# Patient Record
Sex: MALE | Race: WHITE
[De-identification: names, ages, dates, MRNs, and addresses within clinical notes are randomized per-mention and may not be internally consistent; named-entity substitution may affect disease eponyms.]

---

## 2017-01-26 ENCOUNTER — PRIOR ORIGINAL RECORDS (OUTPATIENT)
Dept: OTHER | Age: 67
End: 2017-01-26

## 2017-03-06 ENCOUNTER — HOSPITAL ENCOUNTER (OUTPATIENT)
Dept: CV DIAGNOSTICS | Facility: HOSPITAL | Age: 67
Discharge: HOME | End: 2017-03-06
Attending: INTERNAL MEDICINE

## 2017-03-06 DIAGNOSIS — I71.2 THORACIC AORTIC ANEURYSM WITHOUT RUPTURE (HCC): ICD-10-CM

## 2017-03-06 DIAGNOSIS — I25.10 ATHEROSCLEROSIS OF NATIVE CORONARY ARTERY OF NATIVE HEART, ANGINA PRESENCE UNSPECIFIED: ICD-10-CM

## 2017-03-09 ENCOUNTER — PRIOR ORIGINAL RECORDS (OUTPATIENT)
Dept: OTHER | Age: 67
End: 2017-03-09

## 2017-03-10 ENCOUNTER — PRIOR ORIGINAL RECORDS (OUTPATIENT)
Dept: OTHER | Age: 67
End: 2017-03-10

## 2017-06-23 ENCOUNTER — PRIOR ORIGINAL RECORDS (OUTPATIENT)
Dept: OTHER | Age: 67
End: 2017-06-23

## 2017-09-29 ENCOUNTER — MYAURORA ACCOUNT LINK (OUTPATIENT)
Dept: OTHER | Age: 67
End: 2017-09-29

## 2017-09-29 ENCOUNTER — HOSPITAL ENCOUNTER (OUTPATIENT)
Dept: CV DIAGNOSTICS | Facility: HOSPITAL | Age: 67
Discharge: HOME | End: 2017-09-29
Attending: INTERNAL MEDICINE

## 2017-09-29 DIAGNOSIS — I71.2 THORACIC AORTIC ANEURYSM WITHOUT RUPTURE (HCC): ICD-10-CM

## 2018-01-25 ENCOUNTER — HOSPITAL ENCOUNTER (OUTPATIENT)
Dept: LAB | Facility: HOSPITAL | Age: 68
Discharge: HOME | End: 2018-01-25
Attending: INTERNAL MEDICINE
Payer: COMMERCIAL

## 2018-01-25 ENCOUNTER — PRIOR ORIGINAL RECORDS (OUTPATIENT)
Dept: OTHER | Age: 68
End: 2018-01-25

## 2018-01-25 LAB
ALBUMIN SERPL-MCNC: 3.5 G/DL (ref 3.5–4.8)
ALP LIVER SERPL-CCNC: 92 U/L (ref 45–117)
ALT SERPL-CCNC: 38 U/L (ref 17–63)
AST SERPL-CCNC: 37 U/L (ref 15–41)
BILIRUB SERPL-MCNC: 0.6 MG/DL (ref 0.1–2)
BUN BLD-MCNC: 9 MG/DL (ref 8–20)
CALCIUM BLD-MCNC: 9.2 MG/DL (ref 8.3–10.3)
CHLORIDE: 107 MMOL/L (ref 101–111)
CHOLEST SMN-MCNC: 116 MG/DL (ref ?–200)
CO2: 25 MMOL/L (ref 22–32)
CREAT BLD-MCNC: 0.8 MG/DL (ref 0.7–1.3)
GLUCOSE BLD-MCNC: 97 MG/DL (ref 70–99)
HDLC SERPL-MCNC: 59 MG/DL (ref 45–?)
HDLC SERPL: 1.97 {RATIO} (ref ?–4.97)
LDLC SERPL CALC-MCNC: 41 MG/DL (ref ?–130)
M PROTEIN MFR SERPL ELPH: 7.8 G/DL (ref 6.1–8.3)
NONHDLC SERPL-MCNC: 57 MG/DL (ref ?–130)
POTASSIUM SERPL-SCNC: 4 MMOL/L (ref 3.6–5.1)
SODIUM SERPL-SCNC: 139 MMOL/L (ref 136–144)
TRIGL SERPL-MCNC: 82 MG/DL (ref ?–150)
VLDLC SERPL CALC-MCNC: 16 MG/DL (ref 5–40)

## 2018-01-25 PROCEDURE — 36415 COLL VENOUS BLD VENIPUNCTURE: CPT | Performed by: INTERNAL MEDICINE

## 2018-01-25 PROCEDURE — 80053 COMPREHEN METABOLIC PANEL: CPT | Performed by: INTERNAL MEDICINE

## 2018-01-25 PROCEDURE — 80061 LIPID PANEL: CPT | Performed by: INTERNAL MEDICINE

## 2018-05-03 LAB
ALBUMIN: 3.5 G/DL
ALKALINE PHOSPHATATE(ALK PHOS): 92 IU/L
BILIRUBIN TOTAL: 0.6 MG/DL
BUN: 9 MG/DL
CALCIUM: 9.2 MG/DL
CHLORIDE: 107 MEQ/L
CHOLESTEROL, TOTAL: 116 MG/DL
CREATININE, SERUM: 0.8 MG/DL
GLUCOSE: 97 MG/DL
HDL CHOLESTEROL: 59 MG/DL
LDL CHOLESTEROL: 41 MG/DL
POTASSIUM, SERUM: 4 MEQ/L
PROTEIN, TOTAL: 7.8 G/DL
SGOT (AST): 37 IU/L
SGPT (ALT): 38 IU/L
SODIUM: 139 MEQ/L
TRIGLYCERIDES: 82 MG/DL

## 2018-06-22 ENCOUNTER — MYAURORA ACCOUNT LINK (OUTPATIENT)
Dept: OTHER | Age: 68
End: 2018-06-22

## 2018-06-22 ENCOUNTER — PRIOR ORIGINAL RECORDS (OUTPATIENT)
Dept: OTHER | Age: 68
End: 2018-06-22

## 2018-08-01 ENCOUNTER — PRIOR ORIGINAL RECORDS (OUTPATIENT)
Dept: OTHER | Age: 68
End: 2018-08-01

## 2018-08-21 ENCOUNTER — PRIOR ORIGINAL RECORDS (OUTPATIENT)
Dept: OTHER | Age: 68
End: 2018-08-21

## 2018-09-13 ENCOUNTER — HOSPITAL ENCOUNTER (OUTPATIENT)
Dept: CV DIAGNOSTICS | Facility: HOSPITAL | Age: 68
Discharge: HOME | End: 2018-09-13
Attending: INTERNAL MEDICINE

## 2018-09-13 ENCOUNTER — MYAURORA ACCOUNT LINK (OUTPATIENT)
Dept: OTHER | Age: 68
End: 2018-09-13

## 2018-09-13 DIAGNOSIS — R94.31 ABNORMAL EKG: ICD-10-CM

## 2018-09-13 DIAGNOSIS — I71.2 THORACIC AORTIC ANEURYSM WITHOUT RUPTURE (HCC): ICD-10-CM

## 2018-09-14 ENCOUNTER — PRIOR ORIGINAL RECORDS (OUTPATIENT)
Dept: OTHER | Age: 68
End: 2018-09-14

## 2018-10-11 ENCOUNTER — PRIOR ORIGINAL RECORDS (OUTPATIENT)
Dept: OTHER | Age: 68
End: 2018-10-11

## 2018-10-11 ENCOUNTER — HOSPITAL ENCOUNTER (OUTPATIENT)
Dept: LAB | Facility: HOSPITAL | Age: 68
Discharge: HOME | End: 2018-10-11
Attending: INTERNAL MEDICINE
Payer: COMMERCIAL

## 2018-10-11 ENCOUNTER — MYAURORA ACCOUNT LINK (OUTPATIENT)
Dept: OTHER | Age: 68
End: 2018-10-11

## 2018-10-11 PROCEDURE — 36415 COLL VENOUS BLD VENIPUNCTURE: CPT | Performed by: INTERNAL MEDICINE

## 2018-10-11 PROCEDURE — 80053 COMPREHEN METABOLIC PANEL: CPT | Performed by: INTERNAL MEDICINE

## 2018-10-11 PROCEDURE — 80061 LIPID PANEL: CPT | Performed by: INTERNAL MEDICINE

## 2018-10-12 ENCOUNTER — PRIOR ORIGINAL RECORDS (OUTPATIENT)
Dept: OTHER | Age: 68
End: 2018-10-12

## 2018-10-12 LAB
ALKALINE PHOSPHATATE(ALK PHOS): 77 IU/L
BILIRUBIN TOTAL: 0.8 MG/DL
BUN: 9 MG/DL
CALCIUM: 9.5 MG/DL
CHLORIDE: 105 MEQ/L
CHOLESTEROL, TOTAL: 111 MG/DL
CREATININE, SERUM: 0.86 MG/DL
GLUCOSE: 94 MG/DL
HDL CHOLESTEROL: 58 MG/DL
LDL CHOLESTEROL: 32 MG/DL
POTASSIUM, SERUM: 4 MEQ/L
PROTEIN, TOTAL: 7.8 G/DL
SGOT (AST): 62 IU/L
SGPT (ALT): 56 IU/L
SODIUM: 138 MEQ/L
TRIGLYCERIDES: 104 MG/DL

## 2018-11-08 ENCOUNTER — PRIOR ORIGINAL RECORDS (OUTPATIENT)
Dept: OTHER | Age: 68
End: 2018-11-08

## 2018-11-20 ENCOUNTER — PRIOR ORIGINAL RECORDS (OUTPATIENT)
Dept: OTHER | Age: 68
End: 2018-11-20

## 2018-11-30 ENCOUNTER — PRIOR ORIGINAL RECORDS (OUTPATIENT)
Dept: OTHER | Age: 68
End: 2018-11-30

## 2018-12-03 ENCOUNTER — HOSPITAL ENCOUNTER (OUTPATIENT)
Dept: CT IMAGING | Facility: HOSPITAL | Age: 68
Discharge: HOME | End: 2018-12-03
Attending: INTERNAL MEDICINE
Payer: COMMERCIAL

## 2018-12-03 DIAGNOSIS — I71.2: ICD-10-CM

## 2018-12-03 PROCEDURE — 71275 CT ANGIOGRAPHY CHEST: CPT | Performed by: INTERNAL MEDICINE

## 2018-12-03 PROCEDURE — 82565 ASSAY OF CREATININE: CPT

## 2018-12-04 ENCOUNTER — PRIOR ORIGINAL RECORDS (OUTPATIENT)
Dept: OTHER | Age: 68
End: 2018-12-04

## 2018-12-13 ENCOUNTER — PRIOR ORIGINAL RECORDS (OUTPATIENT)
Dept: OTHER | Age: 68
End: 2018-12-13

## 2019-02-07 PROCEDURE — 88305 TISSUE EXAM BY PATHOLOGIST: CPT | Performed by: FAMILY MEDICINE

## 2019-02-28 VITALS
HEART RATE: 72 BPM | BODY MASS INDEX: 37.25 KG/M2 | WEIGHT: 275 LBS | DIASTOLIC BLOOD PRESSURE: 86 MMHG | HEIGHT: 72 IN | SYSTOLIC BLOOD PRESSURE: 140 MMHG

## 2019-02-28 VITALS
HEART RATE: 80 BPM | SYSTOLIC BLOOD PRESSURE: 126 MMHG | WEIGHT: 276 LBS | BODY MASS INDEX: 38.64 KG/M2 | DIASTOLIC BLOOD PRESSURE: 72 MMHG | HEIGHT: 71 IN

## 2019-02-28 VITALS
WEIGHT: 281 LBS | SYSTOLIC BLOOD PRESSURE: 138 MMHG | DIASTOLIC BLOOD PRESSURE: 83 MMHG | HEIGHT: 71 IN | HEART RATE: 84 BPM | BODY MASS INDEX: 39.34 KG/M2

## 2019-02-28 VITALS
WEIGHT: 278 LBS | SYSTOLIC BLOOD PRESSURE: 136 MMHG | HEART RATE: 84 BPM | HEIGHT: 71 IN | DIASTOLIC BLOOD PRESSURE: 60 MMHG | BODY MASS INDEX: 38.92 KG/M2

## 2019-03-01 VITALS
BODY MASS INDEX: 37.79 KG/M2 | DIASTOLIC BLOOD PRESSURE: 70 MMHG | HEIGHT: 72 IN | HEART RATE: 80 BPM | WEIGHT: 279 LBS | SYSTOLIC BLOOD PRESSURE: 138 MMHG

## 2019-03-11 ENCOUNTER — TELEPHONE (OUTPATIENT)
Dept: CARDIOLOGY | Age: 69
End: 2019-03-11

## 2019-03-12 ENCOUNTER — TELEPHONE (OUTPATIENT)
Dept: CARDIOLOGY | Age: 69
End: 2019-03-12

## 2019-03-18 RX ORDER — OLMESARTAN MEDOXOMIL 20 MG/1
TABLET ORAL
COMMUNITY
Start: 2018-07-17 | End: 2019-04-12 | Stop reason: ALTCHOICE

## 2019-03-18 RX ORDER — LANOLIN ALCOHOL/MO/W.PET/CERES
CREAM (GRAM) TOPICAL
COMMUNITY
Start: 2016-06-23

## 2019-03-18 RX ORDER — ATORVASTATIN CALCIUM 20 MG/1
TABLET, FILM COATED ORAL
COMMUNITY
Start: 2019-02-06 | End: 2019-08-17 | Stop reason: SDUPTHER

## 2019-03-18 RX ORDER — METOPROLOL SUCCINATE 50 MG/1
TABLET, EXTENDED RELEASE ORAL
COMMUNITY
Start: 2018-11-30 | End: 2019-05-31 | Stop reason: SDUPTHER

## 2019-04-11 ENCOUNTER — OFFICE VISIT (OUTPATIENT)
Dept: CARDIOLOGY | Age: 69
End: 2019-04-11

## 2019-04-11 VITALS
HEIGHT: 71 IN | DIASTOLIC BLOOD PRESSURE: 66 MMHG | WEIGHT: 298 LBS | HEART RATE: 76 BPM | SYSTOLIC BLOOD PRESSURE: 132 MMHG | BODY MASS INDEX: 41.72 KG/M2

## 2019-04-11 DIAGNOSIS — I25.10 ASHD (ARTERIOSCLEROTIC HEART DISEASE): ICD-10-CM

## 2019-04-11 DIAGNOSIS — I10 BENIGN HYPERTENSION WITHOUT CHF: ICD-10-CM

## 2019-04-11 DIAGNOSIS — R94.31 ABNORMAL EKG: ICD-10-CM

## 2019-04-11 DIAGNOSIS — I25.10 CORONARY ARTERY CALCIFICATION SEEN ON CT SCAN: ICD-10-CM

## 2019-04-11 DIAGNOSIS — R94.39 ABNORMAL CARDIOVASCULAR STRESS TEST: ICD-10-CM

## 2019-04-11 DIAGNOSIS — G47.33 OBSTRUCTIVE SLEEP APNEA SYNDROME: ICD-10-CM

## 2019-04-11 DIAGNOSIS — I71.20 THORACIC AORTIC ANEURYSM WITHOUT RUPTURE (CMD): Primary | ICD-10-CM

## 2019-04-11 PROCEDURE — 3075F SYST BP GE 130 - 139MM HG: CPT | Performed by: INTERNAL MEDICINE

## 2019-04-11 PROCEDURE — 3078F DIAST BP <80 MM HG: CPT | Performed by: INTERNAL MEDICINE

## 2019-04-11 PROCEDURE — 99214 OFFICE O/P EST MOD 30 MIN: CPT | Performed by: INTERNAL MEDICINE

## 2019-04-11 ASSESSMENT — ENCOUNTER SYMPTOMS
WEIGHT LOSS: 0
HEMATOCHEZIA: 0
CHILLS: 0
HEMOPTYSIS: 0
COUGH: 0
SUSPICIOUS LESIONS: 0
ALLERGIC/IMMUNOLOGIC COMMENTS: NO NEW FOOD ALLERGIES
BRUISES/BLEEDS EASILY: 0
WEIGHT GAIN: 0
FEVER: 0

## 2019-04-11 ASSESSMENT — PATIENT HEALTH QUESTIONNAIRE - PHQ9
SUM OF ALL RESPONSES TO PHQ9 QUESTIONS 1 AND 2: 0
2. FEELING DOWN, DEPRESSED OR HOPELESS: NOT AT ALL
SUM OF ALL RESPONSES TO PHQ9 QUESTIONS 1 AND 2: 0
1. LITTLE INTEREST OR PLEASURE IN DOING THINGS: NOT AT ALL

## 2019-04-12 ENCOUNTER — TELEPHONE (OUTPATIENT)
Dept: CARDIOLOGY | Age: 69
End: 2019-04-12

## 2019-04-12 RX ORDER — IRBESARTAN 150 MG/1
150 TABLET ORAL DAILY
Qty: 90 TABLET | Refills: 1 | Status: SHIPPED | OUTPATIENT
Start: 2019-04-12 | End: 2019-08-16 | Stop reason: SDUPTHER

## 2019-04-12 RX ORDER — IRBESARTAN 150 MG/1
150 TABLET ORAL NIGHTLY
COMMUNITY
End: 2019-04-12 | Stop reason: SDUPTHER

## 2019-05-31 RX ORDER — METOPROLOL SUCCINATE 50 MG/1
TABLET, EXTENDED RELEASE ORAL
Qty: 180 TABLET | Refills: 1 | Status: SHIPPED | OUTPATIENT
Start: 2019-05-31 | End: 2019-11-24 | Stop reason: SDUPTHER

## 2019-08-19 RX ORDER — IRBESARTAN 150 MG/1
150 TABLET ORAL DAILY
Qty: 90 TABLET | Refills: 2 | Status: SHIPPED | OUTPATIENT
Start: 2019-08-19 | End: 2020-03-31

## 2019-08-19 RX ORDER — ATORVASTATIN CALCIUM 20 MG/1
TABLET, FILM COATED ORAL
Qty: 90 TABLET | Refills: 0 | Status: SHIPPED | OUTPATIENT
Start: 2019-08-19 | End: 2019-11-13 | Stop reason: SDUPTHER

## 2019-10-03 DIAGNOSIS — G47.33 OBSTRUCTIVE SLEEP APNEA SYNDROME: Primary | ICD-10-CM

## 2019-10-03 DIAGNOSIS — I10 BENIGN HYPERTENSION WITHOUT CHF: ICD-10-CM

## 2019-10-03 DIAGNOSIS — R94.31 ABNORMAL EKG: ICD-10-CM

## 2019-10-03 DIAGNOSIS — R94.39 ABNORMAL CARDIOVASCULAR STRESS TEST: ICD-10-CM

## 2019-10-03 DIAGNOSIS — I25.10 CORONARY ARTERY CALCIFICATION SEEN ON CT SCAN: ICD-10-CM

## 2019-10-03 DIAGNOSIS — I25.10 ASHD (ARTERIOSCLEROTIC HEART DISEASE): ICD-10-CM

## 2019-10-03 DIAGNOSIS — I71.20 THORACIC AORTIC ANEURYSM WITHOUT RUPTURE (CMD): ICD-10-CM

## 2019-10-04 RX ORDER — IRBESARTAN 150 MG/1
150 TABLET ORAL DAILY
Qty: 90 TABLET | Refills: 1 | Status: SHIPPED | OUTPATIENT
Start: 2019-10-04 | End: 2019-12-25 | Stop reason: CLARIF

## 2019-10-07 ENCOUNTER — HOSPITAL ENCOUNTER (OUTPATIENT)
Dept: CV DIAGNOSTICS | Facility: HOSPITAL | Age: 69
Discharge: HOME | End: 2019-10-07
Attending: INTERNAL MEDICINE
Payer: COMMERCIAL

## 2019-10-07 DIAGNOSIS — I10 BENIGN HYPERTENSION: ICD-10-CM

## 2019-10-07 DIAGNOSIS — I25.10 CORONARY ARTERY CALCIFICATION SEEN ON CT SCAN: ICD-10-CM

## 2019-10-07 DIAGNOSIS — R94.31 ABNORMAL EKG: ICD-10-CM

## 2019-10-07 DIAGNOSIS — G47.33 OBSTRUCTIVE SLEEP APNEA SYNDROME: ICD-10-CM

## 2019-10-07 DIAGNOSIS — R94.39 ABNORMAL CARDIOVASCULAR STRESS TEST: ICD-10-CM

## 2019-10-07 DIAGNOSIS — I25.10 ASHD (ARTERIOSCLEROTIC HEART DISEASE): ICD-10-CM

## 2019-10-07 DIAGNOSIS — I71.2 THORACIC AORTIC ANEURYSM WITHOUT RUPTURE (HCC): ICD-10-CM

## 2019-10-07 PROCEDURE — 93306 TTE W/DOPPLER COMPLETE: CPT | Performed by: INTERNAL MEDICINE

## 2019-10-08 ENCOUNTER — TELEPHONE (OUTPATIENT)
Dept: CARDIOLOGY | Age: 69
End: 2019-10-08

## 2019-10-08 DIAGNOSIS — R94.31 ABNORMAL EKG: ICD-10-CM

## 2019-10-08 DIAGNOSIS — G47.33 OBSTRUCTIVE SLEEP APNEA SYNDROME: ICD-10-CM

## 2019-10-08 DIAGNOSIS — I10 BENIGN HYPERTENSION WITHOUT CHF: ICD-10-CM

## 2019-10-08 DIAGNOSIS — I25.10 CORONARY ARTERY CALCIFICATION SEEN ON CT SCAN: ICD-10-CM

## 2019-10-08 DIAGNOSIS — I25.10 ASHD (ARTERIOSCLEROTIC HEART DISEASE): ICD-10-CM

## 2019-10-08 DIAGNOSIS — I71.20 THORACIC AORTIC ANEURYSM WITHOUT RUPTURE (CMD): ICD-10-CM

## 2019-10-08 DIAGNOSIS — R94.39 ABNORMAL CARDIOVASCULAR STRESS TEST: ICD-10-CM

## 2019-10-24 ENCOUNTER — APPOINTMENT (OUTPATIENT)
Dept: CARDIOLOGY | Age: 69
End: 2019-10-24

## 2019-11-13 ENCOUNTER — TELEPHONE (OUTPATIENT)
Dept: CARDIOLOGY | Age: 69
End: 2019-11-13

## 2019-11-13 DIAGNOSIS — E78.5 HYPERLIPIDEMIA, UNSPECIFIED HYPERLIPIDEMIA TYPE: Primary | ICD-10-CM

## 2019-11-13 RX ORDER — ATORVASTATIN CALCIUM 20 MG/1
TABLET, FILM COATED ORAL
Qty: 30 TABLET | Refills: 1 | Status: SHIPPED | OUTPATIENT
Start: 2019-11-13 | End: 2020-01-08 | Stop reason: SDUPTHER

## 2019-11-26 ENCOUNTER — TELEPHONE (OUTPATIENT)
Dept: CARDIOLOGY | Age: 69
End: 2019-11-26

## 2019-11-26 RX ORDER — METOPROLOL SUCCINATE 50 MG/1
TABLET, EXTENDED RELEASE ORAL
Qty: 180 TABLET | Refills: 1 | Status: SHIPPED | OUTPATIENT
Start: 2019-11-26 | End: 2020-05-19

## 2019-12-26 ENCOUNTER — HOSPITAL ENCOUNTER (OUTPATIENT)
Dept: LAB | Facility: HOSPITAL | Age: 69
Discharge: HOME | End: 2019-12-26
Attending: INTERNAL MEDICINE
Payer: COMMERCIAL

## 2019-12-26 DIAGNOSIS — M10.9 GOUT ATTACK: ICD-10-CM

## 2019-12-26 LAB
ALBUMIN SERPL-MCNC: 3.7 G/DL (ref 3.4–5)
ALBUMIN/GLOB SERPL: 0.9 {RATIO} (ref 1–2)
ALP LIVER SERPL-CCNC: 101 U/L (ref 45–117)
ALT SERPL-CCNC: 60 U/L (ref 16–61)
ANION GAP SERPL CALC-SCNC: 4 MMOL/L (ref 0–18)
AST SERPL-CCNC: 61 U/L (ref 15–37)
BILIRUB SERPL-MCNC: 0.6 MG/DL (ref 0.1–2)
BUN BLD-MCNC: 14 MG/DL (ref 7–18)
BUN/CREAT SERPL: 15.9 (ref 10–20)
CALCIUM BLD-MCNC: 8.7 MG/DL (ref 8.5–10.1)
CHLORIDE SERPL-SCNC: 111 MMOL/L (ref 98–112)
CHOLEST SERPL-MCNC: 136 MG/DL
CHOLEST SMN-MCNC: 136 MG/DL (ref ?–200)
CHOLEST/HDLC SERPL: NORMAL {RATIO}
CO2 SERPL-SCNC: 27 MMOL/L (ref 21–32)
CREAT BLD-MCNC: 0.88 MG/DL (ref 0.7–1.3)
GLOBULIN PLAS-MCNC: 4.2 G/DL (ref 2.8–4.4)
GLUCOSE BLD-MCNC: 108 MG/DL (ref 70–99)
HDLC SERPL-MCNC: 65 MG/DL
HDLC SERPL-MCNC: 65 MG/DL (ref 40–59)
LDLC SERPL CALC-MCNC: 50 MG/DL
LDLC SERPL CALC-MCNC: 50 MG/DL (ref ?–100)
LENGTH OF FAST TIME PATIENT: NORMAL H
M PROTEIN MFR SERPL ELPH: 7.9 G/DL (ref 6.4–8.2)
NONHDLC SERPL-MCNC: 71 MG/DL
NONHDLC SERPL-MCNC: 71 MG/DL (ref ?–130)
OSMOLALITY SERPL CALC.SUM OF ELEC: 295 MOSM/KG (ref 275–295)
PATIENT FASTING Y/N/NP: YES
PATIENT FASTING Y/N/NP: YES
POTASSIUM SERPL-SCNC: 4.4 MMOL/L (ref 3.5–5.1)
SODIUM SERPL-SCNC: 142 MMOL/L (ref 136–145)
TRIGL SERPL-MCNC: 106 MG/DL
TRIGL SERPL-MCNC: 106 MG/DL (ref 30–149)
VLDLC SERPL CALC-MCNC: 21 MG/DL (ref 0–30)
VLDLC SERPL CALC-MCNC: NORMAL MG/DL

## 2019-12-26 PROCEDURE — 80061 LIPID PANEL: CPT | Performed by: INTERNAL MEDICINE

## 2019-12-26 PROCEDURE — 80053 COMPREHEN METABOLIC PANEL: CPT

## 2019-12-26 PROCEDURE — 36415 COLL VENOUS BLD VENIPUNCTURE: CPT | Performed by: INTERNAL MEDICINE

## 2019-12-26 SDOH — HEALTH STABILITY: MENTAL HEALTH: HOW OFTEN DO YOU HAVE A DRINK CONTAINING ALCOHOL?: NEVER

## 2019-12-27 ENCOUNTER — OFFICE VISIT (OUTPATIENT)
Dept: CARDIOLOGY | Age: 69
End: 2019-12-27

## 2019-12-27 VITALS
HEIGHT: 71 IN | HEART RATE: 76 BPM | BODY MASS INDEX: 42 KG/M2 | WEIGHT: 300 LBS | DIASTOLIC BLOOD PRESSURE: 80 MMHG | SYSTOLIC BLOOD PRESSURE: 142 MMHG

## 2019-12-27 DIAGNOSIS — I71.20 THORACIC AORTIC ANEURYSM WITHOUT RUPTURE (CMD): ICD-10-CM

## 2019-12-27 DIAGNOSIS — I10 BENIGN HYPERTENSION WITHOUT CHF: ICD-10-CM

## 2019-12-27 DIAGNOSIS — I25.10 ASHD (ARTERIOSCLEROTIC HEART DISEASE): Primary | ICD-10-CM

## 2019-12-27 DIAGNOSIS — E78.00 PURE HYPERCHOLESTEROLEMIA: ICD-10-CM

## 2019-12-27 PROCEDURE — 3077F SYST BP >= 140 MM HG: CPT | Performed by: NURSE PRACTITIONER

## 2019-12-27 PROCEDURE — 99204 OFFICE O/P NEW MOD 45 MIN: CPT | Performed by: NURSE PRACTITIONER

## 2019-12-27 PROCEDURE — 3079F DIAST BP 80-89 MM HG: CPT | Performed by: NURSE PRACTITIONER

## 2019-12-27 ASSESSMENT — ENCOUNTER SYMPTOMS
FEVER: 0
HEMOPTYSIS: 0
CHILLS: 0
SUSPICIOUS LESIONS: 0
COUGH: 0
BRUISES/BLEEDS EASILY: 0
ALLERGIC/IMMUNOLOGIC COMMENTS: NO NEW FOOD ALLERGIES
WEIGHT GAIN: 0
HEMATOCHEZIA: 0
WEIGHT LOSS: 0

## 2020-01-06 ENCOUNTER — TELEPHONE (OUTPATIENT)
Dept: CARDIOLOGY | Age: 70
End: 2020-01-06

## 2020-01-08 RX ORDER — ATORVASTATIN CALCIUM 20 MG/1
TABLET, FILM COATED ORAL
Qty: 90 TABLET | Refills: 3 | Status: SHIPPED | OUTPATIENT
Start: 2020-01-08 | End: 2021-01-12 | Stop reason: SDUPTHER

## 2020-01-15 ENCOUNTER — CLINICAL ABSTRACT (OUTPATIENT)
Dept: CARDIOLOGY | Age: 70
End: 2020-01-15

## 2020-01-15 DIAGNOSIS — E78.5 HYPERLIPIDEMIA, UNSPECIFIED HYPERLIPIDEMIA TYPE: ICD-10-CM

## 2020-02-18 PROCEDURE — 88305 TISSUE EXAM BY PATHOLOGIST: CPT | Performed by: INTERNAL MEDICINE

## 2020-03-31 RX ORDER — IRBESARTAN 150 MG/1
150 TABLET ORAL DAILY
Qty: 90 TABLET | Refills: 3 | Status: SHIPPED | OUTPATIENT
Start: 2020-03-31

## 2020-04-29 ENCOUNTER — TELEPHONE (OUTPATIENT)
Dept: CARDIOLOGY | Age: 70
End: 2020-04-29

## 2020-05-19 RX ORDER — METOPROLOL SUCCINATE 50 MG/1
TABLET, EXTENDED RELEASE ORAL
Qty: 180 TABLET | Refills: 1 | Status: SHIPPED | OUTPATIENT
Start: 2020-05-19 | End: 2020-11-20

## 2020-06-23 ENCOUNTER — APPOINTMENT (OUTPATIENT)
Dept: CARDIOLOGY | Age: 70
End: 2020-06-23

## 2020-06-30 ENCOUNTER — OFFICE VISIT (OUTPATIENT)
Dept: CARDIOLOGY | Age: 70
End: 2020-06-30

## 2020-06-30 DIAGNOSIS — I47.10 SVT (SUPRAVENTRICULAR TACHYCARDIA): Primary | ICD-10-CM

## 2020-06-30 PROCEDURE — 99213 OFFICE O/P EST LOW 20 MIN: CPT | Performed by: INTERNAL MEDICINE

## 2020-11-20 RX ORDER — METOPROLOL SUCCINATE 50 MG/1
TABLET, EXTENDED RELEASE ORAL
Qty: 180 TABLET | Refills: 1 | Status: SHIPPED | OUTPATIENT
Start: 2020-11-20 | End: 2021-05-14

## 2020-12-02 ENCOUNTER — TELEPHONE (OUTPATIENT)
Dept: CARDIOLOGY | Age: 70
End: 2020-12-02

## 2020-12-02 DIAGNOSIS — R94.39 ABNORMAL CARDIOVASCULAR STRESS TEST: ICD-10-CM

## 2020-12-02 DIAGNOSIS — I25.10 ASHD (ARTERIOSCLEROTIC HEART DISEASE): ICD-10-CM

## 2020-12-02 DIAGNOSIS — I71.20 THORACIC AORTIC ANEURYSM WITHOUT RUPTURE (CMD): ICD-10-CM

## 2020-12-02 DIAGNOSIS — I25.10 CORONARY ARTERY CALCIFICATION SEEN ON CT SCAN: Primary | ICD-10-CM

## 2020-12-02 DIAGNOSIS — R94.31 ABNORMAL EKG: ICD-10-CM

## 2020-12-10 ENCOUNTER — HOSPITAL ENCOUNTER (OUTPATIENT)
Dept: CV DIAGNOSTICS | Facility: HOSPITAL | Age: 70
Discharge: HOME | End: 2020-12-10
Attending: INTERNAL MEDICINE
Payer: COMMERCIAL

## 2020-12-10 DIAGNOSIS — I25.10 ASHD (ARTERIOSCLEROTIC HEART DISEASE): ICD-10-CM

## 2020-12-10 DIAGNOSIS — I71.2 THORACIC AORTIC ANEURYSM WITHOUT RUPTURE (HCC): ICD-10-CM

## 2020-12-10 DIAGNOSIS — I25.10 CORONARY ARTERY CALCIFICATION SEEN ON CT SCAN: ICD-10-CM

## 2020-12-10 DIAGNOSIS — R94.31 ABNORMAL EKG: ICD-10-CM

## 2020-12-10 DIAGNOSIS — I10 BENIGN HYPERTENSION: ICD-10-CM

## 2020-12-10 PROCEDURE — 93306 TTE W/DOPPLER COMPLETE: CPT | Performed by: INTERNAL MEDICINE

## 2020-12-16 ENCOUNTER — OFFICE VISIT (OUTPATIENT)
Dept: CARDIOLOGY | Age: 70
End: 2020-12-16

## 2020-12-16 VITALS
HEART RATE: 93 BPM | SYSTOLIC BLOOD PRESSURE: 160 MMHG | BODY MASS INDEX: 41.31 KG/M2 | WEIGHT: 292 LBS | DIASTOLIC BLOOD PRESSURE: 70 MMHG

## 2020-12-16 DIAGNOSIS — G47.33 OBSTRUCTIVE SLEEP APNEA SYNDROME: ICD-10-CM

## 2020-12-16 DIAGNOSIS — R94.39 ABNORMAL CARDIOVASCULAR STRESS TEST: ICD-10-CM

## 2020-12-16 DIAGNOSIS — E78.00 PURE HYPERCHOLESTEROLEMIA: ICD-10-CM

## 2020-12-16 DIAGNOSIS — I25.10 ASHD (ARTERIOSCLEROTIC HEART DISEASE): ICD-10-CM

## 2020-12-16 DIAGNOSIS — I71.20 THORACIC AORTIC ANEURYSM WITHOUT RUPTURE (CMD): ICD-10-CM

## 2020-12-16 DIAGNOSIS — I25.10 CORONARY ARTERY CALCIFICATION SEEN ON CT SCAN: ICD-10-CM

## 2020-12-16 DIAGNOSIS — I10 BENIGN HYPERTENSION WITHOUT CHF: Primary | ICD-10-CM

## 2020-12-16 PROCEDURE — 3077F SYST BP >= 140 MM HG: CPT | Performed by: INTERNAL MEDICINE

## 2020-12-16 PROCEDURE — 99214 OFFICE O/P EST MOD 30 MIN: CPT | Performed by: INTERNAL MEDICINE

## 2020-12-16 PROCEDURE — 3078F DIAST BP <80 MM HG: CPT | Performed by: INTERNAL MEDICINE

## 2020-12-16 RX ORDER — HYDROCHLOROTHIAZIDE 25 MG/1
25 TABLET ORAL DAILY
Qty: 90 TABLET | Refills: 3 | Status: SHIPPED | OUTPATIENT
Start: 2020-12-16

## 2020-12-16 SDOH — HEALTH STABILITY: PHYSICAL HEALTH: ON AVERAGE, HOW MANY DAYS PER WEEK DO YOU ENGAGE IN MODERATE TO STRENUOUS EXERCISE (LIKE A BRISK WALK)?: 0 DAYS

## 2020-12-16 ASSESSMENT — ENCOUNTER SYMPTOMS
HEMOPTYSIS: 0
COUGH: 0
SUSPICIOUS LESIONS: 0
HEMATOCHEZIA: 0
CHILLS: 0
WEIGHT GAIN: 0
ALLERGIC/IMMUNOLOGIC COMMENTS: NO NEW FOOD ALLERGIES
FEVER: 0
WEIGHT LOSS: 0
BRUISES/BLEEDS EASILY: 0

## 2020-12-16 ASSESSMENT — PATIENT HEALTH QUESTIONNAIRE - PHQ9
SUM OF ALL RESPONSES TO PHQ9 QUESTIONS 1 AND 2: 0
1. LITTLE INTEREST OR PLEASURE IN DOING THINGS: NOT AT ALL
CLINICAL INTERPRETATION OF PHQ9 SCORE: NO FURTHER SCREENING NEEDED
SUM OF ALL RESPONSES TO PHQ9 QUESTIONS 1 AND 2: 0
CLINICAL INTERPRETATION OF PHQ2 SCORE: NO FURTHER SCREENING NEEDED
2. FEELING DOWN, DEPRESSED OR HOPELESS: NOT AT ALL

## 2020-12-17 ENCOUNTER — TELEPHONE (OUTPATIENT)
Dept: CARDIOLOGY | Age: 70
End: 2020-12-17

## 2021-01-12 ENCOUNTER — TELEPHONE (OUTPATIENT)
Dept: CARDIOLOGY | Age: 71
End: 2021-01-12

## 2021-01-12 RX ORDER — ATORVASTATIN CALCIUM 20 MG/1
20 TABLET, FILM COATED ORAL AT BEDTIME
Qty: 30 TABLET | Refills: 0 | Status: SHIPPED | OUTPATIENT
Start: 2021-01-12 | End: 2021-02-12 | Stop reason: SDUPTHER

## 2021-01-25 ENCOUNTER — HOSPITAL ENCOUNTER (OUTPATIENT)
Dept: CV DIAGNOSTICS | Facility: HOSPITAL | Age: 71
Discharge: HOME | End: 2021-01-25
Attending: INTERNAL MEDICINE
Payer: COMMERCIAL

## 2021-01-25 DIAGNOSIS — E78.00 PURE HYPERCHOLESTEROLEMIA: ICD-10-CM

## 2021-01-25 DIAGNOSIS — I10 BENIGN HYPERTENSION WITHOUT CHF: ICD-10-CM

## 2021-01-25 DIAGNOSIS — I25.10 CORONARY ARTERY CALCIFICATION SEEN ON CT SCAN: ICD-10-CM

## 2021-01-25 DIAGNOSIS — I25.10 ASHD (ARTERIOSCLEROTIC HEART DISEASE): ICD-10-CM

## 2021-01-25 DIAGNOSIS — I71.2 THORACIC AORTIC ANEURYSM WITHOUT RUPTURE (HCC): ICD-10-CM

## 2021-01-25 PROCEDURE — 78452 HT MUSCLE IMAGE SPECT MULT: CPT | Performed by: INTERNAL MEDICINE

## 2021-01-25 PROCEDURE — 93018 CV STRESS TEST I&R ONLY: CPT | Performed by: INTERNAL MEDICINE

## 2021-01-25 PROCEDURE — 93017 CV STRESS TEST TRACING ONLY: CPT | Performed by: INTERNAL MEDICINE

## 2021-01-27 ENCOUNTER — TELEPHONE (OUTPATIENT)
Dept: CARDIOLOGY | Age: 71
End: 2021-01-27

## 2021-02-12 RX ORDER — ATORVASTATIN CALCIUM 20 MG/1
20 TABLET, FILM COATED ORAL AT BEDTIME
Qty: 30 TABLET | Refills: 0 | Status: SHIPPED | OUTPATIENT
Start: 2021-02-12 | End: 2021-03-12 | Stop reason: SDUPTHER

## 2021-03-12 ENCOUNTER — TELEPHONE (OUTPATIENT)
Dept: CARDIOLOGY | Age: 71
End: 2021-03-12

## 2021-03-12 DIAGNOSIS — E78.00 PURE HYPERCHOLESTEROLEMIA: Primary | ICD-10-CM

## 2021-03-12 RX ORDER — ATORVASTATIN CALCIUM 20 MG/1
20 TABLET, FILM COATED ORAL AT BEDTIME
Qty: 90 TABLET | Refills: 0 | Status: SHIPPED | OUTPATIENT
Start: 2021-03-12 | End: 2021-06-10 | Stop reason: SDUPTHER

## 2021-04-05 ENCOUNTER — HOSPITAL ENCOUNTER (OUTPATIENT)
Dept: LAB | Facility: HOSPITAL | Age: 71
Discharge: HOME | End: 2021-04-05
Attending: INTERNAL MEDICINE
Payer: COMMERCIAL

## 2021-04-05 PROCEDURE — 80061 LIPID PANEL: CPT | Performed by: INTERNAL MEDICINE

## 2021-04-05 PROCEDURE — 80053 COMPREHEN METABOLIC PANEL: CPT | Performed by: INTERNAL MEDICINE

## 2021-04-05 PROCEDURE — 36415 COLL VENOUS BLD VENIPUNCTURE: CPT | Performed by: INTERNAL MEDICINE

## 2021-04-11 DIAGNOSIS — Z23 NEED FOR VACCINATION: ICD-10-CM

## 2021-05-14 RX ORDER — METOPROLOL SUCCINATE 50 MG/1
TABLET, EXTENDED RELEASE ORAL
Qty: 180 TABLET | Refills: 1 | OUTPATIENT
Start: 2021-05-14

## 2021-05-14 RX ORDER — METOPROLOL SUCCINATE 50 MG/1
TABLET, EXTENDED RELEASE ORAL
Qty: 180 TABLET | Refills: 1 | Status: SHIPPED | OUTPATIENT
Start: 2021-05-14

## 2021-05-17 PROBLEM — E11.9 CONTROLLED TYPE 2 DIABETES MELLITUS WITHOUT COMPLICATION, WITH LONG-TERM CURRENT USE OF INSULIN (HCC): Status: ACTIVE | Noted: 2021-05-17

## 2021-05-17 PROBLEM — I27.20 PULMONARY HYPERTENSION (HCC): Status: ACTIVE | Noted: 2021-05-17

## 2021-05-17 PROBLEM — E66.01 OBESITY, MORBID (HCC): Status: ACTIVE | Noted: 2021-05-17

## 2021-05-17 PROBLEM — I77.810 DILATED AORTIC ROOT (HCC): Status: ACTIVE | Noted: 2021-05-17

## 2021-05-17 PROBLEM — I25.10 CORONARY ARTERY DISEASE INVOLVING NATIVE CORONARY ARTERY OF NATIVE HEART WITHOUT ANGINA PECTORIS: Status: ACTIVE | Noted: 2021-05-17

## 2021-05-17 PROBLEM — Z79.4 CONTROLLED TYPE 2 DIABETES MELLITUS WITHOUT COMPLICATION, WITH LONG-TERM CURRENT USE OF INSULIN (HCC): Status: ACTIVE | Noted: 2021-05-17

## 2021-05-17 PROBLEM — I77.810 DILATED AORTIC ROOT: Status: ACTIVE | Noted: 2021-05-17

## 2021-06-10 ENCOUNTER — TELEPHONE (OUTPATIENT)
Dept: CARDIOLOGY | Age: 71
End: 2021-06-10

## 2021-06-10 RX ORDER — ATORVASTATIN CALCIUM 20 MG/1
20 TABLET, FILM COATED ORAL AT BEDTIME
Qty: 90 TABLET | Refills: 3 | Status: SHIPPED | OUTPATIENT
Start: 2021-06-10

## 2021-07-01 PROBLEM — I10 ESSENTIAL HYPERTENSION: Status: ACTIVE | Noted: 2021-07-01

## 2021-11-17 ENCOUNTER — APPOINTMENT (OUTPATIENT)
Dept: CARDIOLOGY | Age: 71
End: 2021-11-17

## 2021-12-17 ENCOUNTER — ORDER TRANSCRIPTION (OUTPATIENT)
Dept: ADMINISTRATIVE | Facility: HOSPITAL | Age: 71
End: 2021-12-17

## 2021-12-17 DIAGNOSIS — I25.10 ASHD (ARTERIOSCLEROTIC HEART DISEASE): ICD-10-CM

## 2021-12-17 DIAGNOSIS — I25.10 CORONARY ARTERY CALCIFICATION SEEN ON CT SCAN: ICD-10-CM

## 2021-12-17 DIAGNOSIS — I71.2 ANEURYSM, THORACIC AORTIC (HCC): Primary | ICD-10-CM

## 2022-01-11 NOTE — IMAGING NOTE
Left message with call back number regarding  instructions  in preparation for gated study procedure:  Check-in in the [address] [place] side out-patient registration at [facility] 61 caffeine, decaf drink  and chocolate 12 hours prior.   Spoke to [employer] from [place] [phone]

## 2022-01-13 ENCOUNTER — HOSPITAL ENCOUNTER (OUTPATIENT)
Dept: CT IMAGING | Facility: HOSPITAL | Age: 72
Discharge: HOME | End: 2022-01-13
Attending: INTERNAL MEDICINE
Payer: COMMERCIAL

## 2022-01-13 ENCOUNTER — LAB ENCOUNTER (OUTPATIENT)
Dept: LAB | Facility: HOSPITAL | Age: 72
End: 2022-01-13
Attending: INTERNAL MEDICINE
Payer: COMMERCIAL

## 2022-01-13 VITALS — DIASTOLIC BLOOD PRESSURE: 57 MMHG | HEART RATE: 73 BPM | SYSTOLIC BLOOD PRESSURE: 116 MMHG

## 2022-01-13 DIAGNOSIS — I25.10 ASHD (ARTERIOSCLEROTIC HEART DISEASE): ICD-10-CM

## 2022-01-13 DIAGNOSIS — I25.10 CORONARY ARTERY CALCIFICATION SEEN ON CT SCAN: ICD-10-CM

## 2022-01-13 DIAGNOSIS — I71.2 ANEURYSM, THORACIC AORTIC (HCC): ICD-10-CM

## 2022-01-13 DIAGNOSIS — E78.00 PURE HYPERCHOLESTEROLEMIA: ICD-10-CM

## 2022-01-13 DIAGNOSIS — I10 ESSENTIAL HYPERTENSION, MALIGNANT: Primary | ICD-10-CM

## 2022-01-13 LAB — CREAT BLD-MCNC: 1.1 MG/DL

## 2022-01-13 PROCEDURE — 82565 ASSAY OF CREATININE: CPT

## 2022-01-13 PROCEDURE — 71275 CT ANGIOGRAPHY CHEST: CPT | Performed by: INTERNAL MEDICINE

## 2022-01-13 RX ORDER — METOPROLOL TARTRATE 50 MG/1
TABLET, FILM COATED ORAL
Status: DISPENSED
Start: 2022-01-13 | End: 2022-01-13

## 2022-01-13 NOTE — IMAGING NOTE
Pt scheduled for CT gated thoracic  Denies any allergies to contrast.  Scanned in CT room # 4  HR 73 during scan at 0846  0.9 NS 68 ml given  IV contrast 80 ml given  Tolerated exam well. Instructed to drink water.

## 2022-07-20 ENCOUNTER — APPOINTMENT (OUTPATIENT)
Dept: GENERAL RADIOLOGY | Age: 72
End: 2022-07-20
Attending: STUDENT IN AN ORGANIZED HEALTH CARE EDUCATION/TRAINING PROGRAM
Payer: COMMERCIAL

## 2022-07-20 ENCOUNTER — HOSPITAL ENCOUNTER (OUTPATIENT)
Age: 72
Discharge: HOME | End: 2022-07-20
Attending: STUDENT IN AN ORGANIZED HEALTH CARE EDUCATION/TRAINING PROGRAM
Payer: COMMERCIAL

## 2022-07-20 VITALS
HEART RATE: 97 BPM | DIASTOLIC BLOOD PRESSURE: 58 MMHG | BODY MASS INDEX: 45.1 KG/M2 | RESPIRATION RATE: 20 BRPM | TEMPERATURE: 98 F | HEIGHT: 70 IN | SYSTOLIC BLOOD PRESSURE: 107 MMHG | OXYGEN SATURATION: 93 % | WEIGHT: 315 LBS

## 2022-07-20 DIAGNOSIS — J01.90 ACUTE SINUSITIS, RECURRENCE NOT SPECIFIED, UNSPECIFIED LOCATION: Primary | ICD-10-CM

## 2022-07-20 LAB — SARS-COV-2 RNA RESP QL NAA+PROBE: NOT DETECTED

## 2022-07-20 PROCEDURE — 99204 OFFICE O/P NEW MOD 45 MIN: CPT

## 2022-07-20 PROCEDURE — 71046 X-RAY EXAM CHEST 2 VIEWS: CPT | Performed by: STUDENT IN AN ORGANIZED HEALTH CARE EDUCATION/TRAINING PROGRAM

## 2022-07-20 PROCEDURE — 99214 OFFICE O/P EST MOD 30 MIN: CPT

## 2022-07-20 RX ORDER — AMOXICILLIN AND CLAVULANATE POTASSIUM 875; 125 MG/1; MG/1
1 TABLET, FILM COATED ORAL 2 TIMES DAILY
Qty: 14 TABLET | Refills: 0 | Status: SHIPPED | OUTPATIENT
Start: 2022-07-20 | End: 2022-07-27

## 2022-07-20 RX ORDER — FLUTICASONE PROPIONATE 50 MCG
2 SPRAY, SUSPENSION (ML) NASAL DAILY
Qty: 16 G | Refills: 0 | Status: SHIPPED | OUTPATIENT
Start: 2022-07-20 | End: 2022-08-19

## 2023-02-14 ENCOUNTER — HOSPITAL ENCOUNTER (OUTPATIENT)
Dept: LAB | Facility: HOSPITAL | Age: 73
Discharge: HOME | End: 2023-02-14
Attending: INTERNAL MEDICINE
Payer: MEDICARE

## 2023-02-14 LAB
ALBUMIN SERPL-MCNC: 3.4 G/DL (ref 3.4–5)
ALBUMIN/GLOB SERPL: 0.8 {RATIO} (ref 1–2)
ALP LIVER SERPL-CCNC: 97 U/L
ALT SERPL-CCNC: 27 U/L
ANION GAP SERPL CALC-SCNC: 5 MMOL/L (ref 0–18)
AST SERPL-CCNC: 38 U/L (ref 15–37)
BILIRUB SERPL-MCNC: 0.9 MG/DL (ref 0.1–2)
BUN BLD-MCNC: 17 MG/DL (ref 7–18)
CALCIUM BLD-MCNC: 9.4 MG/DL (ref 8.5–10.1)
CHLORIDE SERPL-SCNC: 107 MMOL/L (ref 98–112)
CHOLEST SERPL-MCNC: 117 MG/DL (ref ?–200)
CO2 SERPL-SCNC: 28 MMOL/L (ref 21–32)
CREAT BLD-MCNC: 0.92 MG/DL
FASTING PATIENT LIPID ANSWER: YES
FASTING STATUS PATIENT QL REPORTED: YES
GFR SERPLBLD BASED ON 1.73 SQ M-ARVRAT: 89 ML/MIN/1.73M2 (ref 60–?)
GLOBULIN PLAS-MCNC: 4.2 G/DL (ref 2.8–4.4)
GLUCOSE BLD-MCNC: 116 MG/DL (ref 70–99)
HDLC SERPL-MCNC: 75 MG/DL (ref 40–59)
LDLC SERPL CALC-MCNC: 27 MG/DL (ref ?–100)
NONHDLC SERPL-MCNC: 42 MG/DL (ref ?–130)
OSMOLALITY SERPL CALC.SUM OF ELEC: 293 MOSM/KG (ref 275–295)
POTASSIUM SERPL-SCNC: 4.3 MMOL/L (ref 3.5–5.1)
PROT SERPL-MCNC: 7.6 G/DL (ref 6.4–8.2)
SODIUM SERPL-SCNC: 140 MMOL/L (ref 136–145)
TRIGL SERPL-MCNC: 76 MG/DL (ref 30–149)
VLDLC SERPL CALC-MCNC: 10 MG/DL (ref 0–30)

## 2023-02-14 PROCEDURE — 80053 COMPREHEN METABOLIC PANEL: CPT | Performed by: INTERNAL MEDICINE

## 2023-02-14 PROCEDURE — 80061 LIPID PANEL: CPT | Performed by: INTERNAL MEDICINE

## 2023-02-14 PROCEDURE — 36415 COLL VENOUS BLD VENIPUNCTURE: CPT | Performed by: INTERNAL MEDICINE

## 2023-03-14 ENCOUNTER — HOSPITAL ENCOUNTER (OUTPATIENT)
Dept: LAB | Facility: HOSPITAL | Age: 73
Discharge: HOME | End: 2023-03-14
Attending: INTERNAL MEDICINE
Payer: MEDICARE

## 2023-03-14 LAB
ANION GAP SERPL CALC-SCNC: 6 MMOL/L (ref 0–18)
BUN BLD-MCNC: 43 MG/DL (ref 7–18)
CALCIUM BLD-MCNC: 9.6 MG/DL (ref 8.5–10.1)
CHLORIDE SERPL-SCNC: 107 MMOL/L (ref 98–112)
CO2 SERPL-SCNC: 27 MMOL/L (ref 21–32)
CREAT BLD-MCNC: 1.37 MG/DL
GFR SERPLBLD BASED ON 1.73 SQ M-ARVRAT: 55 ML/MIN/1.73M2 (ref 60–?)
GLUCOSE BLD-MCNC: 177 MG/DL (ref 70–99)
OSMOLALITY SERPL CALC.SUM OF ELEC: 305 MOSM/KG (ref 275–295)
POTASSIUM SERPL-SCNC: 4.8 MMOL/L (ref 3.5–5.1)
SODIUM SERPL-SCNC: 140 MMOL/L (ref 136–145)

## 2023-03-14 PROCEDURE — 36415 COLL VENOUS BLD VENIPUNCTURE: CPT | Performed by: INTERNAL MEDICINE

## 2023-03-14 PROCEDURE — 80048 BASIC METABOLIC PNL TOTAL CA: CPT | Performed by: INTERNAL MEDICINE

## 2023-06-28 ENCOUNTER — HOSPITAL ENCOUNTER (OUTPATIENT)
Dept: LAB | Facility: HOSPITAL | Age: 73
End: 2023-06-28
Attending: INTERNAL MEDICINE | Admitting: INTERNAL MEDICINE
Payer: MEDICARE

## 2023-06-28 LAB
ANION GAP SERPL CALC-SCNC: 6 MMOL/L (ref 0–18)
BUN BLD-MCNC: 27 MG/DL (ref 7–18)
CALCIUM BLD-MCNC: 10.1 MG/DL (ref 8.5–10.1)
CHLORIDE SERPL-SCNC: 109 MMOL/L (ref 98–112)
CO2 SERPL-SCNC: 24 MMOL/L (ref 21–32)
CREAT BLD-MCNC: 1.21 MG/DL
FASTING STATUS PATIENT QL REPORTED: YES
GFR SERPLBLD BASED ON 1.73 SQ M-ARVRAT: 64 ML/MIN/1.73M2 (ref 60–?)
GLUCOSE BLD-MCNC: 161 MG/DL (ref 70–99)
NT-PROBNP SERPL-MCNC: 104 PG/ML (ref ?–125)
OSMOLALITY SERPL CALC.SUM OF ELEC: 297 MOSM/KG (ref 275–295)
POTASSIUM SERPL-SCNC: 4.4 MMOL/L (ref 3.5–5.1)
SODIUM SERPL-SCNC: 139 MMOL/L (ref 136–145)
TSI SER-ACNC: 1.11 MIU/ML (ref 0.36–3.74)

## 2023-06-28 PROCEDURE — 80048 BASIC METABOLIC PNL TOTAL CA: CPT | Performed by: INTERNAL MEDICINE

## 2023-06-28 PROCEDURE — 36415 COLL VENOUS BLD VENIPUNCTURE: CPT | Performed by: INTERNAL MEDICINE

## 2023-06-28 PROCEDURE — 83880 ASSAY OF NATRIURETIC PEPTIDE: CPT | Performed by: INTERNAL MEDICINE

## 2023-06-28 PROCEDURE — 84443 ASSAY THYROID STIM HORMONE: CPT | Performed by: INTERNAL MEDICINE

## 2023-07-06 RX ORDER — FUROSEMIDE 40 MG/1
40 TABLET ORAL 2 TIMES DAILY
COMMUNITY

## 2023-07-06 RX ORDER — POTASSIUM CHLORIDE 1500 MG/1
40 TABLET, EXTENDED RELEASE ORAL DAILY
COMMUNITY

## 2023-07-06 RX ORDER — ALLOPURINOL 300 MG/1
300 TABLET ORAL DAILY
COMMUNITY

## 2023-07-06 RX ORDER — IRBESARTAN 150 MG/1
150 TABLET ORAL DAILY
COMMUNITY

## 2023-07-06 RX ORDER — ORAL SEMAGLUTIDE 3 MG/1
TABLET ORAL DAILY
COMMUNITY

## 2023-07-17 NOTE — OPERATIVE REPORT
PATIENT NAME: Jhon Amezquita  MRN: [id number]  DATE OF OPERATION:  7/18/23  REFERRING PHYSICIAN: Dr. Gabe Figueroa  Medications:  MAC  Date of last COLONOSCOPY:  2020  PREOPERATIVE DIAGNOSIS                personal history of colon polyps (2015 and 2020)  POSTOPERATIVE DIAGNOSIS:   Normal colon exam  PROCEDURE PERFORMED:               Colonoscopy   Endoscopist:                                             Annabelle Johnson MD     PROCEDURE AND FINDINGS: The patient was placed into the left lateral decubitus after informed consent was obtained. All questions were answered. An updated history and physical were performed and an ASA score of 3 was assigned. Informed consent was obtained prior to the procedure, with review of possible complications including bleeding, infection, and missed polyps and or cancer. MAC sedation was administered. A digital rectal exam was performed and was normal.  The video adult colonoscope was passed from anus to cecum. The ileocecal valve, appendiceal orfice, hepatic and splenic flexures were all well visualized. The bowel preparation was rated on the Aronchick bowel prep scale as 2. (1 - excellent > 95% mucosa seen; 2 - good - clear liquid up to 25% of the mucosa, 90% mucosa seen;  3 - fair - semisolid stool not suctioned, but 90% of the mucosa seen; 4 - poor - semisolid stool not suctioned, but < 90% mucosa seen; 5 - inadequate - repeat prep needed) . Findings included no polyps or diverticulosis. On retroflexion of the scope in the anorectum, normal internal hemorrhoids were noted. The scope withdrawal from cecum to anus was 14 minutes. RECOMMENDATIONS         1. The patient will be provided with a written summary of today's endoscopic findings. 2. Repeat colonoscopy in 5 years.     Annabelle Johnson MD, Gastroenterologist  Cc: Dr. Gabe Figueroa

## 2023-07-18 ENCOUNTER — ANESTHESIA EVENT (OUTPATIENT)
Dept: ENDOSCOPY | Facility: HOSPITAL | Age: 73
End: 2023-07-18
Payer: MEDICARE

## 2023-07-18 ENCOUNTER — HOSPITAL ENCOUNTER (OUTPATIENT)
Facility: HOSPITAL | Age: 73
Setting detail: HOSPITAL OUTPATIENT SURGERY
Discharge: HOME | End: 2023-07-18
Attending: INTERNAL MEDICINE | Admitting: INTERNAL MEDICINE
Payer: MEDICARE

## 2023-07-18 ENCOUNTER — ANESTHESIA (OUTPATIENT)
Dept: ENDOSCOPY | Facility: HOSPITAL | Age: 73
End: 2023-07-18
Payer: MEDICARE

## 2023-07-18 VITALS
SYSTOLIC BLOOD PRESSURE: 97 MMHG | TEMPERATURE: 98 F | HEIGHT: 70 IN | WEIGHT: 300 LBS | RESPIRATION RATE: 18 BRPM | HEART RATE: 75 BPM | OXYGEN SATURATION: 91 % | BODY MASS INDEX: 42.95 KG/M2 | DIASTOLIC BLOOD PRESSURE: 48 MMHG

## 2023-07-18 LAB — GLUCOSE BLD-MCNC: 140 MG/DL (ref 70–99)

## 2023-07-18 PROCEDURE — 0DJD8ZZ INSPECTION OF LOWER INTESTINAL TRACT, VIA NATURAL OR ARTIFICIAL OPENING ENDOSCOPIC: ICD-10-PCS | Performed by: INTERNAL MEDICINE

## 2023-07-18 PROCEDURE — 82962 GLUCOSE BLOOD TEST: CPT

## 2023-07-18 RX ORDER — LABETALOL HYDROCHLORIDE 5 MG/ML
5 INJECTION, SOLUTION INTRAVENOUS EVERY 5 MIN PRN
Status: DISCONTINUED | OUTPATIENT
Start: 2023-07-18 | End: 2023-07-18

## 2023-07-18 RX ORDER — NICOTINE POLACRILEX 4 MG
30 LOZENGE BUCCAL
Status: DISCONTINUED | OUTPATIENT
Start: 2023-07-18 | End: 2023-07-18

## 2023-07-18 RX ORDER — ONDANSETRON 2 MG/ML
4 INJECTION INTRAMUSCULAR; INTRAVENOUS AS NEEDED
Status: DISCONTINUED | OUTPATIENT
Start: 2023-07-18 | End: 2023-07-18

## 2023-07-18 RX ORDER — NALOXONE HYDROCHLORIDE 0.4 MG/ML
80 INJECTION, SOLUTION INTRAMUSCULAR; INTRAVENOUS; SUBCUTANEOUS AS NEEDED
Status: DISCONTINUED | OUTPATIENT
Start: 2023-07-18 | End: 2023-07-18

## 2023-07-18 RX ORDER — METOCLOPRAMIDE HYDROCHLORIDE 5 MG/ML
10 INJECTION INTRAMUSCULAR; INTRAVENOUS AS NEEDED
Status: DISCONTINUED | OUTPATIENT
Start: 2023-07-18 | End: 2023-07-18

## 2023-07-18 RX ORDER — LIDOCAINE HYDROCHLORIDE 10 MG/ML
INJECTION, SOLUTION EPIDURAL; INFILTRATION; INTRACAUDAL; PERINEURAL AS NEEDED
Status: DISCONTINUED | OUTPATIENT
Start: 2023-07-18 | End: 2023-07-18 | Stop reason: SURG

## 2023-07-18 RX ORDER — SODIUM CHLORIDE, SODIUM LACTATE, POTASSIUM CHLORIDE, CALCIUM CHLORIDE 600; 310; 30; 20 MG/100ML; MG/100ML; MG/100ML; MG/100ML
INJECTION, SOLUTION INTRAVENOUS CONTINUOUS
Status: DISCONTINUED | OUTPATIENT
Start: 2023-07-18 | End: 2023-07-18

## 2023-07-18 RX ORDER — NICOTINE POLACRILEX 4 MG
15 LOZENGE BUCCAL
Status: DISCONTINUED | OUTPATIENT
Start: 2023-07-18 | End: 2023-07-18

## 2023-07-18 RX ORDER — PHENYLEPHRINE HCL 10 MG/ML
VIAL (ML) INJECTION AS NEEDED
Status: DISCONTINUED | OUTPATIENT
Start: 2023-07-18 | End: 2023-07-18 | Stop reason: SURG

## 2023-07-18 RX ORDER — DEXTROSE MONOHYDRATE 25 G/50ML
50 INJECTION, SOLUTION INTRAVENOUS
Status: DISCONTINUED | OUTPATIENT
Start: 2023-07-18 | End: 2023-07-18

## 2023-07-18 RX ADMIN — SODIUM CHLORIDE, SODIUM LACTATE, POTASSIUM CHLORIDE, CALCIUM CHLORIDE: 600; 310; 30; 20 INJECTION, SOLUTION INTRAVENOUS at 09:34:00

## 2023-07-18 RX ADMIN — PHENYLEPHRINE HCL 200 MCG: 10 MG/ML VIAL (ML) INJECTION at 09:58:00

## 2023-07-18 RX ADMIN — PHENYLEPHRINE HCL 200 MCG: 10 MG/ML VIAL (ML) INJECTION at 10:02:00

## 2023-07-18 RX ADMIN — PHENYLEPHRINE HCL 100 MCG: 10 MG/ML VIAL (ML) INJECTION at 09:48:00

## 2023-07-18 RX ADMIN — LIDOCAINE HYDROCHLORIDE 50 MG: 10 INJECTION, SOLUTION EPIDURAL; INFILTRATION; INTRACAUDAL; PERINEURAL at 09:42:00

## 2023-07-18 RX ADMIN — PHENYLEPHRINE HCL 200 MCG: 10 MG/ML VIAL (ML) INJECTION at 09:52:00

## 2023-07-18 NOTE — BRIEF OP NOTE
Pre-Operative Diagnosis: FAMILY HISTORY OF MALIGNANT NEOPLASM OF GASTROINTESTINAL TRACT; HISTORY OF COLON POLYPS     Post-Operative Diagnosis: normal colonoscopy      Procedure Performed:   COLONOSCOPY    Surgeon(s) and Role:     * Alverto Cary MD - Primary    Assistant(s):        Surgical Findings: see above     Specimen: none     Estimated Blood Loss: No data recorded    Dictation Number:  none    Jared Narayan MD  7/18/2023  10:14 AM

## 2023-07-18 NOTE — ANESTHESIA POSTPROCEDURE EVALUATION
[facility]    Vijay Rojas Patient Status:  Hospital Outpatient Surgery   Age/Gender 72year old male MRN [id number]   Location [address] [facility] 28 Attending Rick Paredes MD   Hosp Day # 0 PCP Halie Hernandez MD       Anesthesia Post-op Note    COLONOSCOPY    Procedure Summary       Date: 07/18/23 Room / Location: [address] [place] [address] ENDOSCOPY 03 / 1404 [facility] ENDOSCOPY    Anesthesia Start: 0933 Anesthesia Stop: 4424    Procedure: COLONOSCOPY Diagnosis: (normal colonoscopy)    Surgeons: Rick Paredes MD Anesthesiologist: Debbie Hardy MD    Anesthesia Type: MAC ASA Status: 3            Anesthesia Type: MAC    Vitals Value Taken Time   /58 07/18/23 1012   Temp  07/18/23 1016   Pulse 78 07/18/23 1016   Resp 18 07/18/23 1016   SpO2 91 % 07/18/23 1016   Vitals shown include unvalidated device data. Patient Location: Endoscopy    Anesthesia Type: MAC    Airway Patency: patent    Postop Pain Control: adequate    Mental Status: preanesthetic baseline    Nausea/Vomiting: none    Cardiopulmonary/Hydration status: stable euvolemic    Complications: no apparent anesthesia related complications    Postop vital signs: stable    Dental Exam: Unchanged from Preop    Patient to be discharged home when criteria met.

## 2023-07-18 NOTE — DISCHARGE INSTRUCTIONS
ENDOSCOPY DISCHARGE INSTRUCTIONS    Procedure Performed:   Colonoscopy  Endoscopist: No name on file  FINDINGS:   Normal colon    MEDICATIONS:  You may resume all other medications today    DIET:  General    BIOPSIES:  No biopsies were taken      ADDITIONAL RECOMMENDATIONS:  Repeat the colonoscopy in 5 years. Activity for remainder of today:    REST TODAY  DO NOT drive or operate heavy machinery  DO NOT drink any alcoholic beverages  DO NOT sign any legal documents or make any important decisions    After your procedure(s): It is not unusual to feel bloated or gassy . Passing gas and belching is encouraged. Lying on your left side with your knees flexed may relieve the discomfort. A hot pack to the abdomen may also help. After your gastroscopy (upper endoscopy): You may experience a slight sore throat which will subside. Throat lozenges or salt water gargle can be used.     FOLLOW-UP:  Contact the office at [phone] for follow-up appointment if needed or if you develop any of the following:    Severe abdominal pain/discomfort       Excessive bleeding or black tarry stool  Difficulty breathing or swallowing        Persistent nausea,vomiting, or a fever above 100 degrees or chills

## 2023-08-01 ENCOUNTER — HOSPITAL ENCOUNTER (OUTPATIENT)
Dept: LAB | Facility: HOSPITAL | Age: 73
Discharge: HOME | End: 2023-08-01
Attending: INTERNAL MEDICINE
Payer: MEDICARE

## 2023-08-01 LAB
ANION GAP SERPL CALC-SCNC: 7 MMOL/L (ref 0–18)
BUN BLD-MCNC: 43 MG/DL (ref 7–18)
CALCIUM BLD-MCNC: 9.8 MG/DL (ref 8.5–10.1)
CHLORIDE SERPL-SCNC: 103 MMOL/L (ref 98–112)
CO2 SERPL-SCNC: 26 MMOL/L (ref 21–32)
CREAT BLD-MCNC: 1.54 MG/DL
EGFRCR SERPLBLD CKD-EPI 2021: 48 ML/MIN/1.73M2 (ref 60–?)
FASTING STATUS PATIENT QL REPORTED: NO
GLUCOSE BLD-MCNC: 129 MG/DL (ref 70–99)
OSMOLALITY SERPL CALC.SUM OF ELEC: 295 MOSM/KG (ref 275–295)
POTASSIUM SERPL-SCNC: 4.6 MMOL/L (ref 3.5–5.1)
SODIUM SERPL-SCNC: 136 MMOL/L (ref 136–145)

## 2023-08-01 PROCEDURE — 36415 COLL VENOUS BLD VENIPUNCTURE: CPT | Performed by: INTERNAL MEDICINE

## 2023-08-01 PROCEDURE — 80048 BASIC METABOLIC PNL TOTAL CA: CPT | Performed by: INTERNAL MEDICINE

## 2023-09-20 ENCOUNTER — HOSPITAL ENCOUNTER (OUTPATIENT)
Dept: LAB | Facility: HOSPITAL | Age: 73
Discharge: HOME | End: 2023-09-20
Attending: INTERNAL MEDICINE
Payer: MEDICARE

## 2023-09-20 LAB
ANION GAP SERPL CALC-SCNC: 7 MMOL/L (ref 0–18)
BUN BLD-MCNC: 43 MG/DL (ref 7–18)
CALCIUM BLD-MCNC: 10.3 MG/DL (ref 8.5–10.1)
CHLORIDE SERPL-SCNC: 103 MMOL/L (ref 98–112)
CHOLEST SERPL-MCNC: 127 MG/DL (ref ?–200)
CO2 SERPL-SCNC: 26 MMOL/L (ref 21–32)
CREAT BLD-MCNC: 1.72 MG/DL
EGFRCR SERPLBLD CKD-EPI 2021: 42 ML/MIN/1.73M2 (ref 60–?)
FASTING PATIENT LIPID ANSWER: YES
FASTING STATUS PATIENT QL REPORTED: YES
GLUCOSE BLD-MCNC: 156 MG/DL (ref 70–99)
HDLC SERPL-MCNC: 84 MG/DL (ref 40–59)
LDLC SERPL CALC-MCNC: 26 MG/DL (ref ?–100)
NONHDLC SERPL-MCNC: 43 MG/DL (ref ?–130)
OSMOLALITY SERPL CALC.SUM OF ELEC: 296 MOSM/KG (ref 275–295)
POTASSIUM SERPL-SCNC: 4.7 MMOL/L (ref 3.5–5.1)
SODIUM SERPL-SCNC: 136 MMOL/L (ref 136–145)
TRIGL SERPL-MCNC: 91 MG/DL (ref 30–149)
VLDLC SERPL CALC-MCNC: 12 MG/DL (ref 0–30)

## 2023-09-20 PROCEDURE — 80061 LIPID PANEL: CPT | Performed by: INTERNAL MEDICINE

## 2023-09-20 PROCEDURE — 36415 COLL VENOUS BLD VENIPUNCTURE: CPT | Performed by: INTERNAL MEDICINE

## 2023-09-20 PROCEDURE — 80048 BASIC METABOLIC PNL TOTAL CA: CPT | Performed by: INTERNAL MEDICINE

## 2023-10-17 ENCOUNTER — HOSPITAL ENCOUNTER (OUTPATIENT)
Dept: LAB | Facility: HOSPITAL | Age: 73
Discharge: HOME | End: 2023-10-17
Attending: INTERNAL MEDICINE
Payer: MEDICARE

## 2023-10-17 LAB
ANION GAP SERPL CALC-SCNC: 5 MMOL/L (ref 0–18)
BUN BLD-MCNC: 35 MG/DL (ref 7–18)
CALCIUM BLD-MCNC: 10 MG/DL (ref 8.5–10.1)
CHLORIDE SERPL-SCNC: 105 MMOL/L (ref 98–112)
CO2 SERPL-SCNC: 25 MMOL/L (ref 21–32)
CREAT BLD-MCNC: 1.74 MG/DL
EGFRCR SERPLBLD CKD-EPI 2021: 41 ML/MIN/1.73M2 (ref 60–?)
FASTING STATUS PATIENT QL REPORTED: YES
GLUCOSE BLD-MCNC: 146 MG/DL (ref 70–99)
OSMOLALITY SERPL CALC.SUM OF ELEC: 291 MOSM/KG (ref 275–295)
POTASSIUM SERPL-SCNC: 4.4 MMOL/L (ref 3.5–5.1)
SODIUM SERPL-SCNC: 135 MMOL/L (ref 136–145)

## 2023-10-17 PROCEDURE — 80048 BASIC METABOLIC PNL TOTAL CA: CPT | Performed by: INTERNAL MEDICINE

## 2023-10-17 PROCEDURE — 36415 COLL VENOUS BLD VENIPUNCTURE: CPT | Performed by: INTERNAL MEDICINE

## 2023-11-01 ENCOUNTER — LAB ENCOUNTER (OUTPATIENT)
Dept: LAB | Age: 73
End: 2023-11-01
Attending: INTERNAL MEDICINE
Payer: MEDICARE

## 2023-11-01 ENCOUNTER — HOSPITAL ENCOUNTER (OUTPATIENT)
Dept: GENERAL RADIOLOGY | Age: 73
Discharge: HOME | End: 2023-11-01
Attending: INTERNAL MEDICINE
Payer: MEDICARE

## 2023-11-01 ENCOUNTER — EKG ENCOUNTER (OUTPATIENT)
Dept: LAB | Age: 73
End: 2023-11-01
Attending: INTERNAL MEDICINE
Payer: MEDICARE

## 2023-11-01 DIAGNOSIS — I50.31 ACUTE DIASTOLIC HEART FAILURE (HCC): ICD-10-CM

## 2023-11-01 DIAGNOSIS — Z01.818 PREOP TESTING: ICD-10-CM

## 2023-11-01 DIAGNOSIS — R94.39 ABNORMAL STRESS TEST: Primary | ICD-10-CM

## 2023-11-01 DIAGNOSIS — R06.09 DOE (DYSPNEA ON EXERTION): ICD-10-CM

## 2023-11-01 DIAGNOSIS — R06.09 DOE (DYSPNEA ON EXERTION): Primary | ICD-10-CM

## 2023-11-01 DIAGNOSIS — I10 HYPERTENSION: ICD-10-CM

## 2023-11-01 DIAGNOSIS — I10 BENIGN HYPERTENSION: ICD-10-CM

## 2023-11-01 DIAGNOSIS — I10 HTN (HYPERTENSION): ICD-10-CM

## 2023-11-01 LAB
ATRIAL RATE: 80 BPM
BASOPHILS # BLD AUTO: 0.07 X10(3) UL (ref 0–0.2)
BASOPHILS NFR BLD AUTO: 0.9 %
EOSINOPHIL # BLD AUTO: 0.26 X10(3) UL (ref 0–0.7)
EOSINOPHIL NFR BLD AUTO: 3.3 %
ERYTHROCYTE [DISTWIDTH] IN BLOOD BY AUTOMATED COUNT: 15.4 %
HCT VFR BLD AUTO: 38.2 %
HGB BLD-MCNC: 12.4 G/DL
IMM GRANULOCYTES # BLD AUTO: 0.04 X10(3) UL (ref 0–1)
IMM GRANULOCYTES NFR BLD: 0.5 %
LYMPHOCYTES # BLD AUTO: 1.29 X10(3) UL (ref 1–4)
LYMPHOCYTES NFR BLD AUTO: 16.5 %
MCH RBC QN AUTO: 35.3 PG (ref 26–34)
MCHC RBC AUTO-ENTMCNC: 32.5 G/DL (ref 31–37)
MCV RBC AUTO: 108.8 FL
MONOCYTES # BLD AUTO: 0.84 X10(3) UL (ref 0.1–1)
MONOCYTES NFR BLD AUTO: 10.7 %
NEUTROPHILS # BLD AUTO: 5.33 X10 (3) UL (ref 1.5–7.7)
NEUTROPHILS # BLD AUTO: 5.33 X10(3) UL (ref 1.5–7.7)
NEUTROPHILS NFR BLD AUTO: 68.1 %
P-R INTERVAL: 208 MS
PLATELET # BLD AUTO: 164 10(3)UL (ref 150–450)
Q-T INTERVAL: 412 MS
QRS DURATION: 140 MS
QTC CALCULATION (BEZET): 475 MS
R AXIS: 41 DEGREES
RBC # BLD AUTO: 3.51 X10(6)UL
T AXIS: -17 DEGREES
VENTRICULAR RATE: 80 BPM
WBC # BLD AUTO: 7.8 X10(3) UL (ref 4–11)

## 2023-11-01 PROCEDURE — 36415 COLL VENOUS BLD VENIPUNCTURE: CPT

## 2023-11-01 PROCEDURE — 93010 ELECTROCARDIOGRAM REPORT: CPT | Performed by: INTERNAL MEDICINE

## 2023-11-01 PROCEDURE — 71046 X-RAY EXAM CHEST 2 VIEWS: CPT | Performed by: INTERNAL MEDICINE

## 2023-11-01 PROCEDURE — 93005 ELECTROCARDIOGRAM TRACING: CPT

## 2023-11-01 PROCEDURE — 85025 COMPLETE CBC W/AUTO DIFF WBC: CPT

## 2023-11-06 RX ORDER — ORAL SEMAGLUTIDE 7 MG/1
1 TABLET ORAL DAILY
COMMUNITY

## 2023-11-06 RX ORDER — TORSEMIDE 10 MG/1
10 TABLET ORAL 2 TIMES DAILY
Status: ON HOLD | COMMUNITY
End: 2023-11-08

## 2023-11-06 NOTE — PAT NURSING NOTE
Per PAT encounter/Astereshart message sent to pt:     PreOp Instructions     You are scheduled for: a Cardiac Procedure     Date of Procedure: 11/08/23 Wednesday     Diet Instructions: Do not eat or drink anything after midnight     Medications: Take Aspirin 81 mg x 4 tablets the day of your procedure;Medications you are allowed to take can be taken with a sip of water the morning of your procedure     Medications to Stop: Hold herbal supplements and vitamins     Other Medications: Do NOT take your Torsemide and Potassium doses on Wednesday 11/8 morning of the procedure. Diabetic Instructions: Do not take morning dose of your diabetic medications (Jardiance, Rybelsus)     Skin Prep: Shower with antibacterial soap using a clean washcloth, prior to procedure     Arrival Time: You will receive a call the afternoon before your procedure after 3 pm on what time you should arrive the day of your procedure    Driving After Procedure: If sedation is given, you WILL NOT be able to drive home. You will need a responsible adult  to drive you home. ;Cannot take uber or cab unless approved by physician     Discharge Teaching: Most people can resume normal activities in 2-3 days; Your nurse will give you specific instructions before discharge; Any questions, please call the physician's office      parking is available starting at 6 am or park in the [facility] at [address] [place] [address] in at the [place] reception desk. Our  will be there to check you in for your procedure. Please bring your insurance cards and ID with you. Please DO NOT respond to this message, the inbasket is not monitored for messages. For any questions, please call the physician's office.

## 2023-11-08 ENCOUNTER — HOSPITAL ENCOUNTER (OUTPATIENT)
Dept: INTERVENTIONAL RADIOLOGY/VASCULAR | Facility: HOSPITAL | Age: 73
Discharge: HOME | End: 2023-11-08
Attending: INTERNAL MEDICINE | Admitting: INTERNAL MEDICINE
Payer: MEDICARE

## 2023-11-08 VITALS
OXYGEN SATURATION: 96 % | HEART RATE: 73 BPM | BODY MASS INDEX: 43.67 KG/M2 | HEIGHT: 70 IN | TEMPERATURE: 97 F | DIASTOLIC BLOOD PRESSURE: 67 MMHG | WEIGHT: 305 LBS | RESPIRATION RATE: 24 BRPM | SYSTOLIC BLOOD PRESSURE: 135 MMHG

## 2023-11-08 DIAGNOSIS — I10 HTN (HYPERTENSION): ICD-10-CM

## 2023-11-08 DIAGNOSIS — I50.31 ACUTE DIASTOLIC HEART FAILURE (HCC): ICD-10-CM

## 2023-11-08 DIAGNOSIS — E66.01 OBESITY, MORBID (HCC): Primary | ICD-10-CM

## 2023-11-08 DIAGNOSIS — R06.09 DOE (DYSPNEA ON EXERTION): ICD-10-CM

## 2023-11-08 LAB
GLUCOSE BLD-MCNC: 124 MG/DL (ref 70–99)
ISTAT ACTIVATED CLOTTING TIME: 185 SECONDS (ref 74–137)
ISTAT ACTIVATED CLOTTING TIME: 227 SECONDS (ref 74–137)

## 2023-11-08 PROCEDURE — 4A023N8 MEASUREMENT OF CARDIAC SAMPLING AND PRESSURE, BILATERAL, PERCUTANEOUS APPROACH: ICD-10-PCS | Performed by: INTERNAL MEDICINE

## 2023-11-08 PROCEDURE — 99153 MOD SED SAME PHYS/QHP EA: CPT | Performed by: INTERNAL MEDICINE

## 2023-11-08 PROCEDURE — 93460 R&L HRT ART/VENTRICLE ANGIO: CPT | Performed by: INTERNAL MEDICINE

## 2023-11-08 PROCEDURE — 85347 COAGULATION TIME ACTIVATED: CPT

## 2023-11-08 PROCEDURE — B211YZZ FLUOROSCOPY OF MULTIPLE CORONARY ARTERIES USING OTHER CONTRAST: ICD-10-PCS | Performed by: INTERNAL MEDICINE

## 2023-11-08 PROCEDURE — 82962 GLUCOSE BLOOD TEST: CPT

## 2023-11-08 PROCEDURE — 99152 MOD SED SAME PHYS/QHP 5/>YRS: CPT | Performed by: INTERNAL MEDICINE

## 2023-11-08 RX ORDER — HEPARIN SODIUM 5000 [USP'U]/ML
INJECTION, SOLUTION INTRAVENOUS; SUBCUTANEOUS
Status: COMPLETED
Start: 2023-11-08 | End: 2023-11-08

## 2023-11-08 RX ORDER — TORSEMIDE 10 MG/1
10 TABLET ORAL DAILY
Qty: 30 TABLET | Refills: 0 | Status: SHIPPED | OUTPATIENT
Start: 2023-11-08

## 2023-11-08 RX ORDER — TORSEMIDE 10 MG/1
10 TABLET ORAL DAILY
Qty: 30 TABLET | Refills: 0 | Status: SHIPPED | OUTPATIENT
Start: 2023-11-08 | End: 2023-11-08

## 2023-11-08 RX ORDER — MIDAZOLAM HYDROCHLORIDE 1 MG/ML
INJECTION INTRAMUSCULAR; INTRAVENOUS
Status: COMPLETED
Start: 2023-11-08 | End: 2023-11-08

## 2023-11-08 RX ORDER — NITROGLYCERIN 20 MG/100ML
INJECTION INTRAVENOUS
Status: COMPLETED
Start: 2023-11-08 | End: 2023-11-08

## 2023-11-08 RX ORDER — VERAPAMIL HYDROCHLORIDE 2.5 MG/ML
INJECTION, SOLUTION INTRAVENOUS
Status: COMPLETED
Start: 2023-11-08 | End: 2023-11-08

## 2023-11-08 RX ORDER — LIDOCAINE HYDROCHLORIDE 10 MG/ML
INJECTION, SOLUTION EPIDURAL; INFILTRATION; INTRACAUDAL; PERINEURAL
Status: COMPLETED
Start: 2023-11-08 | End: 2023-11-08

## 2023-11-08 RX ORDER — POTASSIUM CHLORIDE 1500 MG/1
20 TABLET, EXTENDED RELEASE ORAL DAILY
Qty: 30 TABLET | Refills: 0 | Status: SHIPPED | OUTPATIENT
Start: 2023-11-08

## 2023-11-08 RX ORDER — IODIXANOL 320 MG/ML
100 INJECTION, SOLUTION INTRAVASCULAR
Status: COMPLETED | OUTPATIENT
Start: 2023-11-08 | End: 2023-11-08

## 2023-11-08 RX ORDER — SODIUM CHLORIDE 9 MG/ML
INJECTION, SOLUTION INTRAVENOUS
Status: COMPLETED | OUTPATIENT
Start: 2023-11-08 | End: 2023-11-08

## 2023-11-08 RX ADMIN — SODIUM CHLORIDE: 9 INJECTION, SOLUTION INTRAVENOUS at 13:28:00

## 2023-11-08 RX ADMIN — IODIXANOL 130 ML: 320 INJECTION, SOLUTION INTRAVASCULAR at 16:36:00

## 2023-11-08 NOTE — H&P
[facility]   History and Physical    Luigi Pang Patient Status:  Outpatient    6/15/1950 MRN [id number]   Location [address] [facility] Attending Lexie Rodrigez MD   Hosp Day # 0 PCP Sondra Ratliff MD         Please see scanned recent H&P from [facility] office 2023. The patient is referred from Dr. Vesna Winters. History reviewed and up to date. No changes to H&P. The patient is scheduled for right and left heart catheterization to assess coronary status and also hemodynamics of the heart with dyspnea on exertion. Patient has obesity hypertension hypercholesterolemia calcium score of 904 and pulm hypertension with history of smoking as well as obstructive sleep apnea. Patient has chronic kidney disease       Associated Documents  Scan on 10/24/2023 3:46 PM by Cece Mao RN       Patient was consented. The risks and benefits of the procedure were explained to the patient. 1-2% risk of coronary angiography, possible angioplasty, include, but are not limited to stroke, death, heart attack, coronary dissection requiring emergent CABG, hematoma, bleeding, allergic reaction to medications, vascular damage requiring surgical repair, kidney failure requiring dialysis and others. Patient wishes to proceed. Case was discussed with the patient and the family member and the nursing staff    Savanna Laws M.D., F.A.C.C.   Interventional Cardiology  Advanced Heart Failure  [address] [place] [address]    2023

## 2023-11-09 NOTE — PROCEDURES
[facility]    PATIENT'S NAME: Terry Hines TIARRA   ATTENDING PHYSICIAN: Astrid Corona MD   OPERATING PHYSICIAN: Fallon Desir M.D. PATIENT ACCOUNT#:   [de-identified]    LOCATION:  [address]  MEDICAL RECORD #:   [id number]       YOB: 1950  ADMISSION DATE:       11/08/2023      OPERATION DATE:  11/08/2023    CARDIAC PROCEDURE TRANSCRIPTION      CARDIAC CATHETERIZATION    PREOPERATIVE DIAGNOSIS:    1. Dyspnea on exertion. 2.   Bilateral leg swelling. 3.   Abnormal ultrafast CT scan with high coronary calcium score of 904.  4.   Morbid obesity with BMI of 44.  5.   Obstructive sleep apnea, untreated. 6.   Systemic hypertension. 7.   Diabetes mellitus type 2.  8.   Hypercholesterolemia. 9.   Leg venous insufficiency, bilateral.  10.   Chronic kidney disease with recent increase in creatinine of up to 1.7, on diuretic. POSTOPERATIVE DIAGNOSIS:    1. Dyspnea on exertion. 2.   Bilateral leg swelling. 3.   Abnormal ultrafast CT scan with high coronary calcium score of 904.  4.   Morbid obesity with BMI of 44.  5.   Obstructive sleep apnea, untreated. 6.   Systemic hypertension. 7.   Diabetes mellitus type 2.  8.   Hypercholesterolemia. 9.   Leg venous insufficiency, bilateral.  10.   Chronic kidney disease with recent increase in creatinine of up to 1.7, on diuretic. PROCEDURE PERFORMED:    1. Selective coronary angiography through the right radial artery approach. 2.   Right heart catheterization through the right internal jugular vein approach. SEDATION:  We performed moderate conscious sedation with IV Versed and IV fentanyl. The time of first sedation dose was 3:46 p.m. and procedure ended at 4:51 p.m. Blood pressure, pulse ox, and heart rate were monitored all the time by the nurse and myself, and IV line was checked by the nurse and myself.     PROCEDURE DESCRIPTION:  After written informed consent was obtained from the patient, he was brought to cardiac catheterization laboratory. He was prepped and draped in the usual sterile fashion. Then, 1% lidocaine was used to infiltrate his right side of the neck and micropuncture kit was used to cannulate the right jugular vein. Micropuncture sheath was removed, and a 7-Trinidadian introducer sheath was inserted over the J-wire using modified Seldinger technique. Right heart catheterization was performed using [place]-Arcelia catheter through the right internal jugular vein. We measured right heart pressures and calculated cardiac output using a Nicki equation on room air. We also obtained saturation from pulmonary artery and right atrium and aorta to screen for any intracardiac shunt on room air. There was no need for any vasodilator challenge. Then, our attention was turned to coronary angiography. Coronary angiography was performed through the right radial approach. For this reason, we anesthetized the area of right radial artery at the wrist level. Micropuncture needle was used to obtain access to the right radial artery with no difficulties. A 6-Trinidadian Navneet Gio was advanced to the right radial artery over the wire using modified Seldinger technique. Then, we performed selective coronary angiography through the right radial approach using 6-Trinidadian FR4 diagnostic catheter for right coronary arterial system and 6-Trinidadian FL3.5 catheter for left coronary arterial system. We performed selective coronary angiography over J guidewire Cook 0.035 x 260 cm. The patient with spasming artery despite sedation, and we did not perform left ventriculogram.  Patient had echo Doppler and nuclear stress test with normal LVEF. The hemostasis of the right radial artery was completed with 27 cm vascular wrist band with no difficulties. There was no bleeding or hematoma on the right side of the neck or in the area of the right radial approach.     ACT was 226 second, and right internal jugular vein sheath was secured to the skin to be pulled out later in holding area when ACT is less than 180 seconds. SELECTIVE CORONARY ANGIOGRAPHY FINDINGS:  Left main coronary artery had no disease angiographically. The left main was very short vessel. Mildly calcified LAD artery, had no disease angiographically, but there was a bridging mid LAD segment with kinking of the vessel, and patient was also hypovolemic by right heart catheterization. Major diagonal branches with no disease angiographically. There was FRIEDA 3 in LAD artery. The left circumflex was a dominant system with no disease angiographically. Major high OM branch had no disease angiographically. Left posterolateral branch from dominant circumflex had no disease angiographically. Right coronary artery was nondominant, but good medium-sized vessel with no disease angiographically. 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 RIGHT HEART CATHETERIZATION HEMODYNAMICS:  Right atrial pressure 3 mmHg. RV pressure 29/0/5 mmHg. Pulmonary artery pressure 30/8/19 mmHg. Pulmonary capillary wedge pressure is 7 mmHg. Transpulmonary gradient 12 mmHg. Pulmonary vascular resistance 1.5 Wood units with systemic vascular resistance of 415. Cardiac output 8.1 L/minute with cardiac index of 3.2 L/minute/sq m. Saturations 93% in aorta, 66.3% in pulmonary artery, and 66.4% in right atrium on room air with hemoglobin sampling of 10.6. There was no pressure gradient across the aortic valve and LVEDP was 12 mmHg. IMPRESSION:    1. Normal coronary arteries angiographically. 2.   Dominant circumflex artery. 3.   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   4.   Right heart catheterization was surprisingly unremarkable with normal pulmonary pressures, but in view of mild hypovolemia and if volume status is corrected, he may have at most mild pulmonary hypertension. 5.   Normal transpulmonary gradient, at the upper limit of normal.  6.   Pulmonary vascular resistance at the upper limit of normal.  7.   Low normal biventricular filling pressures. 8.   No intracardiac shunt by quick saturation run on room air. 9.   Low SVR with hypovolemia. 10.   Right ventricle pressure waveform was consistent with sleep apnea. DISCUSSION AND PLAN:    1. Medical management. 2.   I believe the patient's symptoms and clinical picture is related to morbid obesity, which includes leg swelling, venous insufficiency with obstructive sleep apnea. 3.   The patient is hypovolemic with mild leg edema which is most likely related to bilateral venous insufficiency rather than any heart failure by itself. There was normal systolic function and only slow relaxation by Doppler study, which can be also normal variant for his age. 4.   I believe the kidney function got worse because he was treated with a higher dose of torsemide for venous insufficiency induced leg edema and dyspnea on exertion caused by morbid obesity. There was no fluid overload and actually, he is hypovolemic for this reason, I would recommend to lower torsemide dose at least to 10 mg p.o. daily and also lower the potassium supplement and it may be re-evaluated in the clinic by Dr. Kylie Cleaning later. 5.   The patient needs to lose weight and treat sleep apnea. 6.   He may benefit from semaglutide injections and referral to weight loss clinic. 7.    The patient will follow with referring, Dr. Kylie Cleaning. 8.   Repeat BMP after lowering the torsemide dose and KCl dose. I think he has artificially elevated creatinine from overdosing of torsemide, and his picture is related to morbid obesity and venous insufficiency rather than any heart failure.   9.   All was discussed with the patient and his wife in detail.      Dictated By Aysha Cao M.D.  d: 11/08/2023 17:27:46  t: 11/08/2023 20:49:43  Job [id number]  JESSICA/    cc: MD Erin Merchant M.D.

## 2023-11-09 NOTE — PROGRESS NOTES
Pt received s/p [facility] with Dr. Leobardo Litten. Right radial arterial access site closed with vasc band with air in cuff. Site CDI, no bleeding or hematoma present. Right jugular venous sheath in place. Sheath removed after ACT drawn. Manual pressure held until hemostasis achieved. Site CDI. Dr. Leobardo Litten at bedside and spoke with pt and pt's family. Vasc band removed per protocol, site remains unchanged. Recovery complete. Discharge instructions given to pt and pt's wife. IV discontinued, pt taken down to [facility] via wheelchair for discharge. Pt's wife driving pt home.

## 2023-11-17 ENCOUNTER — HOSPITAL ENCOUNTER (OUTPATIENT)
Dept: LAB | Facility: HOSPITAL | Age: 73
Discharge: HOME | End: 2023-11-17
Attending: INTERNAL MEDICINE
Payer: MEDICARE

## 2023-11-17 DIAGNOSIS — I10 HTN (HYPERTENSION): ICD-10-CM

## 2023-11-17 DIAGNOSIS — E66.01 OBESITY, MORBID (HCC): ICD-10-CM

## 2023-11-17 LAB
ANION GAP SERPL CALC-SCNC: 5 MMOL/L (ref 0–18)
BUN BLD-MCNC: 21 MG/DL (ref 9–23)
CALCIUM BLD-MCNC: 9.9 MG/DL (ref 8.5–10.1)
CHLORIDE SERPL-SCNC: 108 MMOL/L (ref 98–112)
CO2 SERPL-SCNC: 26 MMOL/L (ref 21–32)
CREAT BLD-MCNC: 1.22 MG/DL
EGFRCR SERPLBLD CKD-EPI 2021: 63 ML/MIN/1.73M2 (ref 60–?)
FASTING STATUS PATIENT QL REPORTED: NO
GLUCOSE BLD-MCNC: 156 MG/DL (ref 70–99)
OSMOLALITY SERPL CALC.SUM OF ELEC: 294 MOSM/KG (ref 275–295)
POTASSIUM SERPL-SCNC: 4.2 MMOL/L (ref 3.5–5.1)
SODIUM SERPL-SCNC: 139 MMOL/L (ref 136–145)

## 2023-11-17 PROCEDURE — 36415 COLL VENOUS BLD VENIPUNCTURE: CPT | Performed by: INTERNAL MEDICINE

## 2023-11-17 PROCEDURE — 80048 BASIC METABOLIC PNL TOTAL CA: CPT | Performed by: INTERNAL MEDICINE

## 2025-01-06 ENCOUNTER — APPOINTMENT (OUTPATIENT)
Dept: ULTRASOUND IMAGING | Facility: HOSPITAL | Age: 75
End: 2025-01-06
Attending: EMERGENCY MEDICINE
Payer: MEDICARE

## 2025-01-06 ENCOUNTER — HOSPITAL ENCOUNTER (INPATIENT)
Facility: HOSPITAL | Age: 75
LOS: 11 days | Discharge: NURSING, REHAB OR LONG-TERM CARE | End: 2025-01-17
Attending: EMERGENCY MEDICINE | Admitting: INTERNAL MEDICINE
Payer: MEDICARE

## 2025-01-06 ENCOUNTER — APPOINTMENT (OUTPATIENT)
Dept: CT IMAGING | Facility: HOSPITAL | Age: 75
End: 2025-01-06
Attending: INTERNAL MEDICINE
Payer: MEDICARE

## 2025-01-06 DIAGNOSIS — N17.9 ACUTE RENAL FAILURE, UNSPECIFIED ACUTE RENAL FAILURE TYPE (HCC): Primary | ICD-10-CM

## 2025-01-06 DIAGNOSIS — D64.9 ANEMIA, UNSPECIFIED TYPE: ICD-10-CM

## 2025-01-06 LAB
ALBUMIN SERPL-MCNC: 3.5 G/DL (ref 3.2–4.8)
ALBUMIN/GLOB SERPL: 1.3 {RATIO} (ref 1–2)
ALP LIVER SERPL-CCNC: 93 U/L
ALT SERPL-CCNC: 12 U/L
ANION GAP SERPL CALC-SCNC: 12 MMOL/L (ref 0–18)
ANTIBODY SCREEN: NEGATIVE
AST SERPL-CCNC: 17 U/L (ref ?–34)
BASOPHILS # BLD AUTO: 0.03 X10(3) UL (ref 0–0.2)
BASOPHILS NFR BLD AUTO: 0.3 %
BILIRUB SERPL-MCNC: 0.7 MG/DL (ref 0.2–1.1)
BUN BLD-MCNC: 84 MG/DL (ref 9–23)
CALCIUM BLD-MCNC: 9.2 MG/DL (ref 8.7–10.4)
CHLORIDE SERPL-SCNC: 103 MMOL/L (ref 98–112)
CO2 SERPL-SCNC: 20 MMOL/L (ref 21–32)
CREAT BLD-MCNC: 5.2 MG/DL
EGFRCR SERPLBLD CKD-EPI 2021: 11 ML/MIN/1.73M2 (ref 60–?)
EOSINOPHIL # BLD AUTO: 0.13 X10(3) UL (ref 0–0.7)
EOSINOPHIL NFR BLD AUTO: 1.5 %
ERYTHROCYTE [DISTWIDTH] IN BLOOD BY AUTOMATED COUNT: 14.6 %
GLOBULIN PLAS-MCNC: 2.6 G/DL (ref 2–3.5)
GLUCOSE BLD-MCNC: 137 MG/DL (ref 70–99)
HCT VFR BLD AUTO: 35.1 %
HGB BLD-MCNC: 11.8 G/DL
IMM GRANULOCYTES # BLD AUTO: 0.03 X10(3) UL (ref 0–1)
IMM GRANULOCYTES NFR BLD: 0.3 %
LIPASE SERPL-CCNC: 96 U/L (ref 12–53)
LYMPHOCYTES # BLD AUTO: 0.64 X10(3) UL (ref 1–4)
LYMPHOCYTES NFR BLD AUTO: 7.3 %
MCH RBC QN AUTO: 37.2 PG (ref 26–34)
MCHC RBC AUTO-ENTMCNC: 33.6 G/DL (ref 31–37)
MCV RBC AUTO: 110.7 FL
MONOCYTES # BLD AUTO: 0.77 X10(3) UL (ref 0.1–1)
MONOCYTES NFR BLD AUTO: 8.8 %
NEUTROPHILS # BLD AUTO: 7.19 X10 (3) UL (ref 1.5–7.7)
NEUTROPHILS # BLD AUTO: 7.19 X10(3) UL (ref 1.5–7.7)
NEUTROPHILS NFR BLD AUTO: 81.8 %
OSMOLALITY SERPL CALC.SUM OF ELEC: 308 MOSM/KG (ref 275–295)
OSMOLALITY SERPL: 317 MOSM/KG (ref 280–300)
PLATELET # BLD AUTO: 107 10(3)UL (ref 150–450)
POTASSIUM SERPL-SCNC: 4.5 MMOL/L (ref 3.5–5.1)
PROT SERPL-MCNC: 6.1 G/DL (ref 5.7–8.2)
RBC # BLD AUTO: 3.17 X10(6)UL
RH BLOOD TYPE: POSITIVE
RH BLOOD TYPE: POSITIVE
SODIUM SERPL-SCNC: 135 MMOL/L (ref 136–145)
WBC # BLD AUTO: 8.8 X10(3) UL (ref 4–11)

## 2025-01-06 PROCEDURE — 76770 US EXAM ABDO BACK WALL COMP: CPT | Performed by: EMERGENCY MEDICINE

## 2025-01-06 RX ORDER — TAMSULOSIN HYDROCHLORIDE 0.4 MG/1
0.4 CAPSULE ORAL NIGHTLY
Status: DISCONTINUED | OUTPATIENT
Start: 2025-01-06 | End: 2025-01-11 | Stop reason: ALTCHOICE

## 2025-01-06 RX ORDER — DOXEPIN HYDROCHLORIDE 50 MG/1
1 CAPSULE ORAL DAILY
Status: DISCONTINUED | OUTPATIENT
Start: 2025-01-07 | End: 2025-01-17

## 2025-01-06 RX ORDER — ATORVASTATIN CALCIUM 20 MG/1
20 TABLET, FILM COATED ORAL NIGHTLY
Status: DISCONTINUED | OUTPATIENT
Start: 2025-01-06 | End: 2025-01-17

## 2025-01-06 RX ORDER — ONDANSETRON 2 MG/ML
4 INJECTION INTRAMUSCULAR; INTRAVENOUS EVERY 6 HOURS PRN
Status: DISCONTINUED | OUTPATIENT
Start: 2025-01-06 | End: 2025-01-17

## 2025-01-06 RX ORDER — TORSEMIDE 10 MG/1
10 TABLET ORAL
COMMUNITY
End: 2025-01-17

## 2025-01-06 RX ORDER — TORSEMIDE 10 MG/1
TABLET ORAL SEE ADMIN INSTRUCTIONS
COMMUNITY
End: 2025-01-17

## 2025-01-06 RX ORDER — SPIRONOLACTONE 25 MG/1
12.5 TABLET ORAL DAILY
COMMUNITY
Start: 2024-12-23 | End: 2025-01-17

## 2025-01-06 RX ORDER — FOLIC ACID 1 MG/1
1 TABLET ORAL DAILY
COMMUNITY

## 2025-01-06 RX ORDER — DILTIAZEM HYDROCHLORIDE 120 MG/1
120 CAPSULE, EXTENDED RELEASE ORAL DAILY
COMMUNITY
Start: 2024-12-17 | End: 2025-01-17

## 2025-01-06 RX ORDER — ACETAMINOPHEN 325 MG/1
650 TABLET ORAL EVERY 6 HOURS PRN
Status: DISCONTINUED | OUTPATIENT
Start: 2025-01-06 | End: 2025-01-17

## 2025-01-06 RX ORDER — SODIUM CHLORIDE 9 MG/ML
125 INJECTION, SOLUTION INTRAVENOUS CONTINUOUS
Status: DISCONTINUED | OUTPATIENT
Start: 2025-01-06 | End: 2025-01-07

## 2025-01-06 RX ORDER — ONDANSETRON 2 MG/ML
4 INJECTION INTRAMUSCULAR; INTRAVENOUS ONCE
Status: DISCONTINUED | OUTPATIENT
Start: 2025-01-06 | End: 2025-01-06

## 2025-01-06 RX ORDER — SODIUM CHLORIDE 9 MG/ML
INJECTION, SOLUTION INTRAVENOUS CONTINUOUS
Status: DISCONTINUED | OUTPATIENT
Start: 2025-01-06 | End: 2025-01-06

## 2025-01-06 RX ORDER — METOPROLOL SUCCINATE 50 MG/1
50 TABLET, EXTENDED RELEASE ORAL 2 TIMES DAILY
COMMUNITY
End: 2025-01-17

## 2025-01-06 RX ORDER — MELATONIN
2000 DAILY
Status: DISCONTINUED | OUTPATIENT
Start: 2025-01-06 | End: 2025-01-17

## 2025-01-06 RX ORDER — MELATONIN
100 DAILY
Status: DISCONTINUED | OUTPATIENT
Start: 2025-01-07 | End: 2025-01-17

## 2025-01-06 RX ORDER — SODIUM CHLORIDE 9 MG/ML
INJECTION, SOLUTION INTRAVENOUS CONTINUOUS
Status: ACTIVE | OUTPATIENT
Start: 2025-01-06 | End: 2025-01-06

## 2025-01-06 NOTE — ED QUICK NOTES
Orders for admission, patient is aware of plan and ready to go upstairs. Any questions, please call ED RN George at extension [phone].     Patient Covid vaccination status: Fully vaccinated     COVID Test Ordered in ED: None    COVID Suspicion at Admission: N/A    Running Infusions:  NS    Mental Status/LOC at time of transport: 4/4    Other pertinent information: wound on coccyx   CIWA score: N/A   NIH score:  N/A

## 2025-01-06 NOTE — ED PROVIDER NOTES
Patient Seen in: [facility] Emergency Department      History     Chief Complaint   Patient presents with    Bleeding     Stated Complaint: Bleeding ulcers    Subjective:   HPI      Patient is a 74-year-old male who was recently started on Eliquis for atrial fibrillation presenting bleeding from a sacral ulcer.  He states he has had some diarrhea for the last couple days and just has not been feeling well.  Today while he was cleaning himself up after a bowel movement suddenly started \"gushing\" blood, he believes he rubbed the scab off of a chronic sacral ulcer he has.    Objective:     Past Medical History:    Back problem    CAD (coronary artery disease)    Calculus of kidney    Diabetes (HCC)    Diabetes mellitus type 2 in obese    Dilated aortic root (HCC)    GOUT    Gout    High blood pressure    High cholesterol    KIDNEY STONE    Morbid obesity (HCC)    Neuropathy    OTHER DISEASES    SVT Chato    PONV (postoperative nausea and vomiting)    Pulmonary hypertension (HCC)    Thoracic aortic aneurysm (HCC)              Past Surgical History:   Procedure Laterality Date    Appendectomy      Back surgery      discectomy/cauda equina   Nicole    Colonoscopy      [place]ps[place]  [place]    Colonoscopy      Colonoscopy      Colonoscopy N/A 10/28/2015    Procedure: COLONOSCOPY;  Surgeon: Reed Garcia;  Location:  ENDOSCOPY    Colonoscopy N/A 2023    Procedure: COLONOSCOPY;  Surgeon: Evgeny Horner MD;  Location:  ENDOSCOPY    Each add tooth extraction      Lithotripsy      Other surgical history      achilles rupture                Social History     Socioeconomic History    Marital status:    Tobacco Use    Smoking status: Former     Current packs/day: 0.00     Average packs/day: 1 pack/day for 20.0 years (20.0 ttl pk-yrs)     Types: Cigarettes     Start date: 1998     Quit date: 2018     Years since quittin.3    Smokeless tobacco: Never    Tobacco comments:      cigarillos/small cigars 3-5 daily   Vaping Use    Vaping status: Never Used   Substance and Sexual Activity    Alcohol use: Not Currently     Alcohol/week: 3.0 - 4.0 standard drinks of alcohol     Types: 3 - 4 Standard drinks or equivalent per week    Drug use: No     Social Drivers of Health     Physical Activity: Unknown (12/16/2020)    Received from [facility], [facility]    Exercise Vital Sign     Days of Exercise per Week: 0 days                  Physical Exam     ED Triage Vitals   BP 01/06/25 1139 (!) 78/40   Pulse 01/06/25 1139 50   Resp 01/06/25 1139 16   Temp 01/06/25 1207 97 °F (36.1 °C)   Temp src 01/06/25 1207 Temporal   SpO2 01/06/25 1139 97 %   O2 Device 01/06/25 1249 None (Room air)       Current Vitals:   Vital Signs  BP: 114/78  Pulse: 52  Resp: 18  Temp: 97 °F (36.1 °C)  Temp src: Temporal    Oxygen Therapy  SpO2: 95 %  O2 Device: None (Room air)        Physical Exam  Vitals and nursing note reviewed.   Constitutional:       Appearance: He is well-developed.   HENT:      Head: Normocephalic and atraumatic.   Eyes:      Conjunctiva/sclera: Conjunctivae normal.      Pupils: Pupils are equal, round, and reactive to light.   Cardiovascular:      Rate and Rhythm: Bradycardia present. Rhythm irregular.      Heart sounds: Normal heart sounds.   Pulmonary:      Effort: Pulmonary effort is normal.      Breath sounds: Normal breath sounds.   Abdominal:      General: Bowel sounds are normal.      Palpations: Abdomen is soft.   Musculoskeletal:         General: Normal range of motion.   Skin:     General: Skin is warm and dry.      Comments: There is some oozing blood from a chronic appearing wound directly between the buttock cleft.  No brisk bleeding.   Neurological:      Mental Status: He is alert and oriented to person, place, and time.             ED Course     Labs Reviewed   COMP METABOLIC PANEL (14) - Abnormal; Notable for the following components:       Result Value    Glucose  137 (*)     Sodium 135 (*)     CO2 20.0 (*)     BUN 84 (*)     Creatinine 5.20 (*)     Calculated Osmolality 308 (*)     eGFR-Cr 11 (*)     All other components within normal limits   CBC WITH DIFFERENTIAL WITH PLATELET - Abnormal; Notable for the following components:    RBC 3.17 (*)     HGB 11.8 (*)     HCT 35.1 (*)     .0 (*)     .7 (*)     MCH 37.2 (*)     Lymphocyte Absolute 0.64 (*)     All other components within normal limits   LIPASE - Abnormal; Notable for the following components:    Lipase 96 (*)     All other components within normal limits   URINALYSIS WITH CULTURE REFLEX   OSMOLALITY, SERUM   OSMOLALITY, URINE   CHLORIDE, URINE, RANDOM   CREATININE, URINE, RANDOM   POTASSIUM, URINE, RANDOM   SODIUM, URINE, RANDOM   TYPE AND SCREEN    Narrative:     The following orders were created for panel order Type and screen.  Procedure                               Abnormality         Status                     ---------                               -----------         ------                     ABORH (Blood Type)[[id number]]                               Final result               Antibody Screen[[id number]]                                  Final result                 Please view results for these tests on the individual orders.   ABORH (BLOOD TYPE)   ANTIBODY SCREEN   ABORH CONFIRMATION   RAINBOW DRAW BLUE     EKG    Rate, intervals and axes as noted on EKG Report.  Rate: 51  Rhythm: Atrial Fibrillation  Reading: Atrial fibrillation with slow ventricular response.  T inversions precordial leads.  No ST elevations or depressions.  No old immediately available for comparison.                       MDM      74-year-old male presenting with bleeding from chronic sacral ulcer.  There is venous oozing on arrival although paramedics reported seem to be more blood at the scene.  4 x 4's placed as a pressure dressing and will reevaluated.  Does not appear to be anything to suture.      Update at 1:10 PM.   Labs demonstrate mild anemia of 11.8.  Wound disease examined today continues to be a little bit of oozing.  Topical silver nitrate chemical cautery was done and was repacked with 4 x 4's.  Minimal oozing at that point.  Hemoglobin will need to be followed in the hospital.  He will need to be admitted as he has EMILIO with a creatinine of 5.2.  Last creatinine from July 2024 was normal at 0.9.  No hyperkalemia no acidosis.  Etiology is unclear although his wife at bedside does report his oral intake, weeks of solids has been poor for a while.  May all be prerenal.  Urine electrolytes, osmole's and renal ultrasound ordered.  Discussed case with hospitalist, nephrology and cardiology.      A total of 35 minutes of critical care time (exclusive of billable procedures) was administered to manage the patient's unstable vital signs due to his presenting hypotension, acute renal failure.  This involved direct patient intervention, complex decision making, and/or extensive discussions with the patient, family, and clinical staff.      Past Medical History-hypertension, diabetes, high cholesterol    Differential diagnosis before testing included anemia, dehydration, electrolyte abnormality, renal failure    Co-morbidities that add to the complexity of management include: new medications per cardiology    Testing ordered during this visit included labs      External chart review showed reviewed outpatient clinic notes    History obtained by an independent source included from family at bedside    Discussion of management with hospitalist, nephrology, cardiology            Disposition:    Admission  I have discussed with the patient the results of test, differential diagnosis, and treatment plan. They expressed clear understanding of these instructions and agrees to the plan provided.         Admission disposition: 1/6/2025  1:12 PM           Medical Decision Making      Disposition and Plan     Clinical Impression:  1. Acute  renal failure, unspecified acute renal failure type (HCC)    2. Anemia, unspecified type         Disposition:  Admit  1/6/2025  1:12 pm    Follow-up:  No follow-up provider specified.        Medications Prescribed:  Current Discharge Medication List              Supplementary Documentation:         Hospital Problems       Present on Admission  Date Reviewed: 7/20/2022            ICD-10-CM Noted POA    * (Principal) Acute renal failure, unspecified acute renal failure type (HCC) N17.9 1/6/2025 Unknown

## 2025-01-06 NOTE — CONSULTS
Cardiology Consultation    Gerardo Torrez Patient Status:  Emergency    6/15/1950 MRN [id number]   Location [facility] EMERGENCY DEPARTMENT Attending Willian Lamas MD   Hosp Day # 0 PCP Sid Gordon MD     Reason for Consultation:  acute bleed on eliquis, ARF      History of Present Illness:  Gerardo Torrez is a a(n) 74 year old male. Nice gillian, here with his wife.  Follows with Dr. Tapia and more recently Dr. Valles from CHF standpoint.  He has chronic afib, DM, and lymphedema.  RHC showing normal coronary arteries and normal filling pressures in .  He reports he just started eliquis last week, but a note from 24 reports he was already on it.  He is extremely limited by his morbid obesity, essentially wheelchair bound.  He has a sacral decubitus ulcer.  He is winded with any activity.  He was scheduled to be cardioverted later this month.  Paramedics called today as he was bleeding profusely from his decubitus ulcer.  In the ER, it was oozing and had slowed down, treated with silver nitrate cautery.  His creatine was 5.0, baseline normal.  Hb 11.8, 12.2023.    History:  Past Medical History:    Back problem    CAD (coronary artery disease)    Calculus of kidney    Diabetes (HCC)    Diabetes mellitus type 2 in obese    Dilated aortic root (HCC)    GOUT    Gout    High blood pressure    High cholesterol    KIDNEY STONE    Morbid obesity (HCC)    Neuropathy    OTHER DISEASES    SVT Chato    PONV (postoperative nausea and vomiting)    Pulmonary hypertension (HCC)    Thoracic aortic aneurysm (HCC)     Past Surgical History:   Procedure Laterality Date    Appendectomy      Back surgery      discectomy/cauda equina   Nicole    Colonoscopy      [place]ps[place]  [place]    Colonoscopy  2009    Colonoscopy      Colonoscopy N/A 10/28/2015    Procedure: COLONOSCOPY;  Surgeon: Reed Garcia;  Location:  ENDOSCOPY    Colonoscopy N/A 2023    Procedure: COLONOSCOPY;  Surgeon: Evgeny Horner  MD;  Location:  ENDOSCOPY    Each add tooth extraction      Lithotripsy      Other surgical history      achilles rupture     Family History   Problem Relation Age of Onset    Cancer Father         colon cancer         Allergies:  Allergies[1]    Medications:      Continuous Infusions:      Social History:   reports that he quit smoking about 6 years ago. His smoking use included cigarettes. He started smoking about 26 years ago. He has a 20 pack-year smoking history. He has never used smokeless tobacco. He reports that he does not currently use alcohol after a past usage of about 3.0 - 4.0 standard drinks of alcohol per week. He reports that he does not use drugs.    Review of Systems:  All systems were reviewed and are negative except as described above in HPI.    Physical Exam:      Temp:  [97 °F (36.1 °C)] 97 °F (36.1 °C)  Pulse:  [50-54] 52  Resp:  [16-18] 18  BP: ()/(40-78) 114/78  SpO2:  [95 %-97 %] 95 %    Last 3 Weights   01/06/25 1144 (!) 328 lb (148.8 kg)   11/06/23 1318 (!) 305 lb (138.3 kg)   07/18/23 0900 300 lb (136.1 kg)   07/06/23 1227 300 lb (136.1 kg)           General: No apparent distress  HEENT: No focal deficits.  Neck: supple. JVP normal  Cardiac: Irregular rhythm, S1, S2 normal,   No rub or gallop.  No murmur.  Lungs: CTA  Abdomen: Soft, non-tender.   Extremities: lymphedema with diffuse erythema.  Neurologic: no focal deficits  Skin: Warm and dry.          Telemetry: fib    Laboratories and Data:  Diagnostics:    EKG, 1/6/2025:  afib, RBBB    CXR, 1/6/2025:  none    Labs:   HEM:  Recent Labs   Lab 01/06/25  1145   WBC 8.8   HGB 11.8*   .0*       Chem:  Recent Labs   Lab 01/06/25  1145   *   K 4.5      CO2 20.0*   BUN 84*   CREATSERUM 5.20*   CA 9.2   *       Recent Labs   Lab 01/06/25  1145   ALT 12   AST 17   ALB 3.5       No results for input(s): \"PTP\", \"INR\" in the last 168 hours.    No results for input(s): \"TROP\", \"CK\" in the last 168  hours.      Impression:   Acute blood loss anemia - hemorrhage from sacral decub, on eliquis.  Chronic afib, on eliquis and metoprolol.  Historically preserved EF.  Morbid obesity with very limited mobility and sacral decubitus ulcer.  Clean cath with normal PA pressures Nov 2023.  Type 2 DM  Lymphedema   ARF - hopefully volume down  Asc Ao aneurysm, 4.2 cm root, 4.1 asc Ao.    Plan:  Hold eliquis and f/u CBC.  Await nephrology.  Ok to give volume.  Continue metoprolol.    Sebastien Cuellar MD  1/6/2025  2:04 PM  C5         [1]   Allergies  Allergen Reactions    Metformin DIARRHEA

## 2025-01-06 NOTE — CONSULTS
[facility]   part of [facility]    Report of Consultation    Gerardo Torrez Patient Status:  Inpatient    6/15/1950 MRN [id number]   Location [facility] 3NE-A Attending Peter Flores, DO   Hosp Day # 0 PCP Sid Gordon MD     Date of consult: 2025    REASON FOR CONSULT:     EMILIO    HISTORY OF PRESENT ILLNESS:     Gerardo Torrez is a a(n) 74 year old male w chronic Afib, DM, lymphedema, morbid obesity essentially wheelchair bound w sacral decubitus ulcer, asc ao aneurysm who called 911 today as was bleeding profusely from decubitus ulcer.     +decreased po intake for weeks  +diarrhea for a few days  +vomiting this am   Did take ibuprofen once recently   Drinks 3-4 etoh (vodka/gin/etc) drinks per day. Does not drink much water   Home meds include = torsemide, jardiance, irbesartan, spironolactone    BPs low 78/40 on admit. Sp 1L IVF  BUN 84, Cr 5.2mg/dl    Cr 0.9 on 2024  Baseline Cr appears 0.9-1.14mg/dL mostly since     REVIEW OF SYSTEMS:     Please see HPI for pertinent positives. 10 point review of systems otherwise reviewed and negative.     HISTORY:     Past Medical History:    Back problem    CAD (coronary artery disease)    Calculus of kidney    Diabetes (HCC)    Diabetes mellitus type 2 in obese    Dilated aortic root (HCC)    GOUT    Gout    High blood pressure    High cholesterol    KIDNEY STONE    Morbid obesity (HCC)    Neuropathy    OTHER DISEASES    SVT Chato    PONV (postoperative nausea and vomiting)    Pulmonary hypertension (HCC)    Thoracic aortic aneurysm (HCC)     Past Surgical History:   Procedure Laterality Date    Appendectomy      Back surgery      discectomy/cauda equina   Nicole    Colonoscopy      polyps/[facility]  [place]    Colonoscopy  2009    Colonoscopy      Colonoscopy N/A 10/28/2015    Procedure: COLONOSCOPY;  Surgeon: Reed Garcia;  Location:  ENDOSCOPY    Colonoscopy N/A 2023    Procedure: COLONOSCOPY;  Surgeon: Evgeny Horner MD;   Location:  ENDOSCOPY    Each add tooth extraction      Lithotripsy      Other surgical history      achilles rupture     Family History   Problem Relation Age of Onset    Cancer Father         colon cancer      reports that he quit smoking about 6 years ago. His smoking use included cigarettes. He started smoking about 26 years ago. He has a 20 pack-year smoking history. He has never used smokeless tobacco. He reports that he does not currently use alcohol after a past usage of about 3.0 - 4.0 standard drinks of alcohol per week. He reports that he does not use drugs.    ALLERGIES:     Allergies[1]    MEDICATIONS:       Current Facility-Administered Medications:     sodium chloride 0.9% infusion, , Intravenous, Continuous    sodium chloride 0.9% infusion, 125 mL/hr, Intravenous, Continuous    atorvastatin (Lipitor) tab 20 mg, 20 mg, Oral, Nightly    B-12 TABS 2,000 mcg, 2,000 mcg, Oral, Daily    tamsulosin (Flomax) cap 0.4 mg, 0.4 mg, Oral, Nightly    acetaminophen (Tylenol) tab 650 mg, 650 mg, Oral, Q6H PRN    ondansetron (Zofran) 4 MG/2ML injection 4 mg, 4 mg, Intravenous, Q6H PRN  No current outpatient medications on file.         PHYSICAL EXAM:     Vital Signs: BP 95/43   Pulse 50   Temp 97 °F (36.1 °C) (Temporal)   Resp 18   Wt (!) 328 lb (148.8 kg)   SpO2 94%   BMI 47.06 kg/m²   Temp (24hrs), Av °F (36.1 °C), Min:97 °F (36.1 °C), Max:97 °F (36.1 °C)     No intake or output data in the 24 hours ending 25 1540  Wt Readings from Last 3 Encounters:   25 (!) 328 lb (148.8 kg)   23 (!) 305 lb (138.3 kg)   23 300 lb (136.1 kg)       General: NAD  HEENT: NCAT, DMM, EOMI  Neck: Supple   Cardiac: Regular rate and rhythm   Lungs: CTAB   Abdomen: Soft, non-tender   : No CVA tenderness  Extremities: + leg edema, erythema  Neurologic/Psych: mentating well, no asterixis  Skin: LE erythema    LABORATORY DATA:       Lab Results   Component Value Date     (H) 2025    BUN 84 (H)  01/06/2025    BUNCREA 19.0 07/01/2021    CREATSERUM 5.20 (H) 01/06/2025    ANIONGAP 12 01/06/2025    GFR 94 01/25/2018    GFRNAA 68 01/13/2022    GFRAA 79 01/13/2022    CA 9.2 01/06/2025    OSMOCALC 308 (H) 01/06/2025    ALKPHO 93 01/06/2025    AST 17 01/06/2025    ALT 12 01/06/2025    BILT 0.7 01/06/2025    TP 6.1 01/06/2025    ALB 3.5 01/06/2025    GLOBULIN 2.6 01/06/2025    AGRATIO 1.7 06/16/2014     (L) 01/06/2025    K 4.5 01/06/2025     01/06/2025    CO2 20.0 (L) 01/06/2025     Lab Results   Component Value Date    WBC 8.8 01/06/2025    RBC 3.17 (L) 01/06/2025    HGB 11.8 (L) 01/06/2025    HCT 35.1 (L) 01/06/2025    .0 (L) 01/06/2025    MPV 10.1 (H) 07/16/2011    .7 (H) 01/06/2025    MCH 37.2 (H) 01/06/2025    MCHC 33.6 01/06/2025    RDW 14.6 01/06/2025    NEPRELIM 7.19 01/06/2025    NEPERCENT 81.8 01/06/2025    LYPERCENT 7.3 01/06/2025    MOPERCENT 8.8 01/06/2025    EOPERCENT 1.5 01/06/2025    BAPERCENT 0.3 01/06/2025    NE 7.19 01/06/2025    LYMABS 0.64 (L) 01/06/2025    MOABSO 0.77 01/06/2025    EOABSO 0.13 01/06/2025    BAABSO 0.03 01/06/2025     No results found for: \"MALBP\", \"CREUR\", \"CREAURINE\", \"MIALBURINE\", \"MCRRATIOUR\", \"MALBCRECALC\", \"MICROALBUMIN\", \"CREAUR\", \"MALBCREACALC\"  Lab Results   Component Value Date    SPECGRAVITY 1.020 06/12/2013    NITRITE Negative 06/12/2013         IMAGING:     All imaging studies personally reviewed.    US KIDNEY/BLADDER (CPT=76770)    Result Date: 1/6/2025  CONCLUSION:  Normal appearance of the kidneys.   LOCATION:  [id number]     Dictated by (CST): Aftab Georges MD on 1/06/2025 at 1:26 PM     Finalized by (CST): Aftab Georges MD on 1/06/2025 at 1:29 PM          ASSESSMENT/PLAN:   Gerardo Torrez is a a(n) 74 year old male w chronic Afib, DM, lymphedema, morbid obesity essentially wheelchair bound w sacral decubitus ulcer, asc ao aneurysm who called 911 today as was bleeding profusely from decubitus ulcer. Nephrology consulted for EMILIO    EMILIO  --  likely prerenal due decreased intravascular volume + poor perfusion w hypotension   -- BUN 84, Cr 5.2mg/dL on admit. Baseline Cr appears 0.9-1.14mg/dL mostly since 2022  -- renal US w no hydro.   -- check UA urine lytes   -- hold torsemide, jardiance, irbesartan, spironolactone  -- stop etoh, monitor per primary team   -- sp 1L bolus, continue IVFs  -- avoid NSAIDs, IV contrast, other nephrotoxins. Renally dose meds  -- no acute need for dialysis, but need to monitor closely. Pt agreeable to dialysis if acute need arises.     Hypotension   -- improved w IVFs    Anemia /acute blood loss anemia  -- transfusion prn. Check iron stores     Bradycardia  -- defer to cards, likely needs to hold BB    D/w RN, Dr Flores, Dr Lamas, and pt wife    Thank you for allowing me to participate in the care of your patient. Please do not hesitate to contact me with concerns or questions.    Deysi Coyle MD  [facility]    1/6/2025  3:40 PM         [1]   Allergies  Allergen Reactions    Metformin DIARRHEA

## 2025-01-06 NOTE — HISTORICAL OFFICE NOTE
Facility Logo [facility]  [address], 4th [place] [address]  [phone]      Gerardo Torrez  Progress Note  Demographics:  Name: Gerardo Torrez YOB: 1950  Age: 74, Male Medical Record No: [id number]  Visited Date/Time: 12/23/2024 03:30 PM    Chief Complaints  F/U per Dr. Tapia  History of Present Illness  74-year-old gentleman with a past medical history of HFpEF, A-fib on anticoagulation, morbid obesity, type 2 diabetes, chronic lymphedema presumed, chronic dyspnea on exertion and type 2 diabetes with CKD who presents today for evaluation and management of HFpEF and chronic OLSON.  Patient states he has been winded and fatigued for years.  He states he has had lower extremity swelling for years as well.  He has been evaluated extensively by his primary cardiologist.  He underwent a right and left heart cath last year that showed normal filling pressures and was decreased on his diuretics because it was presumed that his leg swelling was related to heart failure.  He states he is here now.  Has been attempted on GLP-1 P weight loss however has not helped his symptoms.  He is fairly deconditioned and pretty much in a wheelchair or sitting most of the day.  He is very morbidly obese.  He states he has been taking his medications and following up as needed 2.  His chronic venous skin changes with lower extremity swelling.  He underwent extensive workup and on his last office visit was found to be in A-fib and was put on diltiazem as well as Eliquis and was planned for cardioversion   Within a month.  He denies active chest pain or palpitations but states previously when he had A-fib when his rates were higher he felt symptoms.  He states he is feels chronically winded even going from the living room to the kitchen.  Currently has NYHA Class 3 symptoms.  Cardiac risk factors Diabetes, Hypertension, Obesity, Physical inactivity and Former smoker  Past Medical  History  1.Edema, unspecified  2.DM (diabetes mellitus) Type 2 with CKD stage 2  3.S/P cardiac cath  4.Preop testing  5.OLSON (dyspnea on exertion)  6.Acute diastolic (congestive) heart failure  7.Hypertension (HTN), primary  8.Pure hypercholesterolemia  9.Obstructive sleep apnea - HEIDI  10.Coronary artery calcification seen on CT scan  11.Aneurysm, thoracic aortic  12.Benign hypertension without CHF  13.Obstructive sleep apnea syndrome  14.ASHD (arteriosclerotic heart disease)  15.Abnormal EKG  16.Abnormal cardiovascular stress test  Family History  1. Father - Family history of heart disease - FHx  2. Mother - Hypertension (HTN), primary  3. Brother - Family history of heart disease - FHx  Social History  Smoking status Former smoker  Tobacco usage - No (Non-smoker for personal reasons (finding))  Alcohol usage - Yes (\"daily\" )  Review of systems  Constitutional No history of Fevers and Chills  Cardiovascular OLSON and Edema  No history of Chest pain, Palpitations, Syncope, PND, Orthopnea and Claudication  Physical Examination  Vitals Right Arm Sitting  / 60 mmHg, Pulse rate 67 bpm, Height in 5' 10\", BMI: 47.1, Weight in 328 lbs (or) 148.78 kgs and BSA : 2.79 cc/m²  General Appearance No Acute Distress  Cardiovascular   EKG/Other abnormalities  General: no acute distress, appears stated age. morbidly obese.   HEENT: EOMI, PERRLA, normal oral dentition   Neck - large neck habitus, difficult to appreciate JVD   Cardiac - S1 and S2 irregular, RRR, no rubs or gallops noted.   Respiratory - normal breathing (non labored), Clear to auscultation bilaterally, no wheezing or rhonchi / rales noted.   Abdomen - soft, non tender to palpation, bowel sounds present.   Extremities- chronic venous skin changes present, 2+ edema  Neuro - alert and oriented x4, follows commands, no focal deficits  Allergies  1.metformin \"Ingredient (IN)\" [RxNorm:6809](Reaction:Diarrhea , Severity:Unknown)  Medications (Info obtained by:  Verbal)  1.aspirin 81 MG tablet, 1 TABLET DAILY.  2.ATORVASTATIN 20MG TABLETS, TAKE 1 TABLET BY MOUTH AT BEDTIME  3.dilTIAZem  mg capsule,24 hr,extended release, Take 1 capsule orally once a day.  4.Eliquis 5 mg tablet, Take 1 tablet orally 2 times a day.  5.IRBESARTAN 150MG TABLETS, TAKE 1 TABLET BY MOUTH EVERY DAY  6.JARDIANCE 10MG TABLETS, TAKE 1 TABLET BY MOUTH EVERY DAY  7.metoprolol succinate ER 50 mg tablet,extended release 24 hr, Take 1 tablet orally 2 times a day.  8.Rybelsus 7 mg tablet, Take 1 tablet orally once a day.  9.tamsulosin 0.4 mg capsule, Take 1 capsule orally once a day.  10.TORSEMIDE 10MG TABLETS, TAKE 10 MG DAILY THREE DAYS A WEEK AND TWICE DAILY FOR THE OTHER FOUR DAYS  11.Vitamin b12 1000 mcg , 2 tablets daily  12.Vitamin D2 1,250 mcg (50,000 unit) capsule, Take 1 capsule orally once a day.  Impression  1.Atrial fibrillation, new onset  2.Edema, unspecified  3.DM (diabetes mellitus) Type 2 with CKD stage 2  4.Bilateral leg cramps  5.OLSON (dyspnea on exertion)  6.Acute diastolic (congestive) heart failure  7.Hypertension (HTN), primary  8.Pure hypercholesterolemia  Assessment & Plan  Chronic HFpEF  0-numerous risk factors, I am not convinced that his symptoms are all related to heart failure.  However discussed physiology and pathophysiology as well as management of HFpEF.  -Patient is already on ARB, SGLT2, will add MRA 12.5 daily, repeat blood work in 5 days to monitor for hyperkalemia.  -Discussed that his symptoms are probably combination multifactorial from other causes.    Chronic dyspnea on exertion  -Multifactorial from morbid obesity, deconditioning, chronic lymphedema, minor role in HFpEF.  -Discussed importance of weight loss, lymphedema evaluation, consider weight loss clinic and GLP-1 P agonist to help with weight loss, PT/OT as well as long-term management of other chronic comorbid conditions    A-fib  -A-fib is not helping this patient's symptoms with loss of atrial kick,  patient already on rate control and DOAC and plan for cardioversion which is agreeable.  -Maintained sinus rhythm however has numerous risk factors and may not be able to.  Will defer to primary cardiologist.    Morbid obesity  -BMI greater than 40, encouraged weight loss with diet and exercise as well as medical therapy    Type 2 diabetes mellitus  -Medical management as per PCP, discussed importance of changing may be from Rybelsus to Ozempic to tamp with weight loss    Chronic lower extremity swelling  Suspect likely lymphedema and venous insufficiency, refer to lymphedema clinic, and will also review findings with vein specialist Dr. Ferrell to see if there is any intervention from a vein specialist standpoint that can be present to help-    Chronic osteoarthritis  -Physical therapy  Medications Ordered  1.spironolactone 25 mg tablet, Take one-half tablet orally once a day.  Labs and Diagnostics ordered  1.CMP (Schedule next available)  2.NT - ProBNP (Schedule next available)  Future appointments  1.Referral Visit - Sid Gordon ([email or web address]) : (Today)  2.Follow up visit - Gaston Valles MD (6 Months)  Miscellaneous  1.Weight monitoring (regime/therapy)  Nurses documentation  Upcoming surgeries: None  Assistive devices: None   Refills: Lipitor, Irbesartan, and Jardiance   EKG: No   (MS, CMA)   Patient instructions  Plan:  - Begin Spironolactone 12.5mg daily   - Update bloodwork (CMP/pBNP) 5 days after starting new medication  - Referral to [facility]. Call @ [phone] to schedule a visit, next available.   - Follow-up with Dr. Valles in 6 months   Diagnostics Details  Holter Monitoring 12/17/2024  1.This is an excellent quality study.    2.Predominant rhythm is atrial fibrillation.    3.The minimum heart rate recorded was 35 beats / minute (12/18/2024). The maximum heart rate is 105 beats / minute (12/18/2024). The mean heart rate is 65 beats / minute.    4.No evidence of AV block  is noted.    5.Rare premature atrial contractions noted.    6.No evidence of supraventricular tachycardia is noted.    7.Rare premature ventricular contractions noted.    8.No pauses were noted.    9.*The predominant rhythm was Atrial Fibrillation/Flutter. *The Maximum Heart Rate recorded was 105 bpm, 12/18 15:10:14, the Minimum Heart Rate recorded was 35 bpm, 12/18 15:57:19, and the Average Heart Rate was 65 bpm. *There were 569 VE beats with a burden of 2 %. *The study included an Atrial Fibrillation/Flutter [place] of >99 % with <1 % in RVR and 24 % in SVR. The longest episode was 6h 22m 48.2s, 12/18 10:06:57, and the Fastest episode was 105 bpm, 12/18 10:06:57. *There were 0 Patient Triggers.    Lower Extremity Venous Ultrasound 09/20/2023  1.The study quality is below average. Left leg limited exam as patient was unable to complete exam due to pain with postitioning.    2.Exam performed with the patient in reverse Trendelenburg and sitting positions.    3.Normal compressibility, augmentation, and phasic flow in the Right lower extremity venous system. Negative study for acute deep venous thrombosis of the Right leg. Left leg deep venous system not examined.    4.DEEP REFLUX: Right CFV 1.2 sec, popliteal 1.1 sec.    5.Right GSV reflux at SFJ only.    6.Right no reflux SSV    7.Left GSV no reflux in areas examined proximal calf-distal calf.    8.Left no reflux SSV    9.Left mid calf  to a GSV branch with reflux noted, see report.    Trans Thoracic Echocardiogram 02/28/2023  1.The study quality is good.    2.The left ventricle chamber size is upper normal. Global left ventricular systolic function is normal. The left ventricular ejection fraction is 50-55%. The left ventricle diastolic function is impaired (Grade I) with normal left atrial pressure. Noted left ventricular hypertrophy. Concentric left ventricular hypertrophy is present. It is mild. No regional wall motin abnormalities were identified.  Technically difficult study due to patients body habitus.    3.Aortic root diameter is 4.2 cms. Ascending aorta diameter is 4.1 cms. Dilatation of the aortic root and ascending aorta is noted.    4.Mild aortic regurgitation.    5.The right ventricle is normal in size. Right ventricular systolic function is normal.    6.The estimated pulmonary artery systolic pressure is 49 mmHg assuming a right atrial pressure of 15 mmHg. Evidence of pulmonary hypertension is noted.    Nuclear PET 02/27/2023  1.Stress EKG is normal.    1.The study quality is good.    2.This is a normal perfusion study, no perfusion defects noted. There is no evidence of ischemia.    3.This scan is suggestive of low risk for future cardiovascular events.    4.The left ventricular cavity is noted to be normal on the stress studies. The stress left ventricular ejection fraction was calculated to be 60% and left ventricular global function is normal. The rest left ventricular cavity is noted to be normal. The rest left ventricular ejection fraction was calculated to be 68% and rest left ventricular global function is normal.    CPOE Orders carried out by: Lia Valles MD  Care Providers: Lia Valles MD  Electronically Authenticated by  Gaston Valles MD  12/23/2024 04:14:55 PM  Disclaimer: Components of this note were documented using voice recognition system and are subject to errors not corrected at proofreading. Contact the author of this note for any clarifications.

## 2025-01-06 NOTE — PLAN OF CARE
NURSING ADMISSION NOTE      Patient admitted via Cart  Oriented to room.  Safety precautions initiated.  Bed in low position.  Call light in reach.    Assumed care around 1520.  A&O 4.  Oxygen- 2-3 L via NC.  Fluids- 0.9 at 125 mL/hr started.  Cardiac diet.  Tele- A fib.  No VTE.  PT/OT to see.  MD paged regarding redness and potential fungal infection to groin. See managed orders for more details.  Urine and stool samples to be collected and sent to the lab. Enteric/contact PLUS isolation maintained while awaiting results.  Wound care to see.  CT/abd/pelvis ordered per pt report to MD regarding distention.   Specialty bed ordered.    Pt and wife at bedside updated on the plan of care. All needs met at this time.     1827: Pt made NPO for CT.    Problem: CARDIOVASCULAR - ADULT  Goal: Maintains optimal cardiac output and hemodynamic stability  Description: INTERVENTIONS:  - Monitor vital signs, rhythm, and trends  - Monitor for bleeding, hypotension and signs of decreased cardiac output  - Evaluate effectiveness of vasoactive medications to optimize hemodynamic stability  - Monitor arterial and/or venous puncture sites for bleeding and/or hematoma  - Assess quality of pulses, skin color and temperature  - Assess for signs of decreased coronary artery perfusion - ex. Angina  - Evaluate fluid balance, assess for edema, trend weights  Outcome: Progressing  Goal: Absence of cardiac arrhythmias or at baseline  Description: INTERVENTIONS:  - Continuous cardiac monitoring, monitor vital signs, obtain 12 lead EKG if indicated  - Evaluate effectiveness of antiarrhythmic and heart rate control medications as ordered  - Initiate emergency measures for life threatening arrhythmias  - Monitor electrolytes and administer replacement therapy as ordered  Outcome: Progressing     Problem: RESPIRATORY - ADULT  Goal: Achieves optimal ventilation and oxygenation  Description: INTERVENTIONS:  - Assess for changes in respiratory  status  - Assess for changes in mentation and behavior  - Position to facilitate oxygenation and minimize respiratory effort  - Oxygen supplementation based on oxygen saturation or ABGs  - Provide Smoking Cessation handout, if applicable  - Encourage broncho-pulmonary hygiene including cough, deep breathe, Incentive Spirometry  - Assess the need for suctioning and perform as needed  - Assess and instruct to report SOB or any respiratory difficulty  - Respiratory Therapy support as indicated  - Manage/alleviate anxiety  - Monitor for signs/symptoms of CO2 retention  Outcome: Progressing     Problem: GASTROINTESTINAL - ADULT  Goal: Maintains or returns to baseline bowel function  Description: INTERVENTIONS:  - Assess bowel function  - Maintain adequate hydration with IV or PO as ordered and tolerated  - Evaluate effectiveness of GI medications  - Encourage mobilization and activity  - Obtain nutritional consult as needed  - Establish a toileting routine/schedule  - Consider collaborating with pharmacy to review patient's medication profile  Outcome: Progressing     Problem: SKIN/TISSUE INTEGRITY - ADULT  Goal: Incision(s), wounds(s) or drain site(s) healing without S/S of infection  Description: INTERVENTIONS:  - Assess and document risk factors for pressure ulcer development  - Assess and document skin integrity  - Assess and document dressing/incision, wound bed, drain sites and surrounding tissue  - Implement wound care per orders  - Initiate isolation precautions as appropriate  - Initiate Pressure Ulcer prevention bundle as indicated  Outcome: Progressing     Problem: HEMATOLOGIC - ADULT  Goal: Maintains hematologic stability  Description: INTERVENTIONS  - Assess for signs and symptoms of bleeding or hemorrhage  - Monitor labs and vital signs for trends  - Administer supportive blood products/factors, fluids and medications as ordered and appropriate  - Administer supportive blood products/factors as ordered and  appropriate  Outcome: Progressing  Goal: Free from bleeding injury  Description: (Example usage: patient with low platelets)  INTERVENTIONS:  - Avoid intramuscular injections, enemas and rectal medication administration  - Ensure safe mobilization of patient  - Hold pressure on venipuncture sites to achieve adequate hemostasis  - Assess for signs and symptoms of internal bleeding  - Monitor lab trends  - Patient is to report abnormal signs of bleeding to staff  - Avoid use of toothpicks and dental floss  - Use electric shaver for shaving  - Use soft bristle tooth brush  - Limit straining and forceful nose blowing  Outcome: Progressing

## 2025-01-06 NOTE — H&P
[facility] History and Physical      Chief Complaint   Patient presents with    Bleeding        PCP: Sid Gordon MD      History of Present Illness: Patient is a 74 year old male with PMH sig for a-fib on eliquis, HTN, HLD, DM II, gout, pulm HTN, chronic sacral decub, and morbid obesity who presented to the ED for evaluation of bleeding from his sacral wound.  He was started on eliquis last week by cardiology, his spouse states she noted \"gushing blood\" after cleaning a pressure sore on his sacral area.  The patient reports diarrhea for the last couple of days, has felt more fatigued.   His oral intake has been poor and he drinks several drinks of EtOH daily.  He also reports that his abd has become more distended but he did have a loose BM this AM.  Pt is WC bound with as chronic sacral wound, uses a walker occasionally, lives with his spouse.  No F/C, N/V.    In the ED, labs notable for EMILIO with BUN/Cr 84/5.20.  Hgb 11.8.  Pltc 107.  US kidneys neg for hydronephrosis.  1 L NS given.      On my evaluation, pt c/o feeling more abd distention.      Past Medical History:    Back problem    CAD (coronary artery disease)    Calculus of kidney    Diabetes (HCC)    Diabetes mellitus type 2 in obese    Dilated aortic root (HCC)    GOUT    Gout    High blood pressure    High cholesterol    KIDNEY STONE    Morbid obesity (HCC)    Neuropathy    OTHER DISEASES    SVT [place]    PONV (postoperative nausea and vomiting)    Pulmonary hypertension (HCC)    Thoracic aortic aneurysm (HCC)      Past Surgical History:   Procedure Laterality Date    Appendectomy      Back surgery      discectomy/cauda equina   [place]    Colonoscopy  2003    polyps/[facility]  [place]    Colonoscopy  2009    Colonoscopy      Colonoscopy N/A 10/28/2015    Procedure: COLONOSCOPY;  Surgeon: Reed Garcia;  Location:  ENDOSCOPY    Colonoscopy N/A 7/18/2023    Procedure: COLONOSCOPY;  Surgeon: Evgeny Horner MD;  Location:   ENDOSCOPY    Each add tooth extraction      Lithotripsy      Other surgical history      achilles rupture        ALL:  Allergies[1]     Prior to Admission Medications   Medication Sig    Potassium Chloride ER 20 MEQ Oral Tab CR Take 20 mEq by mouth daily.    torsemide 10 MG Oral Tab Take 1 tablet (10 mg total) by mouth daily.    Semaglutide (RYBELSUS) 7 MG Oral Tab Take 1 tablet by mouth daily.    Ergocalciferol (VITAMIN D2 OR) Take 5,000 Units by mouth daily.    Empagliflozin (JARDIANCE OR) Take by mouth daily.    allopurinol 300 MG Oral Tab Take 1 tablet (300 mg total) by mouth daily.    Irbesartan 150 MG Oral Tab Take 1 tablet (150 mg total) by mouth daily.    tamsulosin (FLOMAX) cap TAKE 1 CAPSULE(0.4 MG) BY MOUTH DAILY    Cyanocobalamin (B-12) 2000 MCG Oral Tab Take 1 tablet (2,000 mcg total) by mouth daily.    atorvastatin (LIPITOR) 20 MG Oral Tab Take 1 tablet (20 mg total) by mouth daily. Per Dr. Reis/Foster    metoprolol (LOPRESSOR) 50 MG Oral Tab Take 1 tablet (50 mg total) by mouth 2 (two) times daily. Per Dr. Reis/Foster    ASPIRIN 81 MG OR TABS Take by mouth daily.       Social History     Tobacco Use    Smoking status: Former     Current packs/day: 0.00     Average packs/day: 1 pack/day for 20.0 years (20.0 ttl pk-yrs)     Types: Cigarettes     Start date: 1998     Quit date: 2018     Years since quittin.3    Smokeless tobacco: Never    Tobacco comments:     cigarillos/small cigars 3-5 daily   Substance Use Topics    Alcohol use: Not Currently     Alcohol/week: 3.0 - 4.0 standard drinks of alcohol     Types: 3 - 4 Standard drinks or equivalent per week        Fam Hx  Family History   Problem Relation Age of Onset    Cancer Father         colon cancer       Review of Systems  Comprehensive ROS reviewed and negative except for what is stated in HPI.      OBJECTIVE:  BP (!) 82/42   Pulse 54   Temp 97 °F (36.1 °C) (Temporal)   Resp 18   Wt (!) 328 lb (148.8 kg)   SpO2 95%   BMI  47.06 kg/m²   Gen: No acute distress, alert and oriented x3, no focal neurologic deficits. Chronically ill appearing male.   HEENT:  EOMI, PERRLA, OP clear, MMM  Pulm: Lungs clear bilaterally, normal respiratory effort  CV: Heart with regular rate and rhythm, no murmur.  Normal PMI.    Abd:  Mod to severe abd distention, hypoactive bowel sounds, no TTP. Morbid obesity.    MSK: +lymphedema, +chronic venous stasis dermatitis.   Skin: +sacral decub  Neuro:  Grossly intact, no focal deficits      Data Review:    LABS:   Lab Results   Component Value Date    WBC 8.8 01/06/2025    HGB 11.8 01/06/2025    HCT 35.1 01/06/2025    .0 01/06/2025    CREATSERUM 5.20 01/06/2025    BUN 84 01/06/2025     01/06/2025    K 4.5 01/06/2025     01/06/2025    CO2 20.0 01/06/2025     01/06/2025    CA 9.2 01/06/2025    ALB 3.5 01/06/2025    ALKPHO 93 01/06/2025    BILT 0.7 01/06/2025    TP 6.1 01/06/2025    AST 17 01/06/2025    ALT 12 01/06/2025    LIP 96 01/06/2025     Radiology:     US kidney/bladder:  Normal appearing kidneys, no hydronephrosis.      Assessment/Plan:     74 yr old male with PMH sig for a-fib on eliquis, HTN, HLD, DM II, gout, pulm HTN, chronic sacral decub, and morbid obesity who presented to the ED for evaluation of bleeding from his sacral wound.      # Acute renal failure   - suspect prerenal in the setting of GI losses and ARB use  - cont IVFs per renal recs  - hold ARB  - hold diuretics   - renal US neg for hydro  - monitor renal function and UO, avoid nephrotoxins   - renal c/s appreciated     # Bleeding sacral decub   # Acute blood loss anemia  - hold eliquis   - monitor CBC  - wound care c/s     # Abdominal distention  - pt with more abd distention than usual  - consider ileus, SBO, ascites, etc  - check CT a/p with po contrast only     # Chronic a-fib  - rate controlled  - hold metoprolol given bradycardia    - hold eliquis for now given bleeding from sacral decub     # Essential HTN  -  low on admit  - hold ARB, metoprolol, and diuretics     # HLD  - cont statin    # Pulm HTN  - monitor volume status on IVFs  - hold diuretics     # Thrombocytopenia   - suspect due to EtOH use and possible liver disease   - monitor     # EtOH abuse   - monitor for withdrawal   - MVI, thiamine, folate   - pt counseled on EtOH cessation     # Morbid obesity  - Recommend follow up with PCP to discuss diet and lifestyle modifications to encourage weight loss.    DVT prophy - SCD  Dispo: inpt care.  POC d/w pt who agrees.     Outpatient records or previous hospital records reviewed.   [facility] hospitalist to continue to follow patient while in house  A total of 76 minutes taken with patient and coordinating care.  Greater than 50% face to face encounter.      Advanced Care Planning  While discussing goals of care with pt, Gerardo voluntarily participated in an advanced care planning discussion.  Additionally, his spouse Grace participated in the conversation.  The following was discussed: POA and code status.  Grace states that she does not have his healthcare POA but is interested in obtaining it. He confirms FULL CODE status.   17 minutes were spent discussing advanced care planning.  This time was exclusive of the documented time for this visit.     Peter Flores DO  [facility] Hospitalist                [1]   Allergies  Allergen Reactions    Metformin DIARRHEA

## 2025-01-06 NOTE — ED INITIAL ASSESSMENT (HPI)
PT reports \"gushing blood\" after wife was cleaning a pressure sore on sacral area.  PT just started thinners last week.

## 2025-01-07 ENCOUNTER — APPOINTMENT (OUTPATIENT)
Dept: GENERAL RADIOLOGY | Facility: HOSPITAL | Age: 75
End: 2025-01-07
Attending: INTERNAL MEDICINE
Payer: MEDICARE

## 2025-01-07 ENCOUNTER — APPOINTMENT (OUTPATIENT)
Dept: CT IMAGING | Facility: HOSPITAL | Age: 75
End: 2025-01-07
Attending: INTERNAL MEDICINE
Payer: MEDICARE

## 2025-01-07 ENCOUNTER — APPOINTMENT (OUTPATIENT)
Dept: CV DIAGNOSTICS | Facility: HOSPITAL | Age: 75
End: 2025-01-07
Attending: NURSE PRACTITIONER
Payer: MEDICARE

## 2025-01-07 LAB
ALBUMIN SERPL-MCNC: 3.2 G/DL (ref 3.2–4.8)
ALBUMIN SERPL-MCNC: 3.3 G/DL (ref 3.2–4.8)
ALBUMIN SERPL-MCNC: 3.3 G/DL (ref 3.2–4.8)
ALBUMIN SERPL-MCNC: 3.4 G/DL (ref 3.2–4.8)
ALBUMIN/GLOB SERPL: 1.3 {RATIO} (ref 1–2)
ALBUMIN/GLOB SERPL: 1.3 {RATIO} (ref 1–2)
ALP LIVER SERPL-CCNC: 89 U/L
ALP LIVER SERPL-CCNC: 94 U/L
ALT SERPL-CCNC: 12 U/L
ALT SERPL-CCNC: 13 U/L
ANION GAP SERPL CALC-SCNC: 10 MMOL/L (ref 0–18)
ANION GAP SERPL CALC-SCNC: 12 MMOL/L (ref 0–18)
ANION GAP SERPL CALC-SCNC: 12 MMOL/L (ref 0–18)
ANION GAP SERPL CALC-SCNC: 14 MMOL/L (ref 0–18)
APTT PPP: 34.9 SECONDS (ref 23–36)
AST SERPL-CCNC: 19 U/L (ref ?–34)
AST SERPL-CCNC: 20 U/L (ref ?–34)
BASE EXCESS BLDA CALC-SCNC: -14.8 MMOL/L (ref ?–2)
BASOPHILS # BLD AUTO: 0.03 X10(3) UL (ref 0–0.2)
BASOPHILS NFR BLD AUTO: 0.3 %
BILIRUB SERPL-MCNC: 0.6 MG/DL (ref 0.2–1.1)
BILIRUB SERPL-MCNC: 0.8 MG/DL (ref 0.2–1.1)
BILIRUB UR QL STRIP.AUTO: NEGATIVE
BODY TEMPERATURE: 98.6 F
BUN BLD-MCNC: 75 MG/DL (ref 9–23)
BUN BLD-MCNC: 82 MG/DL (ref 9–23)
BUN BLD-MCNC: 90 MG/DL (ref 9–23)
BUN BLD-MCNC: 90 MG/DL (ref 9–23)
C DIFF GDH + TOXINS A+B STL QL IA.RAPID: NOT DETECTED
C DIFF TOX B STL QL: POSITIVE
CA-I BLD-SCNC: 0.81 MMOL/L (ref 0.95–1.32)
CA-I BLD-SCNC: 1.16 MMOL/L (ref 0.95–1.32)
CALCIUM BLD-MCNC: 8.5 MG/DL (ref 8.7–10.4)
CALCIUM BLD-MCNC: 8.5 MG/DL (ref 8.7–10.4)
CALCIUM BLD-MCNC: 8.8 MG/DL (ref 8.7–10.4)
CALCIUM BLD-MCNC: 9.1 MG/DL (ref 8.7–10.4)
CHLORIDE SERPL-SCNC: 102 MMOL/L (ref 98–112)
CHLORIDE SERPL-SCNC: 104 MMOL/L (ref 98–112)
CHLORIDE UR-SCNC: 51 MMOL/L
CLARITY UR REFRACT.AUTO: CLEAR
CO2 SERPL-SCNC: 18 MMOL/L (ref 21–32)
CO2 SERPL-SCNC: 18 MMOL/L (ref 21–32)
CO2 SERPL-SCNC: 19 MMOL/L (ref 21–32)
CO2 SERPL-SCNC: 22 MMOL/L (ref 21–32)
COHGB MFR BLD: 1.1 % SAT (ref 0–3)
CREAT BLD-MCNC: 2.99 MG/DL
CREAT BLD-MCNC: 3.64 MG/DL
CREAT BLD-MCNC: 4.37 MG/DL
CREAT BLD-MCNC: 4.37 MG/DL
CREAT UR-SCNC: 60.4 MG/DL
CREAT UR-SCNC: 60.4 MG/DL
EGFRCR SERPLBLD CKD-EPI 2021: 14 ML/MIN/1.73M2 (ref 60–?)
EGFRCR SERPLBLD CKD-EPI 2021: 14 ML/MIN/1.73M2 (ref 60–?)
EGFRCR SERPLBLD CKD-EPI 2021: 17 ML/MIN/1.73M2 (ref 60–?)
EGFRCR SERPLBLD CKD-EPI 2021: 21 ML/MIN/1.73M2 (ref 60–?)
EOSINOPHIL # BLD AUTO: 0.18 X10(3) UL (ref 0–0.7)
EOSINOPHIL NFR BLD AUTO: 1.9 %
ERYTHROCYTE [DISTWIDTH] IN BLOOD BY AUTOMATED COUNT: 14.5 %
ERYTHROCYTE [DISTWIDTH] IN BLOOD BY AUTOMATED COUNT: 14.6 %
FIO2: 80 %
FOLATE SERPL-MCNC: 3.6 NG/ML (ref 5.4–?)
GLOBULIN PLAS-MCNC: 2.5 G/DL (ref 2–3.5)
GLOBULIN PLAS-MCNC: 2.7 G/DL (ref 2–3.5)
GLUCOSE BLD-MCNC: 128 MG/DL (ref 70–99)
GLUCOSE BLD-MCNC: 129 MG/DL (ref 70–99)
GLUCOSE BLD-MCNC: 148 MG/DL (ref 70–99)
GLUCOSE BLD-MCNC: 148 MG/DL (ref 70–99)
GLUCOSE BLD-MCNC: 174 MG/DL (ref 70–99)
GLUCOSE BLD-MCNC: 177 MG/DL (ref 70–99)
GLUCOSE UR STRIP.AUTO-MCNC: 300 MG/DL
HCO3 BLDA-SCNC: 13.4 MEQ/L (ref 21–27)
HCT VFR BLD AUTO: 32.6 %
HCT VFR BLD AUTO: 35 %
HGB BLD-MCNC: 10.9 G/DL
HGB BLD-MCNC: 11.7 G/DL
HGB BLD-MCNC: 8.3 G/DL
HYALINE CASTS #/AREA URNS AUTO: PRESENT /LPF
IMM GRANULOCYTES # BLD AUTO: 0.08 X10(3) UL (ref 0–1)
IMM GRANULOCYTES NFR BLD: 0.8 %
INR BLD: 1.57 (ref 0.8–1.2)
KETONES UR STRIP.AUTO-MCNC: 10 MG/DL
LACTATE BLD-SCNC: 0.5 MMOL/L (ref 0.5–2)
LACTATE SERPL-SCNC: 1 MMOL/L (ref 0.5–2)
LACTATE SERPL-SCNC: 1.4 MMOL/L (ref 0.5–2)
LEUKOCYTE ESTERASE UR QL STRIP.AUTO: NEGATIVE
LYMPHOCYTES # BLD AUTO: 0.57 X10(3) UL (ref 1–4)
LYMPHOCYTES NFR BLD AUTO: 5.9 %
MAGNESIUM SERPL-MCNC: 2.3 MG/DL (ref 1.6–2.6)
MAGNESIUM SERPL-MCNC: 2.3 MG/DL (ref 1.6–2.6)
MCH RBC QN AUTO: 37.3 PG (ref 26–34)
MCH RBC QN AUTO: 37.6 PG (ref 26–34)
MCHC RBC AUTO-ENTMCNC: 33.4 G/DL (ref 31–37)
MCHC RBC AUTO-ENTMCNC: 33.4 G/DL (ref 31–37)
MCV RBC AUTO: 111.5 FL
MCV RBC AUTO: 112.4 FL
METHGB MFR BLD: 0.8 % SAT (ref 0.4–1.5)
MICROALBUMIN UR-MCNC: 4.4 MG/DL
MICROALBUMIN/CREAT 24H UR-RTO: 72.8 UG/MG (ref ?–30)
MONOCYTES # BLD AUTO: 0.76 X10(3) UL (ref 0.1–1)
MONOCYTES NFR BLD AUTO: 7.9 %
MRSA DNA SPEC QL NAA+PROBE: NEGATIVE
NEUTROPHILS # BLD AUTO: 8.02 X10 (3) UL (ref 1.5–7.7)
NEUTROPHILS # BLD AUTO: 8.02 X10(3) UL (ref 1.5–7.7)
NEUTROPHILS NFR BLD AUTO: 83.2 %
NITRITE UR QL STRIP.AUTO: NEGATIVE
OSMOLALITY SERPL CALC.SUM OF ELEC: 306 MOSM/KG (ref 275–295)
OSMOLALITY SERPL CALC.SUM OF ELEC: 308 MOSM/KG (ref 275–295)
OSMOLALITY SERPL CALC.SUM OF ELEC: 308 MOSM/KG (ref 275–295)
OSMOLALITY SERPL CALC.SUM OF ELEC: 309 MOSM/KG (ref 275–295)
OSMOLALITY UR: 408 MOSM/KG (ref 300–1300)
OXYHGB MFR BLDA: 97.4 % (ref 92–100)
PCO2 BLDA: 23 MM HG (ref 35–45)
PEEP: 5 CM H2O
PH BLDA: 7.27 [PH] (ref 7.35–7.45)
PH UR STRIP.AUTO: 5.5 [PH] (ref 5–8)
PHOSPHATE SERPL-MCNC: 4.3 MG/DL (ref 2.4–5.1)
PHOSPHATE SERPL-MCNC: 5.4 MG/DL (ref 2.4–5.1)
PHOSPHATE SERPL-MCNC: 6.9 MG/DL (ref 2.4–5.1)
PLATELET # BLD AUTO: 117 10(3)UL (ref 150–450)
PLATELET # BLD AUTO: 134 10(3)UL (ref 150–450)
PO2 BLDA: 108 MM HG (ref 80–100)
POTASSIUM BLD-SCNC: 4.3 MMOL/L (ref 3.6–5.1)
POTASSIUM SERPL-SCNC: 3.8 MMOL/L (ref 3.5–5.1)
POTASSIUM SERPL-SCNC: 4 MMOL/L (ref 3.5–5.1)
POTASSIUM SERPL-SCNC: 4 MMOL/L (ref 3.5–5.1)
POTASSIUM SERPL-SCNC: 4.2 MMOL/L (ref 3.5–5.1)
POTASSIUM UR-SCNC: 13.1 MMOL/L
PROT SERPL-MCNC: 5.8 G/DL (ref 5.7–8.2)
PROT SERPL-MCNC: 6.1 G/DL (ref 5.7–8.2)
PROT UR STRIP.AUTO-MCNC: 20 MG/DL
PROT UR-MCNC: 44.6 MG/DL (ref ?–14)
PROT/CREAT UR-RTO: 0.74
PROTHROMBIN TIME: 18.9 SECONDS (ref 11.6–14.8)
RBC # BLD AUTO: 2.9 X10(6)UL
RBC # BLD AUTO: 3.14 X10(6)UL
SODIUM BLD-SCNC: 136 MMOL/L (ref 135–145)
SODIUM SERPL-SCNC: 134 MMOL/L (ref 136–145)
SODIUM SERPL-SCNC: 134 MMOL/L (ref 136–145)
SODIUM SERPL-SCNC: 135 MMOL/L (ref 136–145)
SODIUM SERPL-SCNC: 136 MMOL/L (ref 136–145)
SODIUM SERPL-SCNC: 64 MMOL/L
SP GR UR STRIP.AUTO: 1.01 (ref 1–1.03)
TIDAL VOLUME: 500 ML
TRIGL SERPL-MCNC: 90 MG/DL (ref 30–149)
UROBILINOGEN UR STRIP.AUTO-MCNC: NORMAL MG/DL
UUN UR-MCNC: 567 MG/DL
VENT RATE: 22 /MIN
WBC # BLD AUTO: 13.6 X10(3) UL (ref 4–11)
WBC # BLD AUTO: 9.6 X10(3) UL (ref 4–11)

## 2025-01-07 PROCEDURE — 71045 X-RAY EXAM CHEST 1 VIEW: CPT | Performed by: INTERNAL MEDICINE

## 2025-01-07 PROCEDURE — 93306 TTE W/DOPPLER COMPLETE: CPT | Performed by: NURSE PRACTITIONER

## 2025-01-07 PROCEDURE — B548ZZA ULTRASONOGRAPHY OF SUPERIOR VENA CAVA, GUIDANCE: ICD-10-PCS | Performed by: INTERNAL MEDICINE

## 2025-01-07 PROCEDURE — 74018 RADEX ABDOMEN 1 VIEW: CPT | Performed by: INTERNAL MEDICINE

## 2025-01-07 PROCEDURE — 3E033XZ INTRODUCTION OF VASOPRESSOR INTO PERIPHERAL VEIN, PERCUTANEOUS APPROACH: ICD-10-PCS | Performed by: INTERNAL MEDICINE

## 2025-01-07 PROCEDURE — 02HV33Z INSERTION OF INFUSION DEVICE INTO SUPERIOR VENA CAVA, PERCUTANEOUS APPROACH: ICD-10-PCS | Performed by: INTERNAL MEDICINE

## 2025-01-07 PROCEDURE — 5A12012 PERFORMANCE OF CARDIAC OUTPUT, SINGLE, MANUAL: ICD-10-PCS | Performed by: INTERNAL MEDICINE

## 2025-01-07 PROCEDURE — 5A1955Z RESPIRATORY VENTILATION, GREATER THAN 96 CONSECUTIVE HOURS: ICD-10-PCS | Performed by: INTERNAL MEDICINE

## 2025-01-07 PROCEDURE — 74176 CT ABD & PELVIS W/O CONTRAST: CPT | Performed by: INTERNAL MEDICINE

## 2025-01-07 PROCEDURE — 0BH17EZ INSERTION OF ENDOTRACHEAL AIRWAY INTO TRACHEA, VIA NATURAL OR ARTIFICIAL OPENING: ICD-10-PCS | Performed by: INTERNAL MEDICINE

## 2025-01-07 RX ORDER — ACETAMINOPHEN 650 MG/1
650 SUPPOSITORY RECTAL EVERY 4 HOURS PRN
Status: DISCONTINUED | OUTPATIENT
Start: 2025-01-07 | End: 2025-01-13 | Stop reason: ALTCHOICE

## 2025-01-07 RX ORDER — MINERAL OIL AND PETROLATUM 150; 830 MG/G; MG/G
1 OINTMENT OPHTHALMIC NIGHTLY
Status: DISCONTINUED | OUTPATIENT
Start: 2025-01-07 | End: 2025-01-11

## 2025-01-07 RX ORDER — CHLORHEXIDINE GLUCONATE ORAL RINSE 1.2 MG/ML
15 SOLUTION DENTAL
Status: DISCONTINUED | OUTPATIENT
Start: 2025-01-07 | End: 2025-01-11

## 2025-01-07 RX ORDER — VANCOMYCIN HYDROCHLORIDE 50 MG/ML
125 KIT ORAL 4 TIMES DAILY
Status: DISCONTINUED | OUTPATIENT
Start: 2025-01-07 | End: 2025-01-07

## 2025-01-07 RX ORDER — LIDOCAINE HYDROCHLORIDE 10 MG/ML
1 INJECTION, SOLUTION INFILTRATION; PERINEURAL ONCE
Status: COMPLETED | OUTPATIENT
Start: 2025-01-07 | End: 2025-01-07

## 2025-01-07 RX ORDER — BISACODYL 10 MG
10 SUPPOSITORY, RECTAL RECTAL
Status: DISCONTINUED | OUTPATIENT
Start: 2025-01-07 | End: 2025-01-17

## 2025-01-07 RX ORDER — FAMOTIDINE 10 MG/ML
20 INJECTION, SOLUTION INTRAVENOUS EVERY MORNING
Status: DISCONTINUED | OUTPATIENT
Start: 2025-01-07 | End: 2025-01-12 | Stop reason: ALTCHOICE

## 2025-01-07 RX ORDER — PHENYLEPHRINE HCL IN 0.9% NACL 50MG/250ML
PLASTIC BAG, INJECTION (ML) INTRAVENOUS CONTINUOUS
Status: DISCONTINUED | OUTPATIENT
Start: 2025-01-07 | End: 2025-01-10

## 2025-01-07 RX ORDER — SENNOSIDES 8.8 MG/5ML
10 LIQUID ORAL 2 TIMES DAILY
Status: DISCONTINUED | OUTPATIENT
Start: 2025-01-07 | End: 2025-01-07

## 2025-01-07 RX ORDER — ACETAMINOPHEN 160 MG/5ML
650 SOLUTION ORAL EVERY 4 HOURS PRN
Status: DISCONTINUED | OUTPATIENT
Start: 2025-01-07 | End: 2025-01-13 | Stop reason: ALTCHOICE

## 2025-01-07 RX ORDER — ACETAMINOPHEN 10 MG/ML
1000 INJECTION, SOLUTION INTRAVENOUS EVERY 6 HOURS PRN
Status: DISCONTINUED | OUTPATIENT
Start: 2025-01-07 | End: 2025-01-13 | Stop reason: ALTCHOICE

## 2025-01-07 RX ORDER — VANCOMYCIN HYDROCHLORIDE 125 MG/1
125 CAPSULE ORAL 4 TIMES DAILY
Status: DISCONTINUED | OUTPATIENT
Start: 2025-01-07 | End: 2025-01-07

## 2025-01-07 RX ORDER — LIDOCAINE HYDROCHLORIDE 10 MG/ML
INJECTION, SOLUTION EPIDURAL; INFILTRATION; INTRACAUDAL; PERINEURAL
Status: COMPLETED
Start: 2025-01-07 | End: 2025-01-07

## 2025-01-07 RX ORDER — DOPAMINE HYDROCHLORIDE 320 MG/100ML
INJECTION, SOLUTION INTRAVENOUS CONTINUOUS
Status: DISCONTINUED | OUTPATIENT
Start: 2025-01-07 | End: 2025-01-08

## 2025-01-07 RX ORDER — HYDROCODONE BITARTRATE AND ACETAMINOPHEN 5; 325 MG/1; MG/1
1 TABLET ORAL ONCE
Status: COMPLETED | OUTPATIENT
Start: 2025-01-07 | End: 2025-01-07

## 2025-01-07 RX ORDER — BISACODYL 10 MG
10 SUPPOSITORY, RECTAL RECTAL
Status: DISCONTINUED | OUTPATIENT
Start: 2025-01-07 | End: 2025-01-07

## 2025-01-07 RX ORDER — SODIUM BICARBONATE 75 MG/ML
INJECTION, SOLUTION INTRAVENOUS
Status: CANCELLED | OUTPATIENT
Start: 2025-01-07

## 2025-01-07 RX ORDER — FAMOTIDINE 10 MG/ML
10 INJECTION, SOLUTION INTRAVENOUS EVERY MORNING
Status: DISCONTINUED | OUTPATIENT
Start: 2025-01-07 | End: 2025-01-07

## 2025-01-07 RX ORDER — POLYETHYLENE GLYCOL 3350 17 G/17G
17 POWDER, FOR SOLUTION ORAL DAILY PRN
Status: DISCONTINUED | OUTPATIENT
Start: 2025-01-07 | End: 2025-01-17

## 2025-01-07 RX ORDER — MIDAZOLAM HYDROCHLORIDE 1 MG/ML
2 INJECTION INTRAMUSCULAR; INTRAVENOUS EVERY 5 MIN PRN
Status: DISCONTINUED | OUTPATIENT
Start: 2025-01-07 | End: 2025-01-11

## 2025-01-07 RX ORDER — SENNOSIDES 8.8 MG/5ML
10 LIQUID ORAL 2 TIMES DAILY
Status: DISCONTINUED | OUTPATIENT
Start: 2025-01-07 | End: 2025-01-13 | Stop reason: ALTCHOICE

## 2025-01-07 RX ORDER — VANCOMYCIN 2 GRAM/500 ML IN 0.9 % SODIUM CHLORIDE INTRAVENOUS
20 ONCE
Status: COMPLETED | OUTPATIENT
Start: 2025-01-07 | End: 2025-01-07

## 2025-01-07 RX ORDER — CALCIUM GLUCONATE 20 MG/ML
2 INJECTION, SOLUTION INTRAVENOUS ONCE
Status: COMPLETED | OUTPATIENT
Start: 2025-01-07 | End: 2025-01-07

## 2025-01-07 NOTE — PROGRESS NOTES
[facility]   part of [facility] Hospitalist Progress Note     Gerardo Torrez Patient Status:  Inpatient    6/15/1950 MRN [id number]   Location [facility] 4SW-A Attending Peter Flores, DO   Hosp Day # 1 PCP Sid Gordon MD     Follow Up:  The primary encounter diagnosis was Acute renal failure, unspecified acute renal failure type (HCC). A diagnosis of Anemia, unspecified type was also pertinent to this visit.    Subjective:     Patient seen and examined.  RRT called for cardiac arrest this AM, pt given epi x 1, had ROSC, transferred to ICU.  Now intubated on pressors.  Responds to commands.      Objective:    Review of Systems:   10 point ROS completed and was negative, except for pertinent positive and negatives stated in subjective.    Vital signs:  Temp:  [97 °F (36.1 °C)-98.2 °F (36.8 °C)] 97 °F (36.1 °C)  Pulse:  [42-80] 63  Resp:  [9-24] 22  BP: ()/(32-94) 128/56  SpO2:  [92 %-100 %] 95 %  AO: ()/(20-63) 139/53  FiO2 (%):  [80 %-100 %] 80 %    Physical Exam:    Gen: Intubated, responds to commands.  Chronically ill appearing male.   HEENT:  EOMI, PERRLA, OP clear, MMM.  OGT in place.  Pulm:  Course to auscultation on vent.  CV: Heart with regular rate and rhythm, no murmur.  Normal PMI.    Abd:  Mod to severe abd distention, hypoactive bowel sounds, no TTP. Morbid obesity.    MSK: +lymphedema, +chronic venous stasis dermatitis.   Skin: +sacral decub  Neuro:  Grossly intact, no focal deficits  : Luis cath in place    Diagnostic Data:    Labs:  Recent Labs   Lab 25  1145 25  0643 25  1200   WBC 8.8 9.6 13.6*   HGB 11.8* 10.9* 11.7*   .7* 112.4* 111.5*   .0* 117.0* 134.0*   INR  --  1.57*  --        Recent Labs   Lab 25  1145 25  0643 25  1200   * 148*  148* 174*   BUN 84* 90*  90* 82*   CREATSERUM 5.20* 4.37*  4.37* 3.64*   CA 9.2 8.5*  8.5* 8.8   ALB 3.5 3.3  3.3 3.4   * 134*  134*  135*   K 4.5 4.0  4.0 4.2    104  104 102   CO2 20.0* 18.0*  18.0* 19.0*   ALKPHO 93 89 94   AST 17 19 20   ALT 12 12 13   BILT 0.7 0.6 0.8   TP 6.1 5.8 6.1       Estimated Creatinine Clearance: 18.9 mL/min (A) (based on SCr of 3.64 mg/dL (H)).    Recent Labs   Lab 01/07/25  0643   PTP 18.9*   INR 1.57*            COVID-19 Lab Results    COVID-19  Lab Results   Component Value Date    COVID19 Not Detected 07/20/2022    COVID19 DETECTED (A) 01/26/2021       Pro-Calcitonin  No results for input(s): \"PCT\" in the last 168 hours.    Cardiac  No results for input(s): \"TROP\", \"PBNP\" in the last 168 hours.    Creatinine Kinase  No results for input(s): \"CK\" in the last 168 hours.    Inflammatory Markers  No results for input(s): \"CRP\", \"KENNEDY\", \"LDH\", \"DDIMER\" in the last 168 hours.    Imaging: Imaging data reviewed in Epic.    Medications:    senna  10 mL Per NG Tube BID    docusate  100 mg Per NG Tube BID    chlorhexidine gluconate  15 mL Mouth/Throat BID@0800,2000    mineral oil-white petrolatum  1 Application Both Eyes Nightly    piperacillin-tazobactam  4.5 g Intravenous Q12H    famotidine  20 mg Intravenous QAM    Vancomycin IV  1 each Intravenous See Admin Instructions (RX holding)    atorvastatin  20 mg Oral Nightly    cyanocobalamin  2,000 mcg Oral Daily    tamsulosin  0.4 mg Oral Nightly    multivitamin  1 tablet Oral Daily    thiamine  100 mg Oral Daily    miconazole   Topical BID       Assessment & Plan:      74 yr old male with PMH sig for a-fib on eliquis, HTN, HLD, DM II, gout, pulm HTN, chronic sacral decub, and morbid obesity who presented to the ED for evaluation of bleeding from his sacral wound.       # Cardiac arrest   - concern for primary intra-abd event   - ROSC obtained s/p epi x 1  - maintain normothermia     # Acute respiratory failure  - intubated in setting of cardiac arrest   - cont full vent support   - wean FiO2 as able  - weaning trials per critical care    # Shock  - suspect  distributive/hypovolemic  - cont vasopressors to maintain SBP > 90  - cont empiric zosyn   - f/u cultures     # Abdominal distention   - CT a/p neg for SBO or perf  - OG placed to LIS  - cont empiric abx  - gen surg c/s     # Acute renal failure   - suspect prerenal in the setting of GI losses and ARB use  - cont IVFs per renal recs  - hold ARB  - hold diuretics   - renal US neg for hydro  - monitor renal function and UO, avoid nephrotoxins   - renal c/s appreciated      # Bleeding sacral decub   # Acute blood loss anemia  - hold eliquis   - monitor CBC  - wound care c/s      # Chronic a-fib  - rate controlled  - ECHO pending   - hold metoprolol given bradycardia    - hold eliquis for now given bleeding from sacral decub      # Essential HTN  - low on admit  - hold ARB, metoprolol, and diuretics      # HLD  - cont statin     # Pulm HTN  - monitor volume status on IVFs  - hold diuretics      # Thrombocytopenia   - suspect due to EtOH use and possible liver disease   - monitor      # EtOH abuse   - monitor for withdrawal   - MVI, thiamine, folate   - pt counseled on EtOH cessation      # Morbid obesity  - Recommend follow up with PCP to discuss diet and lifestyle modifications to encourage weight loss.    Plan of care: inpt care in the ICU.      Plan of care discussed with bedside RN.    Peter Flores DO    Supplementary Documentation:   DVT Mechanical Prophylaxis:   SCDs,    DVT Pharmacologic Prophylaxis   Medication   None                Code Status: Full Code  Luis: Luis catheter in place  Luis Duration (in days): 1  Central line: central venous catheter in place  ETHAN:

## 2025-01-07 NOTE — BRIEF PROCEDURE NOTE
[facility]    Gerardo Torrez Patient Status:  Inpatient    6/15/1950 MRN [id number]   Location [facility] 4SW-A Attending Peter Flores, DO   Hosp Day # 1 PCP Sid Gordon MD       Pulmonary Procedure Note      Procedure        : right internal jugular triple lumen catheter with ultrasound guidance  Indication          : shock, vasopressors  Consent           : emergent  Timeout           : performed at bedside  (s)     :  ABUNDIO Carmona  Complications : none  EBL                 : 3mL  Meds:                : 3mL 1% lidocaine      Consent unable to be obtained due to emergent nature of procedure.    Timeout performed at bedside. Patient prepped and draped in sterile fashion, and pre-medicated as above. The RIJ was visualized by ultrasound, and distinguished from the adjacent artery as being easily compressible. The RIJ was seen to be entered by the 18g introducer needle under direct ultrasound guidance with one skin break(s). Venous location was verified by return of dark, non-pulsatile blood return. A 7fr triple lumen catheter was advanced by modified seldinger technique. All ports flush and draw easily.    No obvious complications noted.  Chest x-ray is pending.    MARYCARMEN Carmona

## 2025-01-07 NOTE — PROGRESS NOTES
Gerardo Torrez Patient Status:  Inpatient    6/15/1950 MRN [id number]   [facility] 4SW-A Attending Peter Flores, DO   Hosp Day # 1 PCP Sid Gordon MD     Critical Care Progress Note          Subjective:  Patient with continued mechanical ventilation and vasopressor requirements.      Objective:    Medications:   senna  10 mL Per NG Tube BID    docusate  100 mg Per NG Tube BID    chlorhexidine gluconate  15 mL Mouth/Throat BID@0800,2000    mineral oil-white petrolatum  1 Application Both Eyes Nightly    piperacillin-tazobactam  4.5 g Intravenous Q12H    famotidine  20 mg Intravenous QAM    calcium gluconate  2 g Intravenous Once    Vancomycin IV  1 each Intravenous See Admin Instructions (RX holding)    atorvastatin  20 mg Oral Nightly    cyanocobalamin  2,000 mcg Oral Daily    tamsulosin  0.4 mg Oral Nightly    multivitamin  1 tablet Oral Daily    thiamine  100 mg Oral Daily    miconazole   Topical BID       Vent Mode: VC+  FiO2 (%):  [80 %-100 %] 80 %  S RR:  [22] 22  S VT:  [50 mL-500 mL] 50 mL  PEEP/CPAP (cm H2O):  [5 cm H20] 5 cm H20  MAP (cm H2O):  [13-15] 15    Intake/Output Summary (Last 24 hours) at 2025 1245  Last data filed at 2025 1047  Gross per 24 hour   Intake 647.5 ml   Output 500 ml   Net 147.5 ml       BP (!) 115/94   Pulse (!) 43   Temp 97.8 °F (36.6 °C) (Temporal)   Resp 20   Ht 177.8 cm (5' 10\")   Wt (!) 340 lb 6.2 oz (154.4 kg)   SpO2 99%   BMI 48.84 kg/m²   Physical Exam:    General Appearance: Intubated and sedated, appears stated age  Neck: No JVD, neck supple, no adenopathy, trachea midline, no carotid bruits  Lungs: Course to auscultation bilaterally, respirations unlabored  Heart: Regular rate and rhythm, S1 and S2 normal, no murmur, rub or gallop  Abdomen: distended, firm, bowel sounds active all four quadrants, no masses, no organomegaly  Extremities: Extremities normal, atraumatic, no cyanosis or edema,capillary refill <3 sec.    Pulses: 2+ and  symmetric all extremities  Skin: Skin color, texture, turgor normal for ethnicity, no rashes or lesions, warm and dry  Neurologic: sedated, moves all extremities to stimuli    Recent Labs   Lab 01/07/25  0643 01/07/25  1200   RBC 2.90* 3.14*   HGB 10.9* 11.7*   HCT 32.6* 35.0*   .4* 111.5*   MCH 37.6* 37.3*   MCHC 33.4 33.4   RDW 14.5 14.6   NEPRELIM 8.02*  --    WBC 9.6 13.6*   .0* 134.0*     Recent Labs   Lab 01/06/25  1145 01/07/25  0643 01/07/25  1200   * 148*  148* 174*   BUN 84* 90*  90* 82*   CREATSERUM 5.20* 4.37*  4.37* 3.64*   CA 9.2 8.5*  8.5* 8.8   ALB 3.5 3.3  3.3 3.4   * 134*  134* 135*   K 4.5 4.0  4.0 4.2    104  104 102   CO2 20.0* 18.0*  18.0* 19.0*   ALKPHO 93 89 94   AST 17 19 20   ALT 12 12 13   BILT 0.7 0.6 0.8   TP 6.1 5.8 6.1     Recent Labs   Lab 01/07/25  1124   ABGPHT 7.27*   HVLFBU0T 23*   UZCOB1Q 108*   ABGHCO3 13.4*   ABGBE -14.8*   TEMP 98.6   CATHI Not Applicable   SITE Arterial Line   DEV    THGB 8.3*     No results for input(s): \"BNP\" in the last 168 hours.  No results for input(s): \"TROP\", \"CK\" in the last 168 hours.          Assessment/Plan:  Cardiac arrest: on 1/8 am, concern for primary intra-abdominal event   -ROSC obtained after epi x 1  -maintain normothermia   Acute respiratory failure:  -intubated in setting of cardiac arrest  -continue full vent support  -wean FiO2 as able  -SBT daily when more stable  Shock:  -IVF  -vasopressors to maintain SBP >90  -Continue Zosyn (1/8-)  Abdominal distention:   -OG placed to LIS  -stat CT a/p reviewed  -General surgery consulted  -on empiric Abx as above  EMILIO:  -IVF with caution  -renal US negative for hydro  -nephrology following   -avoid nephrotoxins  A.Fib  -eliquis on hold due to bleeding from sacral wound   -hold home medications as patient with bradycardia  -Calcium given for possible BB toxicity  Thrombocytopenia:  -suspect due to EtOH abuse  -monitor   DM:  -insulin per  IM  HTN  -holding antihypertensives in setting of shock  EtOH abuse  -MVI/thiamine/folate  -monitor for withdrawal  FEN:  -NPO  Proph:  -SCD  Dispo:  -Full code  -ICU for risk of deterioration      Plan of care discussed with intensivist on-call, Dr. Alberts.      Yoanan Hampton, [facility]  Critical Care NP  Phone [phone]

## 2025-01-07 NOTE — PHYSICAL THERAPY NOTE
Physical therapy consult requested.  Pt is a 75 y/o M admitted with acute renal failure.  Overnight pt with cardiac arrest. Pt currently intubated and on pressors, not medically stable to participate in therapy at this time.  Will hold therapy at this time and initiate service as clinically appropriate.

## 2025-01-07 NOTE — PROGRESS NOTES
01/07/25 1644   Settings   FiO2 (%) 80 %   Resp Rate (Set) 22   Vt (Set, mL) 500 mL   Waveform Decelerating ramp   PEEP/CPAP (cm H2O) 5 cm H20     Patient resting comfortably on current settings, will continue to monitor and wean as necessary.

## 2025-01-07 NOTE — PLAN OF CARE
Responded to code blue on tele floor, ROSC obtained after 1 round of epi, pt intubated for airway protection at bedside and transported to ICU.  Pt sedated with propofol.  Pt hypotensive with BP ~60/40, initiated levo and vaso gtts.  Arterial line placed per critical care APN.  Pt moving all extremities, nodding appropriately to questions.  OG tube placed and set to suction, brownish red bile noted ~250cc.  Pt's abdomen remains incredibly distended, active bowel sounds noted.  Multiple family member at bedside and updated on pt condition, questions answered, verbalized understanding.

## 2025-01-07 NOTE — PROGRESS NOTES
Pharmacy Dosing Service  Initial Pharmacokinetic Consult for Vancomycin Dosing          Gerardo Torrez is a 74 year old male who is being treated for sepsis.  The initial treatment and monitoring approach will be non-AUC strategy.      Pharmacy has been asked to dose vancomycin by [facility] [place].      Other current anti-infective medication(s):    Zosyn (1/7 -  PO vanc (1/7-      Est CrCl: ~20 mL/min    Renal dosing considerations:  ARF - baseline SCr 0.9-1.1 -- suspect may worsen, holding of on HD at this time    Pharmacokinetic target: Trough/random 10-15 mg/L        A/P:  1.  Ordered a loading dose of vancomycin 2000mg IVPB (~20mg/kg, AdjBW) x 1 dose.    2.  Plan for vancomycin random level to be obtained w/ am labs tomorrow.      3.  Will monitor SCr daily while on vancomycin to assess renal function.  Will continue to follow renal plan for HD needs.    Pharmacy will continue to follow him and adjust dosing as appropriate.        Reji Chinchilla, PharmD, BCPS  1/7/2025  12:45 PM  [facility]: [phone]

## 2025-01-07 NOTE — PLAN OF CARE
Assumed care of pt at 1930  A&Ox4, 2L NC, afib w/ PVCs on tele  Reports pain, prn medications given per MAR  Reports dyspnea at rest and exertion  Denies n/v  Cardiac diet, NPO for CT of abd/pelvis  Max assist, PT/OT to see  Wound care to see  Specialty bed ordered and planned delivery for 1/7  Isolation precautions, stool sample results pending  Urine sample pending  Safety precautions in place  Patient updated on plan of care  All needs met at this time    2306 - Pt back to floor from CT. Was not tolerating CT, declined to complete testing. Pt stated he is not able to breathe lying flat, CT attempted to prop him up. Pt did not tolerate adjustments. MD notified.       0111 - MD notified of BP 88/35, IV Bolus ordered and administered per MAR    0443 - MD notified of increasing concerns: Decreasing Bps, increasing c/o SOB, and increasing abdomen distention    0445 - Notified Charge RN of concerns    0530 - Xray at bedside for KUB ordered per MD.  Pt continues to c/o SOB    0535 - Pt unresponsive and purple in the face.Loss of pulse. Code blue called, compressions started. ROSC. Pt transferred to ICU. Refer to code note for further details.     Problem: CARDIOVASCULAR - ADULT  Goal: Maintains optimal cardiac output and hemodynamic stability  Description: INTERVENTIONS:  - Monitor vital signs, rhythm, and trends  - Monitor for bleeding, hypotension and signs of decreased cardiac output  - Evaluate effectiveness of vasoactive medications to optimize hemodynamic stability  - Monitor arterial and/or venous puncture sites for bleeding and/or hematoma  - Assess quality of pulses, skin color and temperature  - Assess for signs of decreased coronary artery perfusion - ex. Angina  - Evaluate fluid balance, assess for edema, trend weights  Outcome: Progressing  Goal: Absence of cardiac arrhythmias or at baseline  Description: INTERVENTIONS:  - Continuous cardiac monitoring, monitor vital signs, obtain 12 lead EKG if  indicated  - Evaluate effectiveness of antiarrhythmic and heart rate control medications as ordered  - Initiate emergency measures for life threatening arrhythmias  - Monitor electrolytes and administer replacement therapy as ordered  Outcome: Progressing     Problem: RESPIRATORY - ADULT  Goal: Achieves optimal ventilation and oxygenation  Description: INTERVENTIONS:  - Assess for changes in respiratory status  - Assess for changes in mentation and behavior  - Position to facilitate oxygenation and minimize respiratory effort  - Oxygen supplementation based on oxygen saturation or ABGs  - Provide Smoking Cessation handout, if applicable  - Encourage broncho-pulmonary hygiene including cough, deep breathe, Incentive Spirometry  - Assess the need for suctioning and perform as needed  - Assess and instruct to report SOB or any respiratory difficulty  - Respiratory Therapy support as indicated  - Manage/alleviate anxiety  - Monitor for signs/symptoms of CO2 retention  Outcome: Progressing     Problem: GASTROINTESTINAL - ADULT  Goal: Maintains or returns to baseline bowel function  Description: INTERVENTIONS:  - Assess bowel function  - Maintain adequate hydration with IV or PO as ordered and tolerated  - Evaluate effectiveness of GI medications  - Encourage mobilization and activity  - Obtain nutritional consult as needed  - Establish a toileting routine/schedule  - Consider collaborating with pharmacy to review patient's medication profile  Outcome: Progressing     Problem: SKIN/TISSUE INTEGRITY - ADULT  Goal: Incision(s), wounds(s) or drain site(s) healing without S/S of infection  Description: INTERVENTIONS:  - Assess and document risk factors for pressure ulcer development  - Assess and document skin integrity  - Assess and document dressing/incision, wound bed, drain sites and surrounding tissue  - Implement wound care per orders  - Initiate isolation precautions as appropriate  - Initiate Pressure Ulcer prevention  bundle as indicated  Outcome: Progressing     Problem: HEMATOLOGIC - ADULT  Goal: Maintains hematologic stability  Description: INTERVENTIONS  - Assess for signs and symptoms of bleeding or hemorrhage  - Monitor labs and vital signs for trends  - Administer supportive blood products/factors, fluids and medications as ordered and appropriate  - Administer supportive blood products/factors as ordered and appropriate  Outcome: Progressing  Goal: Free from bleeding injury  Description: (Example usage: patient with low platelets)  INTERVENTIONS:  - Avoid intramuscular injections, enemas and rectal medication administration  - Ensure safe mobilization of patient  - Hold pressure on venipuncture sites to achieve adequate hemostasis  - Assess for signs and symptoms of internal bleeding  - Monitor lab trends  - Patient is to report abnormal signs of bleeding to staff  - Avoid use of toothpicks and dental floss  - Use electric shaver for shaving  - Use soft bristle tooth brush  - Limit straining and forceful nose blowing  Outcome: Progressing

## 2025-01-07 NOTE — PROGRESS NOTES
Cardiology Progress Note        Gerardo Torrez Patient Status:  Inpatient    6/15/1950 MRN [id number]   [facility] 4SW-A Attending Peter Flores, DO   Hosp Day # 1 PCP Sid Gordon MD     Subjective:  Events noted.  Daughter at the bedside.  He arrested early this morning during a KUB.  CPR and epi given with pulse restored.  Intubated, on pressors at present, abdomen quite distended.      Medications:   atropine        senna  10 mL Per NG Tube BID    docusate  100 mg Per NG Tube BID    chlorhexidine gluconate  15 mL Mouth/Throat BID@0800,2000    mineral oil-white petrolatum  1 Application Both Eyes Nightly    sodium bicarbonate        piperacillin-tazobactam  4.5 g Intravenous Q12H    vancomycin  20 mg/kg (Adjusted) Intravenous Once    famotidine  20 mg Intravenous QAM    vancomycin  125 mg Per NG Tube QID    atorvastatin  20 mg Oral Nightly    cyanocobalamin  2,000 mcg Oral Daily    tamsulosin  0.4 mg Oral Nightly    multivitamin  1 tablet Oral Daily    thiamine  100 mg Oral Daily    miconazole   Topical BID       Continuous Infusions:   fentanyl 25 mcg/hr (25 0847)    vasopressin (Vasostrict) 20 Units in sodium chloride 0.9% 100 mL infusion for septic shock 0.03 Units/min (25 0834)    phenylephrine      propofol 40 mcg/kg/min (25)    sodium bicarbonate in D5W infusion      norepinephrine           Allergies:  Allergies[1]      Intake/Output:    Intake/Output Summary (Last 24 hours) at 2025 0913  Last data filed at 2025 1801  Gross per 24 hour   Intake 240 ml   Output 50 ml   Net 190 ml           Last 3 Weights   25 1602 (!) 340 lb 6.2 oz (154.4 kg)   25 1144 (!) 328 lb (148.8 kg)   23 1318 (!) 305 lb (138.3 kg)   23 0900 300 lb (136.1 kg)   23 1227 300 lb (136.1 kg)            Physical Exam:    Temp:  [97 °F (36.1 °C)-98.2 °F (36.8 °C)] 97.8 °F (36.6 °C)  Pulse:  [42-72] 62  Resp:  [9-24] 18  BP: ()/(32-78) 112/49  SpO2:   [90 %-100 %] 97 %  AO: ()/(20-53) 123/53  FiO2 (%):  [80 %-100 %] 80 %    General: No apparent distress.  intubated  HEENT: No focal deficits.  Neck: supple. JVP normal  Cardiac: Regular rhythm, S1, S2 normal,  rub or gallop.  No murmur.  Lungs: CTA  Abdomen: distended  Extremities: chronic edema  Neurologic: no focal deficits  Skin: Warm and dry.     Telemetry: fib    EKG:      Echo:      Cardiac Cath:      Labs:  HEM:  Recent Labs   Lab 01/06/25  1145 01/07/25  0643   WBC 8.8 9.6   HGB 11.8* 10.9*   .0* 117.0*       Chem:  Recent Labs   Lab 01/06/25  1145 01/07/25  0643   * 134*  134*   K 4.5 4.0  4.0    104  104   CO2 20.0* 18.0*  18.0*   BUN 84* 90*  90*   CREATSERUM 5.20* 4.37*  4.37*   CA 9.2 8.5*  8.5*   MG  --  2.3   PHOS  --  6.9*   * 148*  148*       Recent Labs   Lab 01/06/25  1145 01/07/25  0643   ALT 12 12   AST 17 19   ALB 3.5 3.3  3.3       No results for input(s): \"TROP\", \"CK\" in the last 168 hours.    Recent Labs   Lab 01/07/25  0643   PTP 18.9*   INR 1.57*                 Impression:  Cardiac arrest early am on 1/7/25.  Epi and CPR.  Remains hypotensive, on pressors.  Intraabdominal source suspected.  Chronic afib, on eliquis and metoprolol.  Historically preserved EF.  Morbid obesity with very limited mobility and sacral decubitus ulcer.  Clean cath with normal PA pressures Nov 2023.  Type 2 DM  Lymphedema   ARF - sl improvement in creatinine with volume.  Acute blood loss anemia - hemorrhage from sacral decub, on eliquis.  ETOH abuse          Plan:  Await CT abdomen.  Echo with doppler.  Continue pressors as indicated.  Holding a/c in this setting.        Sebastien Cuellar MD  1/7/2025  9:13 AM  Cr care 30 min         [1]   Allergies  Allergen Reactions    Metformin DIARRHEA

## 2025-01-07 NOTE — SPIRITUAL CARE NOTE
Spiritual Care Visit Note    Patient Name: Gerardo Torrez Date of Spiritual Care Visit: 25   : 6/15/1950 Primary Dx: Acute renal failure, unspecified acute renal failure type (HCC)       Referred By:      Spiritual Care Taxonomy:    Intended Effects: Build relationship of care and support    Methods: Offer support;Offer spiritual/Yarsanism support;Collaborate with care team member    Interventions: Active listening;Ask guided questions;Assist someone with Advance Directives    Visit Type/Summary:     - PoA: New PoAH Created: Visited patient in response to PoA for Healthcare consult/request. Created a new PoAH for Healthcare document that, per the patient, accurately reflects their wishes. Gave the patient the original PoAH document along with copies. Confirmed that the PoAH primary agent name and contact information have been entered into the Epic, Advance Directives section. A copy of the new PoAH document has been placed on the patient's paper chart. Offered spiritual/emotional support. Patient declined.  remains available for follow up.    Spiritual Care support can be requested via an Epic consult. For urgent/immediate needs, please contact the On Call  at: [facility]: ext [phone]    Pr. Rox Mansfield Mdiv.

## 2025-01-07 NOTE — PROGRESS NOTES
[facility]   part of [facility]    Nephrology Progress Note    Gerardo Torrez Attending:  Peter Flores DO     Cc: EMILIO    SUBJECTIVE     Sp cardiac arrest early this morning. Sp CPR and epi w pulse restored  Intubated, transferred to icu, and started on pressors and abx (zosyn and vanc)  Notes abdominal distention. Getting CT and echo w doppler     PHYSICAL EXAM   Vital signs: BP (!) 115/94   Pulse (!) 43   Temp 97.8 °F (36.6 °C) (Temporal)   Resp 20   Ht 5' 10\" (1.778 m)   Wt (!) 340 lb 6.2 oz (154.4 kg)   SpO2 99%   BMI 48.84 kg/m²   Temp (24hrs), Av.7 °F (36.5 °C), Min:97 °F (36.1 °C), Max:98.2 °F (36.8 °C)       Intake/Output Summary (Last 24 hours) at 2025 1144  Last data filed at 2025 1047  Gross per 24 hour   Intake 647.5 ml   Output 500 ml   Net 147.5 ml     Wt Readings from Last 3 Encounters:   25 (!) 340 lb 6.2 oz (154.4 kg)   23 (!) 305 lb (138.3 kg)   23 300 lb (136.1 kg)     General: intubated on MV  HEENT: NCAT, DMM  Neck: Supple   Cardiac: Regular rate and rhythm   Lungs: dimininished  Abdomen: +distended   Extremities: + edema  Neurologic: No asterxis  Skin: LE erythema     MEDS     Current Facility-Administered Medications   Medication Dose Route Frequency    acetaminophen (Tylenol) 160 MG/5ML oral liquid 650 mg  650 mg Per NG Tube Q4H PRN    Or    acetaminophen (Tylenol) rectal suppository 650 mg  650 mg Rectal Q4H PRN    Or    acetaminophen (Ofirmev) 10 mg/mL infusion premix 1,000 mg  1,000 mg Intravenous Q6H PRN    fentaNYL (Sublimaze) 50 mcg/mL injection 50 mcg  50 mcg Intravenous Q30 Min PRN    fentaNYL (Sublimaze) 50 mcg BOLUS FROM BAG infusion  50 mcg Intravenous Once PRN    And    fentaNYL in sodium chloride 0.9% (Sublimaze) 1000 mcg/100mL infusion premix   mcg/hr Intravenous Continuous PRN    fentaNYL (Sublimaze) 25 mcg BOLUS FROM BAG infusion  25 mcg Intravenous Q30 Min PRN    senna (Senokot) 8.8 MG/5ML oral syrup 17.6 mg  10 mL Per  NG Tube BID    docusate (Colace) 50 MG/5ML oral solution 100 mg  100 mg Per NG Tube BID    polyethylene glycol (PEG 3350) (Miralax) 17 g oral packet 17 g  17 g Per NG Tube Daily PRN    bisacodyl (Dulcolax) 10 MG rectal suppository 10 mg  10 mg Rectal Daily PRN    chlorhexidine gluconate (Peridex) 0.12 % oral solution 15 mL  15 mL Mouth/Throat BID@0800,2000    mineral oil-white petrolatum (Artificial Tears) 83-15 % ophthalmic ointment 1 Application  1 Application Both Eyes Nightly    vasopressin (Vasostrict) 20 Units in sodium chloride 0.9% 100 mL infusion for septic shock  0.01-0.03 Units/min Intravenous Continuous    phenylephrine in sodium chloride 0.9% (Francesco-Synephrine) 50 mg/250mL infusion premix   mcg/min Intravenous Continuous    sodium bicarbonate 8.4 % injection        midazolam (Versed) 2 MG/2ML injection 2 mg  2 mg Intravenous Q5 Min PRN    propofol (Diprivan) 10 mg/mL infusion premix  5-50 mcg/kg/min (Dosing Weight) Intravenous Continuous    piperacillin-tazobactam (Zosyn) 4.5 g in dextrose 5% 100 mL IVPB-ADDV  4.5 g Intravenous Q12H    sodium bicarbonate 150 mEq in dextrose 5% 1,000 mL infusion  150 mEq Intravenous Continuous    famotidine (Pepcid) 20 mg/2mL injection 20 mg  20 mg Intravenous QAM    norepinephrine (Levophed) 32 mg in dextrose 5% 250 mL infusion  0.5-30 mcg/min Intravenous Continuous    vancomycin (Firvanq) 50 mg/mL oral solution 125 mg  125 mg Per NG Tube QID    calcium gluconate 2g in 100mL iso-NaCl IVPB premix  2 g Intravenous Once    atorvastatin (Lipitor) tab 20 mg  20 mg Oral Nightly    cyanocobalamin (Vitamin B12) tab 2,000 mcg  2,000 mcg Oral Daily    tamsulosin (Flomax) cap 0.4 mg  0.4 mg Oral Nightly    acetaminophen (Tylenol) tab 650 mg  650 mg Oral Q6H PRN    ondansetron (Zofran) 4 MG/2ML injection 4 mg  4 mg Intravenous Q6H PRN    multivitamin (Tab-A-Kieran/Beta Carotene) tab 1 tablet  1 tablet Oral Daily    thiamine (Vitamin B1) tab 100 mg  100 mg Oral Daily    miconazole  2 % powder   Topical BID       LABS     Lab Results   Component Value Date    WBC 9.6 01/07/2025    HGB 10.9 01/07/2025    HCT 32.6 01/07/2025    .0 01/07/2025    CREATSERUM 4.37 01/07/2025    CREATSERUM 4.37 01/07/2025    BUN 90 01/07/2025    BUN 90 01/07/2025     01/07/2025     01/07/2025    K 4.0 01/07/2025    K 4.0 01/07/2025     01/07/2025     01/07/2025    CO2 18.0 01/07/2025    CO2 18.0 01/07/2025     01/07/2025     01/07/2025    CA 8.5 01/07/2025    CA 8.5 01/07/2025    ALB 3.3 01/07/2025    ALB 3.3 01/07/2025    ALKPHO 89 01/07/2025    BILT 0.6 01/07/2025    TP 5.8 01/07/2025    AST 19 01/07/2025    ALT 12 01/07/2025    PTT 34.9 01/07/2025    INR 1.57 01/07/2025    PTP 18.9 01/07/2025    LIP 96 01/06/2025    MG 2.3 01/07/2025    PHOS 6.9 01/07/2025       IMAGING   All imaging studies personally reviewed.    CT ABDOMEN+PELVIS(CPT=74176)   Final Result   PROCEDURE:  CT ABDOMEN+PELVIS (CPT=74176)       COMPARISON:  [facility], CT ABD(C/S)/PELVIS(C) (SET), 3/14/2006, 7:09 PM.       INDICATIONS:  abdominal distention       TECHNIQUE:  Unenhanced multislice CT scanning was performed from the dome    of the diaphragm to the pubic symphysis.  Dose reduction techniques were    used. Dose information is transmitted to the ACR (American College of    Radiology) NRDR (National Radiology    Data Registry) which includes the Dose Index Registry.       PATIENT STATED HISTORY: (As transcribed by Technologist)  Patient is here    for evaluation of abdominal distention.             FINDINGS:     LIVER:  No enlargement, atrophy, abnormal density, or significant focal    lesion.     BILIARY:  There is gallbladder wall thickening and mild stranding around    the gallbladder.  There are no calcified stones detected.   PANCREAS:  No lesion, fluid collection, ductal dilatation, or atrophy.     SPLEEN:  No enlargement or focal lesion.     KIDNEYS:  Kidneys are unremarkable.   ADRENALS:   No mass or enlargement.     AORTA/VASCULAR:    Aorta is diffusely atherosclerotic but not aneurysmal.   RETROPERITONEUM:  There is an enlarged portal caval space lymph node    series 3, image 47 which measures 3.6 x 1.7 cm.    BOWEL/MESENTERY:  There is a nasogastric tube with tip in gastric body.     There is enlarged lymph node noted in region of gastrohepatic ligament    series 3, image 44 which measures 2.4 x 2 cm.    ABDOMINAL WALL:  Diffuse body wall edema is noted.   URINARY BLADDER:  No visible focal wall thickening, lesion, or calculus.     PELVIC NODES:  No adenopathy.     PELVIC ORGANS:  No visible mass.  Pelvic organs appropriate for patient    age.     BONES:  There is diffuse osteopenia noted.  There is diffuse degenerative    disc disease in the spine.   LUNG BASES:  There are moderate bilateral pleural effusions and there is    dependent atelectasis in the lower lobes.   OTHER:  Negative.                           =====   CONCLUSION:     1. There is diffuse body wall edema.  There are moderate bilateral pleural    effusions and there is dependent atelectasis likely related to diffuse    edematous state.   2. Gallbladder wall thickening and mild stranding around gallbladder could    also be related to diffusely edematous state although correlation with any    clinical evidence of cholecystitis is necessary.   3. There is retroperitoneal and gastrohepatic ligament lymphadenopathy    which is of uncertain significance.   4. Nasogastric tube tip is in region of gastric body.             LOCATION:  [place]           Dictated by (CST): Ahmet Griffith MD on 1/07/2025 at 10:16 AM        Finalized by (CST): Ahmet Griffith MD on 1/07/2025 at 10:22 AM         XR CHEST AP PORTABLE  (CPT=71045)   Final Result   PROCEDURE:  XR CHEST AP PORTABLE  (CPT=71045)       TECHNIQUE:  AP chest radiograph was obtained.       COMPARISON:  [facility] , XR, XR CHEST AP PORTABLE  (CPT=71045), 1/07/2025,    6:37 AM.       INDICATIONS:   Verify correct tube placement       PATIENT STATED HISTORY: (As transcribed by Technologist)  Patient offered    no additional history at this time.                                         =====   CONCLUSION:         Stable cardiac and mediastinal contours.  Findings of congestive heart    failure with moderate pulmonary edema.  A small left pleural effusion is    suspected, similar to prior.  No pneumothorax.       Endotracheal tube tip terminates approximately 5 cm from the gianna.     Enteric catheter extends into the upper abdomen beyond the field of view.     Interval placement of right IJ central venous catheter terminating in the    mid/lower SVC.           LOCATION:  [id number]                       Dictated by (CST): Yudy Colon MD on 1/07/2025 at 8:22 AM        Finalized by (CST): Yudy Colon MD on 1/07/2025 at 8:23 AM         XR CHEST AP PORTABLE  (CPT=71045)   Final Result   PROCEDURE:  XR CHEST AP PORTABLE  (CPT=71045)       TECHNIQUE:  AP chest radiograph was obtained.       COMPARISON:  [address] [facility], XR, XR CHEST PA + LAT CHEST (CPT=71046),    11/01/2023, 12:38 PM.       INDICATIONS:  s/p intubation       PATIENT STATED HISTORY: (As transcribed by Technologist)  Patient offered    no additional history at this time.             FINDINGS:                           =====   CONCLUSION:  Stable cardiac and mediastinal contours.  Findings of    pulmonary venous hypertension with mild/moderate pulmonary edema.  A    small/mild left pleural effusion is suspected.  No appreciable    pneumothorax.       Endotracheal tube tip terminates approximately 4 cm from the gianna.     Enteric catheter extends into the upper abdomen beyond the field of view.           LOCATION:  [id number]                       Dictated by (CST): Yudy Colon MD on 1/07/2025 at 6:50 AM        Finalized by (CST): Yudy Colon MD on 1/07/2025 at 6:51 AM         XR ABDOMEN (1 VIEW) (CPT=74018)   Final Result   PROCEDURE:  XR ABDOMEN (1 VIEW)  (CPT=74018)       INDICATIONS:  abdomen distended       COMPARISON:  None.       TECHNIQUE:  Supine AP view was obtained.       PATIENT STATED HISTORY: (As transcribed by Technologist)  Patient offered    no additional history at this time.                                   =====   CONCLUSION:         Under penetration related to body habitus degrades assessment of the bowel    gas pattern.  Within this limitation there appears to be moderate gaseous    distention of the stomach and small bowel with relative paucity of colonic    gas.  Findings may reflect    ileus or bowel obstruction.           LOCATION:  [id number]           Dictated by (CST): Yudy Colon MD on 1/07/2025 at 6:48 AM        Finalized by (CST): Yudy Colon MD on 1/07/2025 at 6:49 AM         US KIDNEY/BLADDER (CPT=76770)   Final Result   PROCEDURE:  US KIDNEY/BLADDER (CPT=76770)       COMPARISON:  None.       INDICATIONS:  Bleeding ulcers       TECHNIQUE:  Transabdominal gray scale ultrasound imaging of the bilateral    kidneys and bladder was performed.  Routine technique was utilized.         PATIENT STATED HISTORY: (As transcribed by Technologist)              FINDINGS:        RIGHT KIDNEY   MEASUREMENTS:  13.5 x 7.3 x 8.4 cm   ECHOGENICITY:  Normal.   HYDRONEPHROSIS:  None.   CYSTS/STONES/MASSES:  None.       LEFT KIDNEY    MEASUREMENTS:  12.7 x 7.6 x 7.9 cm   ECHOGENICITY:  Normal.   HYDRONEPHROSIS:  None.   CYSTS/STONES/MASSES:  None.       BLADDER:  Empty, not evaluated    OTHER:  Negative.                         =====   CONCLUSION:  Normal appearance of the kidneys.           LOCATION:  [id number]                   Dictated by (CST): Aftab Georges MD on 1/06/2025 at 1:26 PM        Finalized by (CST): Aftab Georges MD on 1/06/2025 at 1:29 PM               ASSESSMENT & PLAN    74 year old male w chronic Afib, DM, lymphedema, morbid obesity essentially wheelchair bound w sacral decubitus ulcer, asc ao aneurysm who called 911 today as was bleeding  profusely from decubitus ulcer. Nephrology consulted for EMILIO     EIMLIO  -- likely prerenal due decreased intravascular volume + poor perfusion w hypotension   -- BUN 84, Cr 5.2mg/dL on admit. Baseline Cr appears 0.9-1.14mg/dL mostly since 2022  -- renal US w no hydro.   -- check UA urine lytes   -- hold torsemide, jardiance, irbesartan, spironolactone  -- stop etoh, monitor per primary team   -- sp bolus, continue IVFs, now on bicarb. Increase rate slightly   Cr actually improved on morning labs after IVFs yesterday but may worsen again in next 24-48hrs due to arrest and shock   Will need to be mindful of diffuse body wall edema. Also moderate bl pleural effusions  -- suarez placed, strict UOP  -- ECHO pending   -- avoid NSAIDs, IV contrast, other nephrotoxins. Renally dose meds  -- no acute need for dialysis, but need to monitor closely. Pt previously agreeable to dialysis if acute need arises.      Acidosis  -- lactic wnl. Changed IVF to bicarb.     Hyperphosphatemia  -- binders if feeds starting    Hypocalcemia  -- repleting. Recheck iCa and trend     Anemia /acute blood loss anemia  -- transfusion prn. Check iron stores     Cardiac arrest  -- per cards    Shock   -- on pressors. Echo pending.     Abd distention   -- CT pending     Acute hypoxic resp failure   -- intubated on MV. Per ICU pulm     Critical care time > 35min    D/w RN and pt daughter   D/w Dr Flores    Thank you for allowing me to participate in the care of this patient. Please do not hesitate to call with questions or concerns.       Deysi Coyle MD  [facility] Nephrology

## 2025-01-07 NOTE — SPIRITUAL CARE NOTE
Spiritual Care Visit Note    Patient Name: Gerardo Torrez Date of Spiritual Care Visit: 25   : 6/15/1950 Primary Dx: Acute renal failure, unspecified acute renal failure type (HCC)       Referred By:      Spiritual Care Taxonomy:    Intended Effects: Demonstrate caring and concern    Methods: Offer support;Collaborate with care team member;Offer emotional support    Interventions: Acknowledge current situation;Active listening;Ask guided questions;Silent prayer    Visit Type/Summary:     - Spiritual Care: Consulted with RN prior to visit.  responded to [employer].  followed patient as he was transported to ICU after Code.  escorted wife and daughter from [facility] to ICU.  connected wife Grace with patient daughter.  offered beverages and educated family on waiting area for other family members who will arrive.  remains available as needed for follow up.    Spiritual Care support can be requested via an Epic consult. For urgent/immediate needs, please contact the On Call  at: [facility]: ext [phone]    Pr. Rox Mansfield Mdiv.

## 2025-01-07 NOTE — WOUND PROGRESS NOTE
[facility]  Inpatient Wound Care Contact Note    Gerardo Torrez Patient Status:  Inpatient    6/15/1950 MRN [id number]   Location [facility] 4SW-A Attending Peter Flores, DO   Hosp Day # 1 PCP Sid Gordon MD     Attempted to see patient for follow up wound care session.  Patient's condition is unstable per RN, causing difficulty with turning and repositioning at this time.    We will continue to follow this patient while in-house and assist with wound care discharge planning.    Please call me at [phone]  if you have any questions about this consultation and plan of care.      Thank you,    Val England RN BSN [facility]  [address] [address]

## 2025-01-07 NOTE — CONSULTS
[facility]  Report of Consultation    Gerardo Torrez Patient Status:  Inpatient    6/15/1950 MRN [id number]   Location [facility] 4SW-A Attending Peter Flores, DO   Hosp Day # 1 PCP Sid Gordon MD     25    Reason for Consultation:    Surgical Consultation     CC:   Chief Complaint   Patient presents with    Bleeding        History of Present Illness:    Gerardo Torrez is a a(n) 74 year old male. Patient was admitted yesterday with complains of bleeding from a sacral ulcer.   Patient noted increased abdominal distension, had cardiac arrest during KUB  Patient intubated and sedated on pressors in the ICU, he was noted to have elevated abdominal pressures, general surgery consulted  CT scan was obtained diffuse body wall edema, gb wall thickening- likely 2/2 volume overload, no obstruction, no free air noted.   OG output charted 450cc     Past Medical History:    Past Medical History:    Back problem    CAD (coronary artery disease)    Calculus of kidney    Diabetes (HCC)    Diabetes mellitus type 2 in obese    Dilated aortic root (HCC)    GOUT    Gout    High blood pressure    High cholesterol    KIDNEY STONE    Morbid obesity (HCC)    Neuropathy    OTHER DISEASES    SVT Chato    PONV (postoperative nausea and vomiting)    Pulmonary hypertension (HCC)    Thoracic aortic aneurysm (HCC)       Past Surgical History:    Past Surgical History:   Procedure Laterality Date    Appendectomy      Back surgery      discectomy/cauda equina   [place]    Colonoscopy      polyps/[facility]  [place]    Colonoscopy      Colonoscopy      Colonoscopy N/A 10/28/2015    Procedure: COLONOSCOPY;  Surgeon: Reed Gracia;  Location:  ENDOSCOPY    Colonoscopy N/A 2023    Procedure: COLONOSCOPY;  Surgeon: Evgeny Horner MD;  Location:  ENDOSCOPY    Each add tooth extraction      Lithotripsy      Other surgical history      achilles rupture       Family History:    Family History   Problem Relation Age of  Onset    Cancer Father         colon cancer       Social History:     reports that he quit smoking about 6 years ago. His smoking use included cigarettes. He started smoking about 26 years ago. He has a 20 pack-year smoking history. He has never used smokeless tobacco. He reports that he does not currently use alcohol after a past usage of about 3.0 - 4.0 standard drinks of alcohol per week. He reports that he does not use drugs.    Allergies:    Allergies[1]    Current Medications:      Current Facility-Administered Medications:     acetaminophen (Tylenol) 160 MG/5ML oral liquid 650 mg, 650 mg, Per NG Tube, Q4H PRN **OR** acetaminophen (Tylenol) rectal suppository 650 mg, 650 mg, Rectal, Q4H PRN **OR** acetaminophen (Ofirmev) 10 mg/mL infusion premix 1,000 mg, 1,000 mg, Intravenous, Q6H PRN    fentaNYL (Sublimaze) 50 mcg/mL injection 50 mcg, 50 mcg, Intravenous, Q30 Min PRN    fentaNYL (Sublimaze) 50 mcg BOLUS FROM BAG infusion, 50 mcg, Intravenous, Once PRN **AND** fentaNYL in sodium chloride 0.9% (Sublimaze) 1000 mcg/100mL infusion premix,  mcg/hr, Intravenous, Continuous PRN    fentaNYL (Sublimaze) 25 mcg BOLUS FROM BAG infusion, 25 mcg, Intravenous, Q30 Min PRN    senna (Senokot) 8.8 MG/5ML oral syrup 17.6 mg, 10 mL, Per NG Tube, BID    docusate (Colace) 50 MG/5ML oral solution 100 mg, 100 mg, Per NG Tube, BID    polyethylene glycol (PEG 3350) (Miralax) 17 g oral packet 17 g, 17 g, Per NG Tube, Daily PRN    bisacodyl (Dulcolax) 10 MG rectal suppository 10 mg, 10 mg, Rectal, Daily PRN    chlorhexidine gluconate (Peridex) 0.12 % oral solution 15 mL, 15 mL, Mouth/Throat, BID@0800,2000    mineral oil-white petrolatum (Artificial Tears) 83-15 % ophthalmic ointment 1 Application, 1 Application, Both Eyes, Nightly    vasopressin (Vasostrict) 20 Units in sodium chloride 0.9% 100 mL infusion for septic shock, 0.01-0.03 Units/min, Intravenous, Continuous    phenylephrine in sodium chloride 0.9% (Francesco-Synephrine) 50  mg/250mL infusion premix,  mcg/min, Intravenous, Continuous    midazolam (Versed) 2 MG/2ML injection 2 mg, 2 mg, Intravenous, Q5 Min PRN    propofol (Diprivan) 10 mg/mL infusion premix, 5-50 mcg/kg/min (Dosing Weight), Intravenous, Continuous    piperacillin-tazobactam (Zosyn) 4.5 g in dextrose 5% 100 mL IVPB-ADDV, 4.5 g, Intravenous, Q12H    famotidine (Pepcid) 20 mg/2mL injection 20 mg, 20 mg, Intravenous, QAM    norepinephrine (Levophed) 32 mg in dextrose 5% 250 mL infusion, 0.5-30 mcg/min, Intravenous, Continuous    sodium bicarbonate 150 mEq in dextrose 5% 1,000 mL infusion, 150 mEq, Intravenous, Continuous    Vancomycin: PHARMACY DOSING, 1 each, Intravenous, See Admin Instructions (RX holding)    DOPamine in dextrose 5% (Intropin) 800 mg/250mL infusion premix, 2-20 mcg/kg/min (Dosing Weight), Intravenous, Continuous    atorvastatin (Lipitor) tab 20 mg, 20 mg, Oral, Nightly    cyanocobalamin (Vitamin B12) tab 2,000 mcg, 2,000 mcg, Oral, Daily    tamsulosin (Flomax) cap 0.4 mg, 0.4 mg, Oral, Nightly    acetaminophen (Tylenol) tab 650 mg, 650 mg, Oral, Q6H PRN    ondansetron (Zofran) 4 MG/2ML injection 4 mg, 4 mg, Intravenous, Q6H PRN    multivitamin (Tab-A-Kieran/Beta Carotene) tab 1 tablet, 1 tablet, Oral, Daily    thiamine (Vitamin B1) tab 100 mg, 100 mg, Oral, Daily    miconazole 2 % powder, , Topical, BID    Home Medications:    Medications Ordered Prior to Encounter[2]    Review of Systems:    10 point review performed pertinent positives and negatives per history of present illness.    Physical Exam:    Blood pressure 108/47, pulse 68, temperature 97.4 °F (36.3 °C), temperature source Temporal, resp. rate 22, height 5' 10\" (1.778 m), weight (!) 340 lb 6.2 oz (154.4 kg), SpO2 95%.    GENERAL: intubated and sedated    SKIN: anicteric    RESPIRATORY: non labored breathing    CARDIOVASCULAR: RRR    ABDOMEN: soft, distended, reducible umbilical hernia    LYMPHATIC: no lymphadenopathy    EXTREMITIES: no  cyanosis, clubbing     .    Laboratory Data:  Recent Labs   Lab 01/06/25  1145 01/07/25  0643 01/07/25  1200   WBC 8.8 9.6 13.6*   HGB 11.8* 10.9* 11.7*   .7* 112.4* 111.5*   .0* 117.0* 134.0*   INR  --  1.57*  --        Recent Labs   Lab 01/06/25  1145 01/07/25  0643 01/07/25  1200   * 134*  134* 135*   K 4.5 4.0  4.0 4.2    104  104 102   CO2 20.0* 18.0*  18.0* 19.0*   BUN 84* 90*  90* 82*   CREATSERUM 5.20* 4.37*  4.37* 3.64*   * 148*  148* 174*   CA 9.2 8.5*  8.5* 8.8   MG  --  2.3 2.3   PHOS  --  6.9* 5.4*       Recent Labs   Lab 01/06/25  1145 01/07/25  0643 01/07/25  1200   ALT 12 12 13   AST 17 19 20   ALB 3.5 3.3  3.3 3.4       No results for input(s): \"TROP\" in the last 168 hours.          Radiology:    CT ABDOMEN+PELVIS(CPT=74176)    Result Date: 1/7/2025  PROCEDURE:  CT ABDOMEN+PELVIS (CPT=74176)  COMPARISON:  [facility] , CT, CT ABD(C/S)/PELVIS(C) (SET), 3/14/2006, 7:09 PM.  INDICATIONS:  abdominal distention  TECHNIQUE:  Unenhanced multislice CT scanning was performed from the dome of the diaphragm to the pubic symphysis.  Dose reduction techniques were used. Dose information is transmitted to the ACR (American College of Radiology) NRDR (National Radiology Data Registry) which includes the Dose Index Registry.  PATIENT STATED HISTORY: (As transcribed by Technologist)  Patient is here for evaluation of abdominal distention.    FINDINGS:  LIVER:  No enlargement, atrophy, abnormal density, or significant focal lesion.  BILIARY:  There is gallbladder wall thickening and mild stranding around the gallbladder.  There are no calcified stones detected. PANCREAS:  No lesion, fluid collection, ductal dilatation, or atrophy.  SPLEEN:  No enlargement or focal lesion.  KIDNEYS:  Kidneys are unremarkable. ADRENALS:  No mass or enlargement.  AORTA/VASCULAR:    Aorta is diffusely atherosclerotic but not aneurysmal. RETROPERITONEUM:  There is an enlarged portal caval space lymph  node series 3, image 47 which measures 3.6 x 1.7 cm. BOWEL/MESENTERY:  There is a nasogastric tube with tip in gastric body.  There is enlarged lymph node noted in region of gastrohepatic ligament series 3, image 44 which measures 2.4 x 2 cm. ABDOMINAL WALL:  Diffuse body wall edema is noted. URINARY BLADDER:  No visible focal wall thickening, lesion, or calculus.  PELVIC NODES:  No adenopathy.  PELVIC ORGANS:  No visible mass.  Pelvic organs appropriate for patient age.  BONES:  There is diffuse osteopenia noted.  There is diffuse degenerative disc disease in the spine. LUNG BASES:  There are moderate bilateral pleural effusions and there is dependent atelectasis in the lower lobes. OTHER:  Negative.             CONCLUSION:  1. There is diffuse body wall edema.  There are moderate bilateral pleural effusions and there is dependent atelectasis likely related to diffuse edematous state. 2. Gallbladder wall thickening and mild stranding around gallbladder could also be related to diffusely edematous state although correlation with any clinical evidence of cholecystitis is necessary. 3. There is retroperitoneal and gastrohepatic ligament lymphadenopathy which is of uncertain significance. 4. Nasogastric tube tip is in region of gastric body.    LOCATION:  [facility]   Dictated by (CST): Ahmet Griffith MD on 1/07/2025 at 10:16 AM     Finalized by (CST): Ahmet Griffith MD on 1/07/2025 at 10:22 AM       XR CHEST AP PORTABLE  (CPT=71045)    Result Date: 1/7/2025  PROCEDURE:  XR CHEST AP PORTABLE  (CPT=71045)  TECHNIQUE:  AP chest radiograph was obtained.  COMPARISON:  [facility] , XR, XR CHEST AP PORTABLE  (CPT=71045), 1/07/2025, 6:37 AM.  INDICATIONS:  Verify correct tube placement  PATIENT STATED HISTORY: (As transcribed by Technologist)  Patient offered no additional history at this time.                  CONCLUSION:   Stable cardiac and mediastinal contours.  Findings of congestive heart failure with moderate pulmonary edema.  A  small left pleural effusion is suspected, similar to prior.  No pneumothorax.  Endotracheal tube tip terminates approximately 5 cm from the gianna.  Enteric catheter extends into the upper abdomen beyond the field of view.  Interval placement of right IJ central venous catheter terminating in the mid/lower SVC.   LOCATION:  [id number]      Dictated by (CST): Yudy Colon MD on 1/07/2025 at 8:22 AM     Finalized by (CST): Yudy Colon MD on 1/07/2025 at 8:23 AM       XR CHEST AP PORTABLE  (CPT=71045)    Result Date: 1/7/2025  PROCEDURE:  XR CHEST AP PORTABLE  (CPT=71045)  TECHNIQUE:  AP chest radiograph was obtained.  COMPARISON:  [facility], XR, XR CHEST PA + LAT CHEST (CPT=71046), 11/01/2023, 12:38 PM.  INDICATIONS:  s/p intubation  PATIENT STATED HISTORY: (As transcribed by Technologist)  Patient offered no additional history at this time.    FINDINGS:             CONCLUSION:  Stable cardiac and mediastinal contours.  Findings of pulmonary venous hypertension with mild/moderate pulmonary edema.  A small/mild left pleural effusion is suspected.  No appreciable pneumothorax.  Endotracheal tube tip terminates approximately 4 cm from the gianna.  Enteric catheter extends into the upper abdomen beyond the field of view.   LOCATION:  [id number]      Dictated by (CST): Yudy Colon MD on 1/07/2025 at 6:50 AM     Finalized by (CST): Yudy Colon MD on 1/07/2025 at 6:51 AM       XR ABDOMEN (1 VIEW) (CPT=74018)    Result Date: 1/7/2025  PROCEDURE:  XR ABDOMEN (1 VIEW) (CPT=74018)  INDICATIONS:  abdomen distended  COMPARISON:  None.  TECHNIQUE:  Supine AP view was obtained.  PATIENT STATED HISTORY: (As transcribed by Technologist)  Patient offered no additional history at this time.               CONCLUSION:   Under penetration related to body habitus degrades assessment of the bowel gas pattern.  Within this limitation there appears to be moderate gaseous distention of the stomach and small bowel with relative paucity of colonic  gas.  Findings may reflect ileus or bowel obstruction.   LOCATION:  [id number]   Dictated by (CST): Yudy Colon MD on 1/07/2025 at 6:48 AM     Finalized by (CST): Yudy Colon MD on 1/07/2025 at 6:49 AM       US KIDNEY/BLADDER (CPT=76770)    Result Date: 1/6/2025  PROCEDURE:  US KIDNEY/BLADDER (CPT=76770)  COMPARISON:  None.  INDICATIONS:  Bleeding ulcers  TECHNIQUE:  Transabdominal gray scale ultrasound imaging of the bilateral kidneys and bladder was performed.  Routine technique was utilized.   PATIENT STATED HISTORY: (As transcribed by Technologist)     FINDINGS:   RIGHT KIDNEY MEASUREMENTS:  13.5 x 7.3 x 8.4 cm ECHOGENICITY:  Normal. HYDRONEPHROSIS:  None. CYSTS/STONES/MASSES:  None.  LEFT KIDNEY MEASUREMENTS:  12.7 x 7.6 x 7.9 cm ECHOGENICITY:  Normal. HYDRONEPHROSIS:  None. CYSTS/STONES/MASSES:  None.  BLADDER:  Empty, not evaluated OTHER:  Negative.            CONCLUSION:  Normal appearance of the kidneys.   LOCATION:  [id number]     Dictated by (CST): Aftab Georges MD on 1/06/2025 at 1:26 PM     Finalized by (CST): Aftab Georges MD on 1/06/2025 at 1:29 PM          Problem List:    Patient Active Problem List   Diagnosis    Dyslipidemia    Gout    IGT (impaired glucose tolerance)    Aneurysm, thoracic aortic (HCC)    Controlled type 2 diabetes mellitus without complication, with long-term current use of insulin (HCC)    Obesity, morbid (HCC)    Coronary artery disease involving native coronary artery of native heart without angina pectoris    Dilated aortic root (HCC)    Pulmonary hypertension (HCC)    Essential hypertension    Acute renal failure, unspecified acute renal failure type (HCC)    Anemia, unspecified type       Impression:    75 y/o with abdominal distension  CT scan as noted above  -cardiac arrest today while KUB  -intubated/sedated  -anasarca   Lactic acid 1.0  Obese, soft, distended reducible umbilical hernia    Plan:    NPO  OG to LIS  IV fluids  No plans for surgical management  Serial  abdominal exams  Continue critical care management   Patient seen and examined with [facility] Leigh  [facility]  General Surgery  1/7/2025    Addendum  Agree as above  Discussed about the findings with patient's wife  At this time there is significant anasarca along with abdominal distension but no clear evidence of abdominal compartment syndrome.  There is umbilical hernia but no evidence of incarceration.  His lactic acid level remains normal.  No acute surgical issues to intervene at this time.  Will follow along. Voiced understanding         [1]   Allergies  Allergen Reactions    Metformin DIARRHEA   [2]   No current facility-administered medications on file prior to encounter.     Current Outpatient Medications on File Prior to Encounter   Medication Sig Dispense Refill    Cholecalciferol (D3 OR) Take 1 tablet by mouth Noon.      apixaban (ELIQUIS) 5 MG Oral Tab Take 1 tablet (5 mg total) by mouth 2 (two) times daily.      dilTIAZem HCl ER Beads 120 MG Oral Capsule SR 24 Hr Take 1 capsule (120 mg total) by mouth daily.      spironolactone 25 MG Oral Tab Take 0.5 tablets (12.5 mg total) by mouth daily.      metoprolol succinate ER (TOPROL XL) 50 MG Oral Tablet 24 Hr Take 1 tablet (50 mg total) by mouth 2 (two) times daily.      torsemide 10 MG Oral Tab Take 1 tablet (10 mg total) by mouth 3 (three) times a week.      torsemide 10 MG Oral Tab See Admin Instructions. Take 1 tablet (10 MG total) by mouth twice daily four days a week.      folic acid 1 MG Oral Tab Take 1 tablet (1 mg total) by mouth daily.      Semaglutide (RYBELSUS) 7 MG Oral Tab Take 1 tablet by mouth daily.      empagliflozin 10 MG Oral Tab Take 1 tablet (10 mg total) by mouth daily.      allopurinol 300 MG Oral Tab Take 1 tablet (300 mg total) by mouth daily.      Irbesartan 150 MG Oral Tab Take 1 tablet (150 mg total) by mouth daily.      tamsulosin (FLOMAX) cap TAKE 1 CAPSULE(0.4 MG) BY MOUTH DAILY (Patient taking  differently: Take 1 capsule (0.4 mg total) by mouth daily.) 90 capsule 1    Cyanocobalamin (B-12) 1000 MCG Oral Tab Take 2,000 mcg by mouth Noon.      atorvastatin (LIPITOR) 20 MG Oral Tab Take 1 tablet (20 mg total) by mouth daily.      aspirin 81 MG Oral Tab EC Take 1 tablet (81 mg total) by mouth at bedtime.

## 2025-01-07 NOTE — OCCUPATIONAL THERAPY NOTE
OT order received, chart reviewed. Patient admitted 1/6 with renal failure and bleeding from sacral ulcer. Patient sustained a cardiac arrest with emergent intubation early this A.M, thus will hold initiation of therapy until patient is more medically stable.

## 2025-01-07 NOTE — H&P
[facility]    Gerardo Torrez Patient Status:  Inpatient    6/15/1950 MRN [id number]   Location [facility] 4SW-A Attending Peter Flores, DO   Hosp Day # 1 PCP Sid Gordon MD     Date of Admission: 2025  Admission Diagnosis: Acute renal failure, unspecified acute renal failure type (HCC) [N17.9]  Anemia, unspecified type [D64.9]     History of Present Illness: 73 yo male with history of HTN, HLD, DM, CAD, A.Fib on eliquis, pulm HTN, EtOH abuse who presented on  with bleeding from sacral wound as well as diarrhea and abdominal distention.  On admission labs were notable for EMILIO with creatinine of 5.2.  Pt was given 1L NS.  CT a/p was ordered although pt could not tolerate laying flat to tolerate the study.  This morning pt was layed flat for a KUB when he lost a pulse and became unconscious.   Code blue was called and CPR was initiated.  ROSC was obtained after 1 round of epi.  Pt was intubated and brought to the ICU for further care..       Past Medical History:    Back problem    CAD (coronary artery disease)    Calculus of kidney    Diabetes (HCC)    Diabetes mellitus type 2 in obese    Dilated aortic root (HCC)    GOUT    Gout    High blood pressure    High cholesterol    KIDNEY STONE    Morbid obesity (HCC)    Neuropathy    OTHER DISEASES    SVT Chato    PONV (postoperative nausea and vomiting)    Pulmonary hypertension (HCC)    Thoracic aortic aneurysm (HCC)      Past Surgical History:   Procedure Laterality Date    Appendectomy      Back surgery      discectomy/cauda equina   [place]ole    Colonoscopy      polyps/[facility]  [place]    Colonoscopy      Colonoscopy      Colonoscopy N/A 10/28/2015    Procedure: COLONOSCOPY;  Surgeon: Reed Garcia;  Location:  ENDOSCOPY    Colonoscopy N/A 2023    Procedure: COLONOSCOPY;  Surgeon: Evgeny Horner MD;  Location:  ENDOSCOPY    Each add tooth extraction      Lithotripsy      Other surgical history      achilles rupture          Allergies[1]     Social History:   reports that he quit smoking about 6 years ago. His smoking use included cigarettes. He started smoking about 26 years ago. He has a 20 pack-year smoking history. He has never used smokeless tobacco. He reports that he does not currently use alcohol after a past usage of about 3.0 - 4.0 standard drinks of alcohol per week. He reports that he does not use drugs.     Family History:  Family History   Problem Relation Age of Onset    Cancer Father         colon cancer         Home Medications:  Medications Taking[2]     Current Medications:    Current Facility-Administered Medications:     norepinephrine (Levophed) 4 mg/250mL infusion premix, , ,     propofol (Diprivan) 10 MG/ML injection, , ,     acetaminophen (Tylenol) 160 MG/5ML oral liquid 650 mg, 650 mg, Per NG Tube, Q4H PRN **OR** acetaminophen (Tylenol) rectal suppository 650 mg, 650 mg, Rectal, Q4H PRN **OR** acetaminophen (Ofirmev) 10 mg/mL infusion premix 1,000 mg, 1,000 mg, Intravenous, Q6H PRN    fentaNYL (Sublimaze) 50 mcg/mL injection 50 mcg, 50 mcg, Intravenous, Q30 Min PRN    fentaNYL (Sublimaze) 50 mcg BOLUS FROM BAG infusion, 50 mcg, Intravenous, Once PRN **AND** fentaNYL in sodium chloride 0.9% (Sublimaze) 1000 mcg/100mL infusion premix,  mcg/hr, Intravenous, Continuous PRN    fentaNYL (Sublimaze) 25 mcg BOLUS FROM BAG infusion, 25 mcg, Intravenous, Q30 Min PRN    senna (Senokot) 8.8 MG/5ML oral syrup 17.6 mg, 10 mL, Per NG Tube, BID    docusate (Colace) 50 MG/5ML oral solution 100 mg, 100 mg, Per NG Tube, BID    polyethylene glycol (PEG 3350) (Miralax) 17 g oral packet 17 g, 17 g, Per NG Tube, Daily PRN    bisacodyl (Dulcolax) 10 MG rectal suppository 10 mg, 10 mg, Rectal, Daily PRN    chlorhexidine gluconate (Peridex) 0.12 % oral solution 15 mL, 15 mL, Mouth/Throat, BID@0800,2000    mineral oil-white petrolatum (Artificial Tears) 83-15 % ophthalmic ointment 1 Application, 1 Application, Both Eyes,  Nightly    famotidine (Pepcid) 20 mg/2mL injection 10 mg, 10 mg, Intravenous, QAM    lidocaine (Xylocaine) 1 % injection, 1 mL, Intradermal, Once    vasopressin (Vasostrict) 20 Units in sodium chloride 0.9% 100 mL infusion for septic shock, 0.01-0.03 Units/min, Intravenous, Continuous    norepinephrine (Levophed) 4 mg/250mL infusion premix, 0.5-30 mcg/min, Intravenous, Continuous    sodium chloride 0.9% infusion, 125 mL/hr, Intravenous, Continuous    atorvastatin (Lipitor) tab 20 mg, 20 mg, Oral, Nightly    cyanocobalamin (Vitamin B12) tab 2,000 mcg, 2,000 mcg, Oral, Daily    tamsulosin (Flomax) cap 0.4 mg, 0.4 mg, Oral, Nightly    acetaminophen (Tylenol) tab 650 mg, 650 mg, Oral, Q6H PRN    ondansetron (Zofran) 4 MG/2ML injection 4 mg, 4 mg, Intravenous, Q6H PRN    multivitamin (Tab-A-Kieran/Beta Carotene) tab 1 tablet, 1 tablet, Oral, Daily    thiamine (Vitamin B1) tab 100 mg, 100 mg, Oral, Daily    miconazole 2 % powder, , Topical, BID     REVIEW OF SYSTEMS:   Unable to obtain - pt intubated     OBJECTIVE:  BP 96/61 (BP Location: Right arm)   Pulse 57   Temp 98.2 °F (36.8 °C) (Oral)   Resp 17   Ht 70\"   Wt (!) 340 lb 6.2 oz (154.4 kg)   SpO2 92%   BMI 48.84 kg/m²      Ventilator Settings:          Wt Readings from Last 3 Encounters:   01/06/25 (!) 340 lb 6.2 oz (154.4 kg)   11/06/23 (!) 305 lb (138.3 kg)   07/18/23 300 lb (136.1 kg)        I/O last 3 completed shifts:  In: 240 [P.O.:240]  Out: 50 [Urine:50]  No intake/output data recorded.      Physical Exam:                          General: intubated                          HEENT: ETT in place                           Lungs: Clear to auscultation bilaterally, no wheezes or crackles                           Chest wall: No tenderness or deformity.                          Heart: Regular rate and rhythm, normal S1S2                          Abdomen: soft, non-tender, non-distended, positive BS.                          Extremity: No clubbing or cyanosis.  no edema                          Skin: No rashes or lesions.       Lab Results   Component Value Date    WBC 8.8 01/06/2025    RBC 3.17 01/06/2025    HGB 11.8 01/06/2025    HCT 35.1 01/06/2025    .7 01/06/2025    MCH 37.2 01/06/2025    MCHC 33.6 01/06/2025    RDW 14.6 01/06/2025    .0 01/06/2025     Lab Results   Component Value Date     01/06/2025    K 4.5 01/06/2025     01/06/2025    CO2 20.0 01/06/2025    BUN 84 01/06/2025    CREATSERUM 5.20 01/06/2025     01/06/2025    CA 9.2 01/06/2025    ALKPHO 93 01/06/2025    ALT 12 01/06/2025    AST 17 01/06/2025    BILT 0.7 01/06/2025    ALB 3.5 01/06/2025    TP 6.1 01/06/2025     No results found for: \"PT\", \"INR\"       Imaging: I have independently visualized all relevant chest imaging in PACS.  I agree with the radiology interpretation except where noted.       ASSESSMENT/PLAN:  Cardiac arrest: on 1/8 am, concern for primary intra-abdominal event   -ROSC obtained after epi x 1  -maintain normothermia   Acute respiratory failure:  -intubated in setting of cardiac arrest  -continue full vent support  -check ABG  Shock:  -IVF  -vasopressors to maintain SBP >90  -start empiric zosyn given concern for intra-abdominal process  Abdominal distention:   -OG placed to LIS  -stat CT a/p now   -on empiric Abx as above  EMILIO:  -IVF  -renal US negative for hydro  -nephrology following   A.Fib  -eliquis on hold due to bleeding from sacral wound   Thrombocytopenia:  -suspect due to EtOH abuse  -monitor   DM:  -insulin per IM  HTN  -holding antihypertensives in setting of shock  EtOH abuse  -MVI/thiamine/folate  -monitor for withdrawal  FEN:  -NPO  Proph:  -SCD  Dispo:  -ICU  -discussed with wife, updates provided     Critical Care Time greater than: 60 min    Neda Dbobs MD  1/7/2025  6:13 AM        [1]   Allergies  Allergen Reactions    Metformin DIARRHEA   [2]   Outpatient Medications Marked as Taking for the 1/6/25 encounter (Hospital  Encounter)   Medication Sig Dispense Refill    Cholecalciferol (D3 OR) Take 1 tablet by mouth Noon.      apixaban (ELIQUIS) 5 MG Oral Tab Take 1 tablet (5 mg total) by mouth 2 (two) times daily.      dilTIAZem HCl ER Beads 120 MG Oral Capsule SR 24 Hr Take 1 capsule (120 mg total) by mouth daily.      spironolactone 25 MG Oral Tab Take 0.5 tablets (12.5 mg total) by mouth daily.      metoprolol succinate ER (TOPROL XL) 50 MG Oral Tablet 24 Hr Take 1 tablet (50 mg total) by mouth 2 (two) times daily.      torsemide 10 MG Oral Tab Take 1 tablet (10 mg total) by mouth 3 (three) times a week.      torsemide 10 MG Oral Tab See Admin Instructions. Take 1 tablet (10 MG total) by mouth twice daily four days a week.      folic acid 1 MG Oral Tab Take 1 tablet (1 mg total) by mouth daily.      Semaglutide (RYBELSUS) 7 MG Oral Tab Take 1 tablet by mouth daily.      empagliflozin 10 MG Oral Tab Take 1 tablet (10 mg total) by mouth daily.      allopurinol 300 MG Oral Tab Take 1 tablet (300 mg total) by mouth daily.      Irbesartan 150 MG Oral Tab Take 1 tablet (150 mg total) by mouth daily.      tamsulosin (FLOMAX) cap TAKE 1 CAPSULE(0.4 MG) BY MOUTH DAILY (Patient taking differently: Take 1 capsule (0.4 mg total) by mouth daily.) 90 capsule 1    Cyanocobalamin (B-12) 1000 MCG Oral Tab Take 2,000 mcg by mouth Noon.      atorvastatin (LIPITOR) 20 MG Oral Tab Take 1 tablet (20 mg total) by mouth daily.      aspirin 81 MG Oral Tab EC Take 1 tablet (81 mg total) by mouth at bedtime.

## 2025-01-07 NOTE — PROCEDURES
Intubation (bedside)     Date/Time: 1/8/24 @ 0545     Performed by: Neda Dobbs MD  Authorized by: Neda Dobbs MD  Consent: The procedure was performed in an emergent situation.   Patient identity confirmed: verbally with RN  Time out: Immediately prior to procedure a \"time out\" was called to verify the correct patient, procedure, equipment, support staff  Indications: respiratory distress, respiratory failure, airway protection and hypoxemia  Patient was preoxygenated on 100% noninvasive positive pressure ventilation  Intubation method: video-assisted  Patient status: awake  Sedatives: none  Paralytic: none  Laryngoscope size: Glidescope Mac 4  Tube size: 8.0 mm  Tube type: cuffed  Number of attempts: 1  Cords visualized: yes  Post-procedure assessment: chest rise and ETCO2 monitor  Breath sounds: equal  Cuff inflated: yes  ETT to lip: 25 cm  Tube secured with: ETT odom and adhesive tape  Patient tolerance: patient tolerated the procedure well with no immediate complications    Vera ELIZABETH

## 2025-01-08 LAB
ALBUMIN SERPL-MCNC: 3.1 G/DL (ref 3.2–4.8)
ALBUMIN SERPL-MCNC: 3.1 G/DL (ref 3.2–4.8)
ALBUMIN/GLOB SERPL: 1.2 {RATIO} (ref 1–2)
ALP LIVER SERPL-CCNC: 81 U/L
ALT SERPL-CCNC: 9 U/L
ANION GAP SERPL CALC-SCNC: 7 MMOL/L (ref 0–18)
ANION GAP SERPL CALC-SCNC: 7 MMOL/L (ref 0–18)
AST SERPL-CCNC: 13 U/L (ref ?–34)
BASE EXCESS BLDA CALC-SCNC: -1.3 MMOL/L (ref ?–2)
BASE EXCESS BLDA CALC-SCNC: 1.5 MMOL/L (ref ?–2)
BASE EXCESS BLDA CALC-SCNC: 2.3 MMOL/L (ref ?–2)
BASOPHILS # BLD AUTO: 0.02 X10(3) UL (ref 0–0.2)
BASOPHILS NFR BLD AUTO: 0.2 %
BILIRUB SERPL-MCNC: 0.7 MG/DL (ref 0.2–1.1)
BODY TEMPERATURE: 98.6 F
BUN BLD-MCNC: 66 MG/DL (ref 9–23)
BUN BLD-MCNC: 66 MG/DL (ref 9–23)
CA-I BLD-SCNC: 1.25 MMOL/L (ref 0.95–1.32)
CA-I BLD-SCNC: 1.26 MMOL/L (ref 0.95–1.32)
CA-I BLD-SCNC: 1.28 MMOL/L (ref 0.95–1.32)
CALCIUM BLD-MCNC: 9 MG/DL (ref 8.7–10.4)
CALCIUM BLD-MCNC: 9 MG/DL (ref 8.7–10.4)
CHLORIDE SERPL-SCNC: 106 MMOL/L (ref 98–112)
CHLORIDE SERPL-SCNC: 106 MMOL/L (ref 98–112)
CO2 SERPL-SCNC: 25 MMOL/L (ref 21–32)
CO2 SERPL-SCNC: 25 MMOL/L (ref 21–32)
COHGB MFR BLD: 1.4 % SAT (ref 0–3)
COHGB MFR BLD: 1.4 % SAT (ref 0–3)
COHGB MFR BLD: 1.5 % SAT (ref 0–3)
CREAT BLD-MCNC: 2.38 MG/DL
CREAT BLD-MCNC: 2.38 MG/DL
EGFRCR SERPLBLD CKD-EPI 2021: 28 ML/MIN/1.73M2 (ref 60–?)
EGFRCR SERPLBLD CKD-EPI 2021: 28 ML/MIN/1.73M2 (ref 60–?)
EOSINOPHIL # BLD AUTO: 0.12 X10(3) UL (ref 0–0.7)
EOSINOPHIL NFR BLD AUTO: 1.4 %
ERYTHROCYTE [DISTWIDTH] IN BLOOD BY AUTOMATED COUNT: 14.4 %
FIO2: 50 %
GLOBULIN PLAS-MCNC: 2.5 G/DL (ref 2–3.5)
GLUCOSE BLD-MCNC: 122 MG/DL (ref 70–99)
GLUCOSE BLD-MCNC: 127 MG/DL (ref 70–99)
GLUCOSE BLD-MCNC: 129 MG/DL (ref 70–99)
GLUCOSE BLD-MCNC: 140 MG/DL (ref 70–99)
GLUCOSE BLD-MCNC: 140 MG/DL (ref 70–99)
GLUCOSE BLD-MCNC: 155 MG/DL (ref 70–99)
GLUCOSE BLD-MCNC: 158 MG/DL (ref 70–99)
HCO3 BLDA-SCNC: 23.9 MEQ/L (ref 21–27)
HCO3 BLDA-SCNC: 26.1 MEQ/L (ref 21–27)
HCO3 BLDA-SCNC: 26.7 MEQ/L (ref 21–27)
HCT VFR BLD AUTO: 32 %
HGB BLD-MCNC: 10.9 G/DL
HGB BLD-MCNC: 11 G/DL
HGB BLD-MCNC: 11.4 G/DL
HGB BLD-MCNC: 11.4 G/DL
IMM GRANULOCYTES # BLD AUTO: 0.02 X10(3) UL (ref 0–1)
IMM GRANULOCYTES NFR BLD: 0.2 %
LACTATE BLD-SCNC: 0.8 MMOL/L (ref 0.5–2)
LACTATE BLD-SCNC: 0.8 MMOL/L (ref 0.5–2)
LACTATE BLD-SCNC: 1 MMOL/L (ref 0.5–2)
LYMPHOCYTES # BLD AUTO: 0.47 X10(3) UL (ref 1–4)
LYMPHOCYTES NFR BLD AUTO: 5.6 %
MAGNESIUM SERPL-MCNC: 2.1 MG/DL (ref 1.6–2.6)
MCH RBC QN AUTO: 36.3 PG (ref 26–34)
MCHC RBC AUTO-ENTMCNC: 34.1 G/DL (ref 31–37)
MCV RBC AUTO: 106.7 FL
METHGB MFR BLD: 0.5 % SAT (ref 0.4–1.5)
METHGB MFR BLD: 0.8 % SAT (ref 0.4–1.5)
METHGB MFR BLD: 1.2 % SAT (ref 0.4–1.5)
MONOCYTES # BLD AUTO: 0.83 X10(3) UL (ref 0.1–1)
MONOCYTES NFR BLD AUTO: 9.9 %
NEUTROPHILS # BLD AUTO: 6.91 X10 (3) UL (ref 1.5–7.7)
NEUTROPHILS # BLD AUTO: 6.91 X10(3) UL (ref 1.5–7.7)
NEUTROPHILS NFR BLD AUTO: 82.7 %
OSMOLALITY SERPL CALC.SUM OF ELEC: 307 MOSM/KG (ref 275–295)
OSMOLALITY SERPL CALC.SUM OF ELEC: 307 MOSM/KG (ref 275–295)
OXYHGB MFR BLDA: 94.1 % (ref 92–100)
OXYHGB MFR BLDA: 96.4 % (ref 92–100)
OXYHGB MFR BLDA: 97 % (ref 92–100)
P/F RATIO: 146 MMHG
P/F RATIO: 176 MMHG
PCO2 BLDA: 38 MM HG (ref 35–45)
PCO2 BLDA: 39 MM HG (ref 35–45)
PCO2 BLDA: 40 MM HG (ref 35–45)
PEEP: 5 CM H2O
PEEP: 8 CM H2O
PEEP: 8 CM H2O
PH BLDA: 7.38 [PH] (ref 7.35–7.45)
PH BLDA: 7.43 [PH] (ref 7.35–7.45)
PH BLDA: 7.45 [PH] (ref 7.35–7.45)
PHOSPHATE SERPL-MCNC: 3.3 MG/DL (ref 2.4–5.1)
PLATELET # BLD AUTO: 106 10(3)UL (ref 150–450)
PO2 BLDA: 118 MM HG (ref 80–100)
PO2 BLDA: 73 MM HG (ref 80–100)
PO2 BLDA: 88 MM HG (ref 80–100)
POTASSIUM BLD-SCNC: 3.6 MMOL/L (ref 3.6–5.1)
POTASSIUM BLD-SCNC: 3.7 MMOL/L (ref 3.6–5.1)
POTASSIUM BLD-SCNC: 3.8 MMOL/L (ref 3.6–5.1)
POTASSIUM SERPL-SCNC: 3.6 MMOL/L (ref 3.5–5.1)
POTASSIUM SERPL-SCNC: 3.6 MMOL/L (ref 3.5–5.1)
PROT SERPL-MCNC: 5.6 G/DL (ref 5.7–8.2)
Q-T INTERVAL: 524 MS
QRS DURATION: 162 MS
QTC CALCULATION (BEZET): 482 MS
R AXIS: 36 DEGREES
RBC # BLD AUTO: 3 X10(6)UL
SODIUM BLD-SCNC: 136 MMOL/L (ref 135–145)
SODIUM BLD-SCNC: 136 MMOL/L (ref 135–145)
SODIUM BLD-SCNC: 137 MMOL/L (ref 135–145)
SODIUM SERPL-SCNC: 138 MMOL/L (ref 136–145)
SODIUM SERPL-SCNC: 138 MMOL/L (ref 136–145)
T AXIS: 15 DEGREES
TIDAL VOLUME: 500 ML
VANCOMYCIN SERPL-MCNC: 11 UG/ML (ref ?–40)
VENT RATE: 16 /MIN
VENTRICULAR RATE: 51 BPM
WBC # BLD AUTO: 8.4 X10(3) UL (ref 4–11)

## 2025-01-08 RX ORDER — SODIUM BICARBONATE 325 MG/1
325 TABLET ORAL AS NEEDED
Status: DISCONTINUED | OUTPATIENT
Start: 2025-01-08 | End: 2025-01-13 | Stop reason: ALTCHOICE

## 2025-01-08 RX ORDER — VANCOMYCIN 2 GRAM/500 ML IN 0.9 % SODIUM CHLORIDE INTRAVENOUS
20 ONCE
Status: DISCONTINUED | OUTPATIENT
Start: 2025-01-08 | End: 2025-01-08 | Stop reason: DRUGHIGH

## 2025-01-08 RX ORDER — HEPARIN SODIUM 5000 [USP'U]/ML
5000 INJECTION, SOLUTION INTRAVENOUS; SUBCUTANEOUS EVERY 8 HOURS SCHEDULED
Status: DISCONTINUED | OUTPATIENT
Start: 2025-01-08 | End: 2025-01-09

## 2025-01-08 NOTE — OCCUPATIONAL THERAPY NOTE
OT following to attempt initial evaluation. Patient remains intubated and sedated. Breathing trial was stopped today due to increased agitation per chart review. Will continue to follow and re-attempt as medically and clinically appropriate.

## 2025-01-08 NOTE — PROGRESS NOTES
Cardiology Progress Note        Gerardo Torrez Patient Status:  Inpatient    6/15/1950 MRN [id number]   [facility] 4SW-A Attending Peter Flores, DO   Hosp Day # 2 PCP Sid Gordon MD     Subjective:  Remains intubated, FIO2 50%.  Low dose dopamine and vaso.  Slower HR's , now improved.  Good urine output.      Medications:   vancomycin  20 mg/kg (Adjusted) Intravenous Once    senna  10 mL Per NG Tube BID    docusate  100 mg Per NG Tube BID    chlorhexidine gluconate  15 mL Mouth/Throat BID@0800,2000    mineral oil-white petrolatum  1 Application Both Eyes Nightly    piperacillin-tazobactam  4.5 g Intravenous Q12H    famotidine  20 mg Intravenous QAM    Vancomycin IV  1 each Intravenous See Admin Instructions (RX holding)    atorvastatin  20 mg Oral Nightly    cyanocobalamin  2,000 mcg Oral Daily    tamsulosin  0.4 mg Oral Nightly    multivitamin  1 tablet Oral Daily    thiamine  100 mg Oral Daily    miconazole   Topical BID       Continuous Infusions:   fentanyl 50 mcg/hr (25 0451)    vasopressin (Vasostrict) 20 Units in sodium chloride 0.9% 100 mL infusion for septic shock 0.03 Units/min (25 0622)    phenylephrine      propofol 45 mcg/kg/min (25 0536)    norepinephrine Stopped (25 1850)    sodium bicarbonate in D5W infusion 150 mEq (25 2146)    DOPamine in dextrose 5% 2.5 mcg/kg/min (25 1351)         Allergies:  Allergies[1]      Intake/Output:    Intake/Output Summary (Last 24 hours) at 2025 0718  Last data filed at 2025 0635  Gross per 24 hour   Intake 4484.4 ml   Output 4065 ml   Net 419.4 ml           Last 3 Weights   25 1602 (!) 340 lb 6.2 oz (154.4 kg)   25 1144 (!) 328 lb (148.8 kg)   23 1318 (!) 305 lb (138.3 kg)   23 0900 300 lb (136.1 kg)   23 1227 300 lb (136.1 kg)            Physical Exam:    Temp:  [97 °F (36.1 °C)-98.9 °F (37.2 °C)] 98.9 °F (37.2 °C)  Pulse:  [42-96] 79  Resp:  [15-30] 16  BP:  (108-128)/(47-94) 127/57  SpO2:  [90 %-100 %] 92 %  AO: ()/(37-71) 152/57  FiO2 (%):  [50 %-80 %] 50 %    General: No apparent distress.  intubated  HEENT: No focal deficits.  Neck: supple. JVP normal  Cardiac: Regular rhythm, S1, S2 normal,  rub or gallop.  No murmur.  Lungs: CTA  Abdomen: distended  Extremities: chronic edema  Neurologic: no focal deficits  Skin: Warm and dry.     Telemetry: fib    EKG:      Echo:      Cardiac Cath:      Labs:  HEM:  Recent Labs   Lab 01/06/25 1145 01/07/25 0643 01/07/25 1200 01/08/25  0332   WBC 8.8 9.6 13.6* 8.4   HGB 11.8* 10.9* 11.7* 10.9*   .0* 117.0* 134.0* 106.0*       Chem:  Recent Labs   Lab 01/06/25 1145 01/07/25 0643 01/07/25 1200 01/07/25 1831 01/08/25  0332   * 134*  134* 135* 136 138  138   K 4.5 4.0  4.0 4.2 3.8 3.6  3.6    104  104 102 104 106  106   CO2 20.0* 18.0*  18.0* 19.0* 22.0 25.0  25.0   BUN 84* 90*  90* 82* 75* 66*  66*   CREATSERUM 5.20* 4.37*  4.37* 3.64* 2.99* 2.38*  2.38*   CA 9.2 8.5*  8.5* 8.8 9.1 9.0  9.0   MG  --  2.3 2.3  --  2.1   PHOS  --  6.9* 5.4* 4.3 3.3   * 148*  148* 174* 129* 140*  140*       Recent Labs   Lab 01/06/25 1145 01/07/25 0643 01/07/25 1200 01/07/25 1831 01/08/25  0332   ALT 12 12 13  --  9*   AST 17 19 20  --  13   ALB 3.5 3.3  3.3 3.4 3.2 3.1*  3.1*       No results for input(s): \"TROP\", \"CK\" in the last 168 hours.    Recent Labs   Lab 01/07/25  0643   PTP 18.9*   INR 1.57*                 Impression:  Cardiac arrest early am on 1/7/25, responded to Epi and CPR.  Remains hypotensive, on low dose pressors.  Intraabdominal source suspected, though Ct abdomen unrevealing.  Chronic afib, on eliquis and metoprolol.  Preserved EF, last echo 1/7/25.  Morbid obesity with very limited mobility and sacral decubitus ulcer.  Clean cath with normal PA pressures Nov 2023.  Type 2 DM  Lymphedema   ARF -improving greatly with volume.  Acute blood loss anemia - hemorrhage from  sacral decub, on eliquis.  Hb stable.  ETOH abuse            Plan:  Nephrology managing volume.  Continue pressors as indicated.  Holding a/c in this setting.        Sebastien Cuellar MD    Cr care 30 min         [1]   Allergies  Allergen Reactions    Metformin DIARRHEA

## 2025-01-08 NOTE — PLAN OF CARE
Assumed care after RN shift report.   Pt opening eyes to verbal stimuli. Not following commands. Moves all extremities to pain. Soft wrist restraints for pt safey. Propofol gtt for sedation goals. Fentanyl gtt for pain control.  ETT intact. Large amounts of oral secretions. Tan inline secretions. Repeat ABG/vent settings changed.   Vaso/dopamine gtt for BP goal. Afib, rate controlled. EKG completed. Afebrile. Frequent ectopy.   OG to LIS. Distended abdomen. Hyperactive bowel sounds. No BM. Bariatric bed ordered.

## 2025-01-08 NOTE — PROGRESS NOTES
Gerardo Torrez Patient Status:  Inpatient    6/15/1950 MRN [id number]   [facility] 4SW-A Attending Peter Flores, DO   Hosp Day # 2 PCP Sid Gordon MD     Critical Care Progress Note          Subjective:  No acute events overnight.  Patient with SBT this am, stopped 2/2 agitation.  Good urine output.    Objective:    Medications:   piperacillin-tazobactam  4.5 g Intravenous Q8H BING    senna  10 mL Per NG Tube BID    docusate  100 mg Per NG Tube BID    chlorhexidine gluconate  15 mL Mouth/Throat BID@0800,2000    mineral oil-white petrolatum  1 Application Both Eyes Nightly    famotidine  20 mg Intravenous QAM    Vancomycin IV  1 each Intravenous See Admin Instructions (RX holding)    atorvastatin  20 mg Oral Nightly    cyanocobalamin  2,000 mcg Oral Daily    tamsulosin  0.4 mg Oral Nightly    multivitamin  1 tablet Oral Daily    thiamine  100 mg Oral Daily    miconazole   Topical BID       Vent Mode: VC+  FiO2 (%):  [50 %-80 %] 50 %  S RR:  [16-22] 16  S VT:  [500 mL] 500 mL  PEEP/CPAP (cm H2O):  [5 cm H20-8 cm H20] 8 cm H20  MAP (cm H2O):  [12-15] 13    Intake/Output Summary (Last 24 hours) at 2025 0840  Last data filed at 2025 0635  Gross per 24 hour   Intake 4484.4 ml   Output 4065 ml   Net 419.4 ml       /63   Pulse 87   Temp 99.3 °F (37.4 °C) (Temporal)   Resp 19   Ht 177.8 cm (5' 10\")   Wt (!) 340 lb 6.2 oz (154.4 kg)   SpO2 93%   BMI 48.84 kg/m²   Physical Exam:    General Appearance: Intubated and sedated, appears stated age  Neck: No JVD, neck supple, no adenopathy, trachea midline, no carotid bruits  Lungs: Course to auscultation bilaterally, respirations unlabored  Heart: Regular rate and rhythm, S1 and S2 normal, no murmur, rub or gallop  Abdomen: distended, firm, bowel sounds active all four quadrants, no masses, no organomegaly  Extremities: Extremities normal, atraumatic, no cyanosis or edema,capillary refill <3 sec.    Pulses: 2+ and symmetric all  extremities  Skin: Skin color, texture, turgor normal for ethnicity, no rashes or lesions, warm and dry  Neurologic: sedated, moves all extremities to stimuli    Recent Labs   Lab 01/08/25  0332   RBC 3.00*   HGB 10.9*   HCT 32.0*   .7*   MCH 36.3*   MCHC 34.1   RDW 14.4   NEPRELIM 6.91   WBC 8.4   .0*     Recent Labs   Lab 01/07/25  0643 01/07/25  1200 01/07/25  1831 01/08/25  0332   *  148* 174* 129* 140*  140*   BUN 90*  90* 82* 75* 66*  66*   CREATSERUM 4.37*  4.37* 3.64* 2.99* 2.38*  2.38*   CA 8.5*  8.5* 8.8 9.1 9.0  9.0   ALB 3.3  3.3 3.4 3.2 3.1*  3.1*   *  134* 135* 136 138  138   K 4.0  4.0 4.2 3.8 3.6  3.6     104 102 104 106  106   CO2 18.0*  18.0* 19.0* 22.0 25.0  25.0   ALKPHO 89 94  --  81   AST 19 20  --  13   ALT 12 13  --  9*   BILT 0.6 0.8  --  0.7   TP 5.8 6.1  --  5.6*     Recent Labs   Lab 01/07/25  2358   ABGPHT 7.38   FSUNYO1N 40   DQRLC9R 73*   ABGHCO3 23.9   ABGBE -1.3   TEMP 98.6   CATHI Not Applicable   SITE Arterial Line   DEV  adult   THGB 11.4*     No results for input(s): \"BNP\" in the last 168 hours.  No results for input(s): \"TROP\", \"CK\" in the last 168 hours.          Assessment/Plan:  Cardiac arrest: on 1/7 am, concern for primary intra-abdominal event   -ROSC obtained after epi x 1  -maintain normothermia   Acute respiratory failure:  -intubated in setting of cardiac arrest  -continue full vent support  -wean FiO2 as able  -SBT daily  Shock:  -IVF  -vasopressors to maintain SBP >90, continues on levophed  -Continue Zosyn (1/8-)  Abdominal distention: improving   -OG clamped.  -stat CT a/p reviewed  -General surgery consulted  -on empiric Abx as above  EMILIO:  -renal US negative for hydro  -nephrology following   -avoid nephrotoxins  A.Fib  -eliquis on hold due to bleeding from sacral wound   -hold home medications as patient with bradycardia  -Calcium given for possible BB toxicity  Thrombocytopenia:  -suspect due to EtOH  abuse  -monitor   DM:  -insulin per IM  HTN  -holding antihypertensives in setting of shock  EtOH abuse  -MVI/thiamine/folate  -monitor for withdrawal  FEN:  -NPO  -Start tube feeding  -Replete electrolytes as needed  Proph:  -SCD  -Heparin SQ  Dispo:  -Full code  -ICU for risk of deterioration      Plan of care discussed with intensivist on-call, Dr. Alberts.      Yoanna Hampton [facility]  Critical Care NP  Phone [phone]

## 2025-01-08 NOTE — RESPIRATORY THERAPY NOTE
Current Mechanical Ventilator Settings:  - Mode: Acvc+  - Rate: 16  - Tidal Volume(mL):500  - FiO2: 50%  - PEEP +8  - inspiratory time 0.9    Breath Sounds: dim    ETT Size: 8.0    Measured Parameters:  Peak Inspiratory Pressure(cmH2O): 27  Plateu Pressure(cmH2O) : 17  Tidal Volume(mL): 501  Rate:17    Shift Events noted: set RR decreased to 16, peep  increased to +8. Pt's tolerating current vent settings. Routine care given

## 2025-01-08 NOTE — PROGRESS NOTES
Care assumed at change of shift. Patient intubated, sedated, and restrainted. On propofol to maintain RASS 0 to -1, fentanyl gtt started for pain control. Patient on levo and vaso to maintain SBP >/= 90, weaned as tolerated. Dopamine added for bradycardia, when up titrated patient with runs of VT-dopamine weaned. Luis placed, IAP monitored, 26-28 mmHg, surgery consulted-no surgical intervention at this time. Family updated on plan of care. See MAR and flowsheets for additional documentation details.

## 2025-01-08 NOTE — PROGRESS NOTES
[facility] Pharmacy Dosing Service      Follow Up Pharmacokinetic Consult for Vancomycin Dosing     Gerardo Torrez is a 74 year old male who is receiving vancomycin therapy for sepsis. Patient is on day 2 of vancomycin and is currently receiving  per levels. The current treatment and monitoring approach is non-AUC strategy.        Weight and Temperature:    Wt Readings from Last 1 Encounters:   25 (!) 154.4 kg (340 lb 6.2 oz)         Temp Readings from Last 1 Encounters:   25 98.9 °F (37.2 °C) (Temporal)      Labs:   Recent Labs   Lab 25  1200 25  1831 25  0332   CREATSERUM 3.64* 2.99* 2.38*  2.38*      Estimated Creatinine Clearance: 29 mL/min (A) (based on SCr of 2.38 mg/dL (H)).     Recent Labs   Lab 25  0643 25  1200 25  0332   WBC 9.6 13.6* 8.4        Vancomycin Levels:  Lab Results   Component Value Date/Time    VANCR 11.0 2025 03:32 AM       Corresponding 24 h-AUC: N/A     The Pharmacokinetic Target is:    Trough/random 10-15 mg/L    Renal Dosing Considerations:    EMILIO/ARF     Assessment/Plan:   Maintenance Regimen:  Continue to dose per levels. Next random post 2nd dose    Monitorin) Plan for vancomycin random level to be obtained in approximately 48 hours    2) Pharmacy will order SCr as clinically indicated to assess renal function.    3) Pharmacy will monitor for toxicity and efficacy, adjust vancomycin dose and/or frequency, and order vancomycin levels as appropriate per the Pharmacy and Therapeutics Committee approved protocol until discontinuation of the medication.       We appreciate the opportunity to assist in the care of this patient.     Kosta Snow, [facility]  2025  7:13 AM  [facility]  Pharmacy Extension: [phone]

## 2025-01-08 NOTE — PROGRESS NOTES
01/08/25 1626   Vent Information   $ RT Standby Charge (per 15 min) 1   Vent Initiation Date 01/07/25   Ventilation Day(s) 2   Interface Invasive   Vent Type PB   Vent plugged into main power? Yes   Vent -3   Vent Mode VC+   Settings   FiO2 (%) 50 %   Resp Rate (Set) 16   Vt (Set, mL) 500 mL   Waveform Decelerating ramp   PEEP/CPAP (cm H2O) (S)  5 cm H20   Insp Time (sec) 0.9 sec   Insp Rise Time (%) 50 %   Trigger Sensitivity Flow (L/min) 3 L/min   Humidification Heater   H2O Bag Level 1/2 Full   Heater Temperature 98.4 °F (36.9 °C)   Readings   Total RR 16   Minute Ventilation (L/min) 8.2 L/min   Inspiratory Tidal Volume 500 mL   Expiratory Tidal Volume 514 mL   PIP Observed (cm H2O) 18 cm H2O   MAP (cm H2O) 8.7   I/E Ratio 1:3.2   Plateau Pressure (cm H2O) 13 cm H2O   Static Compliance (L/cm H2O) 70   Alarms   High RR 40   Insp Pressure High (cm H2O) 40 cm H2O   Insp Pressure Low (cm H2O) 2 cm H2O   MV High (L/min) 20 L/min   MV Low (L/min) 4 L/min   Apnea Interval (sec) 20 seconds   Apnea Rate 16   Apnea Volume (mL) 500 mL   ETT   Placement Date/Time: 01/07/25 0605   Airway Size: 8 mm  Cuffed: Cuffed  Insertion attempts: 1  Technique: Video laryngoscopy  Placement Verification: Capnometry;Colorimetric EtCO2 device  Placed By: ICU physician   Secured at (cm) 25 cm   Cuff Pressure (cm H2O) 30 cm H2O   Suctioned? N   Measured From Teeth   Secured Location Right   Secured by Commercial tube odom   Site Condition Dry   Req'd equipment at bedside Bag mask     Patient doing well on a decreased peep of +5. Will continue to monitor

## 2025-01-08 NOTE — DIETARY NOTE
[facility]   part of [facility]  NUTRITION ASSESSMENT    Unable to diagnose malnutrition criteria at this time.    NUTRITION INTERVENTION:    Meal and Snacks - ADAT once extubated.  Enteral Nutrition - Via OGT, recommend initiating Vital AF 1.2 at 20 ml/hr and advancing 20 ml/hr q4hrs to GOAL: Vital AF at 80 ml/hr x 22hrs + 1 pkt Prosource TF Free BID.   This will provide 2292 kcal, 162 grams protein, 1426 ml total free water, and 100% of RDI's.   Recommend 150 ml water flush q 4 hours, TF+FWF provides 2326 ml total fluids.   Hold TF 1 hour before/after administering flomax.  Coordination of Nutrition Care - Recommend SLP consult prior to diet advancement.    PATIENT STATUS: 74 year old male admitted on 1/6 presents with bleeding from sacral wound. Per H&P, pt was having diarrhea and increased fatigue as well as poor oral intake for days PTA. Pt with abdominal distention so KUB attempted 1/7 but code blue called after lying flat; was intubated and transferred to ICU. Pt screened d/t consult for tube feeds. Pt is currently intubated, sedation on hold for SBT, requiring pressors, NPO with OGT to LIS. Will provide TF recs above.     PMH: CAD, Diabetes mellitus type 2, Gout, High blood pressure, High cholesterol, Pulmonary hypertension, Thoracic aortic aneurysm    ANTHROPOMETRICS:  Ht: 177.8 cm (5' 10\")  Wt: (!) 154.4 kg (340 lb 6.2 oz).   BMI: Body mass index is 48.84 kg/m².  IBW: 75.5 kg    WEIGHT HISTORY:   Patient Weight(s) for the past 336 hrs:   Weight   01/06/25 1602 (!) 154.4 kg (340 lb 6.2 oz)   01/06/25 1144 (!) 148.8 kg (328 lb)       Wt Readings from Last 10 Encounters:   01/06/25 (!) 154.4 kg (340 lb 6.2 oz)   11/06/23 (!) 138.3 kg (305 lb)   07/18/23 136.1 kg (300 lb)   07/20/22 (!) 145.2 kg (320 lb)   03/14/22 136.1 kg (300 lb)   11/29/21 (!) 140.6 kg (310 lb)   11/16/21 136.1 kg (300 lb)   11/15/21 136.1 kg (300 lb)   09/14/21 135.2 kg (298 lb)   08/09/21 (!) 137 kg (302 lb)         NUTRITION:  Diet:       Procedures    NPO      Food Allergies: No  Cultural/Ethnic/Confucianist Preferences Addressed: Yes    Percent Meals Eaten (last 3 days)       Date/Time Percent Meals Eaten (%)    01/06/25 1526 0 %    01/06/25 1801 40 %            GI SYSTEM REVIEW:  distention ; last BM 1/7  Skin/Wounds: sacral wound    NUTRITION RELATED PHYSICAL FINDINGS:     1. Body Fat/Muscle Mass: obese     2. Fluid Accumulation: ascites, lower extremity edema 4+, foot edema 3+, and perineal +2    NUTRITION PRESCRIPTION:  148.8 kg Actual Body Weight / 75.5 kg IBW  Calories: 2263 calories/day ([facility]; MV:9 L/min, Temp:37.4 C)  Protein: 151-189 grams protein/day (2-2.5 gm/kg IBW)  Fluid: ~1 ml/kcal or per MD discretion    NUTRITION DIAGNOSIS/PROBLEM:  Inadequate oral intake related to respiratory process or complication of therapy which results in mechanical ventilation as evidenced by need for NPO status    MONITOR AND EVALUATE/NUTRITION GOALS:  Weight stable within 1 to 2 lbs during admission - New  Start alternative nutrition in 24-48 hrs if diet is not able to advance- New      MEDICATIONS:  Vit B12 2000 mcg, colace, pepcid, multivitamin, abx, senna, flomax, thiamine 100 mg  Gtt: levophed at 2 mcg/min, Na bicarb at 84 ml/hr, vasopressin at 0.03 units/min    LABS:  Reviewed     Pt is at High nutrition risk    Amy Hurtado RD, LDN, CNSC  Clinical Dietitian  Spectra: [phone]

## 2025-01-08 NOTE — PROGRESS NOTES
[facility]   part of [facility] Hospitalist Progress Note     Gerardo Torrez Patient Status:  Inpatient    6/15/1950 MRN [id number]   Location [facility] 4SW-A Attending Peter Flores, DO   Hosp Day # 2 PCP Sid Gordon MD     Follow Up:  The primary encounter diagnosis was Acute renal failure, unspecified acute renal failure type (HCC). A diagnosis of Anemia, unspecified type was also pertinent to this visit.    Subjective:     Patient seen and examined.  SBT this AM stopped due to agitation.  Remains intubated and sedated.  Family at bedside.      Objective:    Review of Systems:   10 point ROS completed and was negative, except for pertinent positive and negatives stated in subjective.    Vital signs:  Temp:  [97.6 °F (36.4 °C)-99.3 °F (37.4 °C)] 98.7 °F (37.1 °C)  Pulse:  [] 73  Resp:  [13-30] 16  BP: ()/(44-69) 101/53  SpO2:  [90 %-99 %] 96 %  AO: ()/(37-71) 103/46  FiO2 (%):  [50 %-80 %] 50 %    Physical Exam:    Gen: Intubated, sedated.  Chronically ill appearing male.   HEENT:  EOMI, PERRLA, OP clear, MMM.  OGT in place.  Pulm:  Course to auscultation on vent.  CV: Heart with regular rate and rhythm, no murmur.  Normal PMI.    Abd:  Mod to severe abd distention, hypoactive bowel sounds, no TTP. Morbid obesity.    MSK: +lymphedema, +chronic venous stasis dermatitis.   Skin: +sacral decub  Neuro:  Grossly intact, no focal deficits  : Luis cath in place    Diagnostic Data:    Labs:  Recent Labs   Lab 25  1145 25  0643 25  1200 25  0332   WBC 8.8 9.6 13.6* 8.4   HGB 11.8* 10.9* 11.7* 10.9*   .7* 112.4* 111.5* 106.7*   .0* 117.0* 134.0* 106.0*   INR  --  1.57*  --   --        Recent Labs   Lab 25  0643 25  1200 25  1831 25  0332   *  148* 174* 129* 140*  140*   BUN 90*  90* 82* 75* 66*  66*   CREATSERUM 4.37*  4.37* 3.64* 2.99* 2.38*  2.38*   CA 8.5*  8.5* 8.8 9.1 9.0  9.0    ALB 3.3  3.3 3.4 3.2 3.1*  3.1*   *  134* 135* 136 138  138   K 4.0  4.0 4.2 3.8 3.6  3.6     104 102 104 106  106   CO2 18.0*  18.0* 19.0* 22.0 25.0  25.0   ALKPHO 89 94  --  81   AST 19 20  --  13   ALT 12 13  --  9*   BILT 0.6 0.8  --  0.7   TP 5.8 6.1  --  5.6*       Estimated Creatinine Clearance: 29 mL/min (A) (based on SCr of 2.38 mg/dL (H)).    Recent Labs   Lab 01/07/25  0643   PTP 18.9*   INR 1.57*            COVID-19 Lab Results    COVID-19  Lab Results   Component Value Date    COVID19 Not Detected 07/20/2022    COVID19 DETECTED (A) 01/26/2021       Pro-Calcitonin  No results for input(s): \"PCT\" in the last 168 hours.    Cardiac  No results for input(s): \"TROP\", \"PBNP\" in the last 168 hours.    Creatinine Kinase  No results for input(s): \"CK\" in the last 168 hours.    Inflammatory Markers  No results for input(s): \"CRP\", \"KENNEDY\", \"LDH\", \"DDIMER\" in the last 168 hours.    Imaging: Imaging data reviewed in Epic.    Medications:    piperacillin-tazobactam  4.5 g Intravenous Q8H [facility]    heparin  5,000 Units Subcutaneous Q8H [facility]    senna  10 mL Per NG Tube BID    docusate  100 mg Per NG Tube BID    chlorhexidine gluconate  15 mL Mouth/Throat BID@0800,2000    mineral oil-white petrolatum  1 Application Both Eyes Nightly    famotidine  20 mg Intravenous QAM    atorvastatin  20 mg Oral Nightly    cyanocobalamin  2,000 mcg Oral Daily    tamsulosin  0.4 mg Oral Nightly    multivitamin  1 tablet Oral Daily    thiamine  100 mg Oral Daily    miconazole   Topical BID       Assessment & Plan:      74 yr old male with PMH sig for a-fib on eliquis, HTN, HLD, DM II, gout, pulm HTN, chronic sacral decub, and morbid obesity who presented to the ED for evaluation of bleeding from his sacral wound.       # Cardiac arrest   - concern for primary intra-abd event   - ROSC obtained s/p epi x 1  - maintain normothermia     # Acute respiratory failure  - intubated in setting of cardiac arrest   - cont full vent  support   - wean FiO2 as able  - weaning trials per critical care    # Shock  - suspect distributive/hypovolemic  - IVFs  - cont vasopressors to maintain SBP > 90  - cont empiric zosyn (1/8 - )  - f/u cultures     # Abdominal distention   - CT a/p neg for SBO or perf  - OG placed to LIS  - cont empiric abx  - gen surg c/s appreciated     # Acute renal failure   - suspect prerenal in the setting of GI losses and ARB use  - cont IVFs per renal recs  - hold ARB  - hold diuretics   - renal US neg for hydro  - monitor renal function and UO, avoid nephrotoxins   - renal c/s appreciated      # Bleeding sacral decub   # Acute blood loss anemia  - hold eliquis   - monitor CBC  - wound care c/s      # Chronic a-fib  - rate controlled  - ECHO with intact LVEF  - hold metoprolol given bradycardia    - hold eliquis for now given bleeding from sacral decub      # Essential HTN  - low on admit  - hold ARB, metoprolol, and diuretics      # HLD  - cont statin     # Pulm HTN  - monitor volume status on IVFs  - hold diuretics      # Thrombocytopenia   - suspect due to EtOH use and possible liver disease   - monitor      # EtOH abuse   - monitor for withdrawal   - MVI, thiamine, folate   - pt counseled on EtOH cessation      # Morbid obesity  - Recommend follow up with PCP to discuss diet and lifestyle modifications to encourage weight loss.    Plan of care: inpt care in the ICU.      Plan of care discussed with pt's spouse and daughter at bedside who agrees.     Peter Flores, DO    Supplementary Documentation:   DVT Mechanical Prophylaxis:   SCDs,    DVT Pharmacologic Prophylaxis   Medication    heparin (Porcine) 5000 UNIT/ML injection 5,000 Units                Code Status: Full Code  Luis: Luis catheter in place  Luis Duration (in days): 1  Central line: central venous catheter in place  ETHAN:

## 2025-01-08 NOTE — PAYOR COMM NOTE
--------------  ADMISSION REVIEW     Payor: [employer]  Subscriber #:  [id number]  Authorization Number: [id number]    Admit date: 1/6/25  Admit time:  2:58 PM       ED Provider Notes        History   HPI  Patient is a 74-year-old male who was recently started on Eliquis for atrial fibrillation presenting bleeding from a sacral ulcer.  He states he has had some diarrhea for the last couple days and just has not been feeling well.  Today while he was cleaning himself up after a bowel movement suddenly started \"gushing\" blood, he believes he rubbed the scab off of a chronic sacral ulcer he has.  ED Triage Vitals   BP 01/06/25 1139 (!) 78/40   Pulse 01/06/25 1139 50   Resp 01/06/25 1139 16   Temp 01/06/25 1207 97 °F (36.1 °C)   Temp src 01/06/25 1207 Temporal   SpO2 01/06/25 1139 97 %   O2 Device 01/06/25 1249 None (Room air)     Oxygen Therapy  SpO2: 95 %  O2 Device: None (Room air)     HENT:      Head: Normocephalic and atraumatic.   Eyes:      Conjunctiva/sclera: Conjunctivae normal.      Pupils: Pupils are equal, round, and reactive to light.   Cardiovascular:      Rate and Rhythm: Bradycardia present. Rhythm irregular.      Heart sounds: Normal heart sounds.   Pulmonary:      Effort: Pulmonary effort is normal.      Breath sounds: Normal breath sounds.   Abdominal:      General: Bowel sounds are normal.      Palpations: Abdomen is soft.   Musculoskeletal:         General: Normal range of motion.   Skin:     General: Skin is warm and dry.      Comments: There is some oozing blood from a chronic appearing wound directly between the buttock cleft.  No brisk bleeding.   Neurological:      Mental Status: He is alert and oriented to person, place, and time.     Labs Reviewed   COMP METABOLIC PANEL (14) - Abnormal; Notable for the following components:       Result Value    Glucose 137 (*)     Sodium 135 (*)     CO2 20.0 (*)     BUN 84 (*)     Creatinine 5.20 (*)     Calculated Osmolality 308 (*)      eGFR-Cr 11 (*)     All other components within normal limits   CBC WITH DIFFERENTIAL WITH PLATELET - Abnormal; Notable for the following components:    RBC 3.17 (*)     HGB 11.8 (*)     HCT 35.1 (*)     .0 (*)     .7 (*)     MCH 37.2 (*)     Lymphocyte Absolute 0.64 (*)     All other components within normal limits   LIPASE - Abnormal; Notable for the following components:    Lipase 96 (*)    EKG    Rate, intervals and axes as noted on EKG Report.  Rate: 51  Rhythm: Atrial Fibrillation  Reading: Atrial fibrillation with slow ventricular response.  T inversions precordial leads.  No ST elevations or depressions.  No old immediately available for comparison.  Admission disposition: 1/6/2025  1:12 PM    Disposition and Plan     Clinical Impression:  1. Acute renal failure, unspecified acute renal failure type (HCC)    2. Anemia, unspecified type      Disposition:  Admit  1/6/2025  1:12 pm         [facility] Hospitalist History and Physical       History of Present Illness: Patient is a 74 year old male with PMH sig for a-fib on eliquis, HTN, HLD, DM II, gout, pulm HTN, chronic sacral decub, and morbid obesity who presented to the ED for evaluation of bleeding from his sacral wound.  He was started on eliquis last week by cardiology, his spouse states she noted \"gushing blood\" after cleaning a pressure sore on his sacral area.  The patient reports diarrhea for the last couple of days, has felt more fatigued.   His oral intake has been poor and he drinks several drinks of EtOH daily.  He also reports that his abd has become more distended but he did have a loose BM this AM.  Pt is WC bound with as chronic sacral wound, uses a walker occasionally, lives with his spouse.  No F/C, N/V.     In the ED, labs notable for EMILIO with BUN/Cr 84/5.20.  Hgb 11.8.  Pltc 107.  US kidneys neg for hydronephrosis.  1 L NS given.      On my evaluation, pt c/o feeling more abd distention.       BP (!) 82/42   Pulse  54   Temp 97 °F (36.1 °C) (Temporal)   Resp 18   Wt (!) 328 lb (148.8 kg)   SpO2 95%   BMI 47.06 kg/m²   Gen: alert and oriented x3, no focal neurologic deficits. Chronically ill appearing male.   HEENT:  EOMI, PERRLA, OP clear, MMM  Pulm: Lungs clear bilaterally, normal respiratory effort  CV: Heart with regular rate and rhythm, no murmur.  Normal PMI.    Abd:  Mod to severe abd distention, hypoactive bowel sounds, no TTP. Morbid obesity.    MSK: +lymphedema, +chronic venous stasis dermatitis.   Skin: +sacral decub  Neuro:  Grossly intact, no focal deficits     Lab Results   Component Value Date     WBC 8.8 01/06/2025     HGB 11.8 01/06/2025     HCT 35.1 01/06/2025     .0 01/06/2025     CREATSERUM 5.20 01/06/2025     BUN 84 01/06/2025      01/06/2025     K 4.5 01/06/2025      01/06/2025     CO2 20.0 01/06/2025      01/06/2025     CA 9.2 01/06/2025     ALB 3.5 01/06/2025     ALKPHO 93 01/06/2025     BILT 0.7 01/06/2025     TP 6.1 01/06/2025     AST 17 01/06/2025     ALT 12 01/06/2025     LIP 96 01/06/2025      Assessment/Plan:      74 yr old male with PMH sig for a-fib on eliquis, HTN, HLD, DM II, gout, pulm HTN, chronic sacral decub, and morbid obesity who presented to the ED for evaluation of bleeding from his sacral wound.       # Acute renal failure   - suspect prerenal in the setting of GI losses and ARB use  - cont IVFs per renal recs  - hold ARB  - hold diuretics   - renal US neg for hydro  - monitor renal function and UO, avoid nephrotoxins   - renal c/s appreciated      # Bleeding sacral decub   # Acute blood loss anemia  - hold eliquis   - monitor CBC  - wound care c/s      # Abdominal distention  - pt with more abd distention than usual  - consider ileus, SBO, ascites, etc  - check CT a/p with po contrast only      # Chronic a-fib  - rate controlled  - hold metoprolol given bradycardia    - hold eliquis for now given bleeding from sacral decub      # Essential HTN  - low on  admit  - hold ARB, metoprolol, and diuretics      # HLD  - cont statin     # Pulm HTN  - monitor volume status on IVFs  - hold diuretics      # Thrombocytopenia   - suspect due to EtOH use and possible liver disease   - monitor      # EtOH abuse   - monitor for withdrawal   - MVI, thiamine, folate   - pt counseled on EtOH cessation      # Morbid obesity  - Recommend follow up with PCP to discuss diet and lifestyle modifications to encourage weight loss.     DVT prophy - SCD            PULMONOLOGY  History of Present Illness: 73 yo male with history of HTN, HLD, DM, CAD, A.Fib on eliquis, pulm HTN, EtOH abuse who presented on 1/7 with bleeding from sacral wound as well as diarrhea and abdominal distention.  On admission labs were notable for EMILIO with creatinine of 5.2.  Pt was given 1L NS.  CT a/p was ordered although pt could not tolerate laying flat to tolerate the study.  This morning pt was layed flat for a KUB when he lost a pulse and became unconscious.   Code blue was called and CPR was initiated.  ROSC was obtained after 1 round of epi.  Pt was intubated and brought to the ICU for further care.     ASSESSMENT/PLAN:  Cardiac arrest: concern for primary intra-abdominal event   -ROSC obtained after epi x 1  -maintain normothermia   Acute respiratory failure:  -intubated in setting of cardiac arrest  -continue full vent support  -check ABG  Shock:  -IVF  -vasopressors to maintain SBP >90  -start empiric zosyn given concern for intra-abdominal process  Abdominal distention:   -OG placed to LIS  -stat CT a/p now   -on empiric Abx as above  EMILIO:  -IVF  -renal US negative for hydro  -nephrology following   A.Fib  -eliquis on hold due to bleeding from sacral wound   Thrombocytopenia:  -suspect due to EtOH abuse  -monitor   DM:  -insulin per IM  HTN  -holding antihypertensives in setting of shock  EtOH abuse  -MVI/thiamine/folate  -monitor for withdrawal  FEN:  -NPO  Proph:  -SCD  Dispo:  -ICU      1/7/25      Subjective:  Patient with continued mechanical ventilation and vasopressor requirements.        Medications:   senna  10 mL Per NG Tube BID    docusate  100 mg Per NG Tube BID    chlorhexidine gluconate  15 mL Mouth/Throat BID@0800,2000    mineral oil-white petrolatum  1 Application Both Eyes Nightly    piperacillin-tazobactam  4.5 g Intravenous Q12H    famotidine  20 mg Intravenous QAM    calcium gluconate  2 g Intravenous Once    Vancomycin IV  1 each Intravenous See Admin Instructions (RX holding)    atorvastatin  20 mg Oral Nightly    cyanocobalamin  2,000 mcg Oral Daily    tamsulosin  0.4 mg Oral Nightly    multivitamin  1 tablet Oral Daily    thiamine  100 mg Oral Daily    miconazole   Topical BID         Vent Mode: VC+  FiO2 (%):  [80 %-100 %] 80 %  S RR:  [22] 22  S VT:  [50 mL-500 mL] 50 mL  PEEP/CPAP (cm H2O):  [5 cm H20] 5 cm H20  MAP (cm H2O):  [13-15] 15     BP (!) 115/94   Pulse (!) 43   Temp 97.8 °F (36.6 °C) (Temporal)   Resp 20   Ht 177.8 cm (5' 10\")   Wt (!) 340 lb 6.2 oz (154.4 kg)   SpO2 99%   BMI 48.84 kg/m²   Physical Exam:   General Appearance: Intubated and sedated, appears stated age  Neck: No JVD, neck supple, no adenopathy, trachea midline, no carotid bruits  Lungs: Course to auscultation bilaterally, respirations unlabored  Heart: Regular rate and rhythm, S1 and S2 normal, no murmur, rub or gallop  Abdomen: distended, firm, bowel sounds active all four quadrants, no masses, no organomegaly  Extremities: Extremities normal, atraumatic, no cyanosis or edema,capillary refill <3 sec.    Pulses: 2+ and symmetric all extremities  Skin: Skin color, texture, turgor normal for ethnicity, no rashes or lesions, warm and dry  Neurologic: sedated, moves all extremities to stimuli     Lab 01/07/25  0643 01/07/25  1200   RBC 2.90* 3.14*   HGB 10.9* 11.7*   HCT 32.6* 35.0*   .4* 111.5*   MCH 37.6* 37.3*   MCHC 33.4 33.4   RDW 14.5 14.6   NEPRELIM 8.02*  --    WBC 9.6 13.6*   .0*  134.0*      Lab 01/06/25  1145 01/07/25  0643 01/07/25  1200   * 148*  148* 174*   BUN 84* 90*  90* 82*   CREATSERUM 5.20* 4.37*  4.37* 3.64*   CA 9.2 8.5*  8.5* 8.8   ALB 3.5 3.3  3.3 3.4   * 134*  134* 135*   K 4.5 4.0  4.0 4.2    104  104 102   CO2 20.0* 18.0*  18.0* 19.0*   ALKPHO 93 89 94   AST 17 19 20   ALT 12 12 13   BILT 0.7 0.6 0.8   TP 6.1 5.8 6.1      Lab 01/07/25  1124   ABGPHT 7.27*   HZLWCD9B 23*   WAOQS3L 108*   ABGHCO3 13.4*   ABGBE -14.8*   TEMP 98.6   CATHI Not Applicable   SITE Arterial Line   DEV    THGB 8.3*       Assessment/Plan:  Cardiac arrest: on 1/8 am, concern for primary intra-abdominal event   -ROSC obtained after epi x 1  -maintain normothermia   Acute respiratory failure:  -intubated in setting of cardiac arrest  -continue full vent support  -wean FiO2 as able  -SBT daily when more stable  Shock:  -IVF  -vasopressors to maintain SBP >90  -Continue Zosyn (1/8-)  Abdominal distention:   -OG placed to LIS  -stat CT a/p reviewed  -General surgery consulted  -on empiric Abx as above  EMILIO:  -IVF with caution  -renal US negative for hydro  -nephrology following   -avoid nephrotoxins  A.Fib  -eliquis on hold due to bleeding from sacral wound   -hold home medications as patient with bradycardia  -Calcium given for possible BB toxicity  Thrombocytopenia:  -suspect due to EtOH abuse  -monitor   DM:  -insulin per IM  HTN  -holding antihypertensives in setting of shock  EtOH abuse  -MVI/thiamine/folate  -monitor for withdrawal  FEN:  -NPO  Proph:  -SCD  Dispo:  -Full code  -ICU for risk of deterioration                    1/8/25  CARDIOLOGY     Subjective:  Remains intubated, FIO2 50%.  Low dose dopamine and vaso.  Slower HR's 1/7, now improved.  Good urine output.      Medications:   vancomycin  20 mg/kg (Adjusted) Intravenous Once    senna  10 mL Per NG Tube BID    docusate  100 mg Per NG Tube BID    chlorhexidine gluconate  15 mL Mouth/Throat BID@0800,2000     mineral oil-white petrolatum  1 Application Both Eyes Nightly    piperacillin-tazobactam  4.5 g Intravenous Q12H    famotidine  20 mg Intravenous QAM    Vancomycin IV  1 each Intravenous See Admin    atorvastatin  20 mg Oral Nightly    cyanocobalamin  2,000 mcg Oral Daily    tamsulosin  0.4 mg Oral Nightly    multivitamin  1 tablet Oral Daily    thiamine  100 mg Oral Daily    miconazole   Topical BID       Continuous Infusions:   fentanyl 50 mcg/hr (01/08/25 0451)    vasopressin (Vasostrict) 20 Units in sodium chloride 0.9% 100 mL infusion for septic shock 0.03 Units/min (01/08/25 0622)    phenylephrine      propofol 45 mcg/kg/min (01/08/25 0536)    norepinephrine Stopped (01/07/25 1850)    sodium bicarbonate in D5W infusion 150 mEq (01/07/25 2146)    DOPamine in dextrose 5% 2.5 mcg/kg/min (01/07/25 1351)       Physical Exam:   Temp:  [97 °F (36.1 °C)-98.9 °F (37.2 °C)] 98.9 °F (37.2 °C)  Pulse:  [42-96] 79  Resp:  [15-30] 16  BP: (108-128)/(47-94) 127/57  SpO2:  [90 %-100 %] 92 %  AO: ()/(37-71) 152/57  FiO2 (%):  [50 %-80 %] 50 %     General:  intubated  HEENT: No focal deficits.  Neck: supple. JVP normal  Cardiac: Regular rhythm, S1, S2 normal,  rub or gallop.  No murmur.  Lungs: CTA  Abdomen: distended  Extremities: chronic edema  Neurologic: no focal deficits  Skin: Warm and dry.      Telemetry: fib      Lab 01/06/25  1145 01/07/25  0643 01/07/25  1200 01/08/25  0332   WBC 8.8 9.6 13.6* 8.4   HGB 11.8* 10.9* 11.7* 10.9*   .0* 117.0* 134.0* 106.0*      Lab 01/06/25  1145 01/07/25  0643 01/07/25  1200 01/07/25  1831 01/08/25  0332   * 134*  134* 135* 136 138  138   K 4.5 4.0  4.0 4.2 3.8 3.6  3.6    104  104 102 104 106  106   CO2 20.0* 18.0*  18.0* 19.0* 22.0 25.0  25.0   BUN 84* 90*  90* 82* 75* 66*  66*   CREATSERUM 5.20* 4.37*  4.37* 3.64* 2.99* 2.38*  2.38*   CA 9.2 8.5*  8.5* 8.8 9.1 9.0  9.0   MG  --  2.3 2.3  --  2.1   PHOS  --  6.9* 5.4* 4.3 3.3   * 148*   148* 174* 129* 140*  140*      Lab 01/06/25  1145 01/07/25  0643 01/07/25  1200 01/07/25  1831 01/08/25  0332   ALT 12 12 13  --  9*   AST 17 19 20  --  13   ALB 3.5 3.3  3.3 3.4 3.2 3.1*  3.1*      Lab 01/07/25  0643   PTP 18.9*   INR 1.57*       Impression:  Cardiac arrest early am on 1/7/25, responded to Epi and CPR.  Remains hypotensive, on low dose pressors.  Intraabdominal source suspected, though Ct abdomen unrevealing.  Chronic afib, on eliquis and metoprolol.  Preserved EF, last echo 1/7/25.  Morbid obesity with very limited mobility and sacral decubitus ulcer.  Clean cath with normal PA pressures Nov 2023.  Type 2 DM  Lymphedema   ARF -improving greatly with volume.  Acute blood loss anemia - hemorrhage from sacral decub, on eliquis.  Hb stable.  ETOH abuse  +C. Dif       Plan:  Nephrology managing volume.  Continue pressors as indicated.  Holding a/c in this setting.                      MEDICATIONS ADMINISTERED IN LAST 1 DAY:  mineral oil-white petrolatum (Artificial Tears) 83-15 % ophthalmic ointment 1 Application       Date Action Dose Route User    1/7/2025 2134 Given 1 Application Both Eyes Matthew Forrest, ZENA          calcium gluconate 2g in 100mL iso-NaCl IVPB premix       Date Action Dose Route User    1/7/2025 1255 Given 2 g Intravenous Cristiana Cooper RN          chlorhexidine gluconate (Peridex) 0.12 % oral solution 15 mL       Date Action Dose Route User    1/8/2025 0900 Given 15 mL Mouth/Throat Adamaris Ramsey RN    1/7/2025 2117 Given 15 mL Mouth/Throat Matthew Forrest RN          docusate (Colace) 50 MG/5ML oral solution 100 mg       Date Action Dose Route User    1/8/2025 0804 Given 100 mg Per NG Tube Adamaris Ramsey, ZENA          DOPamine in dextrose 5% (Intropin) 800 mg/250mL infusion premix       Date Action Dose Route User    1/7/2025 1351 Rate/Dose Change 2.5 mcg/kg/min × 154.4 kg (Dosing Weight) Intravenous Cristiana Cooper, RN    1/7/2025 4860 Rate/Dose Change 5 mcg/kg/min  × 154.4 kg (Dosing Weight) Intravenous Cristiana Cooper RN    1/7/2025 1345 Rate/Dose Change 7.5 mcg/kg/min × 154.4 kg (Dosing Weight) Intravenous Cristiana Cooper, RN    1/7/2025 1337 New Bag 5 mcg/kg/min × 154.4 kg (Dosing Weight) Intravenous Cristiana Cooper, ZENA          famotidine (Pepcid) 20 mg/2mL injection 20 mg       Date Action Dose Route User    1/8/2025 0804 Given 20 mg Intravenous Adamaris Ramsey RN     heparin (Porcine) 5000 UNIT/ML injection 5,000 Units       Date Action Dose Route User    1/8/2025 1003 Given 5,000 Units Subcutaneous (Left Upper Arm) Adamaris Ramsey RN          miconazole 2 % powder       Date Action Dose Route User    1/8/2025 0900 Given (none) Topical Adamaris Ramsey RN    1/8/2025 0007 Given (none) Topical Matthew Forrest RN          norepinephrine (Levophed) 32 mg in dextrose 5% 250 mL infusion       Date Action Dose Route User    1/8/2025 1030 Rate/Dose Change 2 mcg/min Intravenous Adamaris Ramsey RN    1/8/2025 1015 Rate/Dose Change 3 mcg/min Intravenous Adamaris Ramsey RN    1/8/2025 1000 Restarted 2 mcg/min Intravenous Adamaris Ramsey RN    1/7/2025 1725 Rate/Dose Change 2 mcg/min Intravenous Cristiana Cooper, RN    1/7/2025 1408 Rate/Dose Change 4 mcg/min Intravenous Cristiana Cooper, RN    1/7/2025 1402 Rate/Dose Change 8 mcg/min Intravenous Cristiana Cooper, RN    1/7/2025 1348 Rate/Dose Change 17 mcg/min Intravenous Cristiana Cooper, RN    1/7/2025 1345 Rate/Dose Change 18 mcg/min Intravenous Cristiana Cooper, RN    1/7/2025 1317 Rate/Dose Change 20 mcg/min Intravenous Cristiana Cooper, RN    1/7/2025 1312 Rate/Dose Change 23 mcg/min Intravenous Cristiana Cooper, RN    1/7/2025 1310 Rate/Dose Change 22 mcg/min Intravenous Cristiana Cooper, RN     piperacillin-tazobactam (Zosyn) 4.5 g in dextrose 5% 100 mL IVPB-ADDV       Date Action Dose Route User    1/7/2025 2116 New Bag 4.5 g Intravenous Matthew Forrest, RN           piperacillin-tazobactam (Zosyn) 4.5 g in dextrose 5% 100 mL IVPB-ADDV       Date Action Dose Route User    1/8/2025 0804 New Bag 4.5 g Intravenous Adamaris Ramsey RN          propofol (Diprivan) 10 mg/mL infusion premix       Date Action Dose Route User    1/8/2025 0758 New Bag 45 mcg/kg/min × 154.4 kg (Dosing Weight) Intravenous Adamaris Ramsey RN    1/8/2025 0536 New Bag 45 mcg/kg/min × 154.4 kg (Dosing Weight) Intravenous Matthew Forrest RN    1/8/2025 0436 Rate/Dose Change 50 mcg/kg/min × 154.4 kg (Dosing Weight) Intravenous Matthew Forrest RN    1/8/2025 0320 New Bag 40 mcg/kg/min × 154.4 kg (Dosing Weight) Intravenous Matthew Forrest RN    1/8/2025 0108 New Bag 45 mcg/kg/min × 154.4 kg (Dosing Weight) Intravenous Matthew Forrest RN    1/7/2025 2318 New Bag 45 mcg/kg/min × 154.4 kg (Dosing Weight) Intravenous Matthew Forrest RN    1/7/2025 2058 New Bag 45 mcg/kg/min × 154.4 kg (Dosing Weight) Intravenous Matthew Forrest RN    1/7/2025 1852 New Bag 45 mcg/kg/min × 154.4 kg (Dosing Weight) Intravenous Cristiana Cooper RN    1/7/2025 1201 New Bag 45 mcg/kg/min × 154.4 kg (Dosing Weight) Intravenous Cristiana Cooper RN     senna (Senokot) 8.8 MG/5ML oral syrup 17.6 mg       Date Action Dose Route User    1/8/2025 0804 Given 17.6 mg Per NG Tube Adamaris Ramsey RN          sodium bicarbonate 150 mEq in dextrose 5% 1,000 mL infusion       Date Action Dose Route User    1/8/2025 0758 New Bag 150 mEq Intravenous Adamaris Ramsey RN    1/7/2025 2146 New Bag 150 mEq Intravenous Matthew Forrest RN    1/7/2025 1302 Rate/Dose Change (none) Intravenous Cristiana Cooper, ZENA          multivitamin (Tab-A-Kieran/Beta Carotene) tab 1 tablet       Date Action Dose Route User    1/8/2025 1001 Given 1 tablet Oral Adamaris Ramsey RN          thiamine (Vitamin B1) tab 100 mg       Date Action Dose Route User    1/8/2025 0805 Given 100 mg Oral Adamaris Ramsey RN          vasopressin (Vasostrict) 20 Units in sodium  chloride 0.9% 100 mL infusion for septic shock       Date Action Dose Route User    1/8/2025 1004 Rate/Dose Change 0.03 Units/min Intravenous Adamaris Ramsey RN    1/8/2025 0836 Rate/Dose Change 0.02 Units/min Intravenous Adamaris Ramsey RN    1/8/2025 0622 New Bag 0.03 Units/min Intravenous Matthew Forerst RN    1/8/2025 0621 Rate/Dose Change 0.03 Units/min Intravenous Matthew Forrest RN    1/8/2025 0257 Rate/Dose Change 0.02 Units/min Intravenous Matthew Forrest RN    1/8/2025 0035 Rate/Dose Change 0.03 Units/min Intravenous Matthew Forrest RN    1/7/2025 2345 Rate/Dose Change 0.02 Units/min Intravenous Matthew Forrest RN    1/7/2025 2135 Rate/Dose Change 0.01 Units/min Intravenous Matthew Forrest RN    1/7/2025 1708 New Bag 0.03 Units/min Intravenous Cristiana Cooper, ZENA          cyanocobalamin (Vitamin B12) tab 2,000 mcg       Date Action Dose Route User    1/8/2025 1000 Given 2,000 mcg Oral Adamaris Ramsey, RN            Vitals (last day)       Date/Time Temp Pulse Resp BP SpO2 Weight O2 Device O2 Flow Rate (L/min) [facility]    01/08/25 0800 99.3 °F (37.4 °C) 85 16 130/59 93 % -- -- -- LE    01/08/25 0400 98.9 °F (37.2 °C) 86 17 127/57 94 % -- Ventilator -- JL    01/08/25 0000 98.7 °F (37.1 °C) 84 16 118/51 94 % -- Ventilator -- JL    01/07/25 2000 98.3 °F (36.8 °C) 60 22 118/51 96 % -- Ventilator -- JL    01/07/25 1600 97.6 °F (36.4 °C) 63 22 128/56 95 % -- Ventilator -- RM    01/07/25 0800 97.4 °F (36.3 °C) 53 18 115/94 97 % -- Ventilator -- RM    01/07/25 0615 -- -- 22 69/35 100 % -- -- --     01/07/25 0600 97.8 °F (36.6 °C) 72 13 86/40 99 % -- -- --     01/07/25 0320 98.2 °F (36.8 °C) 57 17 96/61 92 % -- Nasal cannula 3 L/min SB

## 2025-01-08 NOTE — PROGRESS NOTES
01/08/25 1145   Settings   FiO2 (%) 50 %   Resp Rate (Set) 16   Vt (Set, mL) 500 mL   Waveform Decelerating ramp   PEEP/CPAP (cm H2O) 8 cm H20     Patient weaned for 60 mins today on PS 5/+8/50% He was placed back on full support due to feeling tired and getting agitated. He is resting on current settings. Will continue to monitor.

## 2025-01-08 NOTE — PROGRESS NOTES
[facility]   part of [facility]    Nephrology Progress Note    Gerardo Torrez Attending:  Peter Flores DO     Cc: EMILIO    SUBJECTIVE     On low dose dopa and vaso   Excellent UOP 3.4L/24hrs    PHYSICAL EXAM   Vital signs: BP 94/50   Pulse 61   Temp 97.5 °F (36.4 °C) (Temporal)   Resp 16   Ht 5' 10\" (1.778 m)   Wt (!) 340 lb 6.2 oz (154.4 kg)   SpO2 96%   BMI 48.84 kg/m²   Temp (24hrs), Av.6 °F (37 °C), Min:97.5 °F (36.4 °C), Max:99.3 °F (37.4 °C)       Intake/Output Summary (Last 24 hours) at 2025 1705  Last data filed at 2025 1600  Gross per 24 hour   Intake 4566.1 ml   Output 4140 ml   Net 426.1 ml     Wt Readings from Last 3 Encounters:   25 (!) 340 lb 6.2 oz (154.4 kg)   23 (!) 305 lb (138.3 kg)   23 300 lb (136.1 kg)     General: intubated on MV  HEENT: NCAT, DMM  Neck: Supple   Cardiac: Regular rate and rhythm   Lungs: dimininished  Abdomen: +distended   Extremities: + edema  Neurologic: No asterxis  Skin: LE erythema     MEDS     Current Facility-Administered Medications   Medication Dose Route Frequency    piperacillin-tazobactam (Zosyn) 4.5 g in dextrose 5% 100 mL IVPB-ADDV  4.5 g Intravenous Q8H [facility]    heparin (Porcine) 5000 UNIT/ML injection 5,000 Units  5,000 Units Subcutaneous Q8H [facility]    dextrose 10% infusion (TPN no rate)   Intravenous Continuous PRN    pancrelipase (Lip-Prot-Amyl) (Zenpep) DR particles cap 10,000 Units  10,000 Units Per G Tube PRN    And    sodium bicarbonate tab 325 mg  325 mg Per G Tube PRN    norepinephrine (Levophed) 4 mg/250mL infusion premix  0.5-30 mcg/min Intravenous Continuous    acetaminophen (Tylenol) 160 MG/5ML oral liquid 650 mg  650 mg Per NG Tube Q4H PRN    Or    acetaminophen (Tylenol) rectal suppository 650 mg  650 mg Rectal Q4H PRN    Or    acetaminophen (Ofirmev) 10 mg/mL infusion premix 1,000 mg  1,000 mg Intravenous Q6H PRN    fentaNYL (Sublimaze) 50 mcg/mL injection 50 mcg  50 mcg Intravenous Q30 Min PRN     fentaNYL in sodium chloride 0.9% (Sublimaze) 1000 mcg/100mL infusion premix   mcg/hr Intravenous Continuous PRN    fentaNYL (Sublimaze) 25 mcg BOLUS FROM BAG infusion  25 mcg Intravenous Q30 Min PRN    senna (Senokot) 8.8 MG/5ML oral syrup 17.6 mg  10 mL Per NG Tube BID    docusate (Colace) 50 MG/5ML oral solution 100 mg  100 mg Per NG Tube BID    polyethylene glycol (PEG 3350) (Miralax) 17 g oral packet 17 g  17 g Per NG Tube Daily PRN    bisacodyl (Dulcolax) 10 MG rectal suppository 10 mg  10 mg Rectal Daily PRN    chlorhexidine gluconate (Peridex) 0.12 % oral solution 15 mL  15 mL Mouth/Throat BID@0800,2000    mineral oil-white petrolatum (Artificial Tears) 83-15 % ophthalmic ointment 1 Application  1 Application Both Eyes Nightly    vasopressin (Vasostrict) 20 Units in sodium chloride 0.9% 100 mL infusion for septic shock  0.01-0.03 Units/min Intravenous Continuous    phenylephrine in sodium chloride 0.9% (Francesco-Synephrine) 50 mg/250mL infusion premix   mcg/min Intravenous Continuous    midazolam (Versed) 2 MG/2ML injection 2 mg  2 mg Intravenous Q5 Min PRN    propofol (Diprivan) 10 mg/mL infusion premix  5-50 mcg/kg/min (Dosing Weight) Intravenous Continuous    famotidine (Pepcid) 20 mg/2mL injection 20 mg  20 mg Intravenous QAM    norepinephrine (Levophed) 32 mg in dextrose 5% 250 mL infusion  0.5-30 mcg/min Intravenous Continuous    atorvastatin (Lipitor) tab 20 mg  20 mg Oral Nightly    cyanocobalamin (Vitamin B12) tab 2,000 mcg  2,000 mcg Oral Daily    tamsulosin (Flomax) cap 0.4 mg  0.4 mg Oral Nightly    acetaminophen (Tylenol) tab 650 mg  650 mg Oral Q6H PRN    ondansetron (Zofran) 4 MG/2ML injection 4 mg  4 mg Intravenous Q6H PRN    multivitamin (Tab-A-Kieran/Beta Carotene) tab 1 tablet  1 tablet Oral Daily    thiamine (Vitamin B1) tab 100 mg  100 mg Oral Daily    miconazole 2 % powder   Topical BID       LABS     Lab Results   Component Value Date    WBC 8.4 01/08/2025    HGB 10.9 01/08/2025     HCT 32.0 01/08/2025    .0 01/08/2025    CREATSERUM 2.38 01/08/2025    CREATSERUM 2.38 01/08/2025    BUN 66 01/08/2025    BUN 66 01/08/2025     01/08/2025     01/08/2025    K 3.6 01/08/2025    K 3.6 01/08/2025     01/08/2025     01/08/2025    CO2 25.0 01/08/2025    CO2 25.0 01/08/2025     01/08/2025     01/08/2025    CA 9.0 01/08/2025    CA 9.0 01/08/2025    ALB 3.1 01/08/2025    ALB 3.1 01/08/2025    ALKPHO 81 01/08/2025    BILT 0.7 01/08/2025    TP 5.6 01/08/2025    AST 13 01/08/2025    ALT 9 01/08/2025    MG 2.1 01/08/2025    PHOS 3.3 01/08/2025    PGLU 155 01/08/2025       IMAGING   All imaging studies personally reviewed.    CT ABDOMEN+PELVIS(CPT=74176)   Final Result   PROCEDURE:  CT ABDOMEN+PELVIS (CPT=74176)       COMPARISON:  [facility] , CT, CT ABD(C/S)/PELVIS(C) (SET), 3/14/2006, 7:09 PM.       INDICATIONS:  abdominal distention       TECHNIQUE:  Unenhanced multislice CT scanning was performed from the dome    of the diaphragm to the pubic symphysis.  Dose reduction techniques were    used. Dose information is transmitted to the ACR (American College of    Radiology) NRDR (National Radiology    Data Registry) which includes the Dose Index Registry.       PATIENT STATED HISTORY: (As transcribed by Technologist)  Patient is here    for evaluation of abdominal distention.             FINDINGS:     LIVER:  No enlargement, atrophy, abnormal density, or significant focal    lesion.     BILIARY:  There is gallbladder wall thickening and mild stranding around    the gallbladder.  There are no calcified stones detected.   PANCREAS:  No lesion, fluid collection, ductal dilatation, or atrophy.     SPLEEN:  No enlargement or focal lesion.     KIDNEYS:  Kidneys are unremarkable.   ADRENALS:  No mass or enlargement.     AORTA/VASCULAR:    Aorta is diffusely atherosclerotic but not aneurysmal.   RETROPERITONEUM:  There is an enlarged portal caval space lymph node    series 3,  image 47 which measures 3.6 x 1.7 cm.    BOWEL/MESENTERY:  There is a nasogastric tube with tip in gastric body.     There is enlarged lymph node noted in region of gastrohepatic ligament    series 3, image 44 which measures 2.4 x 2 cm.    ABDOMINAL WALL:  Diffuse body wall edema is noted.   URINARY BLADDER:  No visible focal wall thickening, lesion, or calculus.     PELVIC NODES:  No adenopathy.     PELVIC ORGANS:  No visible mass.  Pelvic organs appropriate for patient    age.     BONES:  There is diffuse osteopenia noted.  There is diffuse degenerative    disc disease in the spine.   LUNG BASES:  There are moderate bilateral pleural effusions and there is    dependent atelectasis in the lower lobes.   OTHER:  Negative.                           =====   CONCLUSION:     1. There is diffuse body wall edema.  There are moderate bilateral pleural    effusions and there is dependent atelectasis likely related to diffuse    edematous state.   2. Gallbladder wall thickening and mild stranding around gallbladder could    also be related to diffusely edematous state although correlation with any    clinical evidence of cholecystitis is necessary.   3. There is retroperitoneal and gastrohepatic ligament lymphadenopathy    which is of uncertain significance.   4. Nasogastric tube tip is in region of gastric body.             LOCATION:  [place]           Dictated by (CST): Ahmet Griffith MD on 1/07/2025 at 10:16 AM        Finalized by (CST): Ahmet Griffith MD on 1/07/2025 at 10:22 AM         XR CHEST AP PORTABLE  (CPT=71045)   Final Result   PROCEDURE:  XR CHEST AP PORTABLE  (CPT=71045)       TECHNIQUE:  AP chest radiograph was obtained.       COMPARISON:  [facility] , XR, XR CHEST AP PORTABLE  (CPT=71045), 1/07/2025,    6:37 AM.       INDICATIONS:  Verify correct tube placement       PATIENT STATED HISTORY: (As transcribed by Technologist)  Patient offered    no additional history at this time.                                          =====   CONCLUSION:         Stable cardiac and mediastinal contours.  Findings of congestive heart    failure with moderate pulmonary edema.  A small left pleural effusion is    suspected, similar to prior.  No pneumothorax.       Endotracheal tube tip terminates approximately 5 cm from the gianna.     Enteric catheter extends into the upper abdomen beyond the field of view.     Interval placement of right IJ central venous catheter terminating in the    mid/lower SVC.           LOCATION:  [id number]                       Dictated by (CST): Yudy Colon MD on 1/07/2025 at 8:22 AM        Finalized by (CST): Yudy Colon MD on 1/07/2025 at 8:23 AM         XR CHEST AP PORTABLE  (CPT=71045)   Final Result   PROCEDURE:  XR CHEST AP PORTABLE  (CPT=71045)       TECHNIQUE:  AP chest radiograph was obtained.       COMPARISON:  [address] [facility], XR, XR CHEST PA + LAT CHEST (CPT=71046),    11/01/2023, 12:38 PM.       INDICATIONS:  s/p intubation       PATIENT STATED HISTORY: (As transcribed by Technologist)  Patient offered    no additional history at this time.             FINDINGS:                           =====   CONCLUSION:  Stable cardiac and mediastinal contours.  Findings of    pulmonary venous hypertension with mild/moderate pulmonary edema.  A    small/mild left pleural effusion is suspected.  No appreciable    pneumothorax.       Endotracheal tube tip terminates approximately 4 cm from the gianna.     Enteric catheter extends into the upper abdomen beyond the field of view.           LOCATION:  [id number]                       Dictated by (CST): Yudy Colon MD on 1/07/2025 at 6:50 AM        Finalized by (CST): Yudy Colon MD on 1/07/2025 at 6:51 AM         XR ABDOMEN (1 VIEW) (CPT=74018)   Final Result   PROCEDURE:  XR ABDOMEN (1 VIEW) (CPT=74018)       INDICATIONS:  abdomen distended       COMPARISON:  None.       TECHNIQUE:  Supine AP view was obtained.       PATIENT STATED HISTORY: (As transcribed by Technologist)  Patient  offered    no additional history at this time.                                   =====   CONCLUSION:         Under penetration related to body habitus degrades assessment of the bowel    gas pattern.  Within this limitation there appears to be moderate gaseous    distention of the stomach and small bowel with relative paucity of colonic    gas.  Findings may reflect    ileus or bowel obstruction.           LOCATION:  [id number]           Dictated by (CST): Yudy Colon MD on 1/07/2025 at 6:48 AM        Finalized by (CST): Yudy Colon MD on 1/07/2025 at 6:49 AM         US KIDNEY/BLADDER (CPT=76770)   Final Result   PROCEDURE:  US KIDNEY/BLADDER (CPT=76770)       COMPARISON:  None.       INDICATIONS:  Bleeding ulcers       TECHNIQUE:  Transabdominal gray scale ultrasound imaging of the bilateral    kidneys and bladder was performed.  Routine technique was utilized.         PATIENT STATED HISTORY: (As transcribed by Technologist)              FINDINGS:        RIGHT KIDNEY   MEASUREMENTS:  13.5 x 7.3 x 8.4 cm   ECHOGENICITY:  Normal.   HYDRONEPHROSIS:  None.   CYSTS/STONES/MASSES:  None.       LEFT KIDNEY    MEASUREMENTS:  12.7 x 7.6 x 7.9 cm   ECHOGENICITY:  Normal.   HYDRONEPHROSIS:  None.   CYSTS/STONES/MASSES:  None.       BLADDER:  Empty, not evaluated    OTHER:  Negative.                         =====   CONCLUSION:  Normal appearance of the kidneys.           LOCATION:  [id number]                   Dictated by (CST): Aftab Georges MD on 1/06/2025 at 1:26 PM        Finalized by (CST): Aftab Georges MD on 1/06/2025 at 1:29 PM               ASSESSMENT & PLAN    74 year old male w chronic Afib, DM, lymphedema, morbid obesity essentially wheelchair bound w sacral decubitus ulcer, asc ao aneurysm who called 911 today as was bleeding profusely from decubitus ulcer. Nephrology consulted for EMILIO     EMILIO  -- likely prerenal due decreased intravascular volume + poor perfusion w hypotension   -- BUN 84, Cr 5.2mg/dL on admit.  Baseline Cr appears 0.9-1.14mg/dL mostly since 2022  -- renal US w no hydro.   -- UA w hyaline casts, 10 ketones, 20 protein, no RBCs. Urine na 64.    -- UPCR 0.738, UACR only 72.8, recheck when EMILIO improving and if persists send serologies   -- hold torsemide, jardiance, irbesartan, spironolactone  -- stop etoh, monitor per primary team   -- sp IVFs/bicarb. Cr improved even in the setting of arrest and shock, IVFs stopped. Feeds starting w [facility]. Would hold off on diuresis in setting of pressor requirement and EMILIO unless needed from pulm standpoint   -- suarez placed, strict UOP   -- avoid NSAIDs, IV contrast, other nephrotoxins. Renally dose meds  -- no acute need for dialysis, but need to monitor closely. Pt previously agreeable to dialysis if acute need arises.      Acidosis  -- lactic wnl. Changed IVF to bicarb. Now off     Hyperphosphatemia  -- binders if feeds starting    Hypocalcemia  -- replete and trend     Anemia /acute blood loss anemia  -- transfusion prn.    Cardiac arrest  -- per cards    Shock   -- on pressors. Echo EF 55-60%, mildly increased pulm artery systolic pressure.     Abd distention   -- CT diffuse body wall edema. Also moderate bl pleural effusions    Acute hypoxic resp failure / pleural effusions   -- intubated on MV. Per ICU pulm.  -- CT w moderate bl pleural effusions. Defer if needs thora or not to icu   -- If needs diuresis defer to pulm in setting of shock on pressors.     Critical care time > 35min   D/w RN and pt family     Thank you for allowing me to participate in the care of this patient. Please do not hesitate to call with questions or concerns.       Deysi Coyle MD  [facility] Nephrology

## 2025-01-09 ENCOUNTER — APPOINTMENT (OUTPATIENT)
Dept: GENERAL RADIOLOGY | Facility: HOSPITAL | Age: 75
End: 2025-01-09
Attending: NURSE PRACTITIONER
Payer: MEDICARE

## 2025-01-09 LAB
ALBUMIN SERPL-MCNC: 3.2 G/DL (ref 3.2–4.8)
ALBUMIN/GLOB SERPL: 1.3 {RATIO} (ref 1–2)
ALP LIVER SERPL-CCNC: 76 U/L
ALT SERPL-CCNC: 8 U/L
ANION GAP SERPL CALC-SCNC: 5 MMOL/L (ref 0–18)
ANION GAP SERPL CALC-SCNC: 8 MMOL/L (ref 0–18)
AST SERPL-CCNC: 14 U/L (ref ?–34)
ATRIAL RATE: 241 BPM
ATRIAL RATE: 94 BPM
BASE EXCESS BLDA CALC-SCNC: 3.5 MMOL/L (ref ?–2)
BASOPHILS # BLD AUTO: 0.05 X10(3) UL (ref 0–0.2)
BASOPHILS NFR BLD AUTO: 0.5 %
BILIRUB SERPL-MCNC: 0.8 MG/DL (ref 0.2–1.1)
BODY TEMPERATURE: 98.6 F
BUN BLD-MCNC: 41 MG/DL (ref 9–23)
BUN BLD-MCNC: 46 MG/DL (ref 9–23)
CALCIUM BLD-MCNC: 9.2 MG/DL (ref 8.7–10.4)
CALCIUM BLD-MCNC: 9.5 MG/DL (ref 8.7–10.4)
CHLORIDE SERPL-SCNC: 105 MMOL/L (ref 98–112)
CHLORIDE SERPL-SCNC: 105 MMOL/L (ref 98–112)
CO2 SERPL-SCNC: 27 MMOL/L (ref 21–32)
CO2 SERPL-SCNC: 30 MMOL/L (ref 21–32)
COHGB MFR BLD: 2.2 % SAT (ref 0–3)
CREAT BLD-MCNC: 1.18 MG/DL
CREAT BLD-MCNC: 1.45 MG/DL
EGFRCR SERPLBLD CKD-EPI 2021: 51 ML/MIN/1.73M2 (ref 60–?)
EGFRCR SERPLBLD CKD-EPI 2021: 66 ML/MIN/1.73M2 (ref 60–?)
EOSINOPHIL # BLD AUTO: 0.2 X10(3) UL (ref 0–0.7)
EOSINOPHIL NFR BLD AUTO: 2 %
ERYTHROCYTE [DISTWIDTH] IN BLOOD BY AUTOMATED COUNT: 14.8 %
FIO2: 100 %
GLOBULIN PLAS-MCNC: 2.5 G/DL (ref 2–3.5)
GLUCOSE BLD-MCNC: 142 MG/DL (ref 70–99)
GLUCOSE BLD-MCNC: 169 MG/DL (ref 70–99)
GLUCOSE BLD-MCNC: 170 MG/DL (ref 70–99)
HCO3 BLDA-SCNC: 27.7 MEQ/L (ref 21–27)
HCT VFR BLD AUTO: 31.9 %
HGB BLD-MCNC: 10.8 G/DL
HGB BLD-MCNC: 11.6 G/DL
IMM GRANULOCYTES # BLD AUTO: 0.06 X10(3) UL (ref 0–1)
IMM GRANULOCYTES NFR BLD: 0.6 %
LYMPHOCYTES # BLD AUTO: 0.79 X10(3) UL (ref 1–4)
LYMPHOCYTES NFR BLD AUTO: 7.9 %
MAGNESIUM SERPL-MCNC: 1.9 MG/DL (ref 1.6–2.6)
MAGNESIUM SERPL-MCNC: 2.1 MG/DL (ref 1.6–2.6)
MCH RBC QN AUTO: 36.6 PG (ref 26–34)
MCHC RBC AUTO-ENTMCNC: 33.9 G/DL (ref 31–37)
MCV RBC AUTO: 108.1 FL
METHGB MFR BLD: 0.5 % SAT (ref 0.4–1.5)
MONOCYTES # BLD AUTO: 1.22 X10(3) UL (ref 0.1–1)
MONOCYTES NFR BLD AUTO: 12.2 %
NEUTROPHILS # BLD AUTO: 7.68 X10 (3) UL (ref 1.5–7.7)
NEUTROPHILS # BLD AUTO: 7.68 X10(3) UL (ref 1.5–7.7)
NEUTROPHILS NFR BLD AUTO: 76.8 %
OSMOLALITY SERPL CALC.SUM OF ELEC: 303 MOSM/KG (ref 275–295)
OSMOLALITY SERPL CALC.SUM OF ELEC: 306 MOSM/KG (ref 275–295)
OXYHGB MFR BLDA: 97.3 % (ref 92–100)
P/F RATIO: 101 MMHG
PCO2 BLDA: 44 MM HG (ref 35–45)
PEEP: 5 CM H2O
PH BLDA: 7.42 [PH] (ref 7.35–7.45)
PHOSPHATE SERPL-MCNC: 2.7 MG/DL (ref 2.4–5.1)
PLATELET # BLD AUTO: 120 10(3)UL (ref 150–450)
PO2 BLDA: 101 MM HG (ref 80–100)
POTASSIUM SERPL-SCNC: 3.6 MMOL/L (ref 3.5–5.1)
POTASSIUM SERPL-SCNC: 3.8 MMOL/L (ref 3.5–5.1)
POTASSIUM SERPL-SCNC: 3.8 MMOL/L (ref 3.5–5.1)
PROT SERPL-MCNC: 5.7 G/DL (ref 5.7–8.2)
Q-T INTERVAL: 410 MS
Q-T INTERVAL: 492 MS
QRS DURATION: 138 MS
QRS DURATION: 148 MS
QTC CALCULATION (BEZET): 476 MS
QTC CALCULATION (BEZET): 503 MS
R AXIS: 28 DEGREES
R AXIS: 37 DEGREES
RBC # BLD AUTO: 2.95 X10(6)UL
SODIUM SERPL-SCNC: 140 MMOL/L (ref 136–145)
SODIUM SERPL-SCNC: 140 MMOL/L (ref 136–145)
T AXIS: -18 DEGREES
T AXIS: -69 DEGREES
TIDAL VOLUME: 500 ML
VENT RATE: 16 /MIN
VENTRICULAR RATE: 63 BPM
VENTRICULAR RATE: 81 BPM
WBC # BLD AUTO: 10 X10(3) UL (ref 4–11)

## 2025-01-09 PROCEDURE — 71045 X-RAY EXAM CHEST 1 VIEW: CPT | Performed by: NURSE PRACTITIONER

## 2025-01-09 RX ORDER — HEPARIN SODIUM 5000 [USP'U]/ML
7500 INJECTION, SOLUTION INTRAVENOUS; SUBCUTANEOUS EVERY 8 HOURS SCHEDULED
Status: DISCONTINUED | OUTPATIENT
Start: 2025-01-09 | End: 2025-01-13

## 2025-01-09 RX ORDER — FUROSEMIDE 10 MG/ML
60 INJECTION INTRAMUSCULAR; INTRAVENOUS
Status: COMPLETED | OUTPATIENT
Start: 2025-01-09 | End: 2025-01-09

## 2025-01-09 RX ORDER — FUROSEMIDE 10 MG/ML
60 INJECTION INTRAMUSCULAR; INTRAVENOUS 3 TIMES DAILY
Status: COMPLETED | OUTPATIENT
Start: 2025-01-10 | End: 2025-01-10

## 2025-01-09 RX ORDER — DOPAMINE HYDROCHLORIDE 320 MG/100ML
INJECTION, SOLUTION INTRAVENOUS CONTINUOUS
Status: DISCONTINUED | OUTPATIENT
Start: 2025-01-09 | End: 2025-01-11

## 2025-01-09 RX ORDER — POTASSIUM CHLORIDE 1.5 G/1.58G
40 POWDER, FOR SOLUTION ORAL EVERY 4 HOURS
Status: COMPLETED | OUTPATIENT
Start: 2025-01-09 | End: 2025-01-09

## 2025-01-09 RX ORDER — POTASSIUM CHLORIDE 29.8 MG/ML
40 INJECTION INTRAVENOUS ONCE
Status: COMPLETED | OUTPATIENT
Start: 2025-01-09 | End: 2025-01-10

## 2025-01-09 NOTE — PROGRESS NOTES
[facility] Cardiology Progress Note    Gerardo Torrez Patient Status:  Inpatient    6/15/1950 MRN [id number]   Location [facility] 4SW-A Attending Peter Flores, DO   Hosp Day # 3 PCP Sid Gordon MD     Subjective:  Intubated, sedated     Objective:  /52 (BP Location: Left arm)   Pulse 59   Temp 98.8 °F (37.1 °C) (Temporal)   Resp 16   Ht 5' 10\" (1.778 m)   Wt (!) 365 lb 14.4 oz (166 kg)   SpO2 92%   BMI 52.50 kg/m²     Telemetry: Afib      Intake/Output:    Intake/Output Summary (Last 24 hours) at 2025 0622  Last data filed at 2025 0510  Gross per 24 hour   Intake 3389.45 ml   Output 3350 ml   Net 39.45 ml       Last 3 Weights   25 0430 (!) 365 lb 14.4 oz (166 kg)   25 1602 (!) 340 lb 6.2 oz (154.4 kg)   25 1144 (!) 328 lb (148.8 kg)   23 1318 (!) 305 lb (138.3 kg)   23 0900 300 lb (136.1 kg)   23 1227 300 lb (136.1 kg)       Labs:  Recent Labs   Lab 25  1831 25  0332 25  0442   * 140*  140* 170*   BUN 75* 66*  66* 46*   CREATSERUM 2.99* 2.38*  2.38* 1.45*   EGFRCR 21* 28*  28* 51*   CA 9.1 9.0  9.0 9.2    138  138 140   K 3.8 3.6  3.6 3.6    106  106 105   CO2 22.0 25.0  25.0 27.0     Recent Labs   Lab 25  0643 25  1200 25  0332 25  0442   RBC 2.90* 3.14* 3.00* 2.95*   HGB 10.9* 11.7* 10.9* 10.8*   HCT 32.6* 35.0* 32.0* 31.9*   .4* 111.5* 106.7* 108.1*   MCH 37.6* 37.3* 36.3* 36.6*   MCHC 33.4 33.4 34.1 33.9   RDW 14.5 14.6 14.4 14.8   NEPRELIM 8.02*  --  6.91 7.68   WBC 9.6 13.6* 8.4 10.0   .0* 134.0* 106.0* 120.0*         No results for input(s): \"TROP\", \"TROPHS\", \"CK\" in the last 168 hours.    Diagnostics:   RHC 2023:   Right atrial pressure 3 mmHg. RV pressure 29/0/5 mmHg. Pulmonary artery pressure 30/8/19 mmHg. Pulmonary capillary wedge pressure is 7 mmHg. Transpulmonary gradient 12 mmHg. Pulmonary vascular resistance 1.5 Wood units with systemic  vascular resistance of 415. Cardiac output 8.1 L/minute with cardiac index of 3.2 L/minute/sq m.         Physical Exam:    Physical Exam  Constitutional:       Appearance: He is obese.      Comments: Sedated    Pulmonary:      Comments: Intubated, auscultation limited r/t habitus   Abdominal:      General: There is distension.   Musculoskeletal:      Right lower leg: Edema present.      Left lower leg: Edema present.         Medications:   piperacillin-tazobactam  4.5 g Intravenous Q8H BING    heparin  5,000 Units Subcutaneous Q8H B[facility]    senna  10 mL Per NG Tube BID    docusate  100 mg Per NG Tube BID    chlorhexidine gluconate  15 mL Mouth/Throat BID@0800,2000    mineral oil-white petrolatum  1 Application Both Eyes Nightly    famotidine  20 mg Intravenous QAM    atorvastatin  20 mg Oral Nightly    cyanocobalamin  2,000 mcg Oral Daily    tamsulosin  0.4 mg Oral Nightly    multivitamin  1 tablet Oral Daily    thiamine  100 mg Oral Daily    miconazole   Topical BID      DOPamine in dextrose 5%      dextrose 10%      norepinephrine 7 mcg/min (01/09/25 0602)    fentanyl 25 mcg/hr (01/09/25 0432)    vasopressin (Vasostrict) 20 Units in sodium chloride 0.9% 100 mL infusion for septic shock 0.03 Units/min (01/09/25 0153)    phenylephrine      propofol 35 mcg/kg/min (01/09/25 0601)       Assessment:   75yo presented with profuse bleeding from sacral ulcer, and EMILIO    PEA/ asystolic arrest  While lying flat for KUB, ROSC after EPIx1/CPR  Respiratory Failure  Intubated, SBT stopped r/t agitation 1/9  Shock  Lactic remains normal past few days   Still on vasopressors but weaning   Multifactorial Volume overload  Chronic HFpEF  EF remains 55-60%  Had normal filling pressures on [facility] 2023, but that was at a weight closer to 300lbs  Now volume up after required fluid resuscitation   Minimal CAD on [facility] 2023  Pafib  Rate control for now, prior plans for dccv, bradycardia noted with arrest  Eliquis on hold with sacral bleeding   EMILIO  on CKD  Recovering  Cr5--> 1.4  Chronic Lymphedema with Morbid Obesity contributing to limited mobility and development of sacral decubitus ulcer   Thrombocytopenia   Plt ~ 120  Etoh abuse   Ascending Aortic Aneurysm- 4.2cm root,   DM2    Plan:    Overall, as renal function now improving, would benefit from diuresis.   Resume anticoagulation as able.     Shelia Davis, APRN  1/9/2025  6:22 AM  Ph [phone] ([place])  Ph [phone] ([place])

## 2025-01-09 NOTE — PROGRESS NOTES
Gerardo Torrez Patient Status:  Inpatient    6/15/1950 MRN [id number]   [facility] 4SW-A Attending Peter Flores, DO   Hosp Day # 3 PCP Sid Gordon MD     Critical Care Progress Note          Subjective:  No acute events overnight.  Desaturated this morning requiring ventilator to taken up to 100%    Objective:    Medications:   furosemide  60 mg Intravenous BID (Diuretic)    heparin  7,500 Units Subcutaneous Q8H [facility]    potassium chloride  40 mEq Oral Q4H    piperacillin-tazobactam  4.5 g Intravenous Q8H BING    senna  10 mL Per NG Tube BID    docusate  100 mg Per NG Tube BID    chlorhexidine gluconate  15 mL Mouth/Throat BID@0800,2000    mineral oil-white petrolatum  1 Application Both Eyes Nightly    famotidine  20 mg Intravenous QAM    atorvastatin  20 mg Oral Nightly    cyanocobalamin  2,000 mcg Oral Daily    tamsulosin  0.4 mg Oral Nightly    multivitamin  1 tablet Oral Daily    thiamine  100 mg Oral Daily    miconazole   Topical BID       Vent Mode: VC+  FiO2 (%):  [50 %-100 %] 100 %  S RR:  [16] 16  S VT:  [500 mL] 500 mL  PEEP/CPAP (cm H2O):  [5 cm H20-10 cm H20] 10 cm H20  MAP (cm H2O):  [8.7-14] 14    Intake/Output Summary (Last 24 hours) at 2025 1257  Last data filed at 2025 1158  Gross per 24 hour   Intake 3380.95 ml   Output 4325 ml   Net -944.05 ml       /61   Pulse 90   Temp 99 °F (37.2 °C) (Temporal)   Resp 17   Ht 5' 10\" (1.778 m)   Wt (!) 365 lb 14.4 oz (166 kg)   SpO2 95%   BMI 52.50 kg/m²   Physical Exam:    General Appearance: Intubated and sedated, appears stated age  Neck: No JVD, neck supple, no adenopathy, trachea midline, no carotid bruits  Lungs: Course to auscultation bilaterally, respirations unlabored  Heart: Regular rate and rhythm, S1 and S2 normal, no murmur, rub or gallop  Abdomen: distended, firm, bowel sounds active all four quadrants, no masses, no organomegaly  Extremities: Extremities normal, atraumatic, no cyanosis or edema,capillary  refill <3 sec.    Pulses: 2+ and symmetric all extremities  Skin: Skin color, texture, turgor normal for ethnicity, no rashes or lesions, warm and dry  Neurologic: sedated, moves all extremities to stimuli    Recent Labs   Lab 01/09/25  0442   RBC 2.95*   HGB 10.8*   HCT 31.9*   .1*   MCH 36.6*   MCHC 33.9   RDW 14.8   NEPRELIM 7.68   WBC 10.0   .0*     Recent Labs   Lab 01/07/25  1200 01/07/25  1831 01/08/25  0332 01/09/25  0442   * 129* 140*  140* 170*   BUN 82* 75* 66*  66* 46*   CREATSERUM 3.64* 2.99* 2.38*  2.38* 1.45*   CA 8.8 9.1 9.0  9.0 9.2   ALB 3.4 3.2 3.1*  3.1* 3.2   * 136 138  138 140   K 4.2 3.8 3.6  3.6 3.6    104 106  106 105   CO2 19.0* 22.0 25.0  25.0 27.0   ALKPHO 94  --  81 76   AST 20  --  13 14   ALT 13  --  9* 8*   BILT 0.8  --  0.7 0.8   TP 6.1  --  5.6* 5.7     Recent Labs   Lab 01/09/25  0809   ABGPHT 7.42   DFCMBH7B 44   TJBOQ7G 101*   ABGHCO3 27.7*   ABGBE 3.5*   TEMP 98.6   CATHI Not Applicable   SITE Arterial Line   DEV  adult   THGB 11.6*     No results for input(s): \"BNP\" in the last 168 hours.  No results for input(s): \"TROP\", \"CK\" in the last 168 hours.          Assessment/Plan:  Cardiac arrest: on 1/7 am, concern for primary intra-abdominal event   -ROSC obtained after epi x 1  -maintain normothermia   Acute respiratory failure:  -intubated in setting of cardiac arrest  -continue full vent support  -Desaturated this morning, made several vent adjustments, I checked ABG and chest x-ray consistent with some evidence of ongoing volume overload, will plan to diurese with Lasix today  Shock:  -IVF  -vasopressors to maintain SBP >90, continues on levophed  -Continue Zosyn (1/8-) will continue for today since worsening hypoxia, can probably discontinue near future  Abdominal distention: improving   -OG clamped.  -stat CT a/p reviewed  -General surgery consulted  -on empiric Abx as above  EMILIO:  -renal US negative for hydro  -nephrology  following   -avoid nephrotoxins  A.Fib  -eliquis on hold due to bleeding from sacral wound   -hold home medications as patient with bradycardia  -Calcium given for possible BB toxicity, cardiology following, appreciate recommendations  Thrombocytopenia:  -suspect due to EtOH abuse  -monitor   DM:  -insulin per IM  HTN  -holding antihypertensives in setting of shock  EtOH abuse  -MVI/thiamine/folate  -monitor for withdrawal  FEN:  -tube feeding  -Replete electrolytes as needed  Proph:  -SCD  -Heparin SQ  Dispo:  -Full code  -ICU for risk of deterioration    Discussed with wife at bedside    Upon my evaluation, this patient had a high probability of imminent or life-threatening deterioration due to Critical findings of laboratory tests  , Critical findings of imaging , Hypoxemia, and Respiratory Failure , which required my direct attention, intervention, and personal management.    I have personally provider 40 minutes of critical care time exclusive of time spent on separately billable procedures.  Time includes review of all pertinent laboratory/radiology results, discussion with consultants, and monitoring for potential decompensation.  Performed interventions included Managing Mechanical ventilation  and diuretics.

## 2025-01-09 NOTE — PAYOR COMM NOTE
--------------  CONTINUED STAY REVIEW    Payor: [employer]  Subscriber #:  [id number]  Authorization Number: [id number]    Admit date: 1/6/25  Admit time:  2:58 PM    REVIEW DOCUMENTATION:  1-9-25     Desaturated this morning requiring ventilator to taken up to 100%      Vent Mode: VC+  FiO2 (%):  [50 %-100 %] 100 %  S RR:  [16] 16  S VT:  [500 mL] 500 mL  PEEP/CPAP (cm H2O):  [5 cm H20-10 cm H20] 10 cm H20  MAP (cm H2O):  [8.7-14] 14     Physical Exam:     General Appearance: Intubated and sedated, appears stated age  Neck: No JVD, neck supple, no adenopathy, trachea midline, no carotid bruits  Lungs: Course to auscultation bilaterally, respirations unlabored  Heart: Regular rate and rhythm, S1 and S2 normal, no murmur, rub or gallop  Abdomen: distended, firm, bowel sounds active all four quadrants, no masses, no organomegaly  Extremities: Extremities normal, atraumatic, no cyanosis or edema,capillary refill <3 sec.    Pulses: 2+ and symmetric all extremities  Skin: Skin color, texture, turgor normal for ethnicity, no rashes or lesions, warm and dry  Neurologic: sedated, moves all extremities to stimuli         Recent Labs   Lab 01/09/25  0442   RBC 2.95*   HGB 10.8*   HCT 31.9*   .1*   MCH 36.6*   MCHC 33.9   RDW 14.8   NEPRELIM 7.68   WBC 10.0   .0*          Recent Labs   Lab 01/09/25  0442   *   BUN 46*   CREATSERUM 1.45*   CA 9.2   ALB 3.2      K 3.6      CO2 27.0   ALKPHO 76   AST 14   ALT 8*   BILT 0.8   TP 5.7          Recent Labs   Lab 01/09/25  0809   ABGPHT 7.42   ULAVSX4Q 44   RXFIB1M 101*   ABGHCO3 27.7*   ABGBE 3.5*   TEMP 98.6   CATHI Not Applicable   SITE Arterial Line   DEV  adult   THGB 11.6*     Assessment/Plan:  Cardiac arrest: on 1/7 am, concern for primary intra-abdominal event   -ROSC obtained after epi x 1  -maintain normothermia   Acute respiratory failure:  -intubated in setting of cardiac arrest  -continue full vent support  -Desaturated  this morning, made several vent adjustments, I checked ABG and chest x-ray consistent with some evidence of ongoing volume overload, will plan to diurese with Lasix today  Shock:  -IVF  -vasopressors to maintain SBP >90, continues on levophed  -Continue Zosyn (1/8-) will continue for today since worsening hypoxia, can probably discontinue near future  Abdominal distention: improving   -OG clamped.  -stat CT a/p reviewed  -General surgery consulted  -on empiric Abx as above  EMILIO:  -renal US negative for hydro  -nephrology following   -avoid nephrotoxins  A.Fib  -eliquis on hold due to bleeding from sacral wound   -hold home medications as patient with bradycardia  -Calcium given for possible BB toxicity, cardiology following, appreciate recommendations  Thrombocytopenia:  -suspect due to EtOH abuse  -monitor   DM:  -insulin per IM  HTN  -holding antihypertensives in setting of shock  EtOH abuse  -MVI/thiamine/folate  -monitor for withdrawal  FEN:  -tube feeding  -Replete electrolytes as needed  Proph:  -SCD  -Heparin SQ  Dispo:  -Full code  -ICU for risk of deterioration         MEDICATIONS ADMINISTERED IN LAST 1 DAY:  mineral oil-white petrolatum (Artificial Tears) 83-15 % ophthalmic ointment 1 Application       Date Action Dose Route User    1/8/2025 2048 Given 1 Application Both Eyes Matthew Forrest RN          atorvastatin (Lipitor) tab 20 mg       Date Action Dose Route User    1/8/2025 2041 Given 20 mg Oral Matthew Forrest RN          chlorhexidine gluconate (Peridex) 0.12 % oral solution 15 mL       Date Action Dose Route User    1/9/2025 0800 Given 15 mL Mouth/Throat Chanelle Dhillon RN    1/8/2025 2048 Given 15 mL Mouth/Throat Matthew Forrest RN          docusate (Colace) 50 MG/5ML oral solution 100 mg       Date Action Dose Route User    1/9/2025 0843 Given 100 mg Per NG Tube Chanelle Dhillon RN    1/8/2025 2041 Given 100 mg Per NG Tube Matthew Forrest RN          DOPamine in dextrose 5% (Intropin) 800 mg/250mL  infusion premix       Date Action Dose Route User    1/9/2025 1313 Restarted 2.5 mcg/kg/min × 154.4 kg (Dosing Weight) Intravenous Chanelle Dhillon RN    1/9/2025 1150 Restarted 2.5 mcg/kg/min × 154.4 kg (Dosing Weight) Intravenous Chanelle Dhillon RN    1/9/2025 0627 New Bag 2.5 mcg/kg/min × 154.4 kg (Dosing Weight) Intravenous Matthew Forrest RN          famotidine (Pepcid) 20 mg/2mL injection 20 mg       Date Action Dose Route User    1/9/2025 0841 Given 20 mg Intravenous Chanelle Dhillon RN          fentaNYL (Sublimaze) 25 mcg BOLUS FROM BAG infusion       Date Action Dose Route User    1/9/2025 0646 Bolus from Bag 25 mcg Intravenous Matthew Forrest RN    1/9/2025 0040 Bolus from Bag 25 mcg Intravenous Matthew Forrest RN          fentaNYL in sodium chloride 0.9% (Sublimaze) 1000 mcg/100mL infusion premix       Date Action Dose Route User    1/9/2025 0654 Hi-Risk Rate/Dose Change 50 mcg/hr Intravenous Matthew Forrest RN    1/9/2025 0647 New Bag 25 mcg/hr Intravenous Matthew Forrest RN    1/9/2025 0432 Hi-Risk Rate/Dose Change 25 mcg/hr Intravenous Matthew Forrest RN          furosemide (Lasix) 10 mg/mL injection 60 mg       Date Action Dose Route User    1/9/2025 0834 Given 60 mg Intravenous Chanelle Dhillon RN          heparin (Porcine) 5000 UNIT/ML injection 5,000 Units       Date Action Dose Route User    1/9/2025 0504 Given 5,000 Units Subcutaneous (Left Lower Abdomen) Matthew Forrest RN    1/8/2025 2104 Given 5,000 Units Subcutaneous (Left Lower Abdomen) Matthew Forrest RN    1/8/2025 1516 Given 5,000 Units Subcutaneous (Right Upper Arm) Adamaris Ramsey RN          miconazole 2 % powder       Date Action Dose Route User    1/9/2025 1156 Given (none) Topical Chanelle Dhillon RN    1/8/2025 2041 Given (none) Topical Matthew Forrest, RN          norepinephrine (Levophed) 32 mg in dextrose 5% 250 mL infusion       Date Action Dose Route User    1/8/2025 9119 Rate/Dose Change 6 mcg/min Intravenous Adamaris Ramsey, RN     1/8/2025 1716 Rate/Dose Change 5 mcg/min Intravenous Adamaris Ramsey RN    1/8/2025 1713 Rate/Dose Change 4 mcg/min Intravenous Adamaris Ramsey RN    1/8/2025 1705 Rate/Dose Change 3 mcg/min Intravenous Adamaris Ramsey RN    1/8/2025 1700 Rate/Dose Change 2 mcg/min Intravenous Adamaris Ramsey RN    1/8/2025 1624 Restarted 1 mcg/min Intravenous Tammie Smith RN          norepinephrine (Levophed) 4 mg/250mL infusion premix       Date Action Dose Route User    1/9/2025 0654 Rate/Dose Change 4 mcg/min Intravenous Matthew Forrest RN    1/9/2025 0650 Rate/Dose Change 5 mcg/min Intravenous Matthew Forrest RN    1/9/2025 0633 Rate/Dose Change 6 mcg/min Intravenous Matthew Forrest RN    1/9/2025 0602 New Bag 7 mcg/min Intravenous Matthew Forrest RN    1/9/2025 0600 Rate/Dose Change 2 mcg/min Intravenous Matthew Forrest RN    1/8/2025 2235 New Bag 7 mcg/min Intravenous Matthew Forrest RN          piperacillin-tazobactam (Zosyn) 4.5 g in dextrose 5% 100 mL IVPB-ADDV       Date Action Dose Route User    1/9/2025 0839 New Bag 4.5 g Intravenous Chanelle Dhillon RN    1/9/2025 0001 New Bag 4.5 g Intravenous Matthew Forrest RN    1/8/2025 1658 New Bag 4.5 g Intravenous Adamaris Ramsey RN          potassium chloride (Klor-Con) 20 MEQ oral powder 40 mEq       Date Action Dose Route User    1/9/2025 1155 Given 40 mEq Oral Chanelle Dhillon RN          propofol (Diprivan) 10 mg/mL infusion premix       Date Action Dose Route User    1/9/2025 1320 Rate/Dose Change 15 mcg/kg/min × 154.4 kg (Dosing Weight) Intravenous Chanelle Dhillon RN    1/9/2025 1149 New Bag 35 mcg/kg/min × 154.4 kg (Dosing Weight) Intravenous Chanelle Dhillon RN    1/9/2025 0854 New Bag 35 mcg/kg/min × 154.4 kg (Dosing Weight) Intravenous Chanelle Dhillon RN    1/9/2025 0601 New Bag 35 mcg/kg/min × 154.4 kg (Dosing Weight) Intravenous Matthew Forrest RN    1/9/2025 0311 New Bag 35 mcg/kg/min × 154.4 kg (Dosing Weight) Intravenous Geni Ling RN    1/9/2025 0009 New  Bag 35 mcg/kg/min × 154.4 kg (Dosing Weight) Intravenous Matthew Forrest RN    1/9/2025 0001 Rate/Dose Change 35 mcg/kg/min × 154.4 kg (Dosing Weight) Intravenous Matthew Forrest RN    1/8/2025 2152 New Bag 40 mcg/kg/min × 154.4 kg (Dosing Weight) Intravenous Geni Ling RN    1/8/2025 2007 New Bag 40 mcg/kg/min × 154.4 kg (Dosing Weight) Intravenous Matthew Forrest RN    1/8/2025 1758 New Bag 40 mcg/kg/min × 154.4 kg (Dosing Weight) Intravenous Adamaris Ramsey RN    1/8/2025 1516 New Bag 40 mcg/kg/min × 154.4 kg (Dosing Weight) Intravenous Adamaris Ramsey RN          senna (Senokot) 8.8 MG/5ML oral syrup 17.6 mg       Date Action Dose Route User    1/9/2025 0843 Given 17.6 mg Per NG Tube Chanelle Dhillon RN    1/8/2025 2041 Given 17.6 mg Per NG Tube Matthew Forrest RN          multivitamin (Tab-A-Kieran/Beta Carotene) tab 1 tablet       Date Action Dose Route User    1/9/2025 0854 Given 1 tablet Oral Chanelle Dhillon RN          tamsulosin (Flomax) cap 0.4 mg       Date Action Dose Route User    1/8/2025 2041 Given 0.4 mg Oral Matthew Forrest RN          thiamine (Vitamin B1) tab 100 mg       Date Action Dose Route User    1/9/2025 0843 Given 100 mg Oral Chanelle Dhillon RN          vasopressin (Vasostrict) 20 Units in sodium chloride 0.9% 100 mL infusion for septic shock       Date Action Dose Route User    1/9/2025 0835 Restarted 0.02 Units/min Intravenous Chanelle Dhillon RN    1/9/2025 0153 New Bag 0.03 Units/min Intravenous Matthew Forrest RN    1/8/2025 1703 New Bag 0.03 Units/min Intravenous Adamaris Ramsey RN          cyanocobalamin (Vitamin B12) tab 2,000 mcg       Date Action Dose Route User    1/9/2025 0930 Given 2,000 mcg Oral Chanelle Dhillon, RN            Vitals (last day)       Date/Time Temp Pulse Resp BP SpO2 Weight O2 Device O2 Flow Rate (L/min) [facility]    01/09/25 1309 -- 85 17 -- 95 % -- -- --     01/09/25 1308 -- 79 16 -- 96 % -- -- --     01/09/25 1200 -- 90 17 140/61 95 % -- -- -- BS     01/09/25 1154 -- 90 19 -- 95 % -- -- --     01/09/25 1153 -- 77 20 -- 94 % -- -- --     01/09/25 1152 -- 76 17 -- 94 % -- -- --     01/09/25 0819 99 °F (37.2 °C) -- -- -- 94 % -- -- --     01/09/25 0815 -- 84 16 -- 95 % -- -- --     01/09/25 0800 -- 88 16 153/50 95 % -- Ventilator --     01/09/25 0400 98.8 °F (37.1 °C) 50 17 114/52 93 % -- Ventilator --     01/09/25 0015 -- 63 22 -- 93 % -- -- --     01/09/25 0000 98.4 °F (36.9 °C) 60 14 112/60 88 % -- Ventilator --     01/08/25 2100 -- 58 16 -- 94 % -- -- --     01/08/25 2045 -- 56 16 114/47 94 % -- -- --     01/08/25 2030 -- 58 17 116/62 94 % -- -- --     01/08/25 2015 -- 58 23 115/54 94 % -- -- --     01/08/25 2000 98.2 °F (36.8 °C) 66 21 114/57 94 % -- Ventilator --     01/08/25 1945 -- 61 20 117/54 94 % -- -- --     01/08/25 1930 -- 68 19 109/54 93 % -- -- --     01/08/25 1900 -- 61 16 120/55 93 % -- -- --     01/08/25 1845 -- 60 20 99/52 93 % -- -- --     01/08/25 1844 -- 67 23 -- 93 % -- -- --     01/08/25 1843 -- 56 19 -- 93 % -- -- --     01/08/25 1842 -- 53 18 -- 93 % -- -- --     01/08/25 1830 -- 60 24 108/47 93 % -- -- -- LE 01/08/25 1815 -- 61 18 107/54 93 % -- -- -- LE 01/08/25 1800 -- 65 20 107/48 94 % -- -- -- LE 01/08/25 1229 -- 80 16 -- 95 % -- -- -- LE 01/08/25 1200 98.7 °F (37.1 °C) 85 20 118/56 95 % -- Ventilator -- LE 01/08/25 1120 -- 103 15 136/58 96 % -- -- -- LE 01/08/25 1115 -- 91 19 74/48 95 % -- -- -- LE 01/08/25 1100 -- 88 13 114/58 94 % -- -- -- LE 01/08/25 1050 -- 96 16 -- 95 % -- -- -- LE 01/08/25 1045 -- 91 15 116/60 95 % -- -- -- LE 01/08/25 1030 -- 117 14 115/46 94 % -- -- -- LE 01/08/25 1015 -- 108 17 110/51 95 % -- -- -- LE 01/08/25 1004 -- 107 18 -- 95 % -- -- -- LE 01/08/25 1000 98.8 °F (37.1 °C) 98 15 113/44 95 % -- -- -- LE 01/08/25 0800 99.3 °F (37.4 °C) 85 16 130/59 93 % -- Ventilator -- LE 01/08/25 0015 -- 82 16 -- 98 % -- -- -- JL     01/08/25 0000 98.7 °F (37.1 °C) 84 16 118/51 94 % -- Ventilator -- JL            cigarettes

## 2025-01-09 NOTE — PHYSICAL THERAPY NOTE
PHYSICAL THERAPY EVALUATION - INPATIENT     Room Number: 472/472-A  Evaluation Date: 1/9/2025  Type of Evaluation: Initial  Physician Order: PT Eval and Treat    Presenting Problem: Acute renal failure, cardiac arrest  Co-Morbidities : Sacral ulcer, HTN, DM, morbid obesity, CAD, etoh abuse, thoracic aortic aneurysm  Reason for Therapy: Mobility Dysfunction and Discharge Planning    PHYSICAL THERAPY ASSESSMENT   Patient is a 74 year old male admitted 1/6/2025 for acute renal failure, cardiac arrest.  Prior to admission, patient's baseline is mod I for short distance gait w/I his home w/ use of rolling walker.  Patient is currently functioning below baseline with bed mobility, transfers, and gait.  Patient is requiring dependent as a result of the following impairments: decreased functional strength, decreased endurance/aerobic capacity, decreased muscular endurance, medical status, and increased O2 needs from baseline.  Physical Therapy will continue to follow for duration of hospitalization.    Patient will benefit from continued skilled PT Services to promote return to prior level of function and safety with continuous assistance and gradual rehabilitative therapy .    PLAN DURING HOSPITALIZATION  Nursing Mobility Recommendation : Lift Equipment  PT Device Recommendation: Rolling walker;Wheelchair  PT Treatment Plan: Bed mobility;Patient education;Family education;Range of motion;Transfer training;Balance training  Rehab Potential : Fair  Frequency (Obs): 3-5x/week     CURRENT GOALS    Goal #1 Patient is able to demonstrate supine - sit EOB @ level: moderate assistance     Goal #2 Patient is able to sit on eob for 5 min w/ min a of 1 for balance   Goal #3      Goal #4    Goal #5    Goal #6    Goal Comments: Goals established on 1/9/2025      PHYSICAL THERAPY MEDICAL/SOCIAL HISTORY  History related to current admission: Patient is a 74 year old male admitted on 1/6/2025 from home for bleeding sacral ulcer, diarrhea.   Pt diagnosed with acute renal failure.  Pt had cardiac arrest on  and was transferred to ICU and intubated.    HOME SITUATION  Type of Home: House  Home Layout: Two level;Able to live on main level  Stairs to Enter : 1   Railing: Yes (Or refrigerator)    Stairs to Bedroom: 0         Lives With: Spouse    Drives: No          Prior Level of [place]: Lives w/ his spouse in a two level home, but he stays on main level.  Pt sleeps in recliner.  Only walks short distances w/I his home w/ a rolling walker.    SUBJECTIVE  Pt smiling, laughing at jokes and following commands during session, even through lethargy.  Rn turned down sedation for session.    OBJECTIVE     Fall Risk: High fall risk    WEIGHT BEARING RESTRICTION     PAIN ASSESSMENT  Ratin  Location: Denies pain during session       COGNITION  Overall Cognitive Status:  Impaired  Arousal/Alertness:  delayed responses to stimuli and lethargic  Following Commands:  follows one step commands with increased time and follows one step commands with repetition  Needed to wake pt up regularly to stay on task.    RANGE OF MOTION AND STRENGTH ASSESSMENT  Upper extremity ROM and strength are within functional limits except for the following:  per family, pt's strength through shoulders was minimum.  Couldn't really lift arms above 90 degrees.  Passively, pt was wfl w/o pain.   4/5 on L, 3+/5 on R, biceps 3+/5 on L, 3-/5 on R.  Trace b shoulders    Lower extremity ROM - 3/4 range passively    Lower extremity strength - dorsiflexion 2+/5, plantarflexion 2/5 bilaterally, quads 2+/5 bilaterally, gluts 2+/5, hip adduction 2/5 bilaterally.  Otherwise 0-1/5 throughout    BALANCE  Static Sitting: Not tested  Dynamic Sitting: Not tested  Static Standing: Not tested  Dynamic Standing: Not tested    ADDITIONAL TESTS                                    ACTIVITY TOLERANCE  Pulse: 97  Heart Rate Source: Monitor  Resp: 17                O2 WALK  Oxygen Therapy  SPO2% on  Oxygen at Rest: 98  At rest oxygen flow (liters per minute):  (Pt currently on vent support)    NEUROLOGICAL FINDINGS                        AM-PAC '6-Clicks' INPATIENT SHORT FORM - BASIC MOBILITY  How much difficulty does the patient currently have...  Patient Difficulty: Turning over in bed (including adjusting bedclothes, sheets and blankets)?: Unable   Patient Difficulty: Sitting down on and standing up from a chair with arms (e.g., wheelchair, bedside commode, etc.): Unable   Patient Difficulty: Moving from lying on back to sitting on the side of the bed?: Unable   How much help from another person does the patient currently need...   Help from Another: Moving to and from a bed to a chair (including a wheelchair)?: Total   Help from Another: Need to walk in hospital room?: Total   Help from Another: Climbing 3-5 steps with a railing?: Total     AM-PAC Score:  Raw Score: 6   Approx Degree of Impairment: 100%   Standardized Score (AM-PAC Scale): 23.55   CMS Modifier (G-Code): CN    FUNCTIONAL ABILITY STATUS  Gait Assessment   Functional Mobility/Gait Assessment  Gait Assistance: Not tested    Skilled Therapy Provided   Worked on orienting pt to time, place situation.  Pt nodding head in agreement to information that family was providing about home.  Pt able to give thumbs up bilaterally during session.  Once pt completed ue ex, instructed pt to hold his hands on his stomach.  Pt did well for several seconds, but hands started slipping.  Reminded pt of task and he was able to actively intertwine fingers and keep hands clasped for rest of le ex.    Transitioned pt to chair mode, but pt clearly uncomfortable.  Had pt transition to more upright posture and had him maintain this position for 3 minutes before declining hob somewhat and making pt more comfortable.      Educated pt and family on having pt complete ankle pumps,  open/close, encouraging head to center position and orienting pt regularly.  Family/pt in  agreement and eager to assist in pt's care.    Pt completed ex as outlined below.  THERAPEUTIC EXERCISES  Lower Extremity Ankle pumps  Glut sets  Hip AB/AD  Heel slides  Active assisted or constant cues to remain awake and on task   Upper Extremity Elbow flex/ext,  - open/close, OH Reaching, and shoulder shrugs and turning head from L to center.  Elbow,  and shoulder shrugs were active, others were active assisted   Position Supine     Repetitions 3-10   Sets 1       Exercise/Education Provided:  Functional activity tolerated  Exercise as outlined above    Patient End of Session: In bed;Needs met;Call light within reach;RN aware of session/findings;Family present (Renata Roca aware of eval session)      Patient Evaluation Complexity Level:  History Moderate - 1 or 2 personal factors and/or co-morbidities   Examination of body systems Moderate - addressing a total of 3 or more elements   Clinical Presentation  Moderate - Evolving   Clinical Decision Making Moderate Complexity       PT Session Time: 25 minutes  Gait Trainin minutes  Therapeutic Activity: 4 minutes  Neuromuscular Re-education: 0 minutes  Therapeutic Exercise: 13 minutes

## 2025-01-09 NOTE — PLAN OF CARE
Assumed care after RN shift report.   Sedated. Propofol/fent gtt. Withdraws to pain in upper extremities. Opens eyes to pain. Cough/gag intact. Soft wrist restraints for pt safety.   ETT. Chaudhari inline secretions. Thick white oral secretions. Congested/productive cough. Diminished lung sounds.  Afebrile. Levo/vaso gtt to maintain sys./MAP goals. Subcu heparin. Afib on the monitor. HR sustaining 50s-60s. Some pauses noted, HR would touch the 30s although it would not sustain. APRN notified, EKG completed. Dopamine restarted. OG w/TF running at goal rate. Pt tolerating. Active bowel sounds. 1 loose BM. Distended/soft abdomen. Luis w/ azeb/yellow output. Fent boluses for pain.  Sacral wound cleansed and redressed. Family not present.

## 2025-01-09 NOTE — PROGRESS NOTES
[facility]   part of [facility]    Advanced Heart Failure Progress Note    Gerardo Torrez Patient Status:  Inpatient    6/15/1950 MRN [id number]   Location [facility] 4SW-A Attending Peter Flores, DO   Hosp Day # 3 PCP Sid Gordon MD     Subjective:  Intubated, sedated, but following commands, opens eyes to verbal stimuli. Family at bedside. Attempted SBT yesterday but agitated during. On diuresis to reduce pulmonary edema otherwise. HR fluctuations present in AF with rates ranging from 40s-90s on dopamine.     Objective:  /52 (BP Location: Left arm)   Pulse 62   Temp 99 °F (37.2 °C) (Temporal)   Resp 16   Ht 5' 10\" (1.778 m)   Wt (!) 365 lb 14.4 oz (166 kg)   SpO2 94%   BMI 52.50 kg/m²     Temp (24hrs), Av.4 °F (36.9 °C), Min:97.5 °F (36.4 °C), Max:99 °F (37.2 °C)      Intake/Output:    Intake/Output Summary (Last 24 hours) at 2025 1021  Last data filed at 2025 0839  Gross per 24 hour   Intake 3170.95 ml   Output 2025 ml   Net 1145.95 ml       Wt Readings from Last 3 Encounters:   25 (!) 365 lb 14.4 oz (166 kg)   23 (!) 305 lb (138.3 kg)   23 300 lb (136.1 kg)       Allergies:  Allergies[1]    Physical Exam:  Blood pressure 114/52, pulse 62, temperature 99 °F (37.2 °C), temperature source Temporal, resp. rate 16, height 5' 10\" (1.778 m), weight (!) 365 lb 14.4 oz (166 kg), SpO2 94%.    General: intubated, sedated, but following commands.   HEENT: No focal deficits.  Neck: large neck habitus. Line in place.   Cardiac: irregular, normal rate, no rubs or gallops.   Lungs: intubated, not breathing over vent, on appropriate settings  Abdomen: Soft, non-tender.   Extremities: chronic venous skin change present with 1+ edema present on top of superimposed presumed chronic lymphedema.   Neurologic: Alert, on sedation, but follows commands.     Laboratory/Data:      Labs:  Lab Results   Component Value Date    WBC 10.0 2025    RBC 2.95 2025    HGB  10.8 01/09/2025    HCT 31.9 01/09/2025    .1 01/09/2025    MCH 36.6 01/09/2025    MCHC 33.9 01/09/2025    RDW 14.8 01/09/2025    .0 01/09/2025     Lab Results   Component Value Date    INR 1.57 (H) 01/07/2025     No results for input(s): \"BNPML\" in the last 168 hours.  BUN (mg/dL)   Date Value   01/09/2025 46 (H)   01/08/2025 66 (H)   01/08/2025 66 (H)     Blood Urea Nitrogen (mg/dL)   Date Value   07/01/2021 15.0   02/03/2021 17.0   02/01/2021 20.0   06/16/2014 16   06/12/2013 19   06/18/2012 18     Creatinine, Serum (mg/dL)   Date Value   06/16/2014 0.92   06/12/2013 0.92   06/18/2012 0.99     Creatinine (mg/dL)   Date Value   01/09/2025 1.45 (H)   01/08/2025 2.38 (H)   01/08/2025 2.38 (H)   08/09/2021 0.72   07/01/2021 0.80   02/03/2021 0.99       Medications:  Current Facility-Administered Medications   Medication Dose Route Frequency    DOPamine in dextrose 5% (Intropin) 800 mg/250mL infusion premix  2-20 mcg/kg/min (Dosing Weight) Intravenous Continuous    furosemide (Lasix) 10 mg/mL injection 60 mg  60 mg Intravenous BID (Diuretic)    piperacillin-tazobactam (Zosyn) 4.5 g in dextrose 5% 100 mL IVPB-ADDV  4.5 g Intravenous Q8H BING    heparin (Porcine) 5000 UNIT/ML injection 5,000 Units  5,000 Units Subcutaneous Q8H BING    dextrose 10% infusion (TPN no rate)   Intravenous Continuous PRN    pancrelipase (Lip-Prot-Amyl) (Zenpep) DR particles cap 10,000 Units  10,000 Units Per G Tube PRN    And    sodium bicarbonate tab 325 mg  325 mg Per G Tube PRN    norepinephrine (Levophed) 4 mg/250mL infusion premix  0.5-30 mcg/min Intravenous Continuous    acetaminophen (Tylenol) 160 MG/5ML oral liquid 650 mg  650 mg Per NG Tube Q4H PRN    Or    acetaminophen (Tylenol) rectal suppository 650 mg  650 mg Rectal Q4H PRN    Or    acetaminophen (Ofirmev) 10 mg/mL infusion premix 1,000 mg  1,000 mg Intravenous Q6H PRN    fentaNYL (Sublimaze) 50 mcg/mL injection 50 mcg  50 mcg Intravenous Q30 Min PRN    fentaNYL in  sodium chloride 0.9% (Sublimaze) 1000 mcg/100mL infusion premix   mcg/hr Intravenous Continuous PRN    fentaNYL (Sublimaze) 25 mcg BOLUS FROM BAG infusion  25 mcg Intravenous Q30 Min PRN    senna (Senokot) 8.8 MG/5ML oral syrup 17.6 mg  10 mL Per NG Tube BID    docusate (Colace) 50 MG/5ML oral solution 100 mg  100 mg Per NG Tube BID    polyethylene glycol (PEG 3350) (Miralax) 17 g oral packet 17 g  17 g Per NG Tube Daily PRN    bisacodyl (Dulcolax) 10 MG rectal suppository 10 mg  10 mg Rectal Daily PRN    chlorhexidine gluconate (Peridex) 0.12 % oral solution 15 mL  15 mL Mouth/Throat BID@0800,2000    mineral oil-white petrolatum (Artificial Tears) 83-15 % ophthalmic ointment 1 Application  1 Application Both Eyes Nightly    vasopressin (Vasostrict) 20 Units in sodium chloride 0.9% 100 mL infusion for septic shock  0.01-0.03 Units/min Intravenous Continuous    phenylephrine in sodium chloride 0.9% (Francesco-Synephrine) 50 mg/250mL infusion premix   mcg/min Intravenous Continuous    midazolam (Versed) 2 MG/2ML injection 2 mg  2 mg Intravenous Q5 Min PRN    propofol (Diprivan) 10 mg/mL infusion premix  5-50 mcg/kg/min (Dosing Weight) Intravenous Continuous    famotidine (Pepcid) 20 mg/2mL injection 20 mg  20 mg Intravenous QAM    atorvastatin (Lipitor) tab 20 mg  20 mg Oral Nightly    cyanocobalamin (Vitamin B12) tab 2,000 mcg  2,000 mcg Oral Daily    tamsulosin (Flomax) cap 0.4 mg  0.4 mg Oral Nightly    acetaminophen (Tylenol) tab 650 mg  650 mg Oral Q6H PRN    ondansetron (Zofran) 4 MG/2ML injection 4 mg  4 mg Intravenous Q6H PRN    multivitamin (Tab-A-Kieran/Beta Carotene) tab 1 tablet  1 tablet Oral Daily    thiamine (Vitamin B1) tab 100 mg  100 mg Oral Daily    miconazole 2 % powder   Topical BID       Assessment and Plan:  Patient Active Problem List   Diagnosis    Dyslipidemia    Gout    IGT (impaired glucose tolerance)    Aneurysm, thoracic aortic (HCC)    Controlled type 2 diabetes mellitus without  complication, with long-term current use of insulin (HCC)    Obesity, morbid (HCC)    Coronary artery disease involving native coronary artery of native heart without angina pectoris    Dilated aortic root (HCC)    Pulmonary hypertension (HCC)    Essential hypertension    Acute renal failure, unspecified acute renal failure type (HCC)    Anemia, unspecified type       PEA arrest 1/7/25   - responded to EPI and CPR - on pressors, being weaned, but responsive and follows commands  - appreciate all consultant evaluations and recommendations, appreciate ICU management,   - supportive care, wean oxygen requirements as able, extubate as able, wean pressors otherwise, maintain SBP > 100 to allow adequate perfusion to end organs otherwise, trend labs daily.     Chronic AF with slow VR  - per history, on BB but now with episodes of bradycardia / slow VR on dopamine to sustain HR - suspect combination of heavy vagal tone from HEIDI / morbid obesity, medications for sedation, and possible BB toxicity from worsening EMILIO  - appreciate primary management, hold all rate controlling medications, wean sedation as able, and wean dopamine to maintain HR > 50, and will have EP evaluate for additional management options   - if future DCCV is planned, would need ARCELIA (given interrupted AC in setting of ABLA).     Morbid obesity   - BMI 52  - suspect deconditioning playing a role also, encourage PT / OT when able, and weight loss as able.     EMILIO on CKD   - presumed pre renal from hypotension and ABLA  - nephrology following, improved Cr with fluids and hydration and pressor support, appreciate management, trend labs daily.     ABLA   - secondary to hemorrhage from sacral decubitus ulcer from sitting position  - wound care management, primary management, holding AC for now, and monitor labs daily.     Acute hypoxemic respiratory failure  - intubated during arrest as above.   - pulmonary management as per their recommendations, daily SBT and  wean / extubate as able, diuresis to maintain dry negative net fluid status.     Discussed with wife and daughter at bedside.     Gaston Valles MD   Advanced Heart Failure and Transplant Cardiology   [facility] ([facility])     CCT > 35 minutes, L3       [1]   Allergies  Allergen Reactions    Metformin DIARRHEA

## 2025-01-09 NOTE — PLAN OF CARE
Attempted to wean dopamine x2, persistent hypotension when discontinued. Dr. Peguero aware.  Vasopressin weaned as tolerated, see eMar.  Propofol and fentanyl drips infusing. Patient able to participate with PT at 15mcg/kg/min propofol and 50mcg/min fentanyl. Patient conveying discomfort with ETT later in the day, propofol increased back to 35mcg/kg/min for comfort.  FiO2 weaned per RT.  Tolerating TF at goal.  Luis in place for diuresis.   Repositioned every 2 hours.   Bilateral wrist restraints in place.  Family at bedside, updated on plan of care.        Problem: Patient/Family Goals  Goal: Patient/Family Long Term Goal  Description: Patient's Long Term Goal: Return home at baseline ADLs.     Interventions:  - Participate in PT/OT as able.  - See additional Care Plan goals for specific interventions  Outcome: Not Progressing  Goal: Patient/Family Short Term Goal  Description: Patient's Short Term Goal: Tolerate SBT.    Interventions:   - Follow RT and RN recommendations.   - See additional Care Plan goals for specific interventions  Outcome: Not Progressing

## 2025-01-09 NOTE — CM/SW NOTE
01/09/25 1700   CM/SW Referral Data   Referral Source    Reason for Referral Discharge planning   Informant EMR     Patient is a 74 year old male admitted 1/6/2025 for acute renal failure, cardiac arrest.     HOME SITUATION  Type of Home: House  Home Layout: Two level;Able to live on main level  Stairs to Enter : 1   Railing: Yes (Or refrigerator)    Stairs to Bedroom: 0         Lives With: Spouse    Drives: No       Prior Level of [place]: Lives w/ his spouse in a two level home, but he stays on main level.  Pt sleeps in recliner.  Only walks short distances w/I his home w/ a rolling walker.    PT=[facility] referral sent via [facility]. Will follow up with [facility] choice when available. PASRR complete.    Stella Moser, SOCORRO RN, CM  X [phone]

## 2025-01-09 NOTE — PROGRESS NOTES
[facility]   part of [facility]    Nephrology Progress Note    Gerardo Torrez Attending:  Peter Flores DO     Cc: EMILIO    SUBJECTIVE     On low dose dopa and vaso still   Intubated on MV  Excellent UOP 3.25L/24hrs    PHYSICAL EXAM   Vital signs: /61   Pulse 90   Temp 99 °F (37.2 °C) (Temporal)   Resp 17   Ht 5' 10\" (1.778 m)   Wt (!) 365 lb 14.4 oz (166 kg)   SpO2 95%   BMI 52.50 kg/m²   Temp (24hrs), Av.4 °F (36.9 °C), Min:97.5 °F (36.4 °C), Max:99 °F (37.2 °C)       Intake/Output Summary (Last 24 hours) at 2025 1258  Last data filed at 2025 1158  Gross per 24 hour   Intake 3380.95 ml   Output 4325 ml   Net -944.05 ml     Wt Readings from Last 3 Encounters:   25 (!) 365 lb 14.4 oz (166 kg)   23 (!) 305 lb (138.3 kg)   23 300 lb (136.1 kg)     General: intubated on MV  HEENT: NCAT, DMM  Neck: Supple   Cardiac: Regular rate and rhythm   Lungs: dimininished  Abdomen: +distended   Extremities: + edema  Neurologic: No asterxis  Skin: LE erythema     MEDS     Current Facility-Administered Medications   Medication Dose Route Frequency    DOPamine in dextrose 5% (Intropin) 800 mg/250mL infusion premix  2-20 mcg/kg/min (Dosing Weight) Intravenous Continuous    furosemide (Lasix) 10 mg/mL injection 60 mg  60 mg Intravenous BID (Diuretic)    heparin (Porcine) 5000 UNIT/ML injection 7,500 Units  7,500 Units Subcutaneous Q8H BING    potassium chloride (Klor-Con) 20 MEQ oral powder 40 mEq  40 mEq Oral Q4H    piperacillin-tazobactam (Zosyn) 4.5 g in dextrose 5% 100 mL IVPB-ADDV  4.5 g Intravenous Q8H BING    dextrose 10% infusion (TPN no rate)   Intravenous Continuous PRN    pancrelipase (Lip-Prot-Amyl) (Zenpep) DR particles cap 10,000 Units  10,000 Units Per G Tube PRN    And    sodium bicarbonate tab 325 mg  325 mg Per G Tube PRN    norepinephrine (Levophed) 4 mg/250mL infusion premix  0.5-30 mcg/min Intravenous Continuous    acetaminophen (Tylenol) 160 MG/5ML oral liquid  650 mg  650 mg Per NG Tube Q4H PRN    Or    acetaminophen (Tylenol) rectal suppository 650 mg  650 mg Rectal Q4H PRN    Or    acetaminophen (Ofirmev) 10 mg/mL infusion premix 1,000 mg  1,000 mg Intravenous Q6H PRN    fentaNYL (Sublimaze) 50 mcg/mL injection 50 mcg  50 mcg Intravenous Q30 Min PRN    fentaNYL in sodium chloride 0.9% (Sublimaze) 1000 mcg/100mL infusion premix   mcg/hr Intravenous Continuous PRN    fentaNYL (Sublimaze) 25 mcg BOLUS FROM BAG infusion  25 mcg Intravenous Q30 Min PRN    senna (Senokot) 8.8 MG/5ML oral syrup 17.6 mg  10 mL Per NG Tube BID    docusate (Colace) 50 MG/5ML oral solution 100 mg  100 mg Per NG Tube BID    polyethylene glycol (PEG 3350) (Miralax) 17 g oral packet 17 g  17 g Per NG Tube Daily PRN    bisacodyl (Dulcolax) 10 MG rectal suppository 10 mg  10 mg Rectal Daily PRN    chlorhexidine gluconate (Peridex) 0.12 % oral solution 15 mL  15 mL Mouth/Throat BID@0800,2000    mineral oil-white petrolatum (Artificial Tears) 83-15 % ophthalmic ointment 1 Application  1 Application Both Eyes Nightly    vasopressin (Vasostrict) 20 Units in sodium chloride 0.9% 100 mL infusion for septic shock  0.01-0.03 Units/min Intravenous Continuous    phenylephrine in sodium chloride 0.9% (Francesco-Synephrine) 50 mg/250mL infusion premix   mcg/min Intravenous Continuous    midazolam (Versed) 2 MG/2ML injection 2 mg  2 mg Intravenous Q5 Min PRN    propofol (Diprivan) 10 mg/mL infusion premix  5-50 mcg/kg/min (Dosing Weight) Intravenous Continuous    famotidine (Pepcid) 20 mg/2mL injection 20 mg  20 mg Intravenous QAM    atorvastatin (Lipitor) tab 20 mg  20 mg Oral Nightly    cyanocobalamin (Vitamin B12) tab 2,000 mcg  2,000 mcg Oral Daily    tamsulosin (Flomax) cap 0.4 mg  0.4 mg Oral Nightly    acetaminophen (Tylenol) tab 650 mg  650 mg Oral Q6H PRN    ondansetron (Zofran) 4 MG/2ML injection 4 mg  4 mg Intravenous Q6H PRN    multivitamin (Tab-A-Kieran/Beta Carotene) tab 1 tablet  1 tablet Oral  Daily    thiamine (Vitamin B1) tab 100 mg  100 mg Oral Daily    miconazole 2 % powder   Topical BID       LABS     Lab Results   Component Value Date    WBC 10.0 01/09/2025    HGB 10.8 01/09/2025    HCT 31.9 01/09/2025    .0 01/09/2025    CREATSERUM 1.45 01/09/2025    BUN 46 01/09/2025     01/09/2025    K 3.6 01/09/2025     01/09/2025    CO2 27.0 01/09/2025     01/09/2025    CA 9.2 01/09/2025    ALB 3.2 01/09/2025    ALKPHO 76 01/09/2025    BILT 0.8 01/09/2025    TP 5.7 01/09/2025    AST 14 01/09/2025    ALT 8 01/09/2025    MG 2.1 01/09/2025    PHOS 2.7 01/09/2025    PGLU 169 01/09/2025       IMAGING   All imaging studies personally reviewed.    XR CHEST AP PORTABLE  (CPT=71045)   Final Result   PROCEDURE:  XR CHEST AP PORTABLE  (CPT=71045)       TECHNIQUE:  AP chest radiograph was obtained.       COMPARISON:  [facility] , XR, XR CHEST AP PORTABLE  (CPT=71045), 1/07/2025,    7:41 AM.  [facility] , XR, XR CHEST AP PORTABLE  (CPT=71045), 1/07/2025, 6:37    AM.       INDICATIONS:  Increased O2 needs       PATIENT STATED HISTORY: (As transcribed by Technologist)  Patient offered    no additional history at this time.            FINDINGS:  Right-sided triple-lumen catheter.  Enteric tube extending    beyond the diaphragm the tip not included on this examination.     Endotracheal tube with tip approximately 3 cm above the level of the    gianna.  Cardiomegaly.  Interstitial opacities    bilaterally.  Retrocardiac opacity.  Small left-sided pleural effusion.     No pneumothorax.                         =====   CONCLUSION:     1. Cardiomegaly with interstitial opacities likely representing edema.   2. Retrocardiac opacity suggestive of pneumonia.   3. Small left-sided pleural effusion.             LOCATION:  [facility]                       Dictated by (CST): Elaine Felix MD on 1/09/2025 at 8:06 AM        Finalized by (CST): Elaine Felix MD on 1/09/2025 at 8:09 AM         CT ABDOMEN+PELVIS(EJO=64623)    Final Result   PROCEDURE:  CT ABDOMEN+PELVIS (CPT=74176)       COMPARISON:  [facility] , CT, CT ABD(C/S)/PELVIS(C) (SET), 3/14/2006, 7:09 PM.       INDICATIONS:  abdominal distention       TECHNIQUE:  Unenhanced multislice CT scanning was performed from the dome    of the diaphragm to the pubic symphysis.  Dose reduction techniques were    used. Dose information is transmitted to the ACR (American College of    Radiology) NRDR (National Radiology    Data Registry) which includes the Dose Index Registry.       PATIENT STATED HISTORY: (As transcribed by Technologist)  Patient is here    for evaluation of abdominal distention.             FINDINGS:     LIVER:  No enlargement, atrophy, abnormal density, or significant focal    lesion.     BILIARY:  There is gallbladder wall thickening and mild stranding around    the gallbladder.  There are no calcified stones detected.   PANCREAS:  No lesion, fluid collection, ductal dilatation, or atrophy.     SPLEEN:  No enlargement or focal lesion.     KIDNEYS:  Kidneys are unremarkable.   ADRENALS:  No mass or enlargement.     AORTA/VASCULAR:    Aorta is diffusely atherosclerotic but not aneurysmal.   RETROPERITONEUM:  There is an enlarged portal caval space lymph node    series 3, image 47 which measures 3.6 x 1.7 cm.    BOWEL/MESENTERY:  There is a nasogastric tube with tip in gastric body.     There is enlarged lymph node noted in region of gastrohepatic ligament    series 3, image 44 which measures 2.4 x 2 cm.    ABDOMINAL WALL:  Diffuse body wall edema is noted.   URINARY BLADDER:  No visible focal wall thickening, lesion, or calculus.     PELVIC NODES:  No adenopathy.     PELVIC ORGANS:  No visible mass.  Pelvic organs appropriate for patient    age.     BONES:  There is diffuse osteopenia noted.  There is diffuse degenerative    disc disease in the spine.   LUNG BASES:  There are moderate bilateral pleural effusions and there is    dependent atelectasis in the lower lobes.    OTHER:  Negative.                           =====   CONCLUSION:     1. There is diffuse body wall edema.  There are moderate bilateral pleural    effusions and there is dependent atelectasis likely related to diffuse    edematous state.   2. Gallbladder wall thickening and mild stranding around gallbladder could    also be related to diffusely edematous state although correlation with any    clinical evidence of cholecystitis is necessary.   3. There is retroperitoneal and gastrohepatic ligament lymphadenopathy    which is of uncertain significance.   4. Nasogastric tube tip is in region of gastric body.             LOCATION:  [facility]           Dictated by (CST): Ahmet Griffith MD on 1/07/2025 at 10:16 AM        Finalized by (CST): Ahmet Griffith MD on 1/07/2025 at 10:22 AM         XR CHEST AP PORTABLE  (CPT=71045)   Final Result   PROCEDURE:  XR CHEST AP PORTABLE  (CPT=71045)       TECHNIQUE:  AP chest radiograph was obtained.       COMPARISON:  [facility] , [facility], XR CHEST AP PORTABLE  (CPT=71045), 1/07/2025,    6:37 AM.       INDICATIONS:  Verify correct tube placement       PATIENT STATED HISTORY: (As transcribed by Technologist)  Patient offered    no additional history at this time.                                         =====   CONCLUSION:         Stable cardiac and mediastinal contours.  Findings of congestive heart    failure with moderate pulmonary edema.  A small left pleural effusion is    suspected, similar to prior.  No pneumothorax.       Endotracheal tube tip terminates approximately 5 cm from the gianna.     Enteric catheter extends into the upper abdomen beyond the field of view.     Interval placement of right IJ central venous catheter terminating in the    mid/lower SVC.           LOCATION:  [id number]                       Dictated by (CST): Yudy Colon MD on 1/07/2025 at 8:22 AM        Finalized by (CST): Yudy Colon MD on 1/07/2025 at 8:23 AM         XR CHEST AP PORTABLE  (CPT=71045)   Final Result    PROCEDURE:  XR CHEST AP PORTABLE  (CPT=71045)       TECHNIQUE:  AP chest radiograph was obtained.       COMPARISON:  [address] [facility], XR, XR CHEST PA + LAT CHEST (CPT=71046),    11/01/2023, 12:38 PM.       INDICATIONS:  s/p intubation       PATIENT STATED HISTORY: (As transcribed by Technologist)  Patient offered    no additional history at this time.             FINDINGS:                           =====   CONCLUSION:  Stable cardiac and mediastinal contours.  Findings of    pulmonary venous hypertension with mild/moderate pulmonary edema.  A    small/mild left pleural effusion is suspected.  No appreciable    pneumothorax.       Endotracheal tube tip terminates approximately 4 cm from the gianna.     Enteric catheter extends into the upper abdomen beyond the field of view.           LOCATION:  [id number]                       Dictated by (CST): Yudy Colon MD on 1/07/2025 at 6:50 AM        Finalized by (CST): Yudy Colon MD on 1/07/2025 at 6:51 AM         XR ABDOMEN (1 VIEW) (CPT=74018)   Final Result   PROCEDURE:  XR ABDOMEN (1 VIEW) (CPT=74018)       INDICATIONS:  abdomen distended       COMPARISON:  None.       TECHNIQUE:  Supine AP view was obtained.       PATIENT STATED HISTORY: (As transcribed by Technologist)  Patient offered    no additional history at this time.                                   =====   CONCLUSION:         Under penetration related to body habitus degrades assessment of the bowel    gas pattern.  Within this limitation there appears to be moderate gaseous    distention of the stomach and small bowel with relative paucity of colonic    gas.  Findings may reflect    ileus or bowel obstruction.           LOCATION:  [id number]           Dictated by (CST): Yudy Colon MD on 1/07/2025 at 6:48 AM        Finalized by (CST): Yudy Colon MD on 1/07/2025 at 6:49 AM         US KIDNEY/BLADDER (CPT=76770)   Final Result   PROCEDURE:  US KIDNEY/BLADDER (CPT=76770)       COMPARISON:  None.       INDICATIONS:   Bleeding ulcers       TECHNIQUE:  Transabdominal gray scale ultrasound imaging of the bilateral    kidneys and bladder was performed.  Routine technique was utilized.         PATIENT STATED HISTORY: (As transcribed by Technologist)              FINDINGS:        RIGHT KIDNEY   MEASUREMENTS:  13.5 x 7.3 x 8.4 cm   ECHOGENICITY:  Normal.   HYDRONEPHROSIS:  None.   CYSTS/STONES/MASSES:  None.       LEFT KIDNEY    MEASUREMENTS:  12.7 x 7.6 x 7.9 cm   ECHOGENICITY:  Normal.   HYDRONEPHROSIS:  None.   CYSTS/STONES/MASSES:  None.       BLADDER:  Empty, not evaluated    OTHER:  Negative.                         =====   CONCLUSION:  Normal appearance of the kidneys.           LOCATION:  [id number]                   Dictated by (CST): Aftab Georges MD on 1/06/2025 at 1:26 PM        Finalized by (CST): Aftab Georges MD on 1/06/2025 at 1:29 PM               ASSESSMENT & PLAN    74 year old male w chronic Afib, DM, lymphedema, morbid obesity essentially wheelchair bound w sacral decubitus ulcer, asc ao aneurysm who called 911 today as was bleeding profusely from decubitus ulcer. Nephrology consulted for EMILIO. Hospital course c/b cardiac arrest, acute resp failure requiring intubation and shock.      EMILIO  -- likely prerenal due decreased intravascular volume + poor perfusion w hypotension   -- BUN 84, Cr 5.2mg/dL on admit. Baseline Cr appears 0.9-1.14mg/dL mostly since 2022  -- renal US w no hydro.   -- UA w hyaline casts, 10 ketones, 20 protein, no RBCs. Urine na 64.    -- UPCR 0.738, UACR only 72.8, recheck when EMILIO improving and if persists send serologies   -- hold torsemide, jardiance, irbesartan, spironolactone  -- stop etoh, monitor per primary team   -- sp IVFs/bicarb. Cr improved even in the setting of arrest and shock, IVFs stopped. Feeds starting w [facility].  Cr improving. Pt remains on pressors. Diuresis per cards/icu  -- suarez placed, strict UOP   -- avoid NSAIDs, IV contrast, other nephrotoxins. Renally dose meds  -- no  acute need for dialysis, but need to monitor closely. Pt previously agreeable to dialysis if acute need arises.      Acidosis  -- lactic wnl. Changed IVF to bicarb. Now off     Hyperphosphatemia  -- improved w improving kidney function     Hypocalcemia  -- replete and trend     Anemia /acute blood loss anemia  -- transfusion prn.    Cardiac arrest  -- per cards    Shock   -- on pressors. Echo EF 55-60%, mildly increased pulm artery systolic pressure.     Abd distention   -- CT diffuse body wall edema. Also moderate bl pleural effusions    Acute hypoxic resp failure / pleural effusions   -- intubated on MV. Per ICU pulm.  -- CT w moderate bl pleural effusions. Defer if needs thora or not to icu   -- If needs diuresis defer to pulm/cards in setting of shock on pressors.      D/w RN and pt family     Thank you for allowing me to participate in the care of this patient. Please do not hesitate to call with questions or concerns.       Deysi Coyle MD  [facility]

## 2025-01-09 NOTE — SPIRITUAL CARE NOTE
Spiritual Care Visit Note    Patient Name: Gerardo Torrez Date of Spiritual Care Visit: 25   : 6/15/1950 Primary Dx: Acute renal failure, unspecified acute renal failure type (HCC)       Referred By: Referral From: Nurse    Spiritual Care Taxonomy:    Intended Effects: Build relationship of care and support    Methods: Collaborate with care team member;Offer emotional support;Offer spiritual/Caodaism support    Interventions: Acknowledge response to difficult experience;Active listening;Powderly for care team member(s);Explain  role;Identify supportive relationship(s);Beecher    Visit Type/Summary:     - Spiritual Care: Responded to a request for spiritual care and assessed the patient for spiritual care needs. Consulted with RN prior to visit. Before entering the room, I provided emotional support for patient's daughter. When I entered the room, patient was lying in bed resting while family visited at his bedside. Patient provided non-verbal recognition and appreciation for  visit. Visited with wife, son and friends at bedside. Offered empathic listening and emotional support. Provided support for Patient's spiritual/Caodaism requests. Offered prayer. Patient and family expressed appreciation for  visit. Provided information regarding how to contact Spiritual Care and left a Spiritual Care information card.   remains available for follow up.    Spiritual Care support can be requested via an Epic consult. For urgent/immediate needs, please contact the On Call  at: [place]: ext [phone]    Rev Marie Greene MA  Chaplain Resident

## 2025-01-09 NOTE — PLAN OF CARE
Received pt on ventilator and sedation.  Sedation vacation and SBT performed; pt tolerated SBT for 1 hr.  Titration of levophed, vasopressin, fentanyl and propofol drip as documented.  Pt's bed changed to bariatric bed.  Tube feedings started.  Family at bedside and is updated on patient status and plan of care.

## 2025-01-09 NOTE — PROGRESS NOTES
[facility]   part of [facility] Hospitalist Progress Note     Gerardo Torrez Patient Status:  Inpatient    6/15/1950 MRN [id number]   Location [facility] 4SW-A Attending Peter Flores, DO   Hosp Day # 3 PCP Sid Gordon MD     Follow Up:  The primary encounter diagnosis was Acute renal failure, unspecified acute renal failure type (HCC). A diagnosis of Anemia, unspecified type was also pertinent to this visit.    Subjective:     Patient seen and examined.  Remains intubated but responds to commands.  No fever.      Objective:    Review of Systems:   10 point ROS completed and was negative, except for pertinent positive and negatives stated in subjective.    Vital signs:  Temp:  [97.5 °F (36.4 °C)-99 °F (37.2 °C)] 99 °F (37.2 °C)  Pulse:  [] 78  Resp:  [13-27] 16  BP: ()/(41-63) 153/50  SpO2:  [88 %-96 %] 94 %  AO: ()/(36-65) 125/46  FiO2 (%):  [50 %-100 %] 100 %    Physical Exam:    Gen: Intubated, sedated, responds to commands.  Chronically ill appearing male.   HEENT:  EOMI, PERRLA, OP clear, MMM.  OGT in place.  Pulm:  Course to auscultation on vent.  CV: Heart with regular rate and rhythm, no murmur.  Normal PMI.    Abd:  Mod to severe abd distention, hypoactive bowel sounds, no TTP. Morbid obesity.    MSK: +lymphedema, +chronic venous stasis dermatitis.   Skin: +sacral decub  Neuro:  Grossly intact, no focal deficits  : Luis cath in place    Diagnostic Data:    Labs:  Recent Labs   Lab 25  1145 25  0643 25  1200 25  0332 25  0442   WBC 8.8 9.6 13.6* 8.4 10.0   HGB 11.8* 10.9* 11.7* 10.9* 10.8*   .7* 112.4* 111.5* 106.7* 108.1*   .0* 117.0* 134.0* 106.0* 120.0*   INR  --  1.57*  --   --   --        Recent Labs   Lab 25  1200 25  1831 25  0332 25  0442   * 129* 140*  140* 170*   BUN 82* 75* 66*  66* 46*   CREATSERUM 3.64* 2.99* 2.38*  2.38* 1.45*   CA 8.8 9.1 9.0  9.0 9.2    ALB 3.4 3.2 3.1*  3.1* 3.2   * 136 138  138 140   K 4.2 3.8 3.6  3.6 3.6    104 106  106 105   CO2 19.0* 22.0 25.0  25.0 27.0   ALKPHO 94  --  81 76   AST 20  --  13 14   ALT 13  --  9* 8*   BILT 0.8  --  0.7 0.8   TP 6.1  --  5.6* 5.7       Estimated Creatinine Clearance: 47.5 mL/min (A) (based on SCr of 1.45 mg/dL (H)).    Recent Labs   Lab 01/07/25  0643   PTP 18.9*   INR 1.57*            COVID-19 Lab Results    COVID-19  Lab Results   Component Value Date    COVID19 Not Detected 07/20/2022    COVID19 DETECTED (A) 01/26/2021       Pro-Calcitonin  No results for input(s): \"PCT\" in the last 168 hours.    Cardiac  No results for input(s): \"TROP\", \"PBNP\" in the last 168 hours.    Creatinine Kinase  No results for input(s): \"CK\" in the last 168 hours.    Inflammatory Markers  No results for input(s): \"CRP\", \"KENNEDY\", \"LDH\", \"DDIMER\" in the last 168 hours.    Imaging: Imaging data reviewed in Epic.    Medications:    furosemide  60 mg Intravenous BID (Diuretic)    heparin  7,500 Units Subcutaneous Q8H BING    potassium chloride  40 mEq Oral Q4H    piperacillin-tazobactam  4.5 g Intravenous Q8H BING    senna  10 mL Per NG Tube BID    docusate  100 mg Per NG Tube BID    chlorhexidine gluconate  15 mL Mouth/Throat BID@0800,2000    mineral oil-white petrolatum  1 Application Both Eyes Nightly    famotidine  20 mg Intravenous QAM    atorvastatin  20 mg Oral Nightly    cyanocobalamin  2,000 mcg Oral Daily    tamsulosin  0.4 mg Oral Nightly    multivitamin  1 tablet Oral Daily    thiamine  100 mg Oral Daily    miconazole   Topical BID       Assessment & Plan:      74 yr old male with PMH sig for a-fib on eliquis, HTN, HLD, DM II, gout, pulm HTN, chronic sacral decub, and morbid obesity who presented to the ED for evaluation of bleeding from his sacral wound.       # Cardiac arrest   - concern for primary intra-abd event   - ROSC obtained s/p epi x 1  - maintain normothermia     # Acute respiratory failure  -  intubated in setting of cardiac arrest   - cont full vent support   - wean FiO2 as able  - weaning trials per critical care    # Shock  - suspect distributive/hypovolemic  - IVFs  - cont vasopressors to maintain SBP > 90  - cont empiric zosyn (1/8 - )  - f/u cultures     # Abdominal distention   - CT a/p neg for SBO or perf  - OG placed to LIS  - cont empiric abx  - gen surg c/s appreciated     # Acute renal failure   - suspect prerenal in the setting of GI losses and ARB use  - cont IVFs per renal recs  - hold ARB  - hold diuretics   - renal US neg for hydro  - monitor renal function and UO, avoid nephrotoxins   - renal c/s appreciated      # Bleeding sacral decub   # Acute blood loss anemia  - hold eliquis   - monitor CBC  - wound care c/s      # Chronic a-fib  - rate controlled  - ECHO with intact LVEF  - hold metoprolol given bradycardia    - hold eliquis for now given bleeding from sacral decub      # Essential HTN  - low on admit  - hold ARB, metoprolol, and diuretics      # HLD  - cont statin     # Pulm HTN  - monitor volume status on IVFs  - hold diuretics      # Thrombocytopenia   - suspect due to EtOH use and possible liver disease   - monitor      # EtOH abuse   - monitor for withdrawal   - MVI, thiamine, folate   - pt counseled on EtOH cessation      # Morbid obesity  - Recommend follow up with PCP to discuss diet and lifestyle modifications to encourage weight loss.    Plan of care: inpt care in the ICU.      Plan of care discussed with bedside RN.    Peter Flores, DO    Supplementary Documentation:   DVT Mechanical Prophylaxis:   SCDs,    DVT Pharmacologic Prophylaxis   Medication    heparin (Porcine) 5000 UNIT/ML injection 7,500 Units                Code Status: Full Code  Luis: Luis catheter in place  Luis Duration (in days): 1  Central line: central venous catheter in place  ETHAN:

## 2025-01-09 NOTE — CONSULTS
[facility]  Report of Inpatient Wound Care Consultation    Gerardo Torrez Patient Status:  Inpatient    6/15/1950 MRN [id number]   Location [facility] 4SW-A Attending Peter Flores, DO   Hosp Day # 3 PCP Sid Gordon MD     Reason for Consultation:  Sacral wound    History of Present Illness:  Gerardo Torrez is a a(n) 74 year old male. Family present at bedside. Wound care performed with two other wound care staff and the assigned inpatient RN.  Patient with multiple comorbidities, with skin breakdown described below.     Subjective: patient is intubated and non verbal at this time.     History:  Past Medical History:    Back problem    CAD (coronary artery disease)    Calculus of kidney    Diabetes (HCC)    Diabetes mellitus type 2 in obese    Dilated aortic root (HCC)    GOUT    Gout    High blood pressure    High cholesterol    KIDNEY STONE    Morbid obesity (HCC)    Neuropathy    OTHER DISEASES    SVT Chato    PONV (postoperative nausea and vomiting)    Pulmonary hypertension (HCC)    Thoracic aortic aneurysm (HCC)     Past Surgical History:   Procedure Laterality Date    Appendectomy      Back surgery      discectomy/cauda equina   Nicole    Colonoscopy      polyps/[facility]  [place]    Colonoscopy      Colonoscopy      Colonoscopy N/A 10/28/2015    Procedure: COLONOSCOPY;  Surgeon: Reed Garcia;  Location:  ENDOSCOPY    Colonoscopy N/A 2023    Procedure: COLONOSCOPY;  Surgeon: Evgeny Horner MD;  Location:  ENDOSCOPY    Each add tooth extraction      Lithotripsy      Other surgical history      achilles rupture      reports that he quit smoking about 6 years ago. His smoking use included cigarettes. He started smoking about 26 years ago. He has a 20 pack-year smoking history. He has never used smokeless tobacco. He reports that he does not currently use alcohol after a past usage of about 3.0 - 4.0 standard drinks of alcohol per week. He reports that he does not use  drugs.      Allergies:  @ALLERGY    Laboratory Data:    Recent Labs   Lab 01/07/25  0643 01/07/25  1159 01/07/25  1200 01/07/25  1711 01/07/25  1831 01/08/25  0002 01/08/25  0332 01/08/25  0550 01/08/25  1749 01/08/25  2349 01/09/25  0442 01/09/25  0545 01/09/25  1444   WBC 9.6  --  13.6*  --   --   --  8.4  --   --   --  10.0  --   --    HGB 10.9*  --  11.7*  --   --   --  10.9*  --   --   --  10.8*  --   --    HCT 32.6*  --  35.0*  --   --   --  32.0*  --   --   --  31.9*  --   --    .0*  --  134.0*  --   --   --  106.0*  --   --   --  120.0*  --   --    CREATSERUM 4.37*  4.37*  --  3.64*  --  2.99*  --  2.38*  2.38*  --   --   --  1.45*  --  1.18   BUN 90*  90*  --  82*  --  75*  --  66*  66*  --   --   --  46*  --  41*   *  148*  --  174*  --  129*  --  140*  140*  --   --   --  170*  --  142*   CA 8.5*  8.5*  --  8.8  --  9.1  --  9.0  9.0  --   --   --  9.2  --  9.5   ALB 3.3  3.3  --  3.4  --  3.2  --  3.1*  3.1*  --   --   --  3.2  --   --    TP 5.8  --  6.1  --   --   --  5.6*  --   --   --  5.7  --   --    PTT 34.9  --   --   --   --   --   --   --   --   --   --   --   --    INR 1.57*  --   --   --   --   --   --   --   --   --   --   --   --    PGLU  --    < >  --    < >  --    < >  --    < > 127* 158*  --  169*  --     < > = values in this interval not displayed.         ASSESSMENT:  Wound 01/06/25 Buttocks (Active)   Date First Assessed/Time First Assessed: 01/06/25 1535   Location: Buttocks      Assessments 1/9/2025 11:23 AM   Wound Image     Site Assessment Purple;Moist   Closure None   Drainage Amount Scant   Drainage Description Sanguineous   Treatments Cleansed   Dressing Optifoam   Dressing Changed Changed   Wound Length (cm) 1 cm   Wound Width (cm) 0.4 cm   Wound Surface Area (cm^2) 0.4 cm^2   Wound Depth (cm) 0.2 cm   Wound Volume (cm^3) 0.08 cm^3   Margins Well-defined edges   Non-staged Wound Description Partial thickness   Wound Granulation Tissue Pink;Firm   Wound  Bed Granulation (%) 100 %   Wound Odor None   Shape MASD        Wound Cleaning and Dressings:  Wound cleansing:  normal saline  Wound cleaning frequency: Daily and PRN  Wound product: zinc oxide and sacral foam  Dressing change frequency:  Change dressing daily and/or PRN     ALL WOUND CARE SUPPLIES CAN BE OBTAINED FROM CENTRAL DISTRIBUTION     Miscellaneous/Additional Orders:  Off loading: bariatric bed.     If patient is Diabetic: want to make sure blood sugars are within a controled range for wound healing     Protein intake: depending on providers recommendations and patients kidney functions - if kidneys are good then recommend patient to increase protein intake (Boost, Ko, Ensure, Premiere Protein)     Recommendations: If patient is discharged home, may follow up with home health and/or outpatient wound care clinic.       Thank you for this consultation and for allowing me to participate in the care of your patient.  Please call [phone] if you have any questions about this consultation and plan of care.     Time Spent 45 Minutes.    Thank you,  Yadira Garcia RN  Wound/Ostomy/Continence nurse    1/9/2025  3:24 PM

## 2025-01-10 ENCOUNTER — APPOINTMENT (OUTPATIENT)
Dept: GENERAL RADIOLOGY | Facility: HOSPITAL | Age: 75
End: 2025-01-10
Attending: INTERNAL MEDICINE
Payer: MEDICARE

## 2025-01-10 PROBLEM — J96.01 ACUTE HYPOXIC RESPIRATORY FAILURE (HCC): Status: ACTIVE | Noted: 2025-01-10

## 2025-01-10 PROBLEM — I46.9 CARDIAC ARREST (HCC): Status: ACTIVE | Noted: 2025-01-10

## 2025-01-10 LAB
ALBUMIN SERPL-MCNC: 3.4 G/DL (ref 3.2–4.8)
ALBUMIN/GLOB SERPL: 1.1 {RATIO} (ref 1–2)
ALP LIVER SERPL-CCNC: 81 U/L
ALT SERPL-CCNC: 8 U/L
ANION GAP SERPL CALC-SCNC: 6 MMOL/L (ref 0–18)
AST SERPL-CCNC: 22 U/L (ref ?–34)
BASOPHILS # BLD AUTO: 0.04 X10(3) UL (ref 0–0.2)
BASOPHILS NFR BLD AUTO: 0.4 %
BILIRUB SERPL-MCNC: 0.8 MG/DL (ref 0.2–1.1)
BUN BLD-MCNC: 32 MG/DL (ref 9–23)
CALCIUM BLD-MCNC: 9.7 MG/DL (ref 8.7–10.4)
CHLORIDE SERPL-SCNC: 105 MMOL/L (ref 98–112)
CO2 SERPL-SCNC: 32 MMOL/L (ref 21–32)
CREAT BLD-MCNC: 1.13 MG/DL
EGFRCR SERPLBLD CKD-EPI 2021: 69 ML/MIN/1.73M2 (ref 60–?)
EOSINOPHIL # BLD AUTO: 0.44 X10(3) UL (ref 0–0.7)
EOSINOPHIL NFR BLD AUTO: 4.8 %
ERYTHROCYTE [DISTWIDTH] IN BLOOD BY AUTOMATED COUNT: 14.6 %
GLOBULIN PLAS-MCNC: 3.1 G/DL (ref 2–3.5)
GLUCOSE BLD-MCNC: 112 MG/DL (ref 70–99)
GLUCOSE BLD-MCNC: 126 MG/DL (ref 70–99)
GLUCOSE BLD-MCNC: 147 MG/DL (ref 70–99)
GLUCOSE BLD-MCNC: 152 MG/DL (ref 70–99)
GLUCOSE BLD-MCNC: 165 MG/DL (ref 70–99)
HCT VFR BLD AUTO: 34.8 %
HGB BLD-MCNC: 11.6 G/DL
IMM GRANULOCYTES # BLD AUTO: 0.05 X10(3) UL (ref 0–1)
IMM GRANULOCYTES NFR BLD: 0.5 %
LYMPHOCYTES # BLD AUTO: 0.76 X10(3) UL (ref 1–4)
LYMPHOCYTES NFR BLD AUTO: 8.3 %
MAGNESIUM SERPL-MCNC: 1.5 MG/DL (ref 1.6–2.6)
MCH RBC QN AUTO: 36.3 PG (ref 26–34)
MCHC RBC AUTO-ENTMCNC: 33.3 G/DL (ref 31–37)
MCV RBC AUTO: 108.8 FL
MONOCYTES # BLD AUTO: 1.16 X10(3) UL (ref 0.1–1)
MONOCYTES NFR BLD AUTO: 12.6 %
NEUTROPHILS # BLD AUTO: 6.74 X10 (3) UL (ref 1.5–7.7)
NEUTROPHILS # BLD AUTO: 6.74 X10(3) UL (ref 1.5–7.7)
NEUTROPHILS NFR BLD AUTO: 73.4 %
OSMOLALITY SERPL CALC.SUM OF ELEC: 306 MOSM/KG (ref 275–295)
PHOSPHATE SERPL-MCNC: 2.5 MG/DL (ref 2.4–5.1)
PLATELET # BLD AUTO: 124 10(3)UL (ref 150–450)
POTASSIUM SERPL-SCNC: 3.8 MMOL/L (ref 3.5–5.1)
PROT SERPL-MCNC: 6.5 G/DL (ref 5.7–8.2)
RBC # BLD AUTO: 3.2 X10(6)UL
SODIUM SERPL-SCNC: 143 MMOL/L (ref 136–145)
TRIGL SERPL-MCNC: 70 MG/DL (ref 30–149)
WBC # BLD AUTO: 9.2 X10(3) UL (ref 4–11)

## 2025-01-10 PROCEDURE — 74018 RADEX ABDOMEN 1 VIEW: CPT | Performed by: INTERNAL MEDICINE

## 2025-01-10 RX ORDER — FUROSEMIDE 10 MG/ML
40 INJECTION INTRAMUSCULAR; INTRAVENOUS 3 TIMES DAILY
Status: DISCONTINUED | OUTPATIENT
Start: 2025-01-10 | End: 2025-01-10 | Stop reason: SDUPTHER

## 2025-01-10 RX ORDER — FUROSEMIDE 10 MG/ML
40 INJECTION INTRAMUSCULAR; INTRAVENOUS 3 TIMES DAILY
Status: COMPLETED | OUTPATIENT
Start: 2025-01-10 | End: 2025-01-10

## 2025-01-10 RX ORDER — MAGNESIUM OXIDE 400 MG/1
800 TABLET ORAL ONCE
Status: COMPLETED | OUTPATIENT
Start: 2025-01-10 | End: 2025-01-10

## 2025-01-10 NOTE — PROGRESS NOTES
[place] [facility] Progress Note     SUBJECTIVE/Interval history:  No acute events overnight. Remains intubated/sedated    Medications  Reviewed personally   potassium phosphate dibasic 15 mmol in sodium chloride 0.9% 250 mL IVPB  15 mmol Intravenous Once    heparin  7,500 Units Subcutaneous Q8H BING    piperacillin-tazobactam  4.5 g Intravenous Q8H BING    senna  10 mL Per NG Tube BID    docusate  100 mg Per NG Tube BID    chlorhexidine gluconate  15 mL Mouth/Throat BID@0800,2000    mineral oil-white petrolatum  1 Application Both Eyes Nightly    famotidine  20 mg Intravenous QAM    atorvastatin  20 mg Oral Nightly    cyanocobalamin  2,000 mcg Oral Daily    tamsulosin  0.4 mg Oral Nightly    multivitamin  1 tablet Oral Daily    thiamine  100 mg Oral Daily    miconazole   Topical BID       dextrose 10%    lipase-protease-amylase (Lip-Prot-Amyl) **AND** sodium bicarbonate    acetaminophen **OR** acetaminophen **OR** acetaminophen    fentaNYL    [COMPLETED] fentaNYL (Sublimaze) **AND** fentanyl    fentaNYL (Sublimaze)    polyethylene glycol (PEG 3350)    bisacodyl    midazolam    acetaminophen    ondansetron    OBJECTIVE:  Vitals:    01/10/25 0815 01/10/25 0830 01/10/25 0845 01/10/25 0900   BP:       BP Location:       Pulse: 95 97 92 105   Resp: 24 16 16 16   Temp:       TempSrc:       SpO2: 94% 94% 94% 94%   Weight:       Height:           Vital signs in last 24 hours:  Blood pressure 117/58, pulse 105, temperature 99.1 °F (37.3 °C), temperature source Temporal, resp. rate 16, height 5' 10\" (1.778 m), weight (!) 341 lb 4.8 oz (154.8 kg), SpO2 94%.     Intake/Output:  I/O last 3 completed shifts:  In: 5407.6 [I.V.:2519.6; NG/GT:2588; IV PIGGYBACK:300]  Out: 61248 [Urine:90664]  Vent Mode: VC+  FiO2 (%):  [50 %-100 %] 50 %  S RR:  [16] 16  S VT:  [500 mL] 500 mL  PEEP/CPAP (cm H2O):  [10 cm H20] 10 cm H20  MAP (cm H2O):  [14-15] 14    Physical Exam:  General: Appears intubated/sedated.  No acute distress  Neuro: intubated/sedated  HEENT: ETT in place  Lungs:CTAB no wheeze, compliance of 50  Heart:RRR no murmur heard  Abdomen:soft, mild distention, +tympanic to percussion. No rigidity. No reaction to palpation  Extremities:+BLE edema    Lab Data Review:   Recent Labs   Lab 01/09/25  0442 01/09/25  1444 01/09/25  1835 01/10/25  0409   * 142*  --  152*   BUN 46* 41*  --  32*   CREATSERUM 1.45* 1.18  --  1.13   CA 9.2 9.5  --  9.7    140  --  143   K 3.6 3.8 3.8 3.8    105  --  105   CO2 27.0 30.0  --  32.0     Recent Labs   Lab 01/08/25  0332 01/09/25  0442 01/10/25  0409   RBC 3.00* 2.95* 3.20*   HGB 10.9* 10.8* 11.6*   HCT 32.0* 31.9* 34.8*   .7* 108.1* 108.8*   MCH 36.3* 36.6* 36.3*   MCHC 34.1 33.9 33.3   RDW 14.4 14.8 14.6   NEPRELIM 6.91 7.68 6.74   WBC 8.4 10.0 9.2   .0* 120.0* 124.0*     No results for input(s): \"BNP\" in the last 168 hours.  No results for input(s): \"TROP\", \"CK\" in the last 168 hours.  Recent Labs   Lab 01/07/25  0643   INR 1.57*   PTT 34.9     Recent Labs   Lab 01/09/25  0809   ABGPHT 7.42   GNLFWE1Z 44   JUXKN4M 101*   ABGHCO3 27.7*   ABGBE 3.5*   TEMP 98.6   CATHI Not Applicable   SITE Arterial Line   DEV  adult   THGB 11.6*       Other Labs:  Interval Culture Data:   No results found for this visit on 01/06/25.  Recent Labs   Lab 01/07/25  1330   COLORUR Light-Yellow   CLARITY Clear   SPECGRAVITY 1.012   GLUUR 300*   BILUR Negative   KETUR 10*   BLOODURINE 1+*   PHURINE 5.5   PROUR 20*   UROBILINOGEN Normal   NITRITE Negative   LEUUR Negative   WBCUR 1-5   RBCUR 0-2   BACUR None Seen   EPIUR Few*       Interval Radiology:   Reviewed personally  XR CHEST AP PORTABLE  (CPT=71045)    Result Date: 1/9/2025  CONCLUSION:  1. Cardiomegaly with interstitial opacities likely representing edema. 2. Retrocardiac opacity suggestive of pneumonia. 3. Small left-sided pleural effusion.    LOCATION:  [facility]      Dictated by (CST): Elaine Felix MD  on 1/09/2025 at 8:06 AM     Finalized by (CST): Elaine Felix MD on 1/09/2025 at 8:09 AM       CT ABDOMEN+PELVIS(CPT=74176)    Result Date: 1/7/2025  CONCLUSION:  1. There is diffuse body wall edema.  There are moderate bilateral pleural effusions and there is dependent atelectasis likely related to diffuse edematous state. 2. Gallbladder wall thickening and mild stranding around gallbladder could also be related to diffusely edematous state although correlation with any clinical evidence of cholecystitis is necessary. 3. There is retroperitoneal and gastrohepatic ligament lymphadenopathy which is of uncertain significance. 4. Nasogastric tube tip is in region of gastric body.    LOCATION:  [facility]   Dictated by (CST): Ahmet Griffith MD on 1/07/2025 at 10:16 AM     Finalized by (CST): Ahmet Griffith MD on 1/07/2025 at 10:22 AM        Assessment/Plan  -Cardiac arrest: on 1/7 am, unclear etiology, thought intraabdominal etiology but negative imaging. Possibly related to morbid obesity and underlying HEIDI, critical upper airway when laying supine  -Shock  -pulmonary infiltrates, possible pneumonia  Monitor hemodynamics, goal MAP >65, wean vasopressors as able for goal pressures  Cardiology following  Continue abx, await cx  Monitor neurostatus - improved when sedation weaned  Wean sedation for goal RASS 0  -Acute respiratory failure  Intubated 1/7  Continue mech vent, low tidal voulme strategy  Wean fio2 for sats >89%; wean PEEP as able  Once fio2 and PEEP weaned, will start SAT/SBT; will need NIPPV (BIPAP) post extubation  -Abdominal distention:   Continue OGT  Check KUB today  Surgery following  -EMILIO, improved  Follow fluid balance,lytes  Renal following  Continue diuresis  -A.Fib  Hold anticoagulation  Monitor rates  -anemia, thrombocytopenia:  Possibly related to bleeding, acute illness and hx of etoh abuse  monitor serial labs and s/s bleeding  -DM:  Control glucoses, insulin regimen  -EtOH  abuse  MVI/thiamine/folate  monitor for withdrawal  -Morbid obesity, HEIDI, possible OHS  Will need NIPPV once extubated  -sacral wound, bleeding  -FEN:  NPO  Start tube feeding  Replete electrolytes as needed  -proph:  SCD  Heparin SQ  - Dispo:  ICU for risk of deterioration  -code status  full  Ryley Sawyer MD  Upon my evaluation, this patient had a high probability of imminent or life-threatening deterioration due to Shock, Circulatory Failure  , Respiratory Failure , and Cardiac arrest  , which required my direct attention, intervention, and personal management.    I have personally provider 40  minutes of critical care time exclusive of time spent on separately billable procedures.  Time includes review of all pertinent laboratory/radiology results, discussion with consultants, and monitoring for potential decompensation.  Performed interventions included Fluids, Pressor drugs , and Managing Mechanical ventilation  .

## 2025-01-10 NOTE — PLAN OF CARE
Attempted to wean dopamine to vasopressin, refractory hypotension.  Vasopressin discontinued, dopamine continuing to infuse.  Luis in place due to diuresis.   Wife at bedside, updated on plan of care.  KUB completed.   Tolerating TF at goal.        Problem: Patient/Family Goals  Goal: Patient/Family Long Term Goal  Description: Patient's Long Term Goal: Return home at baseline ADLs.     Interventions:  - Participate in PT/OT as able.  - See additional Care Plan goals for specific interventions  Outcome: Not Progressing  Goal: Patient/Family Short Term Goal  Description: Patient's Short Term Goal: Tolerate SBT.    Interventions:   - Follow RT and RN recommendations.   - See additional Care Plan goals for specific interventions  Outcome: Not Progressing

## 2025-01-10 NOTE — PROGRESS NOTES
[facility]   part of [facility] Hospitalist Progress Note     Gerardo Torrez Patient Status:  Inpatient    6/15/1950 MRN [id number]   Location [facility] 4SW-A Attending Peter Flores, DO   Hosp Day # 4 PCP Sid Gordon MD     Follow Up:  The primary encounter diagnosis was Acute renal failure, unspecified acute renal failure type (HCC). A diagnosis of Anemia, unspecified type was also pertinent to this visit.    Subjective:     Patient seen and examined.  Remains intubated but responds to commands.  No fever.       Objective:    Review of Systems:   10 point ROS completed and was negative, except for pertinent positive and negatives stated in subjective.    Vital signs:  Temp:  [98.3 °F (36.8 °C)-99.1 °F (37.3 °C)] 99.1 °F (37.3 °C)  Pulse:  [] 72  Resp:  [7-39] 22  BP: (117-141)/(57-66) 117/58  SpO2:  [92 %-98 %] 92 %  AO: ()/() 114/49  FiO2 (%):  [40 %-80 %] 40 %    Physical Exam:    Gen: Intubated, sedated, responds to commands.  Chronically ill appearing male.   HEENT:  EOMI, PERRLA, OP clear, MMM.  OGT in place.  Pulm:  Course to auscultation on vent.  CV: Heart with regular rate and rhythm, no murmur.  Normal PMI.    Abd:  Mod to severe abd distention, hypoactive bowel sounds, no TTP. Morbid obesity.    MSK: +lymphedema, +chronic venous stasis dermatitis.   Skin: +sacral decub  Neuro:  Grossly intact, no focal deficits  : Luis cath in place    Diagnostic Data:    Labs:  Recent Labs   Lab 25  0643 25  1200 25  0332 25  0442 01/10/25  0409   WBC 9.6 13.6* 8.4 10.0 9.2   HGB 10.9* 11.7* 10.9* 10.8* 11.6*   .4* 111.5* 106.7* 108.1* 108.8*   .0* 134.0* 106.0* 120.0* 124.0*   INR 1.57*  --   --   --   --        Recent Labs   Lab 25  0332 25  0442 25  1444 25  1835 01/10/25  0409   *  140* 170* 142*  --  152*   BUN 66*  66* 46* 41*  --  32*   CREATSERUM 2.38*  2.38* 1.45* 1.18  --   1.13   CA 9.0  9.0 9.2 9.5  --  9.7   ALB 3.1*  3.1* 3.2  --   --  3.4     138 140 140  --  143   K 3.6  3.6 3.6 3.8 3.8 3.8     106 105 105  --  105   CO2 25.0  25.0 27.0 30.0  --  32.0   ALKPHO 81 76  --   --  81   AST 13 14  --   --  22   ALT 9* 8*  --   --  8*   BILT 0.7 0.8  --   --  0.8   TP 5.6* 5.7  --   --  6.5       Estimated Creatinine Clearance: 61 mL/min (based on SCr of 1.13 mg/dL).    Recent Labs   Lab 01/07/25  0643   PTP 18.9*   INR 1.57*            COVID-19 Lab Results    COVID-19  Lab Results   Component Value Date    COVID19 Not Detected 07/20/2022    COVID19 DETECTED (A) 01/26/2021       Pro-Calcitonin  No results for input(s): \"PCT\" in the last 168 hours.    Cardiac  No results for input(s): \"TROP\", \"PBNP\" in the last 168 hours.    Creatinine Kinase  No results for input(s): \"CK\" in the last 168 hours.    Inflammatory Markers  No results for input(s): \"CRP\", \"KENNEDY\", \"LDH\", \"DDIMER\" in the last 168 hours.    Imaging: Imaging data reviewed in Epic.    Medications:    furosemide  40 mg Intravenous TID    heparin  7,500 Units Subcutaneous Q8H BING    piperacillin-tazobactam  4.5 g Intravenous Q8H BING    senna  10 mL Per NG Tube BID    docusate  100 mg Per NG Tube BID    chlorhexidine gluconate  15 mL Mouth/Throat BID@0800,2000    mineral oil-white petrolatum  1 Application Both Eyes Nightly    famotidine  20 mg Intravenous QAM    atorvastatin  20 mg Oral Nightly    cyanocobalamin  2,000 mcg Oral Daily    tamsulosin  0.4 mg Oral Nightly    multivitamin  1 tablet Oral Daily    thiamine  100 mg Oral Daily    miconazole   Topical BID       Assessment & Plan:      74 yr old male with PMH sig for a-fib on eliquis, HTN, HLD, DM II, gout, pulm HTN, chronic sacral decub, and morbid obesity who presented to the ED for evaluation of bleeding from his sacral wound.       # Cardiac arrest   - concern for primary intra-abd event   - ROSC obtained s/p epi x 1  - maintain normothermia     # Acute  respiratory failure  - intubated in setting of cardiac arrest   - cont full vent support   - wean FiO2 as able  - weaning trials per critical care    # Shock  - suspect distributive/hypovolemic  - IVFs  - cont vasopressors to maintain SBP > 90  - cont empiric zosyn (1/8 - )  - f/u cultures     # Abdominal distention   - CT a/p neg for SBO or perf  - OG placed to LIS  - cont empiric abx  - gen surg c/s appreciated     # Acute renal failure   - suspect prerenal in the setting of GI losses and ARB use  - improved  - renal US neg for hydro  - monitor renal function and UO, avoid nephrotoxins   - diuretics restarted   - renal c/s appreciated      # Bleeding sacral decub   # Acute blood loss anemia  - hold eliquis   - monitor CBC  - wound care c/s      # Persistent a-fib  - rate controlled  - ECHO with intact LVEF  - hold metoprolol given bradycardia    - hold eliquis for now given bleeding from sacral decub      # Essential HTN  - low on admit  - hold ARB, metoprolol     # HLD  - cont statin     # Pulm HTN  - monitor volume status on IVFs  - hold diuretics      # Thrombocytopenia   - suspect due to EtOH use and possible liver disease   - monitor      # EtOH abuse   - monitor for withdrawal   - MVI, thiamine, folate   - pt counseled on EtOH cessation      # Morbid obesity  - Recommend follow up with PCP to discuss diet and lifestyle modifications to encourage weight loss.    Plan of care: inpt care in the ICU.      Plan of care discussed with pt's family at bedside.     Peter Flores, DO    Supplementary Documentation:   DVT Mechanical Prophylaxis:   SCDs,    DVT Pharmacologic Prophylaxis   Medication    heparin (Porcine) 5000 UNIT/ML injection 7,500 Units                Code Status: Full Code  Luis: Luis catheter in place  Luis Duration (in days): 1  Central line: central venous catheter in place  ETHAN:

## 2025-01-10 NOTE — RESPIRATORY THERAPY NOTE
Current Mechanical Ventilator Settings:  - Mode: VC+  - Rate: 16  - Tidal Volume(mL):500  - FiO2: 50  - PEEP 10  - inspiratory time .9    Breath Sounds: Diminisehd    ETT Size: 8    Measured Parameters:  Peak Inspiratory Pressure(cmH2O): 23  Plateu Pressure(cmH2O) : 19  Tidal Volume(mL): 510  Rate:16    Shift Events noted: BS diminished bilaterally. Suctioning thick yellow secretions via ETT requiring lavage. Weaning O2 as tolerated, currently on 50% and tolerating well.

## 2025-01-10 NOTE — RESPIRATORY THERAPY NOTE
Current Mechanical Ventilator Settings:  - Mode: VC+  - Rate: 16  - Tidal Volume(mL):500  - FiO2: 40%  - PEEP 10  - inspiratory time 0.9    Breath Sounds: Diminish with minimal secretions.    ETT Size: 8 @ 26    Measured Parameters:  Peak Inspiratory Pressure(cmH2O): 25  Plateu Pressure(cmH2O) : 19  Tidal Volume(mL): 511  Rate:16    Shift Events noted: Pt remain on ventilator with above setting. Pt not stable for weaning.

## 2025-01-10 NOTE — OCCUPATIONAL THERAPY NOTE
OCCUPATIONAL THERAPY EVALUATION - INPATIENT     Room Number: 472/472-A  Evaluation Date: 1/10/2025  Type of Evaluation: Initial  Presenting Problem: acute renal failure    Physician Order: IP Consult to Occupational Therapy  Reason for Therapy: ADL/IADL Dysfunction and Discharge Planning    OCCUPATIONAL THERAPY ASSESSMENT   Patient is currently functioning below baseline with toileting, bathing, upper body dressing, lower body dressing, grooming, bed mobility, transfers, static sitting balance, dynamic sitting balance, static standing balance, and dynamic standing balance. Prior to admission, patient's baseline is modified independent with RW for short distances at home.  Patient is requiring dependent assistance as a result of the following impairments: decreased functional strength, decreased functional reach, decreased endurance, decreased muscular endurance, medical status, increased O2 needs from baseline, and deconditioned/intubated/sedated-sedation lowered for activity . Occupational Therapy will continue to follow for duration of hospitalization.    Patient will benefit from continued skilled OT Services to promote return to prior level of function and safety with continuous assistance and gradual rehabilitative therapy    History Related to Current Admission: Patient is a 74 year old male admitted on 1/6/2025 with Presenting Problem: acute renal failure. Co-Morbidities : chronic sacral ulcer, morbid obesity,etoh abuse, gout, pulmonary HTN  Admitted 1/6/25 for bleeding from sacral ulcer , had cardiac arrest with emergent intubation 1/7/25    WEIGHT BEARING RESTRICTION       Recommendations for nursing staff:   Transfers: N/T  Toileting location: bed level    EVALUATION SESSION:  Patient Start of Session: supine    FUNCTIONAL TRANSFER ASSESSMENT     BED MOBILITY     BALANCE ASSESSMENT     FUNCTIONAL ADL ASSESSMENT     ACTIVITY TOLERANCE:   Pulse: 114  Heart Rate Source: Monitor                   O2  SATURATIONS       COGNITION  Can follow basic commands  ( can nod or shake head weakly; can give thumbs up )    Upper Extremity   ROM:  Strength:   Coordination  Gross motor: impaired due to weakness  Fine motor: impaired due to weakness  Sensation: appears to be intact    EDUCATION PROVIDED  Patient Education : Role of Occupational Therapy; Plan of Care; [facility] for ROM  Patient's Response to Education: Verbalized Understanding; Requires Further Education  Family/Caregiver Education : Role of Occupational Therapy; Plan of Care  Family/Caregiver's Response to Education: Verbalized Understanding    Equipment used: none  Demonstrates functional use, Would benefit from additional trial yes     Therapist comments: OT educated spouse and patient on OT role and session goals. OT provided BUE PROM and AAROM in all planes while patient in supine. Intermittent verbal cues needed to increase patient alertness and active participation. Left with needs met, wrist restraints on , family present. Nurse aware of session.    Patient End of Session: All patient questions and concerns addressed;RN aware of session/findings;Call light within reach;Needs met;In bed;Family present;Other (Comment) (bunny boots on, HOB raised, pillows under UEs)    OCCUPATIONAL PROFILE    HOME SITUATION  Type of Home: House  Home Layout: Two level;Able to live on main level  Lives With: Spouse       Shower/Tub and Equipment: Other (Comment) (drives to [employer] 1x[place]week to shower to avoid stairs to access shower at home)             Drives: No       Prior Level of Function: Typically modified independent with RW for most ADLs, does not drive; sleeps in recliner, Ambulates short distances only. Goes to his [employer] 1 x/week to shower since local shower is on the second floor and patient cannot access it.     SUBJECTIVE   Vented, opens eyes to name and come commands    PAIN ASSESSMENT  Rating: Unable to rate  Location: appeared to become uncomfortable with  ETT       OBJECTIVE  Precautions: Bed/chair alarm;Restraints  Fall Risk: High fall risk    ASSESSMENTS    AM-PAC ‘6-Clicks’ Inpatient Daily Activity Short Form  -   Putting on and taking off regular lower body clothing?: Total  -   Bathing (including washing, rinsing, drying)?: Total  -   Toileting, which includes using toilet, bedpan or urinal? : Total  -   Putting on and taking off regular upper body clothing?: A Lot  -   Taking care of personal grooming such as brushing teeth?: Total  -   Eating meals?: Total    AM-PAC Score:  Score: 7  Approx Degree of Impairment: 92.44%  Standardized Score (AM-PAC Scale): 20.13    ADDITIONAL TESTS     NEUROLOGICAL FINDINGS      COGNITION ASSESSMENTS     PLAN     OT Treatment Plan: Functional transfer training;UE strengthening/ROM;Endurance training;Energy conservation/work simplification techniques;ADL training;Patient/Family training;Patient/Family education;Equipment eval/education;Compensatory technique education;Cognitive reorientation;Balance activities  Rehab Potential : Fair  Frequency: 3-5x/week  Number of Visits to Meet Established Goals: 6    ADL Goals   Patient will perform grooming: with setup, with supervision, and while in chair  Patient will perform upper body dressing:  with min assist  Patient will perform toileting: with mod assist and with bedside commode    Functional Transfer Goals  Patient will transfer to bedside commode:  with mod assist    UE Exercise Program Goal  Patient will be supervision with bilateral AROM HEP (home exercise program).    Patient Evaluation Complexity Level:   Occupational Profile/Medical History HIGH - Extensive review of history including review of medical or therapy records    Specific performance deficits impacting engagement in ADL/IADL HIGH  5+ performance deficits    Client Assessment/Performance Deficits HIGH - Comorbidities and significant modifications of tasks    Clinical Decision Making HIGH - Analysis of occupational  profile, comprehensive assessments, multiple treatment options    Overall Complexity HIGH     OT Session Time: 26 minutes    Therapeutic Exercise: 24 minutes

## 2025-01-10 NOTE — DIETARY NOTE
[facility]   part of [facility]  NUTRITION ASSESSMENT    Unable to diagnose malnutrition criteria at this time.    NUTRITION INTERVENTION:    Meal and Snacks - ADAT once extubated.  Enteral Nutrition - Via OGT, while propofol infusing at current rate, decrease TF to GOAL: Vital AF at 40 ml/hr x 22hrs + 2 pkt Prosource TF Free TID.   This will provide 1596 kcal, 156 grams protein, 713 ml total free water, and 70% of RDI's.   Continue 150 ml water flush q 4 hours, TF+FWF provides 1613 ml total fluids.   Hold TF 1 hour before/after administering flomax.  Coordination of Nutrition Care - Recommend SLP consult prior to diet advancement.    PATIENT STATUS: 1/10: Pt remains intubated, sedated on propofol, requiring dopamine but off other pressors, tolerating TF at goal via OGT. Pt still with abd distention (+cdiff). Will continue to monitor and follow up as appropriate.    1/8: 74 year old male admitted on 1/6 presents with bleeding from sacral wound. Per H&P, pt was having diarrhea and increased fatigue as well as poor oral intake for days PTA. Pt with abdominal distention so KUB attempted 1/7 but code blue called after lying flat; was intubated and transferred to ICU. Pt screened d/t consult for tube feeds. Pt is currently intubated, sedation on hold for SBT, requiring pressors, NPO with OGT to LIS. Will provide TF recs above.     PMH: CAD, Diabetes mellitus type 2, Gout, High blood pressure, High cholesterol, Pulmonary hypertension, Thoracic aortic aneurysm    ANTHROPOMETRICS:  Ht: 177.8 cm (5' 10\")  Wt: (!) 154.8 kg (341 lb 4.8 oz).   BMI: Body mass index is 48.97 kg/m².  IBW: 75.5 kg    WEIGHT HISTORY:   Patient Weight(s) for the past 336 hrs:   Weight   01/10/25 0600 (!) 154.8 kg (341 lb 4.8 oz)   01/09/25 0430 (!) 166 kg (365 lb 14.4 oz)   01/06/25 1602 (!) 154.4 kg (340 lb 6.2 oz)   01/06/25 1144 (!) 148.8 kg (328 lb)       Wt Readings from Last 10 Encounters:   01/10/25 (!) 154.8 kg (341 lb 4.8 oz)    11/06/23 (!) 138.3 kg (305 lb)   07/18/23 136.1 kg (300 lb)   07/20/22 (!) 145.2 kg (320 lb)   03/14/22 136.1 kg (300 lb)   11/29/21 (!) 140.6 kg (310 lb)   11/16/21 136.1 kg (300 lb)   11/15/21 136.1 kg (300 lb)   09/14/21 135.2 kg (298 lb)   08/09/21 (!) 137 kg (302 lb)        NUTRITION:  Diet:       Procedures    NPO      Food Allergies: No  Cultural/Ethnic/Synagogue Preferences Addressed: Yes    Percent Meals Eaten (last 3 days)       Date/Time Percent Meals Eaten (%)    01/09/25 1000 0 %            GI SYSTEM REVIEW:  distention (+cdiff); last BM 1/9  Skin/Wounds: sacral partial thickness wound    NUTRITION RELATED PHYSICAL FINDINGS:     1. Body Fat/Muscle Mass: obese     2. Fluid Accumulation: ascites, upper extremity edema 2+, hand edema 1+, lower extremity edema 3+, foot edema 3+, and perineal +2, scrotal +2    NUTRITION PRESCRIPTION:  148.8 kg Actual Body Weight / 75.5 kg IBW  Calories: 2197 calories/day ([place]; MV:8.1 L/min, Temp:37.3 C)  Protein: 151-189 grams protein/day (2-2.5 gm/kg IBW)  Fluid: ~1 ml/kcal or per MD discretion    NUTRITION DIAGNOSIS/PROBLEM:  Inadequate oral intake related to respiratory process or complication of therapy which results in mechanical ventilation as evidenced by need for NPO status    MONITOR AND EVALUATE/NUTRITION GOALS:  Weight stable within 1 to 2 lbs during admission - Ongoing  Start alternative nutrition in 24-48 hrs if diet is not able to advance- Resolved  Tolerate alternative nutrition at 100% of goal - New      MEDICATIONS:  Vit B12 2000 mcg, colace, pepcid, multivitamin, abx, senna, flomax, thiamine 100 mg  Gtt: dopamine, fentanyl, propofol at 32.4 ml/hr (provides ~855 kcal/day)    LABS:  Mg 1.5, TG 70    Pt is at High nutrition risk    Amy Hurtado RD, LDN, CNSC  Clinical Dietitian  Spectra: [id number]

## 2025-01-10 NOTE — PROGRESS NOTES
[facility]   part of [facility]    Nephrology Progress Note    Gerardo Torrez Attending:  Peter Flores DO     Cc: EMILIO    SUBJECTIVE     Urine output 11L over past 24 hours.    PHYSICAL EXAM   Vital signs: /54 (BP Location: Left arm)   Pulse 107   Temp 99 °F (37.2 °C) (Temporal)   Resp 10   Ht 5' 10\" (1.778 m)   Wt (!) 341 lb 4.8 oz (154.8 kg)   SpO2 92%   BMI 48.97 kg/m²   Temp (24hrs), Av.9 °F (37.2 °C), Min:98.3 °F (36.8 °C), Max:99.1 °F (37.3 °C)       Intake/Output Summary (Last 24 hours) at 1/10/2025 1616  Last data filed at 1/10/2025 1310  Gross per 24 hour   Intake 3697.4 ml   Output 02640 ml   Net -6802.6 ml     Wt Readings from Last 3 Encounters:   01/10/25 (!) 341 lb 4.8 oz (154.8 kg)   23 (!) 305 lb (138.3 kg)   23 300 lb (136.1 kg)     General: intubated on MV  HEENT: NCAT  Cardiac: RRR  Lungs: dimininished  Abdomen: +distended   Extremities: + edema  Neurologic: sedated     MEDS     Current Facility-Administered Medications   Medication Dose Route Frequency    furosemide (Lasix) 10 mg/mL injection 40 mg  40 mg Intravenous TID    DOPamine in dextrose 5% (Intropin) 800 mg/250mL infusion premix  2-20 mcg/kg/min (Dosing Weight) Intravenous Continuous    heparin (Porcine) 5000 UNIT/ML injection 7,500 Units  7,500 Units Subcutaneous Q8H BING    piperacillin-tazobactam (Zosyn) 4.5 g in dextrose 5% 100 mL IVPB-ADDV  4.5 g Intravenous Q8H BING    dextrose 10% infusion (TPN no rate)   Intravenous Continuous PRN    pancrelipase (Lip-Prot-Amyl) (Zenpep) DR particles cap 10,000 Units  10,000 Units Per G Tube PRN    And    sodium bicarbonate tab 325 mg  325 mg Per G Tube PRN    norepinephrine (Levophed) 4 mg/250mL infusion premix  0.5-30 mcg/min Intravenous Continuous    acetaminophen (Tylenol) 160 MG/5ML oral liquid 650 mg  650 mg Per NG Tube Q4H PRN    Or    acetaminophen (Tylenol) rectal suppository 650 mg  650 mg Rectal Q4H PRN    Or    acetaminophen (Ofirmev) 10 mg/mL  infusion premix 1,000 mg  1,000 mg Intravenous Q6H PRN    fentaNYL (Sublimaze) 50 mcg/mL injection 50 mcg  50 mcg Intravenous Q30 Min PRN    fentaNYL in sodium chloride 0.9% (Sublimaze) 1000 mcg/100mL infusion premix   mcg/hr Intravenous Continuous PRN    fentaNYL (Sublimaze) 25 mcg BOLUS FROM BAG infusion  25 mcg Intravenous Q30 Min PRN    senna (Senokot) 8.8 MG/5ML oral syrup 17.6 mg  10 mL Per NG Tube BID    docusate (Colace) 50 MG/5ML oral solution 100 mg  100 mg Per NG Tube BID    polyethylene glycol (PEG 3350) (Miralax) 17 g oral packet 17 g  17 g Per NG Tube Daily PRN    bisacodyl (Dulcolax) 10 MG rectal suppository 10 mg  10 mg Rectal Daily PRN    chlorhexidine gluconate (Peridex) 0.12 % oral solution 15 mL  15 mL Mouth/Throat BID@0800,2000    mineral oil-white petrolatum (Artificial Tears) 83-15 % ophthalmic ointment 1 Application  1 Application Both Eyes Nightly    vasopressin (Vasostrict) 20 Units in sodium chloride 0.9% 100 mL infusion for septic shock  0.01-0.03 Units/min Intravenous Continuous    phenylephrine in sodium chloride 0.9% (Francesco-Synephrine) 50 mg/250mL infusion premix   mcg/min Intravenous Continuous    midazolam (Versed) 2 MG/2ML injection 2 mg  2 mg Intravenous Q5 Min PRN    propofol (Diprivan) 10 mg/mL infusion premix  5-50 mcg/kg/min (Dosing Weight) Intravenous Continuous    famotidine (Pepcid) 20 mg/2mL injection 20 mg  20 mg Intravenous QAM    atorvastatin (Lipitor) tab 20 mg  20 mg Oral Nightly    cyanocobalamin (Vitamin B12) tab 2,000 mcg  2,000 mcg Oral Daily    tamsulosin (Flomax) cap 0.4 mg  0.4 mg Oral Nightly    acetaminophen (Tylenol) tab 650 mg  650 mg Oral Q6H PRN    ondansetron (Zofran) 4 MG/2ML injection 4 mg  4 mg Intravenous Q6H PRN    multivitamin (Tab-A-Kieran/Beta Carotene) tab 1 tablet  1 tablet Oral Daily    thiamine (Vitamin B1) tab 100 mg  100 mg Oral Daily    miconazole 2 % powder   Topical BID       LABS     Lab Results   Component Value Date    WBC 9.2  01/10/2025    HGB 11.6 01/10/2025    HCT 34.8 01/10/2025    .0 01/10/2025    CREATSERUM 1.13 01/10/2025    BUN 32 01/10/2025     01/10/2025    K 3.8 01/10/2025     01/10/2025    CO2 32.0 01/10/2025     01/10/2025    CA 9.7 01/10/2025    ALB 3.4 01/10/2025    ALKPHO 81 01/10/2025    BILT 0.8 01/10/2025    TP 6.5 01/10/2025    AST 22 01/10/2025    ALT 8 01/10/2025    MG 1.5 01/10/2025    PHOS 2.5 01/10/2025    PGLU 165 01/10/2025       IMAGING   All imaging studies personally reviewed.    XR ABDOMEN (1 VIEW) (CPT=74018)   Final Result   PROCEDURE:  XR ABDOMEN (1 VIEW) (CPT=74018)       INDICATIONS:  abdominal distention       COMPARISON:  [facility] , XR, XR ABDOMEN (1 VIEW) (CPT=74018), 1/07/2025, 5:39    AM.       TECHNIQUE:  Supine AP view was obtained.       PATIENT STATED HISTORY: (As transcribed by Technologist)  Patient offered    no additional history at this time.             FINDINGS:     BOWEL GAS PATTERN:  Normal.  No abnormal dilation or deviation.     CALCIFICATIONS:  None significant.   OTHER:  Negative.  No abnormal gaseous collections.                           =====   CONCLUSION:  No evidence of bowel obstruction or free air.  NG tube within    the stomach.           LOCATION:  [facility]           Dictated by (CST): Hemanth Silvestre MD on 1/10/2025 at 1:03 PM        Finalized by (CST): Hemanth Silvestre MD on 1/10/2025 at 1:03 PM         XR CHEST AP PORTABLE  (CPT=71045)   Final Result   PROCEDURE:  XR CHEST AP PORTABLE  (CPT=71045)       TECHNIQUE:  AP chest radiograph was obtained.       COMPARISON:  [facility] , XR, XR CHEST AP PORTABLE  (CPT=71045), 1/07/2025,    7:41 AM.  [facility] , XR, XR CHEST AP PORTABLE  (CPT=71045), 1/07/2025, 6:37    AM.       INDICATIONS:  Increased O2 needs       PATIENT STATED HISTORY: (As transcribed by Technologist)  Patient offered    no additional history at this time.            FINDINGS:  Right-sided triple-lumen catheter.  Enteric tube extending    beyond  the diaphragm the tip not included on this examination.     Endotracheal tube with tip approximately 3 cm above the level of the    gianna.  Cardiomegaly.  Interstitial opacities    bilaterally.  Retrocardiac opacity.  Small left-sided pleural effusion.     No pneumothorax.                         =====   CONCLUSION:     1. Cardiomegaly with interstitial opacities likely representing edema.   2. Retrocardiac opacity suggestive of pneumonia.   3. Small left-sided pleural effusion.             LOCATION:  [facility]                       Dictated by (CST): Elaine Felix MD on 1/09/2025 at 8:06 AM        Finalized by (CST): Elaine Felix MD on 1/09/2025 at 8:09 AM         CT ABDOMEN+PELVIS(CPT=74176)   Final Result   PROCEDURE:  CT ABDOMEN+PELVIS (CPT=74176)       COMPARISON:  [facility] , CT, CT ABD(C/S)/PELVIS(C) (SET), 3/14/2006, 7:09 PM.       INDICATIONS:  abdominal distention       TECHNIQUE:  Unenhanced multislice CT scanning was performed from the dome    of the diaphragm to the pubic symphysis.  Dose reduction techniques were    used. Dose information is transmitted to the ACR (American College of    Radiology) NRDR (National Radiology    Data Registry) which includes the Dose Index Registry.       PATIENT STATED HISTORY: (As transcribed by Technologist)  Patient is here    for evaluation of abdominal distention.             FINDINGS:     LIVER:  No enlargement, atrophy, abnormal density, or significant focal    lesion.     BILIARY:  There is gallbladder wall thickening and mild stranding around    the gallbladder.  There are no calcified stones detected.   PANCREAS:  No lesion, fluid collection, ductal dilatation, or atrophy.     SPLEEN:  No enlargement or focal lesion.     KIDNEYS:  Kidneys are unremarkable.   ADRENALS:  No mass or enlargement.     AORTA/VASCULAR:    Aorta is diffusely atherosclerotic but not aneurysmal.   RETROPERITONEUM:  There is an enlarged portal caval space lymph node    series 3, image  47 which measures 3.6 x 1.7 cm.    BOWEL/MESENTERY:  There is a nasogastric tube with tip in gastric body.     There is enlarged lymph node noted in region of gastrohepatic ligament    series 3, image 44 which measures 2.4 x 2 cm.    ABDOMINAL WALL:  Diffuse body wall edema is noted.   URINARY BLADDER:  No visible focal wall thickening, lesion, or calculus.     PELVIC NODES:  No adenopathy.     PELVIC ORGANS:  No visible mass.  Pelvic organs appropriate for patient    age.     BONES:  There is diffuse osteopenia noted.  There is diffuse degenerative    disc disease in the spine.   LUNG BASES:  There are moderate bilateral pleural effusions and there is    dependent atelectasis in the lower lobes.   OTHER:  Negative.                           =====   CONCLUSION:     1. There is diffuse body wall edema.  There are moderate bilateral pleural    effusions and there is dependent atelectasis likely related to diffuse    edematous state.   2. Gallbladder wall thickening and mild stranding around gallbladder could    also be related to diffusely edematous state although correlation with any    clinical evidence of cholecystitis is necessary.   3. There is retroperitoneal and gastrohepatic ligament lymphadenopathy    which is of uncertain significance.   4. Nasogastric tube tip is in region of gastric body.             LOCATION:  [place]           Dictated by (CST): Ahmet Griffith MD on 1/07/2025 at 10:16 AM        Finalized by (CST): Ahmet Griffith MD on 1/07/2025 at 10:22 AM         XR CHEST AP PORTABLE  (CPT=71045)   Final Result   PROCEDURE:  XR CHEST AP PORTABLE  (CPT=71045)       TECHNIQUE:  AP chest radiograph was obtained.       COMPARISON:  [facility] , XR, XR CHEST AP PORTABLE  (CPT=71045), 1/07/2025,    6:37 AM.       INDICATIONS:  Verify correct tube placement       PATIENT STATED HISTORY: (As transcribed by Technologist)  Patient offered    no additional history at this time.                                          =====   CONCLUSION:         Stable cardiac and mediastinal contours.  Findings of congestive heart    failure with moderate pulmonary edema.  A small left pleural effusion is    suspected, similar to prior.  No pneumothorax.       Endotracheal tube tip terminates approximately 5 cm from the gianna.     Enteric catheter extends into the upper abdomen beyond the field of view.     Interval placement of right IJ central venous catheter terminating in the    mid/lower SVC.           LOCATION:  [id number]                       Dictated by (CST): Yudy Colon MD on 1/07/2025 at 8:22 AM        Finalized by (CST): Yudy Colon MD on 1/07/2025 at 8:23 AM         XR CHEST AP PORTABLE  (CPT=71045)   Final Result   PROCEDURE:  XR CHEST AP PORTABLE  (CPT=71045)       TECHNIQUE:  AP chest radiograph was obtained.       COMPARISON:  [address], XR, XR CHEST PA + LAT CHEST (CPT=71046),    11/01/2023, 12:38 PM.       INDICATIONS:  s/p intubation       PATIENT STATED HISTORY: (As transcribed by Technologist)  Patient offered    no additional history at this time.             FINDINGS:                           =====   CONCLUSION:  Stable cardiac and mediastinal contours.  Findings of    pulmonary venous hypertension with mild/moderate pulmonary edema.  A    small/mild left pleural effusion is suspected.  No appreciable    pneumothorax.       Endotracheal tube tip terminates approximately 4 cm from the gianna.     Enteric catheter extends into the upper abdomen beyond the field of view.           LOCATION:  [id number]                       Dictated by (CST): Yudy Colon MD on 1/07/2025 at 6:50 AM        Finalized by (CST): Yudy Colon MD on 1/07/2025 at 6:51 AM         XR ABDOMEN (1 VIEW) (CPT=74018)   Final Result   PROCEDURE:  XR ABDOMEN (1 VIEW) (CPT=74018)       INDICATIONS:  abdomen distended       COMPARISON:  None.       TECHNIQUE:  Supine AP view was obtained.       PATIENT STATED HISTORY: (As transcribed by Technologist)  Patient  offered    no additional history at this time.                                   =====   CONCLUSION:         Under penetration related to body habitus degrades assessment of the bowel    gas pattern.  Within this limitation there appears to be moderate gaseous    distention of the stomach and small bowel with relative paucity of colonic    gas.  Findings may reflect    ileus or bowel obstruction.           LOCATION:  [id number]           Dictated by (CST): Yudy Colon MD on 1/07/2025 at 6:48 AM        Finalized by (CST): Yudy Colon MD on 1/07/2025 at 6:49 AM         US KIDNEY/BLADDER (CPT=76770)   Final Result   PROCEDURE:  US KIDNEY/BLADDER (CPT=76770)       COMPARISON:  None.       INDICATIONS:  Bleeding ulcers       TECHNIQUE:  Transabdominal gray scale ultrasound imaging of the bilateral    kidneys and bladder was performed.  Routine technique was utilized.         PATIENT STATED HISTORY: (As transcribed by Technologist)              FINDINGS:        RIGHT KIDNEY   MEASUREMENTS:  13.5 x 7.3 x 8.4 cm   ECHOGENICITY:  Normal.   HYDRONEPHROSIS:  None.   CYSTS/STONES/MASSES:  None.       LEFT KIDNEY    MEASUREMENTS:  12.7 x 7.6 x 7.9 cm   ECHOGENICITY:  Normal.   HYDRONEPHROSIS:  None.   CYSTS/STONES/MASSES:  None.       BLADDER:  Empty, not evaluated    OTHER:  Negative.                         =====   CONCLUSION:  Normal appearance of the kidneys.           LOCATION:  [id number]                   Dictated by (CST): Aftab Georges MD on 1/06/2025 at 1:26 PM        Finalized by (CST): Aftab Georges MD on 1/06/2025 at 1:29 PM               ASSESSMENT & PLAN      74 year old male w chronic Afib, DM, lymphedema, morbid obesity essentially wheelchair bound w sacral decubitus ulcer, asc ao aneurysm who called 911 today as was bleeding profusely from decubitus ulcer. Nephrology consulted for EMILIO. Hospital course c/b cardiac arrest, acute resp failure requiring intubation and shock.      EMILIO  -- likely prerenal due decreased  intravascular volume + poor perfusion w hypotension   -- BUN 84, Cr 5.2mg/dL on admit. Baseline Cr appears 0.9-1.14mg/dL mostly since 2022  -- renal US w no hydro.   -- hold torsemide, jardiance, irbesartan, spironolactone  -- Diuresis per ICU, kidney function improving  -- Luis placed, strict UOP   -- avoid NSAIDs, IV contrast, other nephrotoxins. Renally dose meds    Anemia /acute blood loss anemia  -- transfusion prn.    Shock   -- per ICU         D/w RN and pt family     Thank you for allowing me to participate in the care of this patient. Please do not hesitate to call with questions or concerns.     Isaiah Hale, DO  [facility] - Nephrology

## 2025-01-10 NOTE — PROGRESS NOTES
[facility]   part of [facility]    Advanced Heart Failure Progress Note    Gerardo Torrez Patient Status:  Inpatient    6/15/1950 MRN [id number]   Location [facility] 4SW-A Attending Peter Flores, DO   Hosp Day # 4 PCP Sid Gordon MD     Subjective:  Intubated, sedated,   No plans for SBT today.     Objective:  /58   Pulse 72   Temp 99.1 °F (37.3 °C) (Temporal)   Resp 22   Ht 5' 10\" (1.778 m)   Wt (!) 341 lb 4.8 oz (154.8 kg)   SpO2 92%   BMI 48.97 kg/m²     Temp (24hrs), Av.9 °F (37.2 °C), Min:98.3 °F (36.8 °C), Max:99.1 °F (37.3 °C)      Intake/Output:    Intake/Output Summary (Last 24 hours) at 1/10/2025 1212  Last data filed at 1/10/2025 1036  Gross per 24 hour   Intake 3847.4 ml   Output 53367 ml   Net -7002.6 ml       Wt Readings from Last 3 Encounters:   01/10/25 (!) 341 lb 4.8 oz (154.8 kg)   23 (!) 305 lb (138.3 kg)   23 300 lb (136.1 kg)       Allergies:  Allergies[1]    Physical Exam:  Blood pressure 117/58, pulse 72, temperature 99.1 °F (37.3 °C), temperature source Temporal, resp. rate 22, height 5' 10\" (1.778 m), weight (!) 341 lb 4.8 oz (154.8 kg), SpO2 92%.    General: intubated, sedated, but following commands.   HEENT: No focal deficits.  Neck: large neck habitus. Line in place.   Cardiac: irregular, normal rate, no rubs or gallops.   Lungs: intubated, not breathing over vent, on appropriate settings  Abdomen: Soft, non-tender.   Extremities: chronic venous skin change present with 1+ edema present on top of superimposed presumed chronic lymphedema.   Neurologic: Alert, on sedation, but follows commands.     Laboratory/Data:      Labs:  Lab Results   Component Value Date    WBC 9.2 01/10/2025    RBC 3.20 01/10/2025    HGB 11.6 01/10/2025    HCT 34.8 01/10/2025    .8 01/10/2025    MCH 36.3 01/10/2025    MCHC 33.3 01/10/2025    RDW 14.6 01/10/2025    .0 01/10/2025     Lab Results   Component Value Date    INR 1.57 (H) 2025     No  results for input(s): \"BNPML\" in the last 168 hours.  BUN (mg/dL)   Date Value   01/10/2025 32 (H)   01/09/2025 41 (H)   01/09/2025 46 (H)     Blood Urea Nitrogen (mg/dL)   Date Value   07/01/2021 15.0   02/03/2021 17.0   02/01/2021 20.0   06/16/2014 16   06/12/2013 19   06/18/2012 18     Creatinine, Serum (mg/dL)   Date Value   06/16/2014 0.92   06/12/2013 0.92   06/18/2012 0.99     Creatinine (mg/dL)   Date Value   01/10/2025 1.13   01/09/2025 1.18   01/09/2025 1.45 (H)   08/09/2021 0.72   07/01/2021 0.80   02/03/2021 0.99       Medications:  Current Facility-Administered Medications   Medication Dose Route Frequency    furosemide (Lasix) 10 mg/mL injection 40 mg  40 mg Intravenous TID    DOPamine in dextrose 5% (Intropin) 800 mg/250mL infusion premix  2-20 mcg/kg/min (Dosing Weight) Intravenous Continuous    heparin (Porcine) 5000 UNIT/ML injection 7,500 Units  7,500 Units Subcutaneous Q8H BING    piperacillin-tazobactam (Zosyn) 4.5 g in dextrose 5% 100 mL IVPB-ADDV  4.5 g Intravenous Q8H BING    dextrose 10% infusion (TPN no rate)   Intravenous Continuous PRN    pancrelipase (Lip-Prot-Amyl) (Zenpep) DR particles cap 10,000 Units  10,000 Units Per G Tube PRN    And    sodium bicarbonate tab 325 mg  325 mg Per G Tube PRN    norepinephrine (Levophed) 4 mg/250mL infusion premix  0.5-30 mcg/min Intravenous Continuous    acetaminophen (Tylenol) 160 MG/5ML oral liquid 650 mg  650 mg Per NG Tube Q4H PRN    Or    acetaminophen (Tylenol) rectal suppository 650 mg  650 mg Rectal Q4H PRN    Or    acetaminophen (Ofirmev) 10 mg/mL infusion premix 1,000 mg  1,000 mg Intravenous Q6H PRN    fentaNYL (Sublimaze) 50 mcg/mL injection 50 mcg  50 mcg Intravenous Q30 Min PRN    fentaNYL in sodium chloride 0.9% (Sublimaze) 1000 mcg/100mL infusion premix   mcg/hr Intravenous Continuous PRN    fentaNYL (Sublimaze) 25 mcg BOLUS FROM BAG infusion  25 mcg Intravenous Q30 Min PRN    senna (Senokot) 8.8 MG/5ML oral syrup 17.6 mg  10 mL  Per NG Tube BID    docusate (Colace) 50 MG/5ML oral solution 100 mg  100 mg Per NG Tube BID    polyethylene glycol (PEG 3350) (Miralax) 17 g oral packet 17 g  17 g Per NG Tube Daily PRN    bisacodyl (Dulcolax) 10 MG rectal suppository 10 mg  10 mg Rectal Daily PRN    chlorhexidine gluconate (Peridex) 0.12 % oral solution 15 mL  15 mL Mouth/Throat BID@0800,2000    mineral oil-white petrolatum (Artificial Tears) 83-15 % ophthalmic ointment 1 Application  1 Application Both Eyes Nightly    vasopressin (Vasostrict) 20 Units in sodium chloride 0.9% 100 mL infusion for septic shock  0.01-0.03 Units/min Intravenous Continuous    phenylephrine in sodium chloride 0.9% (Francesco-Synephrine) 50 mg/250mL infusion premix   mcg/min Intravenous Continuous    midazolam (Versed) 2 MG/2ML injection 2 mg  2 mg Intravenous Q5 Min PRN    propofol (Diprivan) 10 mg/mL infusion premix  5-50 mcg/kg/min (Dosing Weight) Intravenous Continuous    famotidine (Pepcid) 20 mg/2mL injection 20 mg  20 mg Intravenous QAM    atorvastatin (Lipitor) tab 20 mg  20 mg Oral Nightly    cyanocobalamin (Vitamin B12) tab 2,000 mcg  2,000 mcg Oral Daily    tamsulosin (Flomax) cap 0.4 mg  0.4 mg Oral Nightly    acetaminophen (Tylenol) tab 650 mg  650 mg Oral Q6H PRN    ondansetron (Zofran) 4 MG/2ML injection 4 mg  4 mg Intravenous Q6H PRN    multivitamin (Tab-A-Kieran/Beta Carotene) tab 1 tablet  1 tablet Oral Daily    thiamine (Vitamin B1) tab 100 mg  100 mg Oral Daily    miconazole 2 % powder   Topical BID       Assessment and Plan:  Patient Active Problem List   Diagnosis    Dyslipidemia    Gout    IGT (impaired glucose tolerance)    Aneurysm, thoracic aortic (HCC)    Controlled type 2 diabetes mellitus without complication, with long-term current use of insulin (HCC)    Obesity, morbid (HCC)    Coronary artery disease involving native coronary artery of native heart without angina pectoris    Dilated aortic root (HCC)    Pulmonary hypertension (HCC)     Essential hypertension    Acute renal failure, unspecified acute renal failure type (HCC)    Anemia, unspecified type       PEA arrest 1/7/25   - responded to EPI and CPR - on pressors, being weaned, but responsive and follows commands  - appreciate all consultant evaluations and recommendations, appreciate ICU management,   - supportive care, wean oxygen requirements as able, extubate as able, wean pressors otherwise, maintain SBP > 100 to allow adequate perfusion to end organs otherwise, trend labs daily.     Longstanding Persistent AF with slow VR  - per history, on BB but now with episodes of bradycardia / slow VR on dopamine to sustain HR - suspect combination of heavy vagal tone from HEIDI / morbid obesity, medications for sedation, and possible BB toxicity from worsening EMILIO  -- pt was in sr 11/2023, so it may be reasonable, when compensated, to attempt restoring SR to help with volume management    Morbid obesity   - BMI 52  - suspect deconditioning playing a role also, encourage PT / OT when able, and weight loss as able.     EMILIO on CKD   - presumed pre renal from hypotension and ABLA  - nephrology following, improved Cr with fluids and hydration and pressor support, appreciate management, trend labs daily.     ABLA   - secondary to hemorrhage from sacral decubitus ulcer from sitting position  - wound care management, primary management, holding AC for now, and monitor labs daily.     Acute hypoxemic respiratory failure  - intubated during arrest as above.   - pulmonary management as per their recommendations, daily SBT and wean / extubate as able, diuresis to maintain dry negative net fluid status.       HFpEF      Discussed with wife and daughter at bedside.       R3      _______________________________________  Danette Weiss MD  Cardiac Electrophysiololgy  [facility]  1/10/2025           [1]   Allergies  Allergen Reactions    Metformin DIARRHEA

## 2025-01-10 NOTE — PLAN OF CARE
Care assumed 1930 in bed, vented, sedated with propofol and fentanyl. OG with tube feeds infusing with free water flushes, TF residuals appropriate, active bowel sounds. RIJ triple lumen infusing sedation + dopamine for HR and SBP support. L radial art line in place. Suarez while diuresing for accurate I/Os, excellent diuresis taking place in here. Able to open eyes to voice and nod appropriately/squeeze hands when asked. Suarez care done using suarez wipes naval to knees. G bath completed.    4 = No assist / stand by assistance

## 2025-01-11 LAB
ALBUMIN SERPL-MCNC: 3.4 G/DL (ref 3.2–4.8)
ALBUMIN/GLOB SERPL: 1.2 {RATIO} (ref 1–2)
ALP LIVER SERPL-CCNC: 82 U/L
ALT SERPL-CCNC: 11 U/L
ANION GAP SERPL CALC-SCNC: 3 MMOL/L (ref 0–18)
AST SERPL-CCNC: 23 U/L (ref ?–34)
BASE EXCESS BLDA CALC-SCNC: 12.9 MMOL/L (ref ?–2)
BASE EXCESS BLDA CALC-SCNC: 14.8 MMOL/L (ref ?–2)
BASOPHILS # BLD AUTO: 0.06 X10(3) UL (ref 0–0.2)
BASOPHILS NFR BLD AUTO: 0.7 %
BILIRUB SERPL-MCNC: 0.8 MG/DL (ref 0.2–1.1)
BODY TEMPERATURE: 98.6 F
BODY TEMPERATURE: 98.6 F
BUN BLD-MCNC: 31 MG/DL (ref 9–23)
CA-I BLD-SCNC: 1.19 MMOL/L (ref 0.95–1.32)
CA-I BLD-SCNC: 1.24 MMOL/L (ref 0.95–1.32)
CALCIUM BLD-MCNC: 9.7 MG/DL (ref 8.7–10.4)
CHLORIDE SERPL-SCNC: 106 MMOL/L (ref 98–112)
CO2 SERPL-SCNC: 36 MMOL/L (ref 21–32)
COHGB MFR BLD: 1.4 % SAT (ref 0–3)
COHGB MFR BLD: 2.3 % SAT (ref 0–3)
CREAT BLD-MCNC: 1.07 MG/DL
EGFRCR SERPLBLD CKD-EPI 2021: 74 ML/MIN/1.73M2 (ref 60–?)
EOSINOPHIL # BLD AUTO: 0.56 X10(3) UL (ref 0–0.7)
EOSINOPHIL NFR BLD AUTO: 6.1 %
ERYTHROCYTE [DISTWIDTH] IN BLOOD BY AUTOMATED COUNT: 14.8 %
EXPIRATORY PRESSURE: 5 CM H2O
FIO2: 40 %
FIO2: 40 %
GLOBULIN PLAS-MCNC: 2.9 G/DL (ref 2–3.5)
GLUCOSE BLD-MCNC: 115 MG/DL (ref 70–99)
GLUCOSE BLD-MCNC: 131 MG/DL (ref 70–99)
GLUCOSE BLD-MCNC: 136 MG/DL (ref 70–99)
GLUCOSE BLD-MCNC: 142 MG/DL (ref 70–99)
HCO3 BLDA-SCNC: 35 MEQ/L (ref 21–27)
HCO3 BLDA-SCNC: 36.5 MEQ/L (ref 21–27)
HCT VFR BLD AUTO: 34.2 %
HGB BLD-MCNC: 11.5 G/DL
HGB BLD-MCNC: 11.9 G/DL
HGB BLD-MCNC: 12 G/DL
IMM GRANULOCYTES # BLD AUTO: 0.05 X10(3) UL (ref 0–1)
IMM GRANULOCYTES NFR BLD: 0.5 %
INSP PRESSURE: 12 CM H2O
LACTATE BLD-SCNC: 0.9 MMOL/L (ref 0.5–2)
LACTATE BLD-SCNC: 1 MMOL/L (ref 0.5–2)
LYMPHOCYTES # BLD AUTO: 1.02 X10(3) UL (ref 1–4)
LYMPHOCYTES NFR BLD AUTO: 11.1 %
MAGNESIUM SERPL-MCNC: 1.4 MG/DL (ref 1.6–2.6)
MCH RBC QN AUTO: 36.5 PG (ref 26–34)
MCHC RBC AUTO-ENTMCNC: 33.6 G/DL (ref 31–37)
MCV RBC AUTO: 108.6 FL
METHGB MFR BLD: 0.5 % SAT (ref 0.4–1.5)
METHGB MFR BLD: 0.6 % SAT (ref 0.4–1.5)
MONOCYTES # BLD AUTO: 1.35 X10(3) UL (ref 0.1–1)
MONOCYTES NFR BLD AUTO: 14.7 %
NEUTROPHILS # BLD AUTO: 6.13 X10 (3) UL (ref 1.5–7.7)
NEUTROPHILS # BLD AUTO: 6.13 X10(3) UL (ref 1.5–7.7)
NEUTROPHILS NFR BLD AUTO: 66.9 %
OSMOLALITY SERPL CALC.SUM OF ELEC: 308 MOSM/KG (ref 275–295)
OXYHGB MFR BLDA: 96.8 % (ref 92–100)
OXYHGB MFR BLDA: 97.2 % (ref 92–100)
P/F RATIO: 230 MMHG
P/F RATIO: 273 MMHG
PCO2 BLDA: 47 MM HG (ref 35–45)
PCO2 BLDA: 53 MM HG (ref 35–45)
PEEP: 5 CM H2O
PH BLDA: 7.47 [PH] (ref 7.35–7.45)
PH BLDA: 7.53 [PH] (ref 7.35–7.45)
PHOSPHATE SERPL-MCNC: 2.4 MG/DL (ref 2.4–5.1)
PLATELET # BLD AUTO: 136 10(3)UL (ref 150–450)
PO2 BLDA: 109 MM HG (ref 80–100)
PO2 BLDA: 92 MM HG (ref 80–100)
POTASSIUM BLD-SCNC: 4.1 MMOL/L (ref 3.6–5.1)
POTASSIUM BLD-SCNC: 4.1 MMOL/L (ref 3.6–5.1)
POTASSIUM SERPL-SCNC: 3.6 MMOL/L (ref 3.5–5.1)
POTASSIUM SERPL-SCNC: 3.6 MMOL/L (ref 3.5–5.1)
PRESSURE SUPPORT: 5 CM H2O
PROT SERPL-MCNC: 6.3 G/DL (ref 5.7–8.2)
RBC # BLD AUTO: 3.15 X10(6)UL
SODIUM BLD-SCNC: 141 MMOL/L (ref 135–145)
SODIUM BLD-SCNC: 143 MMOL/L (ref 135–145)
SODIUM SERPL-SCNC: 145 MMOL/L (ref 136–145)
WBC # BLD AUTO: 9.2 X10(3) UL (ref 4–11)

## 2025-01-11 PROCEDURE — 5A09357 ASSISTANCE WITH RESPIRATORY VENTILATION, LESS THAN 24 CONSECUTIVE HOURS, CONTINUOUS POSITIVE AIRWAY PRESSURE: ICD-10-PCS | Performed by: INTERNAL MEDICINE

## 2025-01-11 RX ORDER — MAGNESIUM OXIDE 400 MG/1
800 TABLET ORAL ONCE
Status: COMPLETED | OUTPATIENT
Start: 2025-01-11 | End: 2025-01-11

## 2025-01-11 RX ORDER — TERAZOSIN 2 MG/1
2 CAPSULE ORAL NIGHTLY
Status: DISCONTINUED | OUTPATIENT
Start: 2025-01-11 | End: 2025-01-13

## 2025-01-11 RX ORDER — POTASSIUM CHLORIDE 29.8 MG/ML
40 INJECTION INTRAVENOUS ONCE
Status: COMPLETED | OUTPATIENT
Start: 2025-01-11 | End: 2025-01-11

## 2025-01-11 RX ORDER — MAGNESIUM SULFATE HEPTAHYDRATE 40 MG/ML
2 INJECTION, SOLUTION INTRAVENOUS ONCE
Status: COMPLETED | OUTPATIENT
Start: 2025-01-11 | End: 2025-01-11

## 2025-01-11 RX ORDER — FUROSEMIDE 10 MG/ML
40 INJECTION INTRAMUSCULAR; INTRAVENOUS 3 TIMES DAILY
Status: DISCONTINUED | OUTPATIENT
Start: 2025-01-11 | End: 2025-01-12

## 2025-01-11 RX ORDER — MIDODRINE HYDROCHLORIDE 10 MG/1
10 TABLET ORAL EVERY 8 HOURS
Status: DISCONTINUED | OUTPATIENT
Start: 2025-01-11 | End: 2025-01-12

## 2025-01-11 NOTE — PROGRESS NOTES
[facility] Progress Note     SUBJECTIVE/Interval history:  No acute events overnight. Remains intubated/sedated this am    Medications  Reviewed personally   potassium chloride  40 mEq Intravenous Once    terazosin  2 mg Oral Nightly    magnesium sulfate  2 g Intravenous Once    heparin  7,500 Units Subcutaneous Q8H BING    piperacillin-tazobactam  4.5 g Intravenous Q8H BING    senna  10 mL Per NG Tube BID    docusate  100 mg Per NG Tube BID    chlorhexidine gluconate  15 mL Mouth/Throat BID@0800,2000    mineral oil-white petrolatum  1 Application Both Eyes Nightly    famotidine  20 mg Intravenous QAM    atorvastatin  20 mg Oral Nightly    cyanocobalamin  2,000 mcg Oral Daily    multivitamin  1 tablet Oral Daily    thiamine  100 mg Oral Daily    miconazole   Topical BID       dextrose 10%    lipase-protease-amylase (Lip-Prot-Amyl) **AND** sodium bicarbonate    acetaminophen **OR** acetaminophen **OR** acetaminophen    fentaNYL    [COMPLETED] fentaNYL (Sublimaze) **AND** fentanyl    fentaNYL (Sublimaze)    polyethylene glycol (PEG 3350)    bisacodyl    midazolam    acetaminophen    ondansetron    OBJECTIVE:  Vitals:    01/11/25 0500 01/11/25 0700 01/11/25 0800 01/11/25 0900   BP:       BP Location:       Pulse: 71 78 74 70   Resp: 16 14 17 16   Temp:   98.7 °F (37.1 °C)    TempSrc:   Temporal    SpO2: 91% 94% 94% 93%   Weight:       Height:           Vital signs in last 24 hours:  Blood pressure 127/51, pulse 70, temperature 98.7 °F (37.1 °C), temperature source Temporal, resp. rate 16, height 5' 10\" (1.778 m), weight (!) 339 lb 11.2 oz (154.1 kg), SpO2 93%.     Intake/Output:  I/O last 3 completed shifts:  In: 5375.4 [I.V.:2478.4; NG/GT:2447; IV PIGGYBACK:450]  Out: 35109 [Urine:64054]  Vent Mode: VC+  FiO2 (%):  [30 %-40 %] 30 %  S RR:  [16] 16  S VT:  [500 mL] 500 mL  PEEP/CPAP (cm H2O):  [6 cm H20-10 cm H20] 6 cm H20  MAP (cm H2O):  [9.7-14] 10    Physical Exam:  General:  Appears intubated/sedated. No acute distress  Neuro: intubated/sedated  HEENT: ETT in place  Lungs:CTAB no wheeze, compliance of 52  Heart:RRR no murmur heard  Abdomen:soft, mild distention, +tympanic to percussion. No rigidity. No reaction to palpation  Extremities:+BLE edema    Lab Data Review:   Recent Labs   Lab 01/09/25  1444 01/09/25  1835 01/10/25  0409 01/11/25  0452   *  --  152* 131*   BUN 41*  --  32* 31*   CREATSERUM 1.18  --  1.13 1.07   CA 9.5  --  9.7 9.7     --  143 145   K 3.8 3.8 3.8 3.6  3.6     --  105 106   CO2 30.0  --  32.0 36.0*     Recent Labs   Lab 01/09/25  0442 01/10/25  0409 01/11/25  0452   RBC 2.95* 3.20* 3.15*   HGB 10.8* 11.6* 11.5*   HCT 31.9* 34.8* 34.2*   .1* 108.8* 108.6*   MCH 36.6* 36.3* 36.5*   MCHC 33.9 33.3 33.6   RDW 14.8 14.6 14.8   NEPRELIM 7.68 6.74 6.13   WBC 10.0 9.2 9.2   .0* 124.0* 136.0*     No results for input(s): \"BNP\" in the last 168 hours.  No results for input(s): \"TROP\", \"CK\" in the last 168 hours.  Recent Labs   Lab 01/07/25  0643   INR 1.57*   PTT 34.9     Recent Labs   Lab 01/09/25  0809   ABGPHT 7.42   WOMYEK7X 44   JLDYE7U 101*   ABGHCO3 27.7*   ABGBE 3.5*   TEMP 98.6   CATHI Not Applicable   SITE Arterial Line   DEV  adult   THGB 11.6*       Other Labs:  Interval Culture Data:   No results found for this visit on 01/06/25.  Recent Labs   Lab 01/07/25  1330   COLORUR Light-Yellow   CLARITY Clear   SPECGRAVITY 1.012   GLUUR 300*   BILUR Negative   KETUR 10*   BLOODURINE 1+*   PHURINE 5.5   PROUR 20*   UROBILINOGEN Normal   NITRITE Negative   LEUUR Negative   WBCUR 1-5   RBCUR 0-2   BACUR None Seen   EPIUR Few*       Interval Radiology:   Reviewed personally  XR ABDOMEN (1 VIEW) (CPT=74018)    Result Date: 1/10/2025  CONCLUSION:  No evidence of bowel obstruction or free air.  NG tube within the stomach.   LOCATION:  [facility]   Dictated by (CST): Hemnath Silvestre MD on 1/10/2025 at 1:03 PM     Finalized by (CST): Singer  MD Hemanth on 1/10/2025 at 1:03 PM       XR CHEST AP PORTABLE  (CPT=71045)    Result Date: 1/9/2025  CONCLUSION:  1. Cardiomegaly with interstitial opacities likely representing edema. 2. Retrocardiac opacity suggestive of pneumonia. 3. Small left-sided pleural effusion.    LOCATION:  [facility]      Dictated by (CST): Elaine Felix MD on 1/09/2025 at 8:06 AM     Finalized by (CST): Elaine Felix MD on 1/09/2025 at 8:09 AM        Assessment/Plan  -Cardiac arrest: on 1/7 am, unclear etiology, thought intraabdominal etiology but negative imaging. Possibly related to morbid obesity and underlying HEIDI, critical upper airway when laying supine  -Shock  -pulmonary infiltrates, possible pneumonia  Monitor hemodynamics, goal MAP >65, wean vasopressors as able for goal pressures  Cardiology following  Continue abx, await cx  Monitor neurostatus - improved when sedation weaned  Wean sedation for goal RASS 0  -Acute respiratory failure  Intubated 1/7  Continue mech vent, low tidal voulme strategy  Wean fio2 for sats >89%; wean PEEP as able  Start SAT/SBT today  -Abdominal distention:   Continue OGT  Surgery following  -EMILIO, improved  Follow fluid balance,lytes  Renal following  Continue diuresis  -A.Fib  Hold anticoagulation  Monitor rates  -anemia, thrombocytopenia:  Possibly related to bleeding, acute illness and hx of etoh abuse  monitor serial labs and s/s bleeding  -DM:  Control glucoses, insulin regimen  -EtOH abuse  MVI/thiamine/folate  monitor for withdrawal  -Morbid obesity, HEIDI, possible OHS  Will need NIPPV once extubated  -sacral wound, bleeding  -FEN:  NPO  Tube feeds if remains intubated  Replete electrolytes as needed  -proph:  SCD  Heparin SQ  - Dispo:  ICU for risk of deterioration  -code status  full  Ryley Sawyer MD  Upon my evaluation, this patient had a high probability of imminent or life-threatening deterioration due to Shock, Circulatory Failure  , Respiratory Failure , and Cardiac arrest  , which  required my direct attention, intervention, and personal management.    I have personally provider 35  minutes of critical care time exclusive of time spent on separately billable procedures.  Time includes review of all pertinent laboratory/radiology results, discussion with consultants, and monitoring for potential decompensation.  Performed interventions included Fluids, Pressor drugs , and Managing Mechanical ventilation  .

## 2025-01-11 NOTE — PROGRESS NOTES
[facility]  Cardiology Progress Note    Gerardo Torrez Patient Status:  Inpatient    6/15/1950 MRN [id number]   Location [facility] 4SW-A Attending Peter Flores, DO   Hosp Day # 5 PCP Sid Gordon MD       Subjective: intubated, sedated, no new events overnight    Objective:   Temp: 97.6 °F (36.4 °C)  Pulse: 95  Resp: 24  BP: 107/49  AO: 111/50  FiO2 (%): 40 %    Intake/Output:     Intake/Output Summary (Last 24 hours) at 2025 1436  Last data filed at 2025 1150  Gross per 24 hour   Intake 3853.05 ml   Output 4445 ml   Net -591.95 ml       Last 3 Weights   25 0000 (!) 339 lb 11.2 oz (154.1 kg)   01/10/25 0600 (!) 341 lb 4.8 oz (154.8 kg)   25 0430 (!) 365 lb 14.4 oz (166 kg)   25 1602 (!) 340 lb 6.2 oz (154.4 kg)   25 1144 (!) 328 lb (148.8 kg)   23 1318 (!) 305 lb (138.3 kg)   23 0900 300 lb (136.1 kg)   23 1227 300 lb (136.1 kg)           Physical Exam:     General: No apparent distress. No respiratory or constitutional distress.  HEENT: Normocephalic, anicteric sclera, neck supple.  Neck: No JVD, carotids 2+, no bruits.  Cardiac: irreg irreg. S1, S2 normal. No murmur, pericardial rub, S3.  Lungs: Clear without wheezes, rales, rhonchi or dullness.  Normal excursions and effort.  Abdomen: Soft, non-tender. BS-present.  Extremities: Without clubbing, cyanosis or edema.  Peripheral pulses are 2+.  Neurologic: no focal deficits  Skin: Warm and dry.     Laboratory/Data:    Labs:         Recent Labs   Lab 25  0643 25  1200 25  0332 25  0442 01/10/25  0409 25  0452   WBC 9.6 13.6* 8.4 10.0 9.2 9.2   HGB 10.9* 11.7* 10.9* 10.8* 11.6* 11.5*   .4* 111.5* 106.7* 108.1* 108.8* 108.6*   .0* 134.0* 106.0* 120.0* 124.0* 136.0*   INR 1.57*  --   --   --   --   --        Recent Labs   Lab 25  1831 25  0332 25  0442 25  1444 25  1835 01/10/25  0409 25  0452 25  0646    138   138 140 140  --  143 145  --    K 3.8 3.6  3.6 3.6 3.8 3.8 3.8 3.6  3.6  --     106  106 105 105  --  105 106  --    CO2 22.0 25.0  25.0 27.0 30.0  --  32.0 36.0*  --    BUN 75* 66*  66* 46* 41*  --  32* 31*  --    CREATSERUM 2.99* 2.38*  2.38* 1.45* 1.18  --  1.13 1.07  --    CA 9.1 9.0  9.0 9.2 9.5  --  9.7 9.7  --    MG  --  2.1 2.1 1.9  --  1.5* 1.4*  --    PHOS 4.3 3.3 2.7  --   --  2.5  --  2.4   * 140*  140* 170* 142*  --  152* 131*  --        Recent Labs   Lab 01/07/25  1200 01/07/25  1831 01/08/25  0332 01/09/25  0442 01/10/25  0409 01/11/25  0452   ALT 13  --  9* 8* 8* 11   AST 20  --  13 14 22 23   ALB 3.4 3.2 3.1*  3.1* 3.2 3.4 3.4       No results for input(s): \"TROP\" in the last 168 hours.    Allergies:   Allergies[1]    Medications:  Current Facility-Administered Medications   Medication Dose Route Frequency    terazosin (Hytrin) cap 2 mg  2 mg Oral Nightly    midodrine (ProAmatine) tab 10 mg  10 mg Oral Q8H    furosemide (Lasix) 10 mg/mL injection 40 mg  40 mg Intravenous TID    DOPamine in dextrose 5% (Intropin) 800 mg/250mL infusion premix  2-20 mcg/kg/min (Dosing Weight) Intravenous Continuous    heparin (Porcine) 5000 UNIT/ML injection 7,500 Units  7,500 Units Subcutaneous Q8H BING    piperacillin-tazobactam (Zosyn) 4.5 g in dextrose 5% 100 mL IVPB-ADDV  4.5 g Intravenous Q8H BING    dextrose 10% infusion (TPN no rate)   Intravenous Continuous PRN    pancrelipase (Lip-Prot-Amyl) (Zenpep) DR particles cap 10,000 Units  10,000 Units Per G Tube PRN    And    sodium bicarbonate tab 325 mg  325 mg Per G Tube PRN    norepinephrine (Levophed) 4 mg/250mL infusion premix  0.5-30 mcg/min Intravenous Continuous    acetaminophen (Tylenol) 160 MG/5ML oral liquid 650 mg  650 mg Per NG Tube Q4H PRN    Or    acetaminophen (Tylenol) rectal suppository 650 mg  650 mg Rectal Q4H PRN    Or    acetaminophen (Ofirmev) 10 mg/mL infusion premix 1,000 mg  1,000 mg Intravenous Q6H PRN    fentaNYL  (Sublimaze) 50 mcg/mL injection 50 mcg  50 mcg Intravenous Q30 Min PRN    fentaNYL in sodium chloride 0.9% (Sublimaze) 1000 mcg/100mL infusion premix   mcg/hr Intravenous Continuous PRN    fentaNYL (Sublimaze) 25 mcg BOLUS FROM BAG infusion  25 mcg Intravenous Q30 Min PRN    senna (Senokot) 8.8 MG/5ML oral syrup 17.6 mg  10 mL Per NG Tube BID    docusate (Colace) 50 MG/5ML oral solution 100 mg  100 mg Per NG Tube BID    polyethylene glycol (PEG 3350) (Miralax) 17 g oral packet 17 g  17 g Per NG Tube Daily PRN    bisacodyl (Dulcolax) 10 MG rectal suppository 10 mg  10 mg Rectal Daily PRN    chlorhexidine gluconate (Peridex) 0.12 % oral solution 15 mL  15 mL Mouth/Throat BID@0800,2000    mineral oil-white petrolatum (Artificial Tears) 83-15 % ophthalmic ointment 1 Application  1 Application Both Eyes Nightly    vasopressin (Vasostrict) 20 Units in sodium chloride 0.9% 100 mL infusion for septic shock  0.01-0.03 Units/min Intravenous Continuous    midazolam (Versed) 2 MG/2ML injection 2 mg  2 mg Intravenous Q5 Min PRN    propofol (Diprivan) 10 mg/mL infusion premix  5-50 mcg/kg/min (Dosing Weight) Intravenous Continuous    famotidine (Pepcid) 20 mg/2mL injection 20 mg  20 mg Intravenous QAM    atorvastatin (Lipitor) tab 20 mg  20 mg Oral Nightly    cyanocobalamin (Vitamin B12) tab 2,000 mcg  2,000 mcg Oral Daily    acetaminophen (Tylenol) tab 650 mg  650 mg Oral Q6H PRN    ondansetron (Zofran) 4 MG/2ML injection 4 mg  4 mg Intravenous Q6H PRN    multivitamin (Tab-A-Kieran/Beta Carotene) tab 1 tablet  1 tablet Oral Daily    thiamine (Vitamin B1) tab 100 mg  100 mg Oral Daily    miconazole 2 % powder   Topical BID         Assessment:  Cardiopulmonary Arrest  Acute hypoxic Resp failure  PEA  A Fib with slow VR  Morbid Obesity  EMILIO on CKD    Plan:   S/p EPI, CPR  Critcal care to manage, wean off vent, supportive care  Diuresis as needed for net negative output  Cardiac monitoring  EF preserved with mild Pulmonary  HTN  Will continue to follow closely      Pardeep Yeh MD  1/11/2025  2:36 PM         [1]   Allergies  Allergen Reactions    Metformin DIARRHEA

## 2025-01-11 NOTE — PROGRESS NOTES
[facility] Pharmacy Note:  Discontinuation of Stress Ulcer Prophylaxis     Gerardo Torrez is a 74 year old patient who was initiated on stress ulcer prophylaxis with famotidine (PEPCID).    The patient no longer meets criteria for stress ulcer prophylaxis.  It has been discontinued per the pharmacy consult.    Thank you,  Jordana Zambrano, PharmD  1/11/2025 3:04 PM

## 2025-01-11 NOTE — PROGRESS NOTES
[facility]   part of [facility] Hospitalist Progress Note     Gerardo Torrez Patient Status:  Inpatient    6/15/1950 MRN [id number]   Location [facility] 4SW-A Attending Peter Flores, DO   Hosp Day # 5 PCP Sid Gordon MD     Follow Up:  The primary encounter diagnosis was Acute renal failure, unspecified acute renal failure type (HCC). A diagnosis of Anemia, unspecified type was also pertinent to this visit.    Subjective:     Patient seen and examined.  Remains intubated and sedated, on levo.  Responds to commands.  Daughter at bedside.     Objective:    Review of Systems:   10 point ROS completed and was negative, except for pertinent positive and negatives stated in subjective.    Vital signs:  Temp:  [97.5 °F (36.4 °C)-99 °F (37.2 °C)] 98.7 °F (37.1 °C)  Pulse:  [] 70  Resp:  [10-29] 16  BP: (111-127)/(51-54) 127/51  SpO2:  [91 %-95 %] 93 %  AO: ()/(31-76) 101/47  FiO2 (%):  [30 %-40 %] 30 %    Physical Exam:    Gen: Intubated, sedated, responds to commands.  Chronically ill appearing male.   HEENT:  EOMI, PERRLA, OP clear, MMM.  OGT in place.  Pulm:  Course to auscultation on vent.  CV: Heart with regular rate and rhythm, no murmur.  Normal PMI.    Abd:  Mod to severe abd distention, hypoactive bowel sounds, no TTP. Morbid obesity.    MSK: +lymphedema, +chronic venous stasis dermatitis.   Skin: +sacral decub  Neuro:  Grossly intact, no focal deficits  : Luis cath in place    Diagnostic Data:    Labs:  Recent Labs   Lab 25  0643 25  1200 25  0332 25  0442 01/10/25  0409 25  0452   WBC 9.6 13.6* 8.4 10.0 9.2 9.2   HGB 10.9* 11.7* 10.9* 10.8* 11.6* 11.5*   .4* 111.5* 106.7* 108.1* 108.8* 108.6*   .0* 134.0* 106.0* 120.0* 124.0* 136.0*   INR 1.57*  --   --   --   --   --        Recent Labs   Lab 25  0442 25  1444 25  1835 01/10/25  0409 25  0452   * 142*  --  152* 131*   BUN 46*  41*  --  32* 31*   CREATSERUM 1.45* 1.18  --  1.13 1.07   CA 9.2 9.5  --  9.7 9.7   ALB 3.2  --   --  3.4 3.4    140  --  143 145   K 3.6 3.8 3.8 3.8 3.6  3.6    105  --  105 106   CO2 27.0 30.0  --  32.0 36.0*   ALKPHO 76  --   --  81 82   AST 14  --   --  22 23   ALT 8*  --   --  8* 11   BILT 0.8  --   --  0.8 0.8   TP 5.7  --   --  6.5 6.3       Estimated Creatinine Clearance: 64.4 mL/min (based on SCr of 1.07 mg/dL).    Recent Labs   Lab 01/07/25  0643   PTP 18.9*   INR 1.57*            COVID-19 Lab Results    COVID-19  Lab Results   Component Value Date    COVID19 Not Detected 07/20/2022    COVID19 DETECTED (A) 01/26/2021       Pro-Calcitonin  No results for input(s): \"PCT\" in the last 168 hours.    Cardiac  No results for input(s): \"TROP\", \"PBNP\" in the last 168 hours.    Creatinine Kinase  No results for input(s): \"CK\" in the last 168 hours.    Inflammatory Markers  No results for input(s): \"CRP\", \"KENNEDY\", \"LDH\", \"DDIMER\" in the last 168 hours.    Imaging: Imaging data reviewed in Epic.    Medications:    terazosin  2 mg Oral Nightly    magnesium sulfate  2 g Intravenous Once    sodium phosphate  15 mmol Intravenous Once    heparin  7,500 Units Subcutaneous Q8H BING    piperacillin-tazobactam  4.5 g Intravenous Q8H BING    senna  10 mL Per NG Tube BID    docusate  100 mg Per NG Tube BID    chlorhexidine gluconate  15 mL Mouth/Throat BID@0800,2000    mineral oil-white petrolatum  1 Application Both Eyes Nightly    famotidine  20 mg Intravenous QAM    atorvastatin  20 mg Oral Nightly    cyanocobalamin  2,000 mcg Oral Daily    multivitamin  1 tablet Oral Daily    thiamine  100 mg Oral Daily    miconazole   Topical BID       Assessment & Plan:      74 yr old male with PMH sig for a-fib on eliquis, HTN, HLD, DM II, gout, pulm HTN, chronic sacral decub, and morbid obesity who presented to the ED for evaluation of bleeding from his sacral wound.       # Cardiac arrest   - concern for primary intra-abd event    - ROSC obtained s/p epi x 1  - maintain normothermia     # Acute respiratory failure  - intubated in setting of cardiac arrest   - cont full vent support   - wean FiO2 as able  - weaning trials per critical care    # Shock  - suspect distributive/hypovolemic  - IVFs  - cont vasopressors to maintain SBP > 90  - cont empiric zosyn (1/8 - )  - f/u cultures     # Abdominal distention   - CT a/p neg for SBO or perf  - OG placed to LIS  - cont empiric abx  - 1/10 KUB without obstruction or free air  - gen surg c/s appreciated     # Acute renal failure   - suspect prerenal in the setting of GI losses and ARB use  - improved  - renal US neg for hydro  - monitor renal function and UO, avoid nephrotoxins   - diuretics per cards  - renal c/s appreciated      # Bleeding sacral decub   # Acute blood loss anemia  - hold eliquis   - monitor CBC  - wound care c/s      # Persistent a-fib  - rate controlled  - ECHO with intact LVEF  - hold metoprolol given bradycardia    - hold eliquis for now given bleeding from sacral decub      # Essential HTN  - low on admit  - hold ARB, metoprolol     # HLD  - cont statin     # Pulm HTN  - monitor volume status on IVFs  - diuretics per cards     # Thrombocytopenia   - suspect due to EtOH use and possible liver disease   - monitor      # EtOH abuse   - monitor for withdrawal   - MVI, thiamine, folate   - pt counseled on EtOH cessation      # Morbid obesity  - Recommend follow up with PCP to discuss diet and lifestyle modifications to encourage weight loss.    Plan of care: inpt care in the ICU.      Plan of care discussed with pt's family at bedside.     Peter Flores,     Supplementary Documentation:   DVT Mechanical Prophylaxis:   SCDs,    DVT Pharmacologic Prophylaxis   Medication    heparin (Porcine) 5000 UNIT/ML injection 7,500 Units                Code Status: Full Code  Luis: Luis catheter in place  Luis Duration (in days): 1  Central line: central venous catheter in place  ETHAN:

## 2025-01-11 NOTE — RESPIRATORY THERAPY NOTE
Current Mechanical Ventilator Settings:  - Mode: VC+  - Rate: 16  - Tidal Volume(mL):500  - FiO2: 40  - PEEP 8  - inspiratory time 0.9     Breath Sounds: diminished     ETT Size: 8.0 @ 26 cm     Measured Parameters:  Peak Inspiratory Pressure(cmH2O): 22  Plateu Pressure(cmH2O) : 19   Tidal Volume(mL): 504   Rate: 16    Shift Events noted: Patient on current settings above. Per MD Sawyer wean PEEP to 5 per patient saturations maintaining at least 90-92% in order to attempt weaning trials in the morning. Will continue to monitor.

## 2025-01-11 NOTE — PLAN OF CARE
Received at change of shift intubated/sedated. Sedation titrated to meet RASS goal, follows commands, nods/gestures appropriately. PERRLA. 40% FiO2 to maintain SpO2 >92%. Afebrile. Tele A-fib. Pressors titrated to meet BP goals. Dopamine gtt off. Luis in place. LBM 1/10. CPOTS (-). Family updated on plan of care. See flowsheets for further info.

## 2025-01-12 LAB
ALBUMIN SERPL-MCNC: 3.6 G/DL (ref 3.2–4.8)
ANION GAP SERPL CALC-SCNC: 4 MMOL/L (ref 0–18)
BASOPHILS # BLD AUTO: 0.05 X10(3) UL (ref 0–0.2)
BASOPHILS NFR BLD AUTO: 0.6 %
BUN BLD-MCNC: 29 MG/DL (ref 9–23)
CALCIUM BLD-MCNC: 9.8 MG/DL (ref 8.7–10.4)
CHLORIDE SERPL-SCNC: 105 MMOL/L (ref 98–112)
CO2 SERPL-SCNC: 38 MMOL/L (ref 21–32)
CREAT BLD-MCNC: 1.09 MG/DL
EGFRCR SERPLBLD CKD-EPI 2021: 72 ML/MIN/1.73M2 (ref 60–?)
EOSINOPHIL # BLD AUTO: 0.43 X10(3) UL (ref 0–0.7)
EOSINOPHIL NFR BLD AUTO: 4.8 %
ERYTHROCYTE [DISTWIDTH] IN BLOOD BY AUTOMATED COUNT: 14.7 %
GLUCOSE BLD-MCNC: 100 MG/DL (ref 70–99)
GLUCOSE BLD-MCNC: 102 MG/DL (ref 70–99)
GLUCOSE BLD-MCNC: 106 MG/DL (ref 70–99)
GLUCOSE BLD-MCNC: 115 MG/DL (ref 70–99)
GLUCOSE BLD-MCNC: 91 MG/DL (ref 70–99)
GLUCOSE BLD-MCNC: 93 MG/DL (ref 70–99)
HCT VFR BLD AUTO: 33.9 %
HGB BLD-MCNC: 11.3 G/DL
IMM GRANULOCYTES # BLD AUTO: 0.05 X10(3) UL (ref 0–1)
IMM GRANULOCYTES NFR BLD: 0.6 %
LYMPHOCYTES # BLD AUTO: 1.09 X10(3) UL (ref 1–4)
LYMPHOCYTES NFR BLD AUTO: 12.2 %
MAGNESIUM SERPL-MCNC: 1.7 MG/DL (ref 1.6–2.6)
MCH RBC QN AUTO: 36.6 PG (ref 26–34)
MCHC RBC AUTO-ENTMCNC: 33.3 G/DL (ref 31–37)
MCV RBC AUTO: 109.7 FL
MONOCYTES # BLD AUTO: 1.26 X10(3) UL (ref 0.1–1)
MONOCYTES NFR BLD AUTO: 14.1 %
NEUTROPHILS # BLD AUTO: 6.03 X10 (3) UL (ref 1.5–7.7)
NEUTROPHILS # BLD AUTO: 6.03 X10(3) UL (ref 1.5–7.7)
NEUTROPHILS NFR BLD AUTO: 67.7 %
OSMOLALITY SERPL CALC.SUM OF ELEC: 310 MOSM/KG (ref 275–295)
PHOSPHATE SERPL-MCNC: 3.2 MG/DL (ref 2.4–5.1)
PHOSPHATE SERPL-MCNC: 3.2 MG/DL (ref 2.4–5.1)
PLATELET # BLD AUTO: 128 10(3)UL (ref 150–450)
POTASSIUM SERPL-SCNC: 3.7 MMOL/L (ref 3.5–5.1)
POTASSIUM SERPL-SCNC: 3.7 MMOL/L (ref 3.5–5.1)
RBC # BLD AUTO: 3.09 X10(6)UL
SODIUM SERPL-SCNC: 147 MMOL/L (ref 136–145)
WBC # BLD AUTO: 8.9 X10(3) UL (ref 4–11)

## 2025-01-12 RX ORDER — MAGNESIUM SULFATE HEPTAHYDRATE 40 MG/ML
2 INJECTION, SOLUTION INTRAVENOUS ONCE
Status: COMPLETED | OUTPATIENT
Start: 2025-01-12 | End: 2025-01-12

## 2025-01-12 RX ORDER — MIDODRINE HYDROCHLORIDE 10 MG/1
10 TABLET ORAL 3 TIMES DAILY
Status: DISCONTINUED | OUTPATIENT
Start: 2025-01-13 | End: 2025-01-14

## 2025-01-12 RX ORDER — FUROSEMIDE 10 MG/ML
40 INJECTION INTRAMUSCULAR; INTRAVENOUS
Status: DISCONTINUED | OUTPATIENT
Start: 2025-01-12 | End: 2025-01-14

## 2025-01-12 RX ORDER — POTASSIUM CHLORIDE 14.9 MG/ML
20 INJECTION INTRAVENOUS ONCE
Status: COMPLETED | OUTPATIENT
Start: 2025-01-12 | End: 2025-01-12

## 2025-01-12 NOTE — PROGRESS NOTES
[facility]   part of [facility]    Nephrology Progress Note    Gerardo Torrez Attending:  Peter Flores DO     Cc: EMILIO    SUBJECTIVE     Urine output 7L over past 24 hours.  Net neg 10L since admit, -3.5 / 24hrs    PHYSICAL EXAM   Vital signs: BP 98/40 (BP Location: Radial)   Pulse 77   Temp 99.4 °F (37.4 °C) (Temporal)   Resp 14   Ht 5' 10\" (1.778 m)   Wt (!) 339 lb 11.2 oz (154.1 kg)   SpO2 95%   BMI 48.74 kg/m²   Temp (24hrs), Av.4 °F (36.9 °C), Min:97.5 °F (36.4 °C), Max:99.4 °F (37.4 °C)       Intake/Output Summary (Last 24 hours) at 2025  Last data filed at 2025  Gross per 24 hour   Intake 2491.55 ml   Output 4525 ml   Net -2033.45 ml     Wt Readings from Last 3 Encounters:   25 (!) 339 lb 11.2 oz (154.1 kg)   23 (!) 305 lb (138.3 kg)   23 300 lb (136.1 kg)     General: intubated on MV  HEENT: NCAT  Cardiac: RRR  Lungs: dimininished  Abdomen: +distended   Extremities: + edema  Neurologic: sedated     MEDS     Current Facility-Administered Medications   Medication Dose Route Frequency    terazosin (Hytrin) cap 2 mg  2 mg Oral Nightly    midodrine (ProAmatine) tab 10 mg  10 mg Oral Q8H    furosemide (Lasix) 10 mg/mL injection 40 mg  40 mg Intravenous TID    heparin (Porcine) 5000 UNIT/ML injection 7,500 Units  7,500 Units Subcutaneous Q8H BING    piperacillin-tazobactam (Zosyn) 4.5 g in dextrose 5% 100 mL IVPB-ADDV  4.5 g Intravenous Q8H BING    dextrose 10% infusion (TPN no rate)   Intravenous Continuous PRN    pancrelipase (Lip-Prot-Amyl) (Zenpep) DR particles cap 10,000 Units  10,000 Units Per G Tube PRN    And    sodium bicarbonate tab 325 mg  325 mg Per G Tube PRN    norepinephrine (Levophed) 4 mg/250mL infusion premix  0.5-30 mcg/min Intravenous Continuous    acetaminophen (Tylenol) 160 MG/5ML oral liquid 650 mg  650 mg Per NG Tube Q4H PRN    Or    acetaminophen (Tylenol) rectal suppository 650 mg  650 mg Rectal Q4H PRN    Or    acetaminophen  (Ofirmev) 10 mg/mL infusion premix 1,000 mg  1,000 mg Intravenous Q6H PRN    senna (Senokot) 8.8 MG/5ML oral syrup 17.6 mg  10 mL Per NG Tube BID    docusate (Colace) 50 MG/5ML oral solution 100 mg  100 mg Per NG Tube BID    polyethylene glycol (PEG 3350) (Miralax) 17 g oral packet 17 g  17 g Per NG Tube Daily PRN    bisacodyl (Dulcolax) 10 MG rectal suppository 10 mg  10 mg Rectal Daily PRN    famotidine (Pepcid) 20 mg/2mL injection 20 mg  20 mg Intravenous QAM    atorvastatin (Lipitor) tab 20 mg  20 mg Oral Nightly    cyanocobalamin (Vitamin B12) tab 2,000 mcg  2,000 mcg Oral Daily    acetaminophen (Tylenol) tab 650 mg  650 mg Oral Q6H PRN    ondansetron (Zofran) 4 MG/2ML injection 4 mg  4 mg Intravenous Q6H PRN    multivitamin (Tab-A-Kieran/Beta Carotene) tab 1 tablet  1 tablet Oral Daily    thiamine (Vitamin B1) tab 100 mg  100 mg Oral Daily    miconazole 2 % powder   Topical BID       LABS     Lab Results   Component Value Date    WBC 9.2 01/11/2025    HGB 11.5 01/11/2025    HCT 34.2 01/11/2025    .0 01/11/2025    CREATSERUM 1.07 01/11/2025    BUN 31 01/11/2025     01/11/2025    K 3.6 01/11/2025    K 3.6 01/11/2025     01/11/2025    CO2 36.0 01/11/2025     01/11/2025    CA 9.7 01/11/2025    ALB 3.4 01/11/2025    ALKPHO 82 01/11/2025    BILT 0.8 01/11/2025    TP 6.3 01/11/2025    AST 23 01/11/2025    ALT 11 01/11/2025    MG 1.4 01/11/2025    PHOS 2.4 01/11/2025    PGLU 115 01/11/2025       IMAGING   All imaging studies personally reviewed.    XR ABDOMEN (1 VIEW) (CPT=74018)   Final Result   PROCEDURE:  XR ABDOMEN (1 VIEW) (CPT=74018)       INDICATIONS:  abdominal distention       COMPARISON:  [facility] , XR, XR ABDOMEN (1 VIEW) (CPT=74018), 1/07/2025, 5:39    AM.       TECHNIQUE:  Supine AP view was obtained.       PATIENT STATED HISTORY: (As transcribed by Technologist)  Patient offered    no additional history at this time.             FINDINGS:     BOWEL GAS PATTERN:  Normal.  No abnormal  dilation or deviation.     CALCIFICATIONS:  None significant.   OTHER:  Negative.  No abnormal gaseous collections.                           =====   CONCLUSION:  No evidence of bowel obstruction or free air.  NG tube within    the stomach.           LOCATION:  [facility]           Dictated by (CST): Hemanth Silvestre MD on 1/10/2025 at 1:03 PM        Finalized by (CST): Hemanth Silvestre MD on 1/10/2025 at 1:03 PM         XR CHEST AP PORTABLE  (CPT=71045)   Final Result   PROCEDURE:  XR CHEST AP PORTABLE  (CPT=71045)       TECHNIQUE:  AP chest radiograph was obtained.       COMPARISON:  [facility] , XR, XR CHEST AP PORTABLE  (CPT=71045), 1/07/2025,    7:41 AM.  [facility] , XR, XR CHEST AP PORTABLE  (CPT=71045), 1/07/2025, 6:37    AM.       INDICATIONS:  Increased O2 needs       PATIENT STATED HISTORY: (As transcribed by Technologist)  Patient offered    no additional history at this time.            FINDINGS:  Right-sided triple-lumen catheter.  Enteric tube extending    beyond the diaphragm the tip not included on this examination.     Endotracheal tube with tip approximately 3 cm above the level of the    gianna.  Cardiomegaly.  Interstitial opacities    bilaterally.  Retrocardiac opacity.  Small left-sided pleural effusion.     No pneumothorax.                         =====   CONCLUSION:     1. Cardiomegaly with interstitial opacities likely representing edema.   2. Retrocardiac opacity suggestive of pneumonia.   3. Small left-sided pleural effusion.             LOCATION:  [facility]                       Dictated by (CST): Elaine Felix MD on 1/09/2025 at 8:06 AM        Finalized by (CST): Elaine Felix MD on 1/09/2025 at 8:09 AM         CT ABDOMEN+PELVIS(CPT=74176)   Final Result   PROCEDURE:  CT ABDOMEN+PELVIS (CPT=74176)       COMPARISON:  [facility] , CT, CT ABD(C/S)/PELVIS(C) (SET), 3/14/2006, 7:09 PM.       INDICATIONS:  abdominal distention       TECHNIQUE:  Unenhanced multislice CT scanning was performed from the dome    of  the diaphragm to the pubic symphysis.  Dose reduction techniques were    used. Dose information is transmitted to the ACR (American College of    Radiology) NRDR (National Radiology    Data Registry) which includes the Dose Index Registry.       PATIENT STATED HISTORY: (As transcribed by Technologist)  Patient is here    for evaluation of abdominal distention.             FINDINGS:     LIVER:  No enlargement, atrophy, abnormal density, or significant focal    lesion.     BILIARY:  There is gallbladder wall thickening and mild stranding around    the gallbladder.  There are no calcified stones detected.   PANCREAS:  No lesion, fluid collection, ductal dilatation, or atrophy.     SPLEEN:  No enlargement or focal lesion.     KIDNEYS:  Kidneys are unremarkable.   ADRENALS:  No mass or enlargement.     AORTA/VASCULAR:    Aorta is diffusely atherosclerotic but not aneurysmal.   RETROPERITONEUM:  There is an enlarged portal caval space lymph node    series 3, image 47 which measures 3.6 x 1.7 cm.    BOWEL/MESENTERY:  There is a nasogastric tube with tip in gastric body.     There is enlarged lymph node noted in region of gastrohepatic ligament    series 3, image 44 which measures 2.4 x 2 cm.    ABDOMINAL WALL:  Diffuse body wall edema is noted.   URINARY BLADDER:  No visible focal wall thickening, lesion, or calculus.     PELVIC NODES:  No adenopathy.     PELVIC ORGANS:  No visible mass.  Pelvic organs appropriate for patient    age.     BONES:  There is diffuse osteopenia noted.  There is diffuse degenerative    disc disease in the spine.   LUNG BASES:  There are moderate bilateral pleural effusions and there is    dependent atelectasis in the lower lobes.   OTHER:  Negative.                           =====   CONCLUSION:     1. There is diffuse body wall edema.  There are moderate bilateral pleural    effusions and there is dependent atelectasis likely related to diffuse    edematous state.   2. Gallbladder wall  thickening and mild stranding around gallbladder could    also be related to diffusely edematous state although correlation with any    clinical evidence of cholecystitis is necessary.   3. There is retroperitoneal and gastrohepatic ligament lymphadenopathy    which is of uncertain significance.   4. Nasogastric tube tip is in region of gastric body.             LOCATION:  [facility]           Dictated by (CST): Ahmet Griffith MD on 1/07/2025 at 10:16 AM        Finalized by (CST): Ahmet Griffith MD on 1/07/2025 at 10:22 AM         XR CHEST AP PORTABLE  (CPT=71045)   Final Result   PROCEDURE:  XR CHEST AP PORTABLE  (CPT=71045)       TECHNIQUE:  AP chest radiograph was obtained.       COMPARISON:  [facility] , XR, XR CHEST AP PORTABLE  (CPT=71045), 1/07/2025,    6:37 AM.       INDICATIONS:  Verify correct tube placement       PATIENT STATED HISTORY: (As transcribed by Technologist)  Patient offered    no additional history at this time.                                         =====   CONCLUSION:         Stable cardiac and mediastinal contours.  Findings of congestive heart    failure with moderate pulmonary edema.  A small left pleural effusion is    suspected, similar to prior.  No pneumothorax.       Endotracheal tube tip terminates approximately 5 cm from the gianna.     Enteric catheter extends into the upper abdomen beyond the field of view.     Interval placement of right IJ central venous catheter terminating in the    mid/lower SVC.           LOCATION:  [id number]                       Dictated by (CST): Yudy Colon MD on 1/07/2025 at 8:22 AM        Finalized by (CST): Yudy Colon MD on 1/07/2025 at 8:23 AM         XR CHEST AP PORTABLE  (CPT=71045)   Final Result   PROCEDURE:  XR CHEST AP PORTABLE  (CPT=71045)       TECHNIQUE:  AP chest radiograph was obtained.       COMPARISON:  [address], XR, XR CHEST PA + LAT CHEST (CPT=71046),    11/01/2023, 12:38 PM.       INDICATIONS:  s/p intubation       PATIENT STATED HISTORY:  (As transcribed by Technologist)  Patient offered    no additional history at this time.             FINDINGS:                           =====   CONCLUSION:  Stable cardiac and mediastinal contours.  Findings of    pulmonary venous hypertension with mild/moderate pulmonary edema.  A    small/mild left pleural effusion is suspected.  No appreciable    pneumothorax.       Endotracheal tube tip terminates approximately 4 cm from the gianna.     Enteric catheter extends into the upper abdomen beyond the field of view.           LOCATION:  [id number]                       Dictated by (CST): Yudy Colon MD on 1/07/2025 at 6:50 AM        Finalized by (CST): Yudy Colon MD on 1/07/2025 at 6:51 AM         XR ABDOMEN (1 VIEW) (CPT=74018)   Final Result   PROCEDURE:  XR ABDOMEN (1 VIEW) (CPT=74018)       INDICATIONS:  abdomen distended       COMPARISON:  None.       TECHNIQUE:  Supine AP view was obtained.       PATIENT STATED HISTORY: (As transcribed by Technologist)  Patient offered    no additional history at this time.                                   =====   CONCLUSION:         Under penetration related to body habitus degrades assessment of the bowel    gas pattern.  Within this limitation there appears to be moderate gaseous    distention of the stomach and small bowel with relative paucity of colonic    gas.  Findings may reflect    ileus or bowel obstruction.           LOCATION:  [id number]           Dictated by (CST): Yudy Colon MD on 1/07/2025 at 6:48 AM        Finalized by (CST): Yudy Colon MD on 1/07/2025 at 6:49 AM         US KIDNEY/BLADDER (CPT=76770)   Final Result   PROCEDURE:  US KIDNEY/BLADDER (CPT=76770)       COMPARISON:  None.       INDICATIONS:  Bleeding ulcers       TECHNIQUE:  Transabdominal gray scale ultrasound imaging of the bilateral    kidneys and bladder was performed.  Routine technique was utilized.         PATIENT STATED HISTORY: (As transcribed by Technologist)              FINDINGS:         RIGHT KIDNEY   MEASUREMENTS:  13.5 x 7.3 x 8.4 cm   ECHOGENICITY:  Normal.   HYDRONEPHROSIS:  None.   CYSTS/STONES/MASSES:  None.       LEFT KIDNEY    MEASUREMENTS:  12.7 x 7.6 x 7.9 cm   ECHOGENICITY:  Normal.   HYDRONEPHROSIS:  None.   CYSTS/STONES/MASSES:  None.       BLADDER:  Empty, not evaluated    OTHER:  Negative.                         =====   CONCLUSION:  Normal appearance of the kidneys.           LOCATION:  [id number]                   Dictated by (CST): Aftab Georges MD on 1/06/2025 at 1:26 PM        Finalized by (CST): Aftab Georges MD on 1/06/2025 at 1:29 PM               ASSESSMENT & PLAN      74 year old male w chronic Afib, DM, lymphedema, morbid obesity essentially wheelchair bound w sacral decubitus ulcer, asc ao aneurysm who called 911 today as was bleeding profusely from decubitus ulcer. Nephrology consulted for EMILIO. Hospital course c/b cardiac arrest, acute resp failure requiring intubation and shock.      EMILIO  -- likely prerenal due decreased intravascular volume + poor perfusion w hypotension   -- BUN 84, Cr 5.2mg/dL on admit. Baseline Cr appears 0.9-1.14mg/dL mostly since 2022  -- renal US w no hydro.   -- hold torsemide, jardiance, irbesartan, spironolactone  -- Diuresis per ICU, kidney function improving  -- Luis placed, strict UOP   -- avoid NSAIDs, IV contrast, other nephrotoxins. Renally dose meds    Anemia /acute blood loss anemia  -- transfusion prn.    Shock   -- per ICU         D/w RN and pt family     Thank you for allowing me to participate in the care of this patient. Please do not hesitate to call with questions or concerns.     Bea Prabhakar MD  [facility]  Nephrology

## 2025-01-12 NOTE — PROGRESS NOTES
Care assumed at change of shift. Patient intubated and sedated, arousable and following commands. Sedation off prior to SBT, patient successful with SBT and extubated to bipap at 1150. Patient eventually transitioned to nasal cannula. Levo to maintain SBP >/= 90, levo off at 1115. Patient aox4 post extubation, following commands. Plan for SLP eval tomorrow. Patient and family updated on plan of care. See MAR and flowsheets for documentation details.

## 2025-01-12 NOTE — PROGRESS NOTES
[facility] Progress Note     SUBJECTIVE/Interval history:  Extubated yesterday to BIPAP without issue. no issues overnight. He feels 'good' today, denies dyspnea or pain. Some cough noted. No fevers    Medications  Reviewed personally   potassium chloride  20 mEq Intravenous Once    terazosin  2 mg Oral Nightly    midodrine  10 mg Oral Q8H    furosemide  40 mg Intravenous TID    heparin  7,500 Units Subcutaneous Q8H BING    piperacillin-tazobactam  4.5 g Intravenous Q8H BING    senna  10 mL Per NG Tube BID    docusate  100 mg Per NG Tube BID    famotidine  20 mg Intravenous QAM    atorvastatin  20 mg Oral Nightly    cyanocobalamin  2,000 mcg Oral Daily    multivitamin  1 tablet Oral Daily    thiamine  100 mg Oral Daily    miconazole   Topical BID       dextrose 10%    lipase-protease-amylase (Lip-Prot-Amyl) **AND** sodium bicarbonate    acetaminophen **OR** acetaminophen **OR** acetaminophen    polyethylene glycol (PEG 3350)    bisacodyl    acetaminophen    ondansetron    OBJECTIVE:  Vitals:    01/12/25 0600 01/12/25 0700 01/12/25 0800 01/12/25 0808   BP: 97/52 120/55 113/57    BP Location:   Right arm    Pulse: 82 75 73    Resp: 24 22 22    Temp:    98.6 °F (37 °C)   TempSrc:    Temporal   SpO2: 95% 96% 97%    Weight:       Height:           Vital signs in last 24 hours:  Blood pressure 113/57, pulse 73, temperature 98.6 °F (37 °C), temperature source Temporal, resp. rate 22, height 5' 10\" (1.778 m), weight (!) 339 lb 11.2 oz (154.1 kg), SpO2 97%.     Intake/Output:  I/O last 3 completed shifts:  In: 4116.6 [I.V.:1472.6; NG/GT:2094; IV PIGGYBACK:550]  Out: 7745 [Urine:7745]  Vent Mode: PS  FiO2 (%):  [30 %-40 %] 40 %  PEEP/CPAP (cm H2O):  [5 cm H20] 5 cm H20    Physical Exam:  General: Appears awake, conversant. No distress  Neuro: awake,conversant, f/c bilaterally  Lungs:CTAB no wheeze, no distress  Heart:RRR no murmur heard  Abdomen:soft, mild distention, +tympanic to  percussion. No rigidity. No reaction to palpation  Extremities:+BLE edema    Lab Data Review:   Recent Labs   Lab 01/10/25  0409 01/11/25 0452 01/12/25  0459   * 131* 100*   BUN 32* 31* 29*   CREATSERUM 1.13 1.07 1.09   CA 9.7 9.7 9.8    145 147*   K 3.8 3.6  3.6 3.7  3.7    106 105   CO2 32.0 36.0* 38.0*     Recent Labs   Lab 01/10/25  0409 01/11/25  0452 01/12/25  0501   RBC 3.20* 3.15* 3.09*   HGB 11.6* 11.5* 11.3*   HCT 34.8* 34.2* 33.9*   .8* 108.6* 109.7*   MCH 36.3* 36.5* 36.6*   MCHC 33.3 33.6 33.3   RDW 14.6 14.8 14.7   NEPRELIM 6.74 6.13 6.03   WBC 9.2 9.2 8.9   .0* 136.0* 128.0*     No results for input(s): \"BNP\" in the last 168 hours.  No results for input(s): \"TROP\", \"CK\" in the last 168 hours.  Recent Labs   Lab 01/07/25  0643   INR 1.57*   PTT 34.9     Recent Labs   Lab 01/11/25  1410   ABGPHT 7.53*   RHVENF7T 47*   AFJED1N 109*   ABGHCO3 36.5*   ABGBE 14.8*   TEMP 98.6   CATHI Not Applicable   SITE Arterial Line   DEV Bi-PAP   THGB 11.9*       Other Labs:  Interval Culture Data:   No results found for this visit on 01/06/25.  Recent Labs   Lab 01/07/25  1330   COLORUR Light-Yellow   CLARITY Clear   SPECGRAVITY 1.012   GLUUR 300*   BILUR Negative   KETUR 10*   BLOODURINE 1+*   PHURINE 5.5   PROUR 20*   UROBILINOGEN Normal   NITRITE Negative   LEUUR Negative   WBCUR 1-5   RBCUR 0-2   BACUR None Seen   EPIUR Few*       Interval Radiology:   Reviewed personally  XR ABDOMEN (1 VIEW) (CPT=74018)    Result Date: 1/10/2025  CONCLUSION:  No evidence of bowel obstruction or free air.  NG tube within the stomach.   LOCATION:  [facility]   Dictated by (CST): Hemanth Silvestre MD on 1/10/2025 at 1:03 PM     Finalized by (CST): Hemanth Silvestre MD on 1/10/2025 at 1:03 PM        Assessment/Plan  -Cardiac arrest: on 1/7 am, unclear etiology, thought intraabdominal etiology but negative imaging. Possibly related to morbid obesity and underlying HEIDI, critical upper airway when laying  supine  -Shock  -pulmonary infiltrates, possible pneumonia  Monitor hemodynamics, goal MAP >65, now off of vasopressors  Cardiology following  Continue abx, await cx  -Acute respiratory failure  Intubated 1/7, extubated 1/11  Complete abx course for possible pneumonia, zosyn day 5  -Abdominal distention:   Continue OGT  Surgery following  -EMILIO, improved  Follow fluid balance,lytes  Renal following  Continue diuresis- will decrease lasix to BID  -A.Fib  Hold anticoagulation  Monitor rates  -anemia, thrombocytopenia:  Possibly related to bleeding, acute illness and hx of etoh abuse  monitor serial labs and s/s bleeding  -DM:  Control glucoses, insulin regimen  -EtOH abuse  MVI/thiamine/folate  monitor for withdrawal  -Morbid obesity, HEIDI, possible OHS  Use BIPAP with naps/sleep  Previously seen by Dr. Camargo - consult duly pulm on 1/13  -sacral wound, bleeding  -FEN:  Swallow assessment today. Hope to start diet  Replete electrolytes as needed  -proph:  SCD  Heparin SQ  - Dispo:  ICU, transfer to floor  -code status  Full  Ryley Sawyer MD

## 2025-01-12 NOTE — PLAN OF CARE
A&O. 4LNC during the day and placed on bipap 40% overnight to sleep. Afib HR 70s-80s. Bps WDL. Passed bedside swallow. Luis intact, adequate UOP s/p lasix. Art line and CVC discontinued per orders this AM.

## 2025-01-12 NOTE — PROGRESS NOTES
[facility]  Cardiology Progress Note    Gerardo Torrez Patient Status:  Inpatient    6/15/1950 MRN [id number]   Location [facility] 4SW-A Attending Peter Flores, DO   Hosp Day # 6 PCP Sid Gordon MD       Subjective: patient extubated, sitting in chair, comfortable    Objective:   Temp: 99.6 °F (37.6 °C)  Pulse: 82  Resp: 23  BP: 117/55  AO: 136/62  FiO2 (%): 40 %    Intake/Output:     Intake/Output Summary (Last 24 hours) at 2025 1326  Last data filed at 2025 1300  Gross per 24 hour   Intake 866.5 ml   Output 5375 ml   Net -4508.5 ml       Last 3 Weights   25 0000 (!) 339 lb 11.2 oz (154.1 kg)   01/10/25 0600 (!) 341 lb 4.8 oz (154.8 kg)   25 0430 (!) 365 lb 14.4 oz (166 kg)   25 1602 (!) 340 lb 6.2 oz (154.4 kg)   25 1144 (!) 328 lb (148.8 kg)   23 1318 (!) 305 lb (138.3 kg)   23 0900 300 lb (136.1 kg)   23 1227 300 lb (136.1 kg)           Physical Exam:     General: AAO x 3, No apparent distress. No respiratory or constitutional distress.  HEENT: Normocephalic, anicteric sclera, neck supple.  Neck: No JVD, carotids 2+, no bruits.  Cardiac: irreg irreg. S1, S2 normal. No murmur, pericardial rub, S3.  Lungs: Clear without wheezes, rales, rhonchi or dullness.  Normal excursions and effort.  Abdomen: Soft, non-tender. BS-present.  Extremities: Without clubbing, cyanosis or edema.  Peripheral pulses are 2+.  Neurologic: no focal deficits  Skin: Warm and dry.     Laboratory/Data:    Labs:         Recent Labs   Lab 25  0643 25  1200 25  0332 25  0442 01/10/25  0409 25  0452 25  0501   WBC 9.6   < > 8.4 10.0 9.2 9.2 8.9   HGB 10.9*   < > 10.9* 10.8* 11.6* 11.5* 11.3*   .4*   < > 106.7* 108.1* 108.8* 108.6* 109.7*   .0*   < > 106.0* 120.0* 124.0* 136.0* 128.0*   INR 1.57*  --   --   --   --   --   --     < > = values in this interval not displayed.       Recent Labs   Lab 25  0332 25  0442  01/09/25  1444 01/09/25  1835 01/10/25  0409 01/11/25  0452 01/11/25  0646 01/12/25 0459     138 140 140  --  143 145  --  147*   K 3.6  3.6 3.6 3.8 3.8 3.8 3.6  3.6  --  3.7  3.7     106 105 105  --  105 106  --  105   CO2 25.0  25.0 27.0 30.0  --  32.0 36.0*  --  38.0*   BUN 66*  66* 46* 41*  --  32* 31*  --  29*   CREATSERUM 2.38*  2.38* 1.45* 1.18  --  1.13 1.07  --  1.09   CA 9.0  9.0 9.2 9.5  --  9.7 9.7  --  9.8   MG 2.1 2.1 1.9  --  1.5* 1.4*  --  1.7   PHOS 3.3 2.7  --   --  2.5  --  2.4 3.2  3.2   *  140* 170* 142*  --  152* 131*  --  100*       Recent Labs   Lab 01/07/25  1200 01/07/25  1831 01/08/25  0332 01/09/25 0442 01/10/25  0409 01/11/25 0452 01/12/25 0459   ALT 13  --  9* 8* 8* 11  --    AST 20  --  13 14 22 23  --    ALB 3.4   < > 3.1*  3.1* 3.2 3.4 3.4 3.6    < > = values in this interval not displayed.       No results for input(s): \"TROP\" in the last 168 hours.    Allergies:   Allergies[1]    Medications:  Current Facility-Administered Medications   Medication Dose Route Frequency    terazosin (Hytrin) cap 2 mg  2 mg Oral Nightly    midodrine (ProAmatine) tab 10 mg  10 mg Oral Q8H    furosemide (Lasix) 10 mg/mL injection 40 mg  40 mg Intravenous TID    heparin (Porcine) 5000 UNIT/ML injection 7,500 Units  7,500 Units Subcutaneous Q8H BING    piperacillin-tazobactam (Zosyn) 4.5 g in dextrose 5% 100 mL IVPB-ADDV  4.5 g Intravenous Q8H BING    dextrose 10% infusion (TPN no rate)   Intravenous Continuous PRN    pancrelipase (Lip-Prot-Amyl) (Zenpep) DR particles cap 10,000 Units  10,000 Units Per G Tube PRN    And    sodium bicarbonate tab 325 mg  325 mg Per G Tube PRN    norepinephrine (Levophed) 4 mg/250mL infusion premix  0.5-30 mcg/min Intravenous Continuous    acetaminophen (Tylenol) 160 MG/5ML oral liquid 650 mg  650 mg Per NG Tube Q4H PRN    Or    acetaminophen (Tylenol) rectal suppository 650 mg  650 mg Rectal Q4H PRN    Or    acetaminophen (Ofirmev) 10 mg/mL  infusion premix 1,000 mg  1,000 mg Intravenous Q6H PRN    senna (Senokot) 8.8 MG/5ML oral syrup 17.6 mg  10 mL Per NG Tube BID    docusate (Colace) 50 MG/5ML oral solution 100 mg  100 mg Per NG Tube BID    polyethylene glycol (PEG 3350) (Miralax) 17 g oral packet 17 g  17 g Per NG Tube Daily PRN    bisacodyl (Dulcolax) 10 MG rectal suppository 10 mg  10 mg Rectal Daily PRN    famotidine (Pepcid) 20 mg/2mL injection 20 mg  20 mg Intravenous QAM    atorvastatin (Lipitor) tab 20 mg  20 mg Oral Nightly    cyanocobalamin (Vitamin B12) tab 2,000 mcg  2,000 mcg Oral Daily    acetaminophen (Tylenol) tab 650 mg  650 mg Oral Q6H PRN    ondansetron (Zofran) 4 MG/2ML injection 4 mg  4 mg Intravenous Q6H PRN    multivitamin (Tab-A-Kieran/Beta Carotene) tab 1 tablet  1 tablet Oral Daily    thiamine (Vitamin B1) tab 100 mg  100 mg Oral Daily    miconazole 2 % powder   Topical BID         Assessment:  Cardiopulmonary Arrest  Acute hypoxic Resp failure  PEA  A Fib with slow VR  Morbid Obesity  EMILIO on CKD    Plan:   S/p EPI, CPR  Critcal care to manage, off vent today, up in chair, supportive care  Diuresis as needed for net negative output  Cardiac monitoring  EF preserved with mild Pulmonary HTN  Resume oral anticoagulation when able  Will continue to follow      Pardeep Yeh MD  1/11/2025  2:36 PM         [1]   Allergies  Allergen Reactions    Metformin DIARRHEA

## 2025-01-12 NOTE — SLP NOTE
ADULT SWALLOWING EVALUATION    ASSESSMENT    ASSESSMENT/OVERALL IMPRESSION:  Order received, chart reviewed. Patient admitted on 1/6/25 with bleeding ulcer. Had cardiac arrest on 1/7 required intubation. Extubated yesterday 1/11.  Medical history includes morbid obesity, ETOH abuse, chronic sacral ulcer, gout, neuropathy, DM.  Patient had XR abdomen yesterday and reported clear of any obstruction.  Patient with no previous dysphagia and was on regular texture diet at baseline.  Patient remains in ICU and RN requested for SLP to proceed.     Patient received awake, alert, and seated upright in bedside chair.  Spouse and daughter present.  Patient on 4L O2 via NC.  Vocal quality appeared to be clear however weak/slightly hoarse in quality.  Patient able to follow directions and response appropriately.  Patient assisted with PO trials due to UE weakness.      Patient tolerated slow rate and small bites/sips at a time of regular texture and thin liquids with no overt or immediate clinical s/s aspiration observed or suspected.  Patient did benefit from 1:1 assist with PO trials and supportive feeding approach.  Respiratory status remained unlabored and not changed.  Prandial throat clear noted intermittently previous to and post PO trials.  Educated patient/family on aspiration precautions given underlying weakness.  All expressed understanding and agreement.  D/W RN.          RECOMMENDATIONS   Diet Recommendations - Solids: Regular  Diet Recommendations - Liquids: Thin Liquids     Aspiration Precautions: Upright position;Slow rate;Small bites;Small sips;1:1 feeding  Medication Administration Recommendations: One pill at a time  Treatment Plan/Recommendations: Aspiration precautions    HISTORY   MEDICAL HISTORY  Reason for Referral: R/O aspiration    Problem List  Principal Problem:    Acute renal failure, unspecified acute renal failure type (HCC)  Active Problems:    Anemia, unspecified type    Cardiac arrest (HCC)     Acute hypoxic respiratory failure (HCC)      Past Medical History  Past Medical History:    Back problem    CAD (coronary artery disease)    Calculus of kidney    Diabetes (HCC)    Diabetes mellitus type 2 in obese    Dilated aortic root (HCC)    GOUT    Gout    High blood pressure    High cholesterol    KIDNEY STONE    Morbid obesity (HCC)    Neuropathy    OTHER DISEASES    SVT Chato    PONV (postoperative nausea and vomiting)    Pulmonary hypertension (HCC)    Thoracic aortic aneurysm (HCC)       Prior Living Situation: Home with spouse  Diet Prior to Admission: Regular;Thin liquids       Patient/Family Goals: to drink water    SWALLOWING HISTORY  Current Diet Consistency: NPO    Dysphagia History: none    Imaging Results:  CXR 1/9:  1. Cardiomegaly with interstitial opacities likely representing edema.   2. Retrocardiac opacity suggestive of pneumonia.   3. Small left-sided pleural effusion.       Abd Xray 1/11:  CONCLUSION:  No evidence of bowel obstruction or free air.  NG tube within the stomach.       OBJECTIVE   ORAL MOTOR EXAMINATION  Dentition: Functional  Symmetry: Within Functional Limits  Strength: Within Functional Limits  Tone: Within Functional Limits  Range of Motion: Within Functional Limits  Rate of Motion: Within Functional Limits    Voice Quality: Clear  Respiratory Status: Nasal cannula  Consistencies Trialed: Thin liquids;Puree;Hard solid  Method of Presentation: Staff/Clinician assistance  Patient Positioned: Upright;Midline;Bedside chair    Oral Phase of Swallow: Within Functional Limits                      Pharyngeal Phase of Swallow: Within Functional Limits           (Please note: Silent aspiration cannot be evaluated clinically. Videofluoroscopic Swallow Study is required to rule-out silent aspiration.)    Esophageal Phase of Swallow: No complaints consistent with possible esophageal involvement              GOALS  Goal #1 The patient will tolerate regular consistency and thin liquids  without overt signs or symptoms of aspiration with 80 % accuracy over 1-2 session(s).  In Progress   Goal #2 The patient/family/caregiver will demonstrate understanding and implementation of aspiration precautions and swallow strategies independently over 1-2 session(s).    In Progress     FOLLOW UP  Treatment Plan/Recommendations: Aspiration precautions     Follow Up Needed (Documentation Required): Yes  SLP Follow-up Date: 01/13/25    Thank you for your referral.   If you have any questions, please contact JULIO Enriquez MS CCC-SLP/L  Speech-Language Pathologist  Spect[employer]-Link [phone]

## 2025-01-12 NOTE — PLAN OF CARE
Frequent loose BMs. Continent and able to ask for bedpan.  Lifted to chair, sat up for several hours.  Luis removed, prima fit in place.  Tolerating soft diet.   PO intake encouraged.   Plan for bipap with sleep.        Problem: Patient/Family Goals  Goal: Patient/Family Long Term Goal  Description: Patient's Long Term Goal: Return home at baseline ADLs.     Interventions:  - Participate in PT/OT as able.  - See additional Care Plan goals for specific interventions  Outcome: Progressing  Goal: Patient/Family Short Term Goal  Description: Patient's Short Term Goal: Tolerate SBT.    Interventions:   - Follow RT and RN recommendations.   - See additional Care Plan goals for specific interventions  Outcome: Progressing

## 2025-01-12 NOTE — PROGRESS NOTES
[facility]   part of [facility] Hospitalist Progress Note     Gerardo Torrez Patient Status:  Inpatient    6/15/1950 MRN [id number]   Location [facility] 4SW-A Attending Peter Flores, DO   Hosp Day # 6 PCP Sid Gordon MD     Follow Up:  The primary encounter diagnosis was Acute renal failure, unspecified acute renal failure type (HCC). A diagnosis of Anemia, unspecified type was also pertinent to this visit.    Subjective:     Patient seen and examined.  Extubated yesterday, now on 4 L NC.  Starting to eat apple sauce.  Denies feeling SOB at rest.  Family at bedside.     Objective:    Review of Systems:   10 point ROS completed and was negative, except for pertinent positive and negatives stated in subjective.    Vital signs:  Temp:  [98.6 °F (37 °C)-99.4 °F (37.4 °C)] 98.6 °F (37 °C)  Pulse:  [73-97] 73  Resp:  [14-25] 22  BP: ()/(40-57) 113/57  SpO2:  [94 %-97 %] 97 %  AO: ()/(42-64) 136/62  FiO2 (%):  [40 %] 40 %    Physical Exam:    Gen:  Aox3, NAD, Chronically ill appearing male.   HEENT:  EOMI, PERRLA, OP clear, MMM.  OGT in place.  Pulm:  Course to auscultation on vent.  CV: Heart with regular rate and rhythm, no murmur.  Normal PMI.    Abd:  Mod to severe abd distention, hypoactive bowel sounds, no TTP. Morbid obesity.    MSK: +lymphedema, +chronic venous stasis dermatitis.   Skin: +sacral decub  Neuro:  Grossly intact, no focal deficits  : Luis cath in place    Diagnostic Data:    Labs:  Recent Labs   Lab 25  0643 25  1200 25  0332 25  0442 01/10/25  0409 25  0452 25  0501   WBC 9.6   < > 8.4 10.0 9.2 9.2 8.9   HGB 10.9*   < > 10.9* 10.8* 11.6* 11.5* 11.3*   .4*   < > 106.7* 108.1* 108.8* 108.6* 109.7*   .0*   < > 106.0* 120.0* 124.0* 136.0* 128.0*   INR 1.57*  --   --   --   --   --   --     < > = values in this interval not displayed.       Recent Labs   Lab 25  0442 25  1449  01/10/25  0409 01/11/25  0452 01/12/25 0459   *   < > 152* 131* 100*   BUN 46*   < > 32* 31* 29*   CREATSERUM 1.45*   < > 1.13 1.07 1.09   CA 9.2   < > 9.7 9.7 9.8   ALB 3.2  --  3.4 3.4 3.6      < > 143 145 147*   K 3.6   < > 3.8 3.6  3.6 3.7  3.7      < > 105 106 105   CO2 27.0   < > 32.0 36.0* 38.0*   ALKPHO 76  --  81 82  --    AST 14  --  22 23  --    ALT 8*  --  8* 11  --    BILT 0.8  --  0.8 0.8  --    TP 5.7  --  6.5 6.3  --     < > = values in this interval not displayed.       Estimated Creatinine Clearance: 63.3 mL/min (based on SCr of 1.09 mg/dL).    Recent Labs   Lab 01/07/25  0643   PTP 18.9*   INR 1.57*            COVID-19 Lab Results    COVID-19  Lab Results   Component Value Date    COVID19 Not Detected 07/20/2022    COVID19 DETECTED (A) 01/26/2021       Pro-Calcitonin  No results for input(s): \"PCT\" in the last 168 hours.    Cardiac  No results for input(s): \"TROP\", \"PBNP\" in the last 168 hours.    Creatinine Kinase  No results for input(s): \"CK\" in the last 168 hours.    Inflammatory Markers  No results for input(s): \"CRP\", \"KENNEDY\", \"LDH\", \"DDIMER\" in the last 168 hours.    Imaging: Imaging data reviewed in Epic.    Medications:    terazosin  2 mg Oral Nightly    midodrine  10 mg Oral Q8H    furosemide  40 mg Intravenous TID    heparin  7,500 Units Subcutaneous Q8H BING    piperacillin-tazobactam  4.5 g Intravenous Q8H BING    senna  10 mL Per NG Tube BID    docusate  100 mg Per NG Tube BID    famotidine  20 mg Intravenous QAM    atorvastatin  20 mg Oral Nightly    cyanocobalamin  2,000 mcg Oral Daily    multivitamin  1 tablet Oral Daily    thiamine  100 mg Oral Daily    miconazole   Topical BID       Assessment & Plan:      74 yr old male with PMH sig for a-fib on eliquis, HTN, HLD, DM II, gout, pulm HTN, chronic sacral decub, and morbid obesity who presented to the ED for evaluation of bleeding from his sacral wound.       # Cardiac arrest   - concern for primary intra-abd  event   - ROSC obtained s/p epi x 1  - maintain normothermia     # Acute respiratory failure  - intubated in setting of cardiac arrest   - cont full vent support   - wean FiO2 as able  - weaning trials per critical care    # Shock  - suspect distributive/hypovolemic  - s/p IVFs  - s/p vasopressors   - cont empiric zosyn (1/8 - )  - f/u cultures   - resolving     # Abdominal distention   - CT a/p neg for SBO or perf  - OG placed to LIS  - cont empiric abx  - 1/10 KUB without obstruction or free air  - gen surg c/s appreciated     # Acute renal failure   - suspect prerenal in the setting of GI losses and ARB use  - improved  - renal US neg for hydro  - monitor renal function and UO, avoid nephrotoxins   - diuretics per cards  - renal c/s appreciated      # Bleeding sacral decub   # Acute blood loss anemia  - hold eliquis   - monitor CBC  - wound care c/s      # Persistent a-fib  - rate controlled  - ECHO with intact LVEF  - hold metoprolol given bradycardia    - hold eliquis for now given bleeding from sacral decub      # Essential HTN  - low on admit  - hold ARB, metoprolol     # HLD  - cont statin     # Pulm HTN  - monitor volume status on IVFs  - diuretics per cards     # Thrombocytopenia   - suspect due to EtOH use and possible liver disease   - monitor      # EtOH abuse   - monitor for withdrawal   - MVI, thiamine, folate   - pt counseled on EtOH cessation      # Morbid obesity  - Recommend follow up with PCP to discuss diet and lifestyle modifications to encourage weight loss.    Plan of care: inpt care in the ICU.      Plan of care discussed with pt's family at bedside.     Peter Flores DO    Supplementary Documentation:   DVT Mechanical Prophylaxis:   SCDs,    DVT Pharmacologic Prophylaxis   Medication    heparin (Porcine) 5000 UNIT/ML injection 7,500 Units                Code Status: Full Code  Luis: Luis catheter in place  Luis Duration (in days): 1  Central line:    ETHAN:

## 2025-01-13 LAB
ALBUMIN SERPL-MCNC: 3.4 G/DL (ref 3.2–4.8)
ALBUMIN/GLOB SERPL: 1.2 {RATIO} (ref 1–2)
ALP LIVER SERPL-CCNC: 84 U/L
ALT SERPL-CCNC: 18 U/L
ANION GAP SERPL CALC-SCNC: 6 MMOL/L (ref 0–18)
AST SERPL-CCNC: 38 U/L (ref ?–34)
BASOPHILS # BLD AUTO: 0.04 X10(3) UL (ref 0–0.2)
BASOPHILS NFR BLD AUTO: 0.4 %
BILIRUB SERPL-MCNC: 1.1 MG/DL (ref 0.2–1.1)
BUN BLD-MCNC: 22 MG/DL (ref 9–23)
CALCIUM BLD-MCNC: 9.6 MG/DL (ref 8.7–10.4)
CHLORIDE SERPL-SCNC: 103 MMOL/L (ref 98–112)
CO2 SERPL-SCNC: 35 MMOL/L (ref 21–32)
CREAT BLD-MCNC: 0.95 MG/DL
EGFRCR SERPLBLD CKD-EPI 2021: 85 ML/MIN/1.73M2 (ref 60–?)
EOSINOPHIL # BLD AUTO: 0.48 X10(3) UL (ref 0–0.7)
EOSINOPHIL NFR BLD AUTO: 4.3 %
ERYTHROCYTE [DISTWIDTH] IN BLOOD BY AUTOMATED COUNT: 14.3 %
EST. AVERAGE GLUCOSE BLD GHB EST-MCNC: 114 MG/DL (ref 68–126)
GLOBULIN PLAS-MCNC: 2.9 G/DL (ref 2–3.5)
GLUCOSE BLD-MCNC: 108 MG/DL (ref 70–99)
GLUCOSE BLD-MCNC: 108 MG/DL (ref 70–99)
GLUCOSE BLD-MCNC: 111 MG/DL (ref 70–99)
GLUCOSE BLD-MCNC: 112 MG/DL (ref 70–99)
GLUCOSE BLD-MCNC: 99 MG/DL (ref 70–99)
HBA1C MFR BLD: 5.6 % (ref ?–5.7)
HCT VFR BLD AUTO: 34.7 %
HGB BLD-MCNC: 11.7 G/DL
IMM GRANULOCYTES # BLD AUTO: 0.06 X10(3) UL (ref 0–1)
IMM GRANULOCYTES NFR BLD: 0.5 %
LYMPHOCYTES # BLD AUTO: 0.79 X10(3) UL (ref 1–4)
LYMPHOCYTES NFR BLD AUTO: 7.1 %
MAGNESIUM SERPL-MCNC: 2 MG/DL (ref 1.6–2.6)
MCH RBC QN AUTO: 36.9 PG (ref 26–34)
MCHC RBC AUTO-ENTMCNC: 33.7 G/DL (ref 31–37)
MCV RBC AUTO: 109.5 FL
MONOCYTES # BLD AUTO: 1.13 X10(3) UL (ref 0.1–1)
MONOCYTES NFR BLD AUTO: 10.2 %
NEUTROPHILS # BLD AUTO: 8.55 X10 (3) UL (ref 1.5–7.7)
NEUTROPHILS # BLD AUTO: 8.55 X10(3) UL (ref 1.5–7.7)
NEUTROPHILS NFR BLD AUTO: 77.5 %
OSMOLALITY SERPL CALC.SUM OF ELEC: 302 MOSM/KG (ref 275–295)
PHOSPHATE SERPL-MCNC: 2.2 MG/DL (ref 2.4–5.1)
PLATELET # BLD AUTO: 120 10(3)UL (ref 150–450)
POTASSIUM SERPL-SCNC: 3.3 MMOL/L (ref 3.5–5.1)
POTASSIUM SERPL-SCNC: 3.3 MMOL/L (ref 3.5–5.1)
PROT SERPL-MCNC: 6.3 G/DL (ref 5.7–8.2)
RBC # BLD AUTO: 3.17 X10(6)UL
SODIUM SERPL-SCNC: 144 MMOL/L (ref 136–145)
WBC # BLD AUTO: 11.1 X10(3) UL (ref 4–11)

## 2025-01-13 PROCEDURE — 99232 SBSQ HOSP IP/OBS MODERATE 35: CPT | Performed by: EMERGENCY MEDICINE

## 2025-01-13 RX ORDER — VANCOMYCIN HYDROCHLORIDE 125 MG/1
125 CAPSULE ORAL 4 TIMES DAILY
Status: DISCONTINUED | OUTPATIENT
Start: 2025-01-13 | End: 2025-01-17

## 2025-01-13 RX ORDER — POTASSIUM CHLORIDE 14.9 MG/ML
20 INJECTION INTRAVENOUS ONCE
Status: COMPLETED | OUTPATIENT
Start: 2025-01-13 | End: 2025-01-13

## 2025-01-13 RX ORDER — NICOTINE POLACRILEX 4 MG
15 LOZENGE BUCCAL
Status: DISCONTINUED | OUTPATIENT
Start: 2025-01-13 | End: 2025-01-17

## 2025-01-13 RX ORDER — DEXTROSE MONOHYDRATE 25 G/50ML
50 INJECTION, SOLUTION INTRAVENOUS
Status: DISCONTINUED | OUTPATIENT
Start: 2025-01-13 | End: 2025-01-17

## 2025-01-13 RX ORDER — NICOTINE POLACRILEX 4 MG
30 LOZENGE BUCCAL
Status: DISCONTINUED | OUTPATIENT
Start: 2025-01-13 | End: 2025-01-17

## 2025-01-13 RX ORDER — TAMSULOSIN HYDROCHLORIDE 0.4 MG/1
0.4 CAPSULE ORAL NIGHTLY
Status: DISCONTINUED | OUTPATIENT
Start: 2025-01-13 | End: 2025-01-17

## 2025-01-13 NOTE — PHYSICAL THERAPY NOTE
PHYSICAL THERAPY TREATMENT NOTE - INPATIENT    Room Number: 472/472-A     Session: 1     Number of Visits to Meet Established Goals: 7    Presenting Problem: Acute renal failure, cardiac arrest  Co-Morbidities : chronic sacral ulcer, morbid obesity,etoh abuse, gout, pulmonary HTN    PHYSICAL THERAPY ASSESSMENT   Patient demonstrates good  progress this session, goals  remain in progress.      Patient is requiring moderate assist and maximum assist as a result of the following impairments: decreased functional strength, decreased endurance/aerobic capacity, impaired standing balance, decreased muscular endurance, medical status, increased O2 needs from baseline, and ongoing diarrhea .     Patient continues to function below baseline with bed mobility, transfers, and gait.  Next session anticipate patient to progress bed mobility, transfers, and gait.  Physical Therapy will continue to follow patient for duration of hospitalization.    Patient continues to benefit from continued skilled PT services: to promote return to prior level of function and safety with continuous assistance and gradual rehabilitative therapy .    PLAN DURING HOSPITALIZATION  Nursing Mobility Recommendation : Lift Equipment  PT Device Recommendation: Rolling walker;Wheelchair  PT Treatment Plan: Bed mobility;Patient education;Family education;Range of motion;Transfer training;Balance training  Frequency (Obs): 3-5x/week     CURRENT GOALS     Goal #1 Patient is able to demonstrate supine - sit EOB @ level: moderate assistance      Goal #2 Patient is able to sit on eob for 5 min w/ min a of 1 for balance   Goal #3        Goal #4     Goal #5     Goal #6     Goal Comments: Goals established on 1/9/2025 1/13/2025 all goals ongoing    History related to current admission: Patient is a 74 year old male admitted on 1/6/2025 from home for bleeding sacral ulcer, diarrhea.  Pt diagnosed with acute renal failure.  Pt had cardiac arrest on 1/7 and was  transferred to ICU and intubated.     HOME SITUATION  Type of Home: House  Home Layout: Two level;Able to live on main level  Stairs to Enter : 1   Railing: Yes (Or refrigerator)    Stairs to Bedroom: 0         Lives With: Spouse    Drives: No           Prior Level of [place]: Lives w/ his spouse in a two level home, but he stays on main level.  Pt sleeps in recliner.  Only walks short distances w/I his home w/ a rolling walker.       SUBJECTIVE  \"I feel like standing up today was a victory.\"    OBJECTIVE  Precautions: Bed/chair alarm;Restraints    WEIGHT BEARING RESTRICTION     PAIN ASSESSMENT   Ratin  Location: Denies pain during session       BALANCE                                                                                                                       Static Sitting: Fair  Dynamic Sitting: Fair -           Static Standing: Poor  Dynamic Standing: Not tested    ACTIVITY TOLERANCE  Pulse: 99  Heart Rate Source: Monitor  Resp: 23  BP: 115/70 (When 1st sitting was 103/50)  BP Location: Right arm  BP Method: Automatic  Patient Position: Sitting    O2 WALK  Oxygen Therapy  SPO2% on Oxygen at Rest: 92  At rest oxygen flow (liters per minute): 2    AM-PAC '6-Clicks' INPATIENT SHORT FORM - BASIC MOBILITY  How much difficulty does the patient currently have...  Patient Difficulty: Turning over in bed (including adjusting bedclothes, sheets and blankets)?: A Lot   Patient Difficulty: Sitting down on and standing up from a chair with arms (e.g., wheelchair, bedside commode, etc.): A Lot   Patient Difficulty: Moving from lying on back to sitting on the side of the bed?: A Lot   How much help from another person does the patient currently need...   Help from Another: Moving to and from a bed to a chair (including a wheelchair)?: Total   Help from Another: Need to walk in hospital room?: Total   Help from Another: Climbing 3-5 steps with a railing?: Total     AM-PAC Score:  Raw Score: 9   Approx Degree  of Impairment: 81.38%   Standardized Score (AM-PAC Scale): 30.55   CMS Modifier (G-Code): CM    FUNCTIONAL ABILITY STATUS  Gait Assessment   Functional Mobility/Gait Assessment  Gait Assistance: Not tested  Distance (ft): 0    Skilled Therapy Provided  Pt w/ chronically weak R ue, able to  his walker at home, but had to switch to his L hand to feed himself.  This has been going on for ~ 10 years, but he feels ue is weaker now.    Bed Mobility:  Rolling: Left w/ hob elevated, mod a of 1.   Supine<>Sit: Mod a of 2 for both trunk and le's.  Pt was able to begin advancing le's off eob and using his L ue to assist w/ the transfer.  Pt denied dizziness upon sitting.  Bp taken and was 103/50 which was significantly lower than his previous readings.  Pt given several minutes to adjust to position change.    Sit<>Supine: Mod a of 2 for trunk and le's.       Transfer Mobility:  Sit<>Stand: Had pt place weak R hand on walker to initiate  right away, used L hand on stabilized walker as well to assist w/ standing.  Pt completed stand w/ mod a of 2.  Pt stood for about 15 seconds.   Once in standing, pt c/o of dizziness.  BP was 115/70 after stand but still in sitting.  Stand<>Sit: Min a of 2 to control descent into sitting.  Once returned to sitting, pt was able to use ue's (including R) to scoot himself back further and up eob 3x w/ CGA.   Gait: Could not complete gait at this time.  Pt feeling that he may have diarrhea shortly, declined second stand as well.    Therapist's Comments: Pt able to lift b le's in supine from knees 4x each when donning/doffing bunny boots and SCD's.      Patient End of Session: In bed;Needs met;Call light within reach;RN aware of session/findings;All patient questions and concerns addressed;Family present (Renata Beltrán aware of treatment session)    PT Session Time: 28 minutes  Gait Trainin minutes  Therapeutic Activity: 28 minutes  Therapeutic Exercise: 0 minutes   Neuromuscular  Re-education: 0 minutes

## 2025-01-13 NOTE — PROGRESS NOTES
[facility]  Cardiology Progress Note    Gerardo Torrez Patient Status:  Inpatient    6/15/1950 MRN [id number]   Location [facility] 4SW-A Attending Peter Flores, DO   Hosp Day # 7 PCP Sid Gordon MD       Subjective:  Looks super.  Daughter at the bedside.  HR, BP stable, 3 L NC.  Waiting for breakfast.    Objective:   Temp: 98.2 °F (36.8 °C)  Pulse: 87  Resp: 20  BP: 133/68  FiO2 (%): 40 %    Intake/Output:     Intake/Output Summary (Last 24 hours) at 2025 0935  Last data filed at 2025 0902  Gross per 24 hour   Intake 1076.9 ml   Output 4200 ml   Net -3123.1 ml       Last 3 Weights   25 0000 (!) 339 lb 11.2 oz (154.1 kg)   01/10/25 0600 (!) 341 lb 4.8 oz (154.8 kg)   25 0430 (!) 365 lb 14.4 oz (166 kg)   25 1602 (!) 340 lb 6.2 oz (154.4 kg)   25 1144 (!) 328 lb (148.8 kg)   23 1318 (!) 305 lb (138.3 kg)   23 0900 300 lb (136.1 kg)   23 1227 300 lb (136.1 kg)           Physical Exam:     General: AAO x 3, No apparent distress. No respiratory or constitutional distress.  HEENT: Normocephalic, anicteric sclera, neck supple.  Neck: No JVD, carotids 2+, no bruits.  Cardiac: irreg irreg. S1, S2 normal. No murmur, pericardial rub, S3.  Lungs: Clear without wheezes, rales, rhonchi or dullness.  Normal excursions and effort.  Abdomen: Soft, non-tender. BS-present.  Extremities: Without clubbing, cyanosis or edema.  Peripheral pulses are 2+.  Neurologic: no focal deficits  Skin: Warm and dry.     Laboratory/Data:    Labs:         Recent Labs   Lab 25  0643 25  1200 25  0442 01/10/25  0409 25  0452 25  0501 25  0518   WBC 9.6   < > 10.0 9.2 9.2 8.9 11.1*   HGB 10.9*   < > 10.8* 11.6* 11.5* 11.3* 11.7*   .4*   < > 108.1* 108.8* 108.6* 109.7* 109.5*   .0*   < > 120.0* 124.0* 136.0* 128.0* 120.0*   INR 1.57*  --   --   --   --   --   --     < > = values in this interval not displayed.       Recent Labs   Lab  01/09/25  0442 01/09/25  1444 01/09/25  1835 01/10/25  0409 01/11/25  0452 01/11/25  0646 01/12/25 0459 01/13/25  0518    140  --  143 145  --  147* 144   K 3.6 3.8 3.8 3.8 3.6  3.6  --  3.7  3.7 3.3*  3.3*    105  --  105 106  --  105 103   CO2 27.0 30.0  --  32.0 36.0*  --  38.0* 35.0*   BUN 46* 41*  --  32* 31*  --  29* 22   CREATSERUM 1.45* 1.18  --  1.13 1.07  --  1.09 0.95   CA 9.2 9.5  --  9.7 9.7  --  9.8 9.6   MG 2.1 1.9  --  1.5* 1.4*  --  1.7 2.0   PHOS 2.7  --   --  2.5  --  2.4 3.2  3.2 2.2*   * 142*  --  152* 131*  --  100* 108*       Recent Labs   Lab 01/08/25  0332 01/09/25  0442 01/10/25  0409 01/11/25  0452 01/12/25 0459 01/13/25  0518   ALT 9* 8* 8* 11  --  18   AST 13 14 22 23  --  38*   ALB 3.1*  3.1* 3.2 3.4 3.4 3.6 3.4       No results for input(s): \"TROP\" in the last 168 hours.    Allergies:   Allergies[1]    Medications:  Current Facility-Administered Medications   Medication Dose Route Frequency    piperacillin-tazobactam (Zosyn) 3.375 g in dextrose 5% 100 mL IVPB-ADDV  3.375 g Intravenous Q8H BING    vancomycin (Vancocin) cap 125 mg  125 mg Oral QID    tamsulosin (Flomax) cap 0.4 mg  0.4 mg Oral Nightly    midodrine (ProAmatine) tab 10 mg  10 mg Oral TID    furosemide (Lasix) 10 mg/mL injection 40 mg  40 mg Intravenous BID (Diuretic)    heparin (Porcine) 5000 UNIT/ML injection 7,500 Units  7,500 Units Subcutaneous Q8H BING    dextrose 10% infusion (TPN no rate)   Intravenous Continuous PRN    polyethylene glycol (PEG 3350) (Miralax) 17 g oral packet 17 g  17 g Per NG Tube Daily PRN    bisacodyl (Dulcolax) 10 MG rectal suppository 10 mg  10 mg Rectal Daily PRN    atorvastatin (Lipitor) tab 20 mg  20 mg Oral Nightly    cyanocobalamin (Vitamin B12) tab 2,000 mcg  2,000 mcg Oral Daily    acetaminophen (Tylenol) tab 650 mg  650 mg Oral Q6H PRN    ondansetron (Zofran) 4 MG/2ML injection 4 mg  4 mg Intravenous Q6H PRN    multivitamin (Tab-A-Kieran/Beta Carotene) tab 1 tablet   1 tablet Oral Daily    thiamine (Vitamin B1) tab 100 mg  100 mg Oral Daily    miconazole 2 % powder   Topical BID         Assessment:  Cardiopulmonary Arrest - etiology not entirely clear.  One dose of epi, brief CPR.  2.     Acute hypoxic Resp failure, extubated on 1/11/25.  3.    Chronic A Fib with slow VR, has been stable.  4.    Morbid Obesity  5.    EMILIO on CKD - resolved.  Initially fluids, now diuresing well.  6.    Sacral decubitus  7.    ETOH abuse     Plan:   Continue IV diuresis as tolerated.  Continue midodrine.  Tolerate low BP's if Asx.  Mobilize, Ptx.      Alisa  L3       [1]   Allergies  Allergen Reactions    Metformin DIARRHEA

## 2025-01-13 NOTE — PROGRESS NOTES
Patient was stable overnight with no acute overnight events reported to me.  This morning he is awake alert oriented.  His abdomen is obese but soft nontender nondistended.  He has good pulses in bilateral upper and lower extremities.  He is able to get out of bed for a second and stand with physical therapy but was quite weak and needed to be sat back down.    On exam he is on 2 L nasal cannula he did wear CPAP overnight.  Labs showed a sodium of 144 glucose 108 bicarb of 35 and a chloride of 103.  His AST was 38 ALT was 18.  His white blood cell count was 11 hemoglobin was 12 and platelet count was 120,000 which is relatively stable overall.    In summary this is a 74-year-old male who presents to us after cardiac arrest and hypercapnic respiratory failure in addition to profound fluid overload and renal failure    Problems  Renal failure  Profound fluid overload  Hypercapnic respiratory failure  Cardiac arrest  A-fib  Abdominal distention    Plan  Neuro  Patient is neurologically intact    Respiratory  Chronic hypercapnic respiratory failure  [facility]m is on consult  Has a history of OHS  Will go ahead and start the patient on BiPAP at night  When he goes home he will need to follow-up with pulm probably need to wear CPAP at night as well.    Cardiovascular  Heart failure reduced ejection fraction  We have not started his home GDMT yet  He previously had been on Jardiance, Iber Yayo, allopurinol, atorvastatin, aspirin, torsemide, metoprolol, spironolactone, apixaban    A-fib had previously been on apixaban for anticoagulation  Currently has been off anticoagulation, will resume now.    Hypercholesterolemia remains on Lipitor    Fluid overload remains on Lasix 40 IV twice daily made a total of 4 L of urine output yesterday has made about a liter today so far.    CKD  Renal function improved  He is at his current baseline    Diabetes  Patient remains on insulin sliding scale his blood sugars have been reasonably  well-controlled he is recently reinstituted more of a diet.    Endocrine  As above    Infectious disease  Patient has C. difficile colitis  Is on vancomycin 125 4 times daily  He has no complicating symptoms such as profound leukocytosis renal failure or shock at this time.  Those have resolved and were likely unrelated he is having persistent diarrhea however.  Abdomen remains soft on exam    He will continue for 14 days on p.o. vancomycin    Disposition okay to transfer to the floor    Date of service was January 13, 2024

## 2025-01-13 NOTE — PROGRESS NOTES
01/13/25 0521   BiPAP   $ RT Standby Charge (per 15 min) 1   Device V60   BiPAP / CPAP CE# v60-12   BiPAP bacteria filter Yes   BiPAP Pre-use check ok? Yes   BIPAP plugged into main power? Yes   Mode Spontaneous/Timed   Interface Full face mask   Mask Size Large   Control Settings   Set Rate 12 breaths/min   Set IPAP 15   Set EPAP 5   Oxygen Percent 40 %   Inspiratory time 0.9   Insp rise time 3   BiPAP/CPAP Alarm Settings   Hi Rate 40   Low Rate 10   Hi VT 2000   Low    Hi Pressure 40   Low Pressure 8   Low Pressure Delay 20   Low MV 2   BiPAP/CPAP Monitored Parameters   Pulse 83   SpO2 97 %   PIP 15   Total Rate 20 breaths/min   Minute Volume 11   Tidal Volume 544   Total Leak 16   Trigger % 98   Ti/Ttot % 39   Toleration Well     Received pt on BiPAP for the night RR12 15/5 40%. Pt on 4L NC during the day. Will continue to monitor.

## 2025-01-13 NOTE — PROGRESS NOTES
[facility]   part of [facility]    Nephrology Progress Note    Gerardo Torrez Attending:  Peter Flores DO     Cc: EMILIO    SUBJECTIVE     Urine output 4.6L over past 24 hours + 3L since 7am  Net neg 13.6 since admit, -3.4 / 24hrs    PHYSICAL EXAM   Vital signs: /54   Pulse 73   Temp 99.4 °F (37.4 °C) (Temporal)   Resp 20   Ht 5' 10\" (1.778 m)   Wt (!) 339 lb 11.2 oz (154.1 kg)   SpO2 95%   BMI 48.74 kg/m²   Temp (24hrs), Av.2 °F (37.3 °C), Min:98.6 °F (37 °C), Max:99.6 °F (37.6 °C)       Intake/Output Summary (Last 24 hours) at 2025 1849  Last data filed at 2025 1800  Gross per 24 hour   Intake 1020.2 ml   Output 5300 ml   Net -4279.8 ml     Wt Readings from Last 3 Encounters:   25 (!) 339 lb 11.2 oz (154.1 kg)   23 (!) 305 lb (138.3 kg)   23 300 lb (136.1 kg)     General: intubated on MV  HEENT: NCAT  Cardiac: RRR  Lungs: dimininished  Abdomen: +distended   Extremities: + edema  Neurologic: sedated     MEDS     Current Facility-Administered Medications   Medication Dose Route Frequency    [START ON 2025] midodrine (ProAmatine) tab 10 mg  10 mg Oral TID    furosemide (Lasix) 10 mg/mL injection 40 mg  40 mg Intravenous BID (Diuretic)    terazosin (Hytrin) cap 2 mg  2 mg Oral Nightly    heparin (Porcine) 5000 UNIT/ML injection 7,500 Units  7,500 Units Subcutaneous Q8H BING    piperacillin-tazobactam (Zosyn) 4.5 g in dextrose 5% 100 mL IVPB-ADDV  4.5 g Intravenous Q8H BING    dextrose 10% infusion (TPN no rate)   Intravenous Continuous PRN    pancrelipase (Lip-Prot-Amyl) (Zenpep) DR particles cap 10,000 Units  10,000 Units Per G Tube PRN    And    sodium bicarbonate tab 325 mg  325 mg Per G Tube PRN    acetaminophen (Tylenol) 160 MG/5ML oral liquid 650 mg  650 mg Per NG Tube Q4H PRN    Or    acetaminophen (Tylenol) rectal suppository 650 mg  650 mg Rectal Q4H PRN    Or    acetaminophen (Ofirmev) 10 mg/mL infusion premix 1,000 mg  1,000 mg Intravenous Q6H PRN     senna (Senokot) 8.8 MG/5ML oral syrup 17.6 mg  10 mL Per NG Tube BID    docusate (Colace) 50 MG/5ML oral solution 100 mg  100 mg Per NG Tube BID    polyethylene glycol (PEG 3350) (Miralax) 17 g oral packet 17 g  17 g Per NG Tube Daily PRN    bisacodyl (Dulcolax) 10 MG rectal suppository 10 mg  10 mg Rectal Daily PRN    atorvastatin (Lipitor) tab 20 mg  20 mg Oral Nightly    cyanocobalamin (Vitamin B12) tab 2,000 mcg  2,000 mcg Oral Daily    acetaminophen (Tylenol) tab 650 mg  650 mg Oral Q6H PRN    ondansetron (Zofran) 4 MG/2ML injection 4 mg  4 mg Intravenous Q6H PRN    multivitamin (Tab-A-Kieran/Beta Carotene) tab 1 tablet  1 tablet Oral Daily    thiamine (Vitamin B1) tab 100 mg  100 mg Oral Daily    miconazole 2 % powder   Topical BID       LABS     Lab Results   Component Value Date    WBC 8.9 01/12/2025    HGB 11.3 01/12/2025    HCT 33.9 01/12/2025    .0 01/12/2025    CREATSERUM 1.09 01/12/2025    BUN 29 01/12/2025     01/12/2025    K 3.7 01/12/2025    K 3.7 01/12/2025     01/12/2025    CO2 38.0 01/12/2025     01/12/2025    CA 9.8 01/12/2025    ALB 3.6 01/12/2025    MG 1.7 01/12/2025    PHOS 3.2 01/12/2025    PHOS 3.2 01/12/2025    PGLU 102 01/12/2025       IMAGING   All imaging studies personally reviewed.    XR ABDOMEN (1 VIEW) (CPT=74018)   Final Result   PROCEDURE:  XR ABDOMEN (1 VIEW) (CPT=74018)       INDICATIONS:  abdominal distention       COMPARISON:  [facility] , XR, XR ABDOMEN (1 VIEW) (CPT=74018), 1/07/2025, 5:39    AM.       TECHNIQUE:  Supine AP view was obtained.       PATIENT STATED HISTORY: (As transcribed by Technologist)  Patient offered    no additional history at this time.             FINDINGS:     BOWEL GAS PATTERN:  Normal.  No abnormal dilation or deviation.     CALCIFICATIONS:  None significant.   OTHER:  Negative.  No abnormal gaseous collections.                           =====   CONCLUSION:  No evidence of bowel obstruction or free air.  NG tube within    the  stomach.           LOCATION:  [facility]           Dictated by (CST): Hemanth Silvestre MD on 1/10/2025 at 1:03 PM        Finalized by (CST): Hemanth Silvestre MD on 1/10/2025 at 1:03 PM         XR CHEST AP PORTABLE  (CPT=71045)   Final Result   PROCEDURE:  XR CHEST AP PORTABLE  (CPT=71045)       TECHNIQUE:  AP chest radiograph was obtained.       COMPARISON:  [facility] , XR, XR CHEST AP PORTABLE  (CPT=71045), 1/07/2025,    7:41 AM.  [facility] , XR, XR CHEST AP PORTABLE  (CPT=71045), 1/07/2025, 6:37    AM.       INDICATIONS:  Increased O2 needs       PATIENT STATED HISTORY: (As transcribed by Technologist)  Patient offered    no additional history at this time.            FINDINGS:  Right-sided triple-lumen catheter.  Enteric tube extending    beyond the diaphragm the tip not included on this examination.     Endotracheal tube with tip approximately 3 cm above the level of the    gianna.  Cardiomegaly.  Interstitial opacities    bilaterally.  Retrocardiac opacity.  Small left-sided pleural effusion.     No pneumothorax.                         =====   CONCLUSION:     1. Cardiomegaly with interstitial opacities likely representing edema.   2. Retrocardiac opacity suggestive of pneumonia.   3. Small left-sided pleural effusion.             LOCATION:  [facility]                       Dictated by (CST): Elaine Felix MD on 1/09/2025 at 8:06 AM        Finalized by (CST): Elaine Felix MD on 1/09/2025 at 8:09 AM         CT ABDOMEN+PELVIS(CPT=74176)   Final Result   PROCEDURE:  CT ABDOMEN+PELVIS (CPT=74176)       COMPARISON:  [facility] , CT, CT ABD(C/S)/PELVIS(C) (SET), 3/14/2006, 7:09 PM.       INDICATIONS:  abdominal distention       TECHNIQUE:  Unenhanced multislice CT scanning was performed from the dome    of the diaphragm to the pubic symphysis.  Dose reduction techniques were    used. Dose information is transmitted to the ACR (American College of    Radiology) NRDR (National Radiology    Data Registry) which includes the Dose Index  Registry.       PATIENT STATED HISTORY: (As transcribed by Technologist)  Patient is here    for evaluation of abdominal distention.             FINDINGS:     LIVER:  No enlargement, atrophy, abnormal density, or significant focal    lesion.     BILIARY:  There is gallbladder wall thickening and mild stranding around    the gallbladder.  There are no calcified stones detected.   PANCREAS:  No lesion, fluid collection, ductal dilatation, or atrophy.     SPLEEN:  No enlargement or focal lesion.     KIDNEYS:  Kidneys are unremarkable.   ADRENALS:  No mass or enlargement.     AORTA/VASCULAR:    Aorta is diffusely atherosclerotic but not aneurysmal.   RETROPERITONEUM:  There is an enlarged portal caval space lymph node    series 3, image 47 which measures 3.6 x 1.7 cm.    BOWEL/MESENTERY:  There is a nasogastric tube with tip in gastric body.     There is enlarged lymph node noted in region of gastrohepatic ligament    series 3, image 44 which measures 2.4 x 2 cm.    ABDOMINAL WALL:  Diffuse body wall edema is noted.   URINARY BLADDER:  No visible focal wall thickening, lesion, or calculus.     PELVIC NODES:  No adenopathy.     PELVIC ORGANS:  No visible mass.  Pelvic organs appropriate for patient    age.     BONES:  There is diffuse osteopenia noted.  There is diffuse degenerative    disc disease in the spine.   LUNG BASES:  There are moderate bilateral pleural effusions and there is    dependent atelectasis in the lower lobes.   OTHER:  Negative.                           =====   CONCLUSION:     1. There is diffuse body wall edema.  There are moderate bilateral pleural    effusions and there is dependent atelectasis likely related to diffuse    edematous state.   2. Gallbladder wall thickening and mild stranding around gallbladder could    also be related to diffusely edematous state although correlation with any    clinical evidence of cholecystitis is necessary.   3. There is retroperitoneal and gastrohepatic  ligament lymphadenopathy    which is of uncertain significance.   4. Nasogastric tube tip is in region of gastric body.             LOCATION:  [facility]           Dictated by (CST): Ahmet Griffith MD on 1/07/2025 at 10:16 AM        Finalized by (CST): Ahmet Griffith MD on 1/07/2025 at 10:22 AM         XR CHEST AP PORTABLE  (CPT=71045)   Final Result   PROCEDURE:  XR CHEST AP PORTABLE  (CPT=71045)       TECHNIQUE:  AP chest radiograph was obtained.       COMPARISON:  [facility] , XR, XR CHEST AP PORTABLE  (CPT=71045), 1/07/2025,    6:37 AM.       INDICATIONS:  Verify correct tube placement       PATIENT STATED HISTORY: (As transcribed by Technologist)  Patient offered    no additional history at this time.                                         =====   CONCLUSION:         Stable cardiac and mediastinal contours.  Findings of congestive heart    failure with moderate pulmonary edema.  A small left pleural effusion is    suspected, similar to prior.  No pneumothorax.       Endotracheal tube tip terminates approximately 5 cm from the gianna.     Enteric catheter extends into the upper abdomen beyond the field of view.     Interval placement of right IJ central venous catheter terminating in the    mid/lower SVC.           LOCATION:  [id number]                       Dictated by (CST): Yudy Colon MD on 1/07/2025 at 8:22 AM        Finalized by (CST): Yudy Colon MD on 1/07/2025 at 8:23 AM         XR CHEST AP PORTABLE  (CPT=71045)   Final Result   PROCEDURE:  XR CHEST AP PORTABLE  (CPT=71045)       TECHNIQUE:  AP chest radiograph was obtained.       COMPARISON:  [address], XR, XR CHEST PA + LAT CHEST (CPT=71046),    11/01/2023, 12:38 PM.       INDICATIONS:  s/p intubation       PATIENT STATED HISTORY: (As transcribed by Technologist)  Patient offered    no additional history at this time.             FINDINGS:                           =====   CONCLUSION:  Stable cardiac and mediastinal contours.  Findings of    pulmonary venous  hypertension with mild/moderate pulmonary edema.  A    small/mild left pleural effusion is suspected.  No appreciable    pneumothorax.       Endotracheal tube tip terminates approximately 4 cm from the gianna.     Enteric catheter extends into the upper abdomen beyond the field of view.           LOCATION:  [id number]                       Dictated by (CST): Yudy Colon MD on 1/07/2025 at 6:50 AM        Finalized by (CST): Yudy Colon MD on 1/07/2025 at 6:51 AM         XR ABDOMEN (1 VIEW) (CPT=74018)   Final Result   PROCEDURE:  XR ABDOMEN (1 VIEW) (CPT=74018)       INDICATIONS:  abdomen distended       COMPARISON:  None.       TECHNIQUE:  Supine AP view was obtained.       PATIENT STATED HISTORY: (As transcribed by Technologist)  Patient offered    no additional history at this time.                                   =====   CONCLUSION:         Under penetration related to body habitus degrades assessment of the bowel    gas pattern.  Within this limitation there appears to be moderate gaseous    distention of the stomach and small bowel with relative paucity of colonic    gas.  Findings may reflect    ileus or bowel obstruction.           LOCATION:  [id number]           Dictated by (CST): Yudy Colon MD on 1/07/2025 at 6:48 AM        Finalized by (CST): Yudy Colon MD on 1/07/2025 at 6:49 AM         US KIDNEY/BLADDER (CPT=76770)   Final Result   PROCEDURE:  US KIDNEY/BLADDER (CPT=76770)       COMPARISON:  None.       INDICATIONS:  Bleeding ulcers       TECHNIQUE:  Transabdominal gray scale ultrasound imaging of the bilateral    kidneys and bladder was performed.  Routine technique was utilized.         PATIENT STATED HISTORY: (As transcribed by Technologist)              FINDINGS:        RIGHT KIDNEY   MEASUREMENTS:  13.5 x 7.3 x 8.4 cm   ECHOGENICITY:  Normal.   HYDRONEPHROSIS:  None.   CYSTS/STONES/MASSES:  None.       LEFT KIDNEY    MEASUREMENTS:  12.7 x 7.6 x 7.9 cm   ECHOGENICITY:  Normal.   HYDRONEPHROSIS:  None.    CYSTS/STONES/MASSES:  None.       BLADDER:  Empty, not evaluated    OTHER:  Negative.                         =====   CONCLUSION:  Normal appearance of the kidneys.           LOCATION:  [id number]                   Dictated by (CST): Aftab Georges MD on 1/06/2025 at 1:26 PM        Finalized by (CST): Aftab Georges MD on 1/06/2025 at 1:29 PM               ASSESSMENT & PLAN      74 year old male w chronic Afib, DM, lymphedema, morbid obesity essentially wheelchair bound w sacral decubitus ulcer, asc ao aneurysm who called 911 today as was bleeding profusely from decubitus ulcer. Nephrology consulted for EMILIO. Hospital course c/b cardiac arrest, acute resp failure requiring intubation and shock.      EMILIO  -- likely prerenal due decreased intravascular volume + poor perfusion w hypotension   -- BUN 84, Cr 5.2mg/dL on admit. Baseline Cr appears 0.9-1.14mg/dL mostly since 2022  -- renal US w no hydro.   -- hold torsemide, jardiance, irbesartan, spironolactone  -- Diuresis per ICU, robust UOP, kidney function improving  -- Luis placed, strict UOP   -- avoid NSAIDs, IV contrast, other nephrotoxins. Renally dose meds    Hypernatremia  -in the setting of diuresis  -push po hydration for today  -repeat bmp in AM     Anemia /acute blood loss anemia  -- transfusion prn.    Shock   -- per ICU         D/w RN and pt family     Thank you for allowing me to participate in the care of this patient. Please do not hesitate to call with questions or concerns.     Bea Prabhakar MD  [facility]  Nephrology

## 2025-01-13 NOTE — PROGRESS NOTES
[facility]   part of [facility] Hospitalist Progress Note     Gerardo Torrez Patient Status:  Inpatient    6/15/1950 MRN [id number]   Location [facility] 4SW-A Attending Peter Flores, DO   Hosp Day # 7 PCP Sid Gordon MD     Follow Up:  The primary encounter diagnosis was Acute renal failure, unspecified acute renal failure type (HCC). A diagnosis of Anemia, unspecified type was also pertinent to this visit.    Subjective:     Patient seen and examined.   Now on 2 L NC.  States he is feeling better.  Denies SOB.      Objective:    Review of Systems:   10 point ROS completed and was negative, except for pertinent positive and negatives stated in subjective.    Vital signs:  Temp:  [98.1 °F (36.7 °C)-99.6 °F (37.6 °C)] 98.2 °F (36.8 °C)  Pulse:  [63-91] 87  Resp:  [13-24] 20  BP: (104-139)/(49-68) 133/68  SpO2:  [90 %-97 %] 93 %  FiO2 (%):  [40 %] 40 %    Physical Exam:    Gen:  Aox3, NAD, Chronically ill appearing male.   HEENT:  EOMI, PERRLA, OP clear, MMM.  OGT in place.  Pulm:  Course to auscultation on vent.  CV: Heart with regular rate and rhythm, no murmur.  Normal PMI.    Abd:  Mod to severe abd distention, hypoactive bowel sounds, no TTP. Morbid obesity.    MSK: +lymphedema, +chronic venous stasis dermatitis.   Skin: +sacral decub  Neuro:  Grossly intact, no focal deficits  : Luis cath in place    Diagnostic Data:    Labs:  Recent Labs   Lab 25  0643 25  1200 25  0442 01/10/25  0409 25  0452 25  0501 25  0518   WBC 9.6   < > 10.0 9.2 9.2 8.9 11.1*   HGB 10.9*   < > 10.8* 11.6* 11.5* 11.3* 11.7*   .4*   < > 108.1* 108.8* 108.6* 109.7* 109.5*   .0*   < > 120.0* 124.0* 136.0* 128.0* 120.0*   INR 1.57*  --   --   --   --   --   --     < > = values in this interval not displayed.       Recent Labs   Lab 01/10/25  0409 25  0452 25  0459 25  0518   * 131* 100* 108*   BUN 32* 31* 29* 22    CREATSERUM 1.13 1.07 1.09 0.95   CA 9.7 9.7 9.8 9.6   ALB 3.4 3.4 3.6 3.4    145 147* 144   K 3.8 3.6  3.6 3.7  3.7 3.3*  3.3*    106 105 103   CO2 32.0 36.0* 38.0* 35.0*   ALKPHO 81 82  --  84   AST 22 23  --  38*   ALT 8* 11  --  18   BILT 0.8 0.8  --  1.1   TP 6.5 6.3  --  6.3       Estimated Creatinine Clearance: 72.6 mL/min (based on SCr of 0.95 mg/dL).    Recent Labs   Lab 01/07/25  0643   PTP 18.9*   INR 1.57*            COVID-19 Lab Results    COVID-19  Lab Results   Component Value Date    COVID19 Not Detected 07/20/2022    COVID19 DETECTED (A) 01/26/2021       Pro-Calcitonin  No results for input(s): \"PCT\" in the last 168 hours.    Cardiac  No results for input(s): \"TROP\", \"PBNP\" in the last 168 hours.    Creatinine Kinase  No results for input(s): \"CK\" in the last 168 hours.    Inflammatory Markers  No results for input(s): \"CRP\", \"KENNEDY\", \"LDH\", \"DDIMER\" in the last 168 hours.    Imaging: Imaging data reviewed in Epic.    Medications:    piperacillin-tazobactam  3.375 g Intravenous Q8H BING    vancomycin  125 mg Oral QID    tamsulosin  0.4 mg Oral Nightly    potassium phosphate dibasic 15 mmol in sodium chloride 0.9% 250 mL IVPB  15 mmol Intravenous Once    Followed by    potassium chloride  20 mEq Intravenous Once    midodrine  10 mg Oral TID    furosemide  40 mg Intravenous BID (Diuretic)    heparin  7,500 Units Subcutaneous Q8H BING    atorvastatin  20 mg Oral Nightly    cyanocobalamin  2,000 mcg Oral Daily    multivitamin  1 tablet Oral Daily    thiamine  100 mg Oral Daily    miconazole   Topical BID       Assessment & Plan:      74 yr old male with PMH sig for a-fib on eliquis, HTN, HLD, DM II, gout, pulm HTN, chronic sacral decub, and morbid obesity who presented to the ED for evaluation of bleeding from his sacral wound.       # Cardiac arrest   - concern for primary intra-abd event   - ROSC obtained s/p epi x 1  - maintain normothermia     # Acute respiratory failure  - intubated in  setting of cardiac arrest   - extubated 1/11  - cont IV diuresis   - wean O2 as tolerated     # Shock  - suspect distributive/hypovolemic  - s/p IVFs  - s/p vasopressors   - cont empiric zosyn (1/8 - )  - f/u cultures   - resolving     # Abdominal distention   - CT a/p neg for SBO or perf  - OG placed to LIS  - cont empiric abx  - 1/10 KUB without obstruction or free air  - gen surg c/s appreciated     # Acute renal failure   - suspect prerenal in the setting of GI losses and ARB use  - improved  - renal US neg for hydro  - monitor renal function and UO, avoid nephrotoxins   - diuretics per cards  - renal c/s appreciated      # Bleeding sacral decub   # Acute blood loss anemia  - hold eliquis   - monitor CBC  - wound care c/s      # Persistent a-fib  - rate controlled  - ECHO with intact LVEF  - hold metoprolol given bradycardia    - hold eliquis for now given bleeding from sacral decub      # Essential HTN  - low on admit  - hold ARB, metoprolol     # HLD  - cont statin     # Pulm HTN  - monitor volume status on IVFs  - diuretics per cards     # Thrombocytopenia   - suspect due to EtOH use and possible liver disease   - monitor      # EtOH abuse   - monitor for withdrawal   - MVI, thiamine, folate   - pt counseled on EtOH cessation      # Morbid obesity  - Recommend follow up with PCP to discuss diet and lifestyle modifications to encourage weight loss.    Plan of care: inpt care in the ICU.      Plan of care discussed with pt's family at bedside.     Peter Flores, DO    Supplementary Documentation:   DVT Mechanical Prophylaxis:   SCDs,    DVT Pharmacologic Prophylaxis   Medication    heparin (Porcine) 5000 UNIT/ML injection 7,500 Units                Code Status: Full Code  Luis: External urinary catheter in place  Luis Duration (in days): 1  Central line:    ETHAN:

## 2025-01-13 NOTE — CM/SW NOTE
No accepting SARs at this time. Reopened referral and extended deadline. Will need to follow up with pt and family.     10:41:  Met with pt and spouse at bedside to discuss SALAS process, still waiting on available facilities. Spouse stating they are agreeable to SALAS but do not want the [facility]. Spouse stating she is home FT and will be available to help as needed. Pt has slept on 1st floor in a recliner for past year. Pt was able to get around using a walker.     Stella Moser, SOCORRO RN, CM  [phone]

## 2025-01-13 NOTE — PLAN OF CARE
Assumed care of pt after RN report. Alert and oriented. Received on 4L, able to wean to 2L. Afib. BP stable. Multiple loose BMs.. Voiding, external cath in place. Denies pain. Stood at bedside with PT/OT. See flowsheets for full assessments.

## 2025-01-13 NOTE — PROGRESS NOTES
[facility]   part of [facility]    Nephrology Progress Note    Gerardo Torrez Attending:  Peter Flores DO     Cc: EMILIO    SUBJECTIVE     Feeling better.     PHYSICAL EXAM   Vital signs: /50 (BP Location: Right arm)   Pulse 87   Temp 98 °F (36.7 °C) (Temporal)   Resp (!) 28   Ht 5' 10\" (1.778 m)   Wt (!) 339 lb 11.2 oz (154.1 kg)   SpO2 97%   BMI 48.74 kg/m²   Temp (24hrs), Av.4 °F (36.9 °C), Min:98 °F (36.7 °C), Max:99.4 °F (37.4 °C)       Intake/Output Summary (Last 24 hours) at 2025 1423  Last data filed at 2025 1354  Gross per 24 hour   Intake 1086.9 ml   Output 3350 ml   Net -2263.1 ml     Wt Readings from Last 3 Encounters:   23 (!) 305 lb (138.3 kg)   23 300 lb (136.1 kg)   22 (!) 320 lb (145.2 kg)     General: no distress  HEENT: NCAT  Cardiac: RRR  Lungs: + oxygen  Abdomen: +distended   Extremities: + edema  Neurologic: awake     MEDS     Current Facility-Administered Medications   Medication Dose Route Frequency    piperacillin-tazobactam (Zosyn) 3.375 g in dextrose 5% 100 mL IVPB-ADDV  3.375 g Intravenous Q8H BING    vancomycin (Vancocin) cap 125 mg  125 mg Oral QID    tamsulosin (Flomax) cap 0.4 mg  0.4 mg Oral Nightly    potassium phosphate dibasic 15 mmol in sodium chloride 0.9% 250 mL IVPB  15 mmol Intravenous Once    Followed by    potassium chloride 20 mEq/100mL IVPB premix 20 mEq  20 mEq Intravenous Once    midodrine (ProAmatine) tab 10 mg  10 mg Oral TID    furosemide (Lasix) 10 mg/mL injection 40 mg  40 mg Intravenous BID (Diuretic)    heparin (Porcine) 5000 UNIT/ML injection 7,500 Units  7,500 Units Subcutaneous Q8H BING    dextrose 10% infusion (TPN no rate)   Intravenous Continuous PRN    polyethylene glycol (PEG 3350) (Miralax) 17 g oral packet 17 g  17 g Per NG Tube Daily PRN    bisacodyl (Dulcolax) 10 MG rectal suppository 10 mg  10 mg Rectal Daily PRN    atorvastatin (Lipitor) tab 20 mg  20 mg Oral Nightly    cyanocobalamin (Vitamin  B12) tab 2,000 mcg  2,000 mcg Oral Daily    acetaminophen (Tylenol) tab 650 mg  650 mg Oral Q6H PRN    ondansetron (Zofran) 4 MG/2ML injection 4 mg  4 mg Intravenous Q6H PRN    multivitamin (Tab-A-Kieran/Beta Carotene) tab 1 tablet  1 tablet Oral Daily    thiamine (Vitamin B1) tab 100 mg  100 mg Oral Daily    miconazole 2 % powder   Topical BID       LABS     Lab Results   Component Value Date    WBC 11.1 01/13/2025    HGB 11.7 01/13/2025    HCT 34.7 01/13/2025    .0 01/13/2025    CREATSERUM 0.95 01/13/2025    BUN 22 01/13/2025     01/13/2025    K 3.3 01/13/2025    K 3.3 01/13/2025     01/13/2025    CO2 35.0 01/13/2025     01/13/2025    CA 9.6 01/13/2025    ALB 3.4 01/13/2025    ALKPHO 84 01/13/2025    BILT 1.1 01/13/2025    TP 6.3 01/13/2025    AST 38 01/13/2025    ALT 18 01/13/2025    MG 2.0 01/13/2025    PHOS 2.2 01/13/2025    PGLU 111 01/13/2025       IMAGING   All imaging studies personally reviewed.    XR ABDOMEN (1 VIEW) (CPT=74018)   Final Result   PROCEDURE:  XR ABDOMEN (1 VIEW) (CPT=74018)       INDICATIONS:  abdominal distention       COMPARISON:  [facility] , XR, XR ABDOMEN (1 VIEW) (CPT=74018), 1/07/2025, 5:39    AM.       TECHNIQUE:  Supine AP view was obtained.       PATIENT STATED HISTORY: (As transcribed by Technologist)  Patient offered    no additional history at this time.             FINDINGS:     BOWEL GAS PATTERN:  Normal.  No abnormal dilation or deviation.     CALCIFICATIONS:  None significant.   OTHER:  Negative.  No abnormal gaseous collections.                           =====   CONCLUSION:  No evidence of bowel obstruction or free air.  NG tube within    the stomach.           LOCATION:  [facility]           Dictated by (CST): Hemanth Silvestre MD on 1/10/2025 at 1:03 PM        Finalized by (CST): Hemanth Silvestre MD on 1/10/2025 at 1:03 PM         XR CHEST AP PORTABLE  (CPT=71045)   Final Result   PROCEDURE:  XR CHEST AP PORTABLE  (CPT=71045)       TECHNIQUE:  AP chest  radiograph was obtained.       COMPARISON:  [facility] , XR, XR CHEST AP PORTABLE  (CPT=71045), 1/07/2025,    7:41 AM.  [facility] , XR, XR CHEST AP PORTABLE  (CPT=71045), 1/07/2025, 6:37    AM.       INDICATIONS:  Increased O2 needs       PATIENT STATED HISTORY: (As transcribed by Technologist)  Patient offered    no additional history at this time.            FINDINGS:  Right-sided triple-lumen catheter.  Enteric tube extending    beyond the diaphragm the tip not included on this examination.     Endotracheal tube with tip approximately 3 cm above the level of the    gianna.  Cardiomegaly.  Interstitial opacities    bilaterally.  Retrocardiac opacity.  Small left-sided pleural effusion.     No pneumothorax.                         =====   CONCLUSION:     1. Cardiomegaly with interstitial opacities likely representing edema.   2. Retrocardiac opacity suggestive of pneumonia.   3. Small left-sided pleural effusion.             LOCATION:  [facility]                       Dictated by (CST): Elaine Felix MD on 1/09/2025 at 8:06 AM        Finalized by (CST): Elaine Felix MD on 1/09/2025 at 8:09 AM         CT ABDOMEN+PELVIS(CPT=74176)   Final Result   PROCEDURE:  CT ABDOMEN+PELVIS (CPT=74176)       COMPARISON:  [facility] , CT, CT ABD(C/S)/PELVIS(C) (SET), 3/14/2006, 7:09 PM.       INDICATIONS:  abdominal distention       TECHNIQUE:  Unenhanced multislice CT scanning was performed from the dome    of the diaphragm to the pubic symphysis.  Dose reduction techniques were    used. Dose information is transmitted to the ACR (American College of    Radiology) NRDR (National Radiology    Data Registry) which includes the Dose Index Registry.       PATIENT STATED HISTORY: (As transcribed by Technologist)  Patient is here    for evaluation of abdominal distention.             FINDINGS:     LIVER:  No enlargement, atrophy, abnormal density, or significant focal    lesion.     BILIARY:  There is gallbladder wall thickening and mild  stranding around    the gallbladder.  There are no calcified stones detected.   PANCREAS:  No lesion, fluid collection, ductal dilatation, or atrophy.     SPLEEN:  No enlargement or focal lesion.     KIDNEYS:  Kidneys are unremarkable.   ADRENALS:  No mass or enlargement.     AORTA/VASCULAR:    Aorta is diffusely atherosclerotic but not aneurysmal.   RETROPERITONEUM:  There is an enlarged portal caval space lymph node    series 3, image 47 which measures 3.6 x 1.7 cm.    BOWEL/MESENTERY:  There is a nasogastric tube with tip in gastric body.     There is enlarged lymph node noted in region of gastrohepatic ligament    series 3, image 44 which measures 2.4 x 2 cm.    ABDOMINAL WALL:  Diffuse body wall edema is noted.   URINARY BLADDER:  No visible focal wall thickening, lesion, or calculus.     PELVIC NODES:  No adenopathy.     PELVIC ORGANS:  No visible mass.  Pelvic organs appropriate for patient    age.     BONES:  There is diffuse osteopenia noted.  There is diffuse degenerative    disc disease in the spine.   LUNG BASES:  There are moderate bilateral pleural effusions and there is    dependent atelectasis in the lower lobes.   OTHER:  Negative.                           =====   CONCLUSION:     1. There is diffuse body wall edema.  There are moderate bilateral pleural    effusions and there is dependent atelectasis likely related to diffuse    edematous state.   2. Gallbladder wall thickening and mild stranding around gallbladder could    also be related to diffusely edematous state although correlation with any    clinical evidence of cholecystitis is necessary.   3. There is retroperitoneal and gastrohepatic ligament lymphadenopathy    which is of uncertain significance.   4. Nasogastric tube tip is in region of gastric body.             LOCATION:  [place]           Dictated by (CST): Ahmet Griffith MD on 1/07/2025 at 10:16 AM        Finalized by (CST): Ahmet Griffith MD on 1/07/2025 at 10:22 AM         XR CHEST  AP PORTABLE  (CPT=71045)   Final Result   PROCEDURE:  XR CHEST AP PORTABLE  (CPT=71045)       TECHNIQUE:  AP chest radiograph was obtained.       COMPARISON:  [facility] , XR, XR CHEST AP PORTABLE  (CPT=71045), 1/07/2025,    6:37 AM.       INDICATIONS:  Verify correct tube placement       PATIENT STATED HISTORY: (As transcribed by Technologist)  Patient offered    no additional history at this time.                                         =====   CONCLUSION:         Stable cardiac and mediastinal contours.  Findings of congestive heart    failure with moderate pulmonary edema.  A small left pleural effusion is    suspected, similar to prior.  No pneumothorax.       Endotracheal tube tip terminates approximately 5 cm from the gianna.     Enteric catheter extends into the upper abdomen beyond the field of view.     Interval placement of right IJ central venous catheter terminating in the    mid/lower SVC.           LOCATION:  [id number]                       Dictated by (CST): Yudy Colon MD on 1/07/2025 at 8:22 AM        Finalized by (CST): Yduy Colon MD on 1/07/2025 at 8:23 AM         XR CHEST AP PORTABLE  (CPT=71045)   Final Result   PROCEDURE:  XR CHEST AP PORTABLE  (CPT=71045)       TECHNIQUE:  AP chest radiograph was obtained.       COMPARISON:  [address], XR, XR CHEST PA + LAT CHEST (CPT=71046),    11/01/2023, 12:38 PM.       INDICATIONS:  s/p intubation       PATIENT STATED HISTORY: (As transcribed by Technologist)  Patient offered    no additional history at this time.             FINDINGS:                           =====   CONCLUSION:  Stable cardiac and mediastinal contours.  Findings of    pulmonary venous hypertension with mild/moderate pulmonary edema.  A    small/mild left pleural effusion is suspected.  No appreciable    pneumothorax.       Endotracheal tube tip terminates approximately 4 cm from the gianna.     Enteric catheter extends into the upper abdomen beyond the field of view.           LOCATION:   [id number]                       Dictated by (CST): Yudy Colon MD on 1/07/2025 at 6:50 AM        Finalized by (CST): Yudy Colon MD on 1/07/2025 at 6:51 AM         XR ABDOMEN (1 VIEW) (CPT=74018)   Final Result   PROCEDURE:  XR ABDOMEN (1 VIEW) (CPT=74018)       INDICATIONS:  abdomen distended       COMPARISON:  None.       TECHNIQUE:  Supine AP view was obtained.       PATIENT STATED HISTORY: (As transcribed by Technologist)  Patient offered    no additional history at this time.                                   =====   CONCLUSION:         Under penetration related to body habitus degrades assessment of the bowel    gas pattern.  Within this limitation there appears to be moderate gaseous    distention of the stomach and small bowel with relative paucity of colonic    gas.  Findings may reflect    ileus or bowel obstruction.           LOCATION:  [id number]           Dictated by (CST): Yudy Colon MD on 1/07/2025 at 6:48 AM        Finalized by (CST): Yudy Colon MD on 1/07/2025 at 6:49 AM         US KIDNEY/BLADDER (CPT=76770)   Final Result   PROCEDURE:  US KIDNEY/BLADDER (CPT=76770)       COMPARISON:  None.       INDICATIONS:  Bleeding ulcers       TECHNIQUE:  Transabdominal gray scale ultrasound imaging of the bilateral    kidneys and bladder was performed.  Routine technique was utilized.         PATIENT STATED HISTORY: (As transcribed by Technologist)              FINDINGS:        RIGHT KIDNEY   MEASUREMENTS:  13.5 x 7.3 x 8.4 cm   ECHOGENICITY:  Normal.   HYDRONEPHROSIS:  None.   CYSTS/STONES/MASSES:  None.       LEFT KIDNEY    MEASUREMENTS:  12.7 x 7.6 x 7.9 cm   ECHOGENICITY:  Normal.   HYDRONEPHROSIS:  None.   CYSTS/STONES/MASSES:  None.       BLADDER:  Empty, not evaluated    OTHER:  Negative.                         =====   CONCLUSION:  Normal appearance of the kidneys.           LOCATION:  [id number]                   Dictated by (CST): Aftab Georges MD on 1/06/2025 at 1:26 PM        Finalized by (CST):  Aftab Georges MD on 1/06/2025 at 1:29 PM               ASSESSMENT & PLAN      74 year old male w chronic Afib, DM, lymphedema, morbid obesity essentially wheelchair bound w sacral decubitus ulcer, asc ao aneurysm who called 911 today as was bleeding profusely from decubitus ulcer. Nephrology consulted for EMILIO. Hospital course c/b cardiac arrest, acute resp failure requiring intubation and shock.      EMILIO  -- likely prerenal due decreased intravascular volume + poor perfusion w hypotension   -- renal US w no hydro.   -- hold torsemide, jardiance, irbesartan, spironolactone  -- Diuresis per ICU, monitor kidney function closely  -- Luis placed, strict UOP     Anemia /acute blood loss anemia  -- transfusion prn.    Shock   -- per ICU        Thank you for allowing me to participate in the care of this patient. Please do not hesitate to call with questions or concerns.     Isaiah Hale, DO  [facility] - Nephrology

## 2025-01-14 LAB
ALBUMIN SERPL-MCNC: 3.5 G/DL (ref 3.2–4.8)
ALBUMIN/GLOB SERPL: 1.1 {RATIO} (ref 1–2)
ALP LIVER SERPL-CCNC: 90 U/L
ALT SERPL-CCNC: 23 U/L
ANION GAP SERPL CALC-SCNC: 3 MMOL/L (ref 0–18)
AST SERPL-CCNC: 46 U/L (ref ?–34)
BASOPHILS # BLD AUTO: 0.04 X10(3) UL (ref 0–0.2)
BASOPHILS NFR BLD AUTO: 0.4 %
BILIRUB SERPL-MCNC: 1 MG/DL (ref 0.2–1.1)
BUN BLD-MCNC: 20 MG/DL (ref 9–23)
CALCIUM BLD-MCNC: 9.4 MG/DL (ref 8.7–10.4)
CHLORIDE SERPL-SCNC: 101 MMOL/L (ref 98–112)
CO2 SERPL-SCNC: 36 MMOL/L (ref 21–32)
CREAT BLD-MCNC: 0.88 MG/DL
EGFRCR SERPLBLD CKD-EPI 2021: 91 ML/MIN/1.73M2 (ref 60–?)
EOSINOPHIL # BLD AUTO: 0.41 X10(3) UL (ref 0–0.7)
EOSINOPHIL NFR BLD AUTO: 4.2 %
ERYTHROCYTE [DISTWIDTH] IN BLOOD BY AUTOMATED COUNT: 14 %
GLOBULIN PLAS-MCNC: 3.1 G/DL (ref 2–3.5)
GLUCOSE BLD-MCNC: 102 MG/DL (ref 70–99)
GLUCOSE BLD-MCNC: 107 MG/DL (ref 70–99)
GLUCOSE BLD-MCNC: 91 MG/DL (ref 70–99)
GLUCOSE BLD-MCNC: 93 MG/DL (ref 70–99)
GLUCOSE BLD-MCNC: 98 MG/DL (ref 70–99)
HCT VFR BLD AUTO: 34.9 %
HGB BLD-MCNC: 11.6 G/DL
IMM GRANULOCYTES # BLD AUTO: 0.05 X10(3) UL (ref 0–1)
IMM GRANULOCYTES NFR BLD: 0.5 %
LYMPHOCYTES # BLD AUTO: 0.83 X10(3) UL (ref 1–4)
LYMPHOCYTES NFR BLD AUTO: 8.5 %
MAGNESIUM SERPL-MCNC: 1.8 MG/DL (ref 1.6–2.6)
MCH RBC QN AUTO: 36.3 PG (ref 26–34)
MCHC RBC AUTO-ENTMCNC: 33.2 G/DL (ref 31–37)
MCV RBC AUTO: 109.1 FL
MONOCYTES # BLD AUTO: 1.05 X10(3) UL (ref 0.1–1)
MONOCYTES NFR BLD AUTO: 10.7 %
NEUTROPHILS # BLD AUTO: 7.44 X10 (3) UL (ref 1.5–7.7)
NEUTROPHILS # BLD AUTO: 7.44 X10(3) UL (ref 1.5–7.7)
NEUTROPHILS NFR BLD AUTO: 75.7 %
OSMOLALITY SERPL CALC.SUM OF ELEC: 293 MOSM/KG (ref 275–295)
PHOSPHATE SERPL-MCNC: 2 MG/DL (ref 2.4–5.1)
PLATELET # BLD AUTO: 117 10(3)UL (ref 150–450)
POTASSIUM SERPL-SCNC: 3.5 MMOL/L (ref 3.5–5.1)
POTASSIUM SERPL-SCNC: 3.5 MMOL/L (ref 3.5–5.1)
PROT SERPL-MCNC: 6.6 G/DL (ref 5.7–8.2)
RBC # BLD AUTO: 3.2 X10(6)UL
SODIUM SERPL-SCNC: 140 MMOL/L (ref 136–145)
WBC # BLD AUTO: 9.8 X10(3) UL (ref 4–11)

## 2025-01-14 PROCEDURE — 99232 SBSQ HOSP IP/OBS MODERATE 35: CPT | Performed by: EMERGENCY MEDICINE

## 2025-01-14 RX ORDER — POTASSIUM CHLORIDE 14.9 MG/ML
20 INJECTION INTRAVENOUS ONCE
Status: COMPLETED | OUTPATIENT
Start: 2025-01-14 | End: 2025-01-14

## 2025-01-14 RX ORDER — FUROSEMIDE 40 MG/1
40 TABLET ORAL
Status: DISCONTINUED | OUTPATIENT
Start: 2025-01-14 | End: 2025-01-17

## 2025-01-14 RX ORDER — MAGNESIUM OXIDE 400 MG/1
400 TABLET ORAL ONCE
Status: COMPLETED | OUTPATIENT
Start: 2025-01-14 | End: 2025-01-14

## 2025-01-14 NOTE — PLAN OF CARE
Pt alert and oriented x4. Received on 2L, attempted to wean to RA pt did not tolerate. Bipap at night w/sleep. Afib on tele, rates controlled. Normotensive. Follows commands, Very pleasant. Multiple BMs this shift. External catheter in place. Tx orders in place. Awaiting bed.     See MAR and flowsheets for additional information.

## 2025-01-14 NOTE — OCCUPATIONAL THERAPY NOTE
OCCUPATIONAL THERAPY TREATMENT NOTE - INPATIENT     Room Number: 472/472-A  Session: 1   Number of Visits to Meet Established Goals: 6    Presenting Problem: acute renal failure    HOME SITUATION  Type of Home: House  Home Layout: Two level;Able to live on main level  Lives With: Spouse     Shower/Tub and Equipment: Other (Comment) (drives to [facility] 1x/week to shower to avoid stairs to access shower at home)     Drives: No     Prior Level of Function: Typically modified independent with RW for most ADLs, does not drive; sleeps in recliner, Ambulates short distances only. Goes to his [facility] 1 x/week to shower since local shower is on the second floor and patient cannot access it.         ASSESSMENT   Patient demonstrates good  progress this session, goals remain in progress.    Patient continues to function below baseline with toileting, bathing, upper body dressing, lower body dressing, bed mobility, transfers, static standing balance, dynamic standing balance, functional standing tolerance, and energy conservation strategies.   Contributing factors to remaining limitations include decreased functional strength, decreased functional reach, decreased endurance, impaired standing balance, and decreased muscular endurance.  Next session anticipate patient to progress toileting, bed mobility, and transfers.  Occupational Therapy will continue to follow patient for duration of hospitalization.    Patient continues to benefit from continued skilled OT services: to promote return to prior level of function and safety with continuous assistance and gradual rehabilitative therapy.     History:   Patient is a 74 year old male admitted on 1/6/2025 with Presenting Problem: acute renal failure. Co-Morbidities : chronic sacral ulcer, morbid obesity,etoh abuse, gout, pulmonary HTN  Admitted 1/6/25 for bleeding from sacral ulcer , had cardiac arrest with emergent intubation 1/7/25    WEIGHT BEARING RESTRICTION        Recommendations for nursing staff:   Transfers: 2 ppl and RW  Toileting location: bedside commode ( bariatric )    TREATMENT SESSION:  Patient Start of Session: supine    FUNCTIONAL TRANSFER ASSESSMENT  Sit to Stand: Edge of Bed  Edge of Bed: Maximum Assist    BED MOBILITY  Supine to Sit : Maximum Assist  Sit to Supine (OT): Maximum Assist  Scooting: MAX  A    BALANCE ASSESSMENT  Static Sitting: Stand-by Assist  Static Standing: Moderate Assist    FUNCTIONAL ADL ASSESSMENT     ACTIVITY TOLERANCE: stable                         O2 SATURATIONS       EDUCATION PROVIDED  Patient Education : Plan of Care; Functional Transfer Techniques; Posture/Positioning; Fall Prevention  Patient's Response to Education: Verbalized Understanding  Family/Caregiver Education : Role of Occupational Therapy; Plan of Care  Family/Caregiver's Response to Education: Verbalized Understanding    Equipment used: rw, gait belt  Demonstrates functional use, Would benefit from additional trial yes     Exercises:    Exercises Repetitions Comments   Scapular elevation     Scapular retraction     Shoulder rolls     Shoulder flexion     Shoulder abduction     Shoulder internal/external rotation     Forward punch     Elbow flexion     Elbow extension     Forearm pronation/supination     Wrist flexion/extension     Gross grasp/fist pumps     Ankle pumps     Knee extension     Marching       Therapist comments:   Patient was assisted with supine to sit  with two person assist. Assisted with scooting hips into seated position at edge of the bed.  He tolerated unsupported sitting for at least 6-8 minutes with good tolerance. Patient able to stand at RW with 2 person assist ( MIN A x2 ) in preparation for commode/toilet transfers . Patient fatigued after about 30-45 seconds and returned to sitting. He declined a second trial of standing or attempting bed to chair transfer due to fatigue and was assisted back into supine . Bed was placed into partial  chair mode and foam boots were re-applied to BLEs. Patient was updated on plan of care and left with needs met and family present. OT verbally educated patient to perform hand squeeze AROM and elbow flexion as tolerated throughout the day.    Patient End of Session: All patient questions and concerns addressed;RN aware of session/findings;Call light within reach;Needs met;In bed;SCDs in place;Family present;Other (Comment) (Foam boots)    SUBJECTIVE  Glad to be able to move    PAIN ASSESSMENT  Ratin        OBJECTIVE  Precautions: Bed/chair alarm     AM-PAC ‘6-Clicks’ Inpatient Daily Activity Short Form  -   Putting on and taking off regular lower body clothing?: A Lot  -   Bathing (including washing, rinsing, drying)?: A Lot  -   Toileting, which includes using toilet, bedpan or urinal? : A Lot  -   Putting on and taking off regular upper body clothing?: A Lot  -   Taking care of personal grooming such as brushing teeth?: None  -   Eating meals?: None    AM-PAC Score:  Score: 16  Approx Degree of Impairment: 53.32%  Standardized Score (AM-PAC Scale): 35.96    PLAN     OT Treatment Plan: Functional transfer training;UE strengthening/ROM;Endurance training;Energy conservation/work simplification techniques;ADL training;Patient/Family training;Patient/Family education;Equipment eval/education;Compensatory technique education;Cognitive reorientation;Balance activities  Rehab Potential : Fair  Frequency: 3-5x/week    OT Goals: ongoing 25  ADL Goals   Patient will perform grooming: with setup, with supervision, and while in chair  Patient will perform upper body dressing:  with min assist  Patient will perform toileting: with mod assist and with bedside commode     Functional Transfer Goals  Patient will transfer to bedside commode:  with mod assist     UE Exercise Program Goal  Patient will be supervision with bilateral AROM HEP (home exercise program).    OT Session Time: 25 minutes    Therapeutic Activity: 25  minutes

## 2025-01-14 NOTE — PROGRESS NOTES
[facility]   part of [facility] Hospitalist Progress Note     Gerardo Torrez Patient Status:  Inpatient    6/15/1950 MRN [id number]   Location [facility] 4SW-A Attending Peter Flores, DO   Hosp Day # 8 PCP Sid Gordon MD     Follow Up:  The primary encounter diagnosis was Acute renal failure, unspecified acute renal failure type (HCC). A diagnosis of Anemia, unspecified type was also pertinent to this visit.    Subjective:     Patient seen and examined.   Now on 2 L NC.  States he is feeling better.  Denies SOB.  Awaiting transfer out of the ICU.     Objective:    Review of Systems:   10 point ROS completed and was negative, except for pertinent positive and negatives stated in subjective.    Vital signs:  Temp:  [98 °F (36.7 °C)-99.5 °F (37.5 °C)] 98.7 °F (37.1 °C)  Pulse:  [71-99] 88  Resp:  [15-28] 20  BP: (115-140)/(50-78) 129/56  SpO2:  [92 %-97 %] 93 %  FiO2 (%):  [40 %] 40 %    Physical Exam:    Gen:  Aox3, NAD, Chronically ill appearing male.   HEENT:  EOMI, PERRLA, OP clear, MMM.  OGT in place.  Pulm:  Course to auscultation on vent.  CV: Heart with regular rate and rhythm, no murmur.  Normal PMI.    Abd:  Mod to severe abd distention, hypoactive bowel sounds, no TTP. Morbid obesity.    MSK: +lymphedema, +chronic venous stasis dermatitis.   Skin: +sacral decub  Neuro:  Grossly intact, no focal deficits  : Luis cath in place    Diagnostic Data:    Labs:  Recent Labs   Lab 01/10/25  0409 25  0452 25  0501 25  0518 25  0402   WBC 9.2 9.2 8.9 11.1* 9.8   HGB 11.6* 11.5* 11.3* 11.7* 11.6*   .8* 108.6* 109.7* 109.5* 109.1*   .0* 136.0* 128.0* 120.0* 117.0*       Recent Labs   Lab 25  0452 25  0459 25  0518 25  0402   * 100* 108* 102*   BUN 31* 29* 22 20   CREATSERUM 1.07 1.09 0.95 0.88   CA 9.7 9.8 9.6 9.4   ALB 3.4 3.6 3.4 3.5    147* 144 140   K 3.6  3.6 3.7  3.7 3.3*  3.3* 3.5  3.5     105 103 101   CO2 36.0* 38.0* 35.0* 36.0*   ALKPHO 82  --  84 90   AST 23  --  38* 46*   ALT 11  --  18 23   BILT 0.8  --  1.1 1.0   TP 6.3  --  6.3 6.6       Estimated Creatinine Clearance: 78.3 mL/min (based on SCr of 0.88 mg/dL).    No results for input(s): \"PTP\", \"INR\" in the last 168 hours.           COVID-19 Lab Results    COVID-19  Lab Results   Component Value Date    COVID19 Not Detected 07/20/2022    COVID19 DETECTED (A) 01/26/2021       Pro-Calcitonin  No results for input(s): \"PCT\" in the last 168 hours.    Cardiac  No results for input(s): \"TROP\", \"PBNP\" in the last 168 hours.    Creatinine Kinase  No results for input(s): \"CK\" in the last 168 hours.    Inflammatory Markers  No results for input(s): \"CRP\", \"KENNEDY\", \"LDH\", \"DDIMER\" in the last 168 hours.    Imaging: Imaging data reviewed in Epic.    Medications:    potassium chloride  20 mEq Intravenous Once    vancomycin  125 mg Oral QID    tamsulosin  0.4 mg Oral Nightly    apixaban  5 mg Oral BID    insulin aspart  2-10 Units Subcutaneous TID AC and HS    furosemide  40 mg Intravenous BID (Diuretic)    atorvastatin  20 mg Oral Nightly    cyanocobalamin  2,000 mcg Oral Daily    multivitamin  1 tablet Oral Daily    thiamine  100 mg Oral Daily    miconazole   Topical BID       Assessment & Plan:      74 yr old male with PMH sig for a-fib on eliquis, HTN, HLD, DM II, gout, pulm HTN, chronic sacral decub, and morbid obesity who presented to the ED for evaluation of bleeding from his sacral wound.       # Cardiac arrest   - concern for primary intra-abd event   - ROSC obtained s/p epi x 1  - maintain normothermia     # Acute respiratory failure  - intubated in setting of cardiac arrest   - extubated 1/11  - cont IV diuresis   - wean O2 as tolerated     # Shock  - suspect distributive/hypovolemic  - s/p IVFs  - s/p vasopressors   - s/p empiric zosyn (1/8 - 1/13)  - cultures neg  - resolving     # Abdominal distention   - CT a/p neg for SBO or perf  -  OG placed to LIS, now removed  - cont empiric abx  - 1/10 KUB without obstruction or free air  - gen surg c/s appreciated     # Acute renal failure   - suspect prerenal in the setting of GI losses and ARB use  - improved  - renal US neg for hydro  - monitor renal function and UO, avoid nephrotoxins   - diuretics per cards  - renal c/s appreciated      # Bleeding sacral decub   # Acute blood loss anemia  - held eliquis, resumed 1/14  - monitor CBC  - wound care c/s      # Persistent a-fib  - rate controlled  - ECHO with intact LVEF  - hold metoprolol given bradycardia    - held eliquis for now given bleeding from sacral decub, resumed 1/13     # Essential HTN  - low on admit  - hold ARB, metoprolol     # HLD  - cont statin     # Pulm HTN  - monitor volume status on IVFs  - diuretics per cards     # Thrombocytopenia   - suspect due to EtOH use and possible liver disease   - monitor      # EtOH abuse   - monitor for withdrawal   - MVI, thiamine, folate   - pt counseled on EtOH cessation      # Morbid obesity  - Recommend follow up with PCP to discuss diet and lifestyle modifications to encourage weight loss.    Plan of care: inpt care in the ICU.  Stable for transfer to the floor.     Plan of care discussed with pt's family at bedside.     Peter Flores, DO    Supplementary Documentation:   DVT Mechanical Prophylaxis:   SCDs,    DVT Pharmacologic Prophylaxis   Medication    apixaban (Eliquis) tab 5 mg                Code Status: Full Code  Luis: External urinary catheter in place  Luis Duration (in days): 1  Central line:    ETHAN:

## 2025-01-14 NOTE — CM/SW NOTE
Met w/pt at the bedside and provided [facility] list.  He will discuss with his wife when she arrives and notify CM/SW of choice.  Discussed w/hospitalist and pt close to dc   CM requested department  (D[facility]) to initiate insurance [facility]  Updates sent to [employer] via [employer]    Addendum 1400  Met w/pt and wife at the bedside.  List had only 2 providers and wife requesting more choices. Noted some providers answered \"no bed\" at time of referral so reopened and sent messages to see if they can accept.    / to remain available for support and/or discharge planning.     Myra Conley MBA MSN, RN CTL/  [phone]

## 2025-01-14 NOTE — CM/SW NOTE
Prior Authorization - Destination  Destination Type: Skilled nursing facility  Service Provider: **PAC  Payer Communication Destination Comments: Vivek [phone]  Prior Authorization Status: Approved  Prior Authorization Start Date: 01/14/25 1/14 to 1/16. ([facility] 1/17)      Kelli Mcduffie DSC

## 2025-01-14 NOTE — PROGRESS NOTES
[facility]   part of [facility]    Nephrology Progress Note    Gerardo Torrez Attending:  Peter Flores DO     Cc: EMILIO    SUBJECTIVE     Feeling better.     PHYSICAL EXAM   Vital signs: /56 (BP Location: Right arm)   Pulse 88   Temp 98.4 °F (36.9 °C)   Resp 20   Ht 5' 10\" (1.778 m)   Wt (!) 339 lb 11.2 oz (154.1 kg)   SpO2 93%   BMI 48.74 kg/m²   Temp (24hrs), Av.8 °F (37.1 °C), Min:98.2 °F (36.8 °C), Max:99.5 °F (37.5 °C)       Intake/Output Summary (Last 24 hours) at 2025 1330  Last data filed at 2025 1200  Gross per 24 hour   Intake 800 ml   Output 3300 ml   Net -2500 ml     Wt Readings from Last 3 Encounters:   23 (!) 305 lb (138.3 kg)   23 300 lb (136.1 kg)   22 (!) 320 lb (145.2 kg)     General: no distress  HEENT: NCAT  Cardiac: RRR  Lungs: + oxygen  Abdomen: +distended   Extremities: + edema  Neurologic: awake     MEDS     Current Facility-Administered Medications   Medication Dose Route Frequency    furosemide (Lasix) tab 40 mg  40 mg Oral BID (Diuretic)    vancomycin (Vancocin) cap 125 mg  125 mg Oral QID    tamsulosin (Flomax) cap 0.4 mg  0.4 mg Oral Nightly    apixaban (Eliquis) tab 5 mg  5 mg Oral BID    glucose (Dex4) 15 GM/59ML oral liquid 15 g  15 g Oral Q15 Min PRN    Or    glucose (Glutose) 40% oral gel 15 g  15 g Oral Q15 Min PRN    Or    glucose-vitamin C (Dex-4) chewable tab 4 tablet  4 tablet Oral Q15 Min PRN    Or    dextrose 50% injection 50 mL  50 mL Intravenous Q15 Min PRN    Or    glucose (Dex4) 15 GM/59ML oral liquid 30 g  30 g Oral Q15 Min PRN    Or    glucose (Glutose) 40% oral gel 30 g  30 g Oral Q15 Min PRN    Or    glucose-vitamin C (Dex-4) chewable tab 8 tablet  8 tablet Oral Q15 Min PRN    insulin aspart (NovoLOG) 100 Units/mL FlexPen 2-10 Units  2-10 Units Subcutaneous TID AC and HS    dextrose 10% infusion (TPN no rate)   Intravenous Continuous PRN    polyethylene glycol (PEG 3350) (Miralax) 17 g oral packet 17 g  17 g Per  NG Tube Daily PRN    bisacodyl (Dulcolax) 10 MG rectal suppository 10 mg  10 mg Rectal Daily PRN    atorvastatin (Lipitor) tab 20 mg  20 mg Oral Nightly    cyanocobalamin (Vitamin B12) tab 2,000 mcg  2,000 mcg Oral Daily    acetaminophen (Tylenol) tab 650 mg  650 mg Oral Q6H PRN    ondansetron (Zofran) 4 MG/2ML injection 4 mg  4 mg Intravenous Q6H PRN    multivitamin (Tab-A-Kieran/Beta Carotene) tab 1 tablet  1 tablet Oral Daily    thiamine (Vitamin B1) tab 100 mg  100 mg Oral Daily    miconazole 2 % powder   Topical BID       LABS     Lab Results   Component Value Date    WBC 9.8 01/14/2025    HGB 11.6 01/14/2025    HCT 34.9 01/14/2025    .0 01/14/2025    CREATSERUM 0.88 01/14/2025    BUN 20 01/14/2025     01/14/2025    K 3.5 01/14/2025    K 3.5 01/14/2025     01/14/2025    CO2 36.0 01/14/2025     01/14/2025    CA 9.4 01/14/2025    ALB 3.5 01/14/2025    ALKPHO 90 01/14/2025    BILT 1.0 01/14/2025    TP 6.6 01/14/2025    AST 46 01/14/2025    ALT 23 01/14/2025    MG 1.8 01/14/2025    PHOS 2.0 01/14/2025    PGLU 107 01/14/2025       IMAGING   All imaging studies personally reviewed.    XR ABDOMEN (1 VIEW) (CPT=74018)   Final Result   PROCEDURE:  XR ABDOMEN (1 VIEW) (CPT=74018)       INDICATIONS:  abdominal distention       COMPARISON:  [facility] , XR, XR ABDOMEN (1 VIEW) (CPT=74018), 1/07/2025, 5:39    AM.       TECHNIQUE:  Supine AP view was obtained.       PATIENT STATED HISTORY: (As transcribed by Technologist)  Patient offered    no additional history at this time.             FINDINGS:     BOWEL GAS PATTERN:  Normal.  No abnormal dilation or deviation.     CALCIFICATIONS:  None significant.   OTHER:  Negative.  No abnormal gaseous collections.                           =====   CONCLUSION:  No evidence of bowel obstruction or free air.  NG tube within    the stomach.           LOCATION:  [facility]           Dictated by (CST): Hmeanth Silvestre MD on 1/10/2025 at 1:03 PM        Finalized by (CST):  Hemanth Silvestre MD on 1/10/2025 at 1:03 PM         XR CHEST AP PORTABLE  (CPT=71045)   Final Result   PROCEDURE:  XR CHEST AP PORTABLE  (CPT=71045)       TECHNIQUE:  AP chest radiograph was obtained.       COMPARISON:  [facility] , XR, XR CHEST AP PORTABLE  (CPT=71045), 1/07/2025,    7:41 AM.  [facility] , XR, XR CHEST AP PORTABLE  (CPT=71045), 1/07/2025, 6:37    AM.       INDICATIONS:  Increased O2 needs       PATIENT STATED HISTORY: (As transcribed by Technologist)  Patient offered    no additional history at this time.            FINDINGS:  Right-sided triple-lumen catheter.  Enteric tube extending    beyond the diaphragm the tip not included on this examination.     Endotracheal tube with tip approximately 3 cm above the level of the    gianna.  Cardiomegaly.  Interstitial opacities    bilaterally.  Retrocardiac opacity.  Small left-sided pleural effusion.     No pneumothorax.                         =====   CONCLUSION:     1. Cardiomegaly with interstitial opacities likely representing edema.   2. Retrocardiac opacity suggestive of pneumonia.   3. Small left-sided pleural effusion.             LOCATION:  [facility]                       Dictated by (CST): Elaine Felix MD on 1/09/2025 at 8:06 AM        Finalized by (CST): Elaine Felix MD on 1/09/2025 at 8:09 AM         CT ABDOMEN+PELVIS(CPT=74176)   Final Result   PROCEDURE:  CT ABDOMEN+PELVIS (CPT=74176)       COMPARISON:  [facility] , CT, CT ABD(C/S)/PELVIS(C) (SET), 3/14/2006, 7:09 PM.       INDICATIONS:  abdominal distention       TECHNIQUE:  Unenhanced multislice CT scanning was performed from the dome    of the diaphragm to the pubic symphysis.  Dose reduction techniques were    used. Dose information is transmitted to the ACR (American College of    Radiology) NRDR (National Radiology    Data Registry) which includes the Dose Index Registry.       PATIENT STATED HISTORY: (As transcribed by Technologist)  Patient is here    for evaluation of abdominal distention.              FINDINGS:     LIVER:  No enlargement, atrophy, abnormal density, or significant focal    lesion.     BILIARY:  There is gallbladder wall thickening and mild stranding around    the gallbladder.  There are no calcified stones detected.   PANCREAS:  No lesion, fluid collection, ductal dilatation, or atrophy.     SPLEEN:  No enlargement or focal lesion.     KIDNEYS:  Kidneys are unremarkable.   ADRENALS:  No mass or enlargement.     AORTA/VASCULAR:    Aorta is diffusely atherosclerotic but not aneurysmal.   RETROPERITONEUM:  There is an enlarged portal caval space lymph node    series 3, image 47 which measures 3.6 x 1.7 cm.    BOWEL/MESENTERY:  There is a nasogastric tube with tip in gastric body.     There is enlarged lymph node noted in region of gastrohepatic ligament    series 3, image 44 which measures 2.4 x 2 cm.    ABDOMINAL WALL:  Diffuse body wall edema is noted.   URINARY BLADDER:  No visible focal wall thickening, lesion, or calculus.     PELVIC NODES:  No adenopathy.     PELVIC ORGANS:  No visible mass.  Pelvic organs appropriate for patient    age.     BONES:  There is diffuse osteopenia noted.  There is diffuse degenerative    disc disease in the spine.   LUNG BASES:  There are moderate bilateral pleural effusions and there is    dependent atelectasis in the lower lobes.   OTHER:  Negative.                           =====   CONCLUSION:     1. There is diffuse body wall edema.  There are moderate bilateral pleural    effusions and there is dependent atelectasis likely related to diffuse    edematous state.   2. Gallbladder wall thickening and mild stranding around gallbladder could    also be related to diffusely edematous state although correlation with any    clinical evidence of cholecystitis is necessary.   3. There is retroperitoneal and gastrohepatic ligament lymphadenopathy    which is of uncertain significance.   4. Nasogastric tube tip is in region of gastric body.              [place]  [facility] [place]           Dictated by (CST): Ahmet Griffith MD on 1/07/2025 at 10:16 AM        Finalized by (CST): Ahmet Griffith MD on 1/07/2025 at 10:22 AM         XR CHEST AP PORTABLE  (CPT=71045)   Final Result   PROCEDURE:  XR CHEST AP PORTABLE  (CPT=71045)       TECHNIQUE:  AP chest radiograph was obtained.       COMPARISON:  [facility] , XR, XR CHEST AP PORTABLE  (CPT=71045), 1/07/2025,    6:37 AM.       INDICATIONS:  Verify correct tube placement       PATIENT STATED HISTORY: (As transcribed by Technologist)  Patient offered    no additional history at this time.                                         =====   CONCLUSION:         Stable cardiac and mediastinal contours.  Findings of congestive heart    failure with moderate pulmonary edema.  A small left pleural effusion is    suspected, similar to prior.  No pneumothorax.       Endotracheal tube tip terminates approximately 5 cm from the gianna.     Enteric catheter extends into the upper abdomen beyond the field of view.     Interval placement of right IJ central venous catheter terminating in the    mid/lower SVC.           LOCATION:  [place] [id number]                       Dictated by (CST): Yudy Colon MD on 1/07/2025 at 8:22 AM        Finalized by (CST): Yudy Colon MD on 1/07/2025 at 8:23 AM         XR CHEST AP PORTABLE  (CPT=71045)   Final Result   PROCEDURE:  XR CHEST AP PORTABLE  (CPT=71045)       TECHNIQUE:  AP chest radiograph was obtained.       COMPARISON:  [facility], XR, XR CHEST PA + LAT CHEST (CPT=71046),    11/01/2023, 12:38 PM.       INDICATIONS:  s/p intubation       PATIENT STATED HISTORY: (As transcribed by Technologist)  Patient offered    no additional history at this time.             FINDINGS:                           =====   CONCLUSION:  Stable cardiac and mediastinal contours.  Findings of    pulmonary venous hypertension with mild/moderate pulmonary edema.  A    small/mild left pleural effusion is suspected.  No appreciable     pneumothorax.       Endotracheal tube tip terminates approximately 4 cm from the gianna.     Enteric catheter extends into the upper abdomen beyond the field of view.           LOCATION:  [id number]                       Dictated by (CST): Yudy Colon MD on 1/07/2025 at 6:50 AM        Finalized by (CST): Yudy Colon MD on 1/07/2025 at 6:51 AM         XR ABDOMEN (1 VIEW) (CPT=74018)   Final Result   PROCEDURE:  XR ABDOMEN (1 VIEW) (CPT=74018)       INDICATIONS:  abdomen distended       COMPARISON:  None.       TECHNIQUE:  Supine AP view was obtained.       PATIENT STATED HISTORY: (As transcribed by Technologist)  Patient offered    no additional history at this time.                                   =====   CONCLUSION:         Under penetration related to body habitus degrades assessment of the bowel    gas pattern.  Within this limitation there appears to be moderate gaseous    distention of the stomach and small bowel with relative paucity of colonic    gas.  Findings may reflect    ileus or bowel obstruction.           LOCATION:  [id number]           Dictated by (CST): Yudy Colon MD on 1/07/2025 at 6:48 AM        Finalized by (CST): Yudy Colon MD on 1/07/2025 at 6:49 AM         US KIDNEY/BLADDER (CPT=76770)   Final Result   PROCEDURE:  US KIDNEY/BLADDER (CPT=76770)       COMPARISON:  None.       INDICATIONS:  Bleeding ulcers       TECHNIQUE:  Transabdominal gray scale ultrasound imaging of the bilateral    kidneys and bladder was performed.  Routine technique was utilized.         PATIENT STATED HISTORY: (As transcribed by Technologist)              FINDINGS:        RIGHT KIDNEY   MEASUREMENTS:  13.5 x 7.3 x 8.4 cm   ECHOGENICITY:  Normal.   HYDRONEPHROSIS:  None.   CYSTS/STONES/MASSES:  None.       LEFT KIDNEY    MEASUREMENTS:  12.7 x 7.6 x 7.9 cm   ECHOGENICITY:  Normal.   HYDRONEPHROSIS:  None.   CYSTS/STONES/MASSES:  None.       BLADDER:  Empty, not evaluated    OTHER:  Negative.                         =====    CONCLUSION:  Normal appearance of the kidneys.           LOCATION:  [id number]                   Dictated by (CST): Aftab Georges MD on 1/06/2025 at 1:26 PM        Finalized by (CST): Aftab Georges MD on 1/06/2025 at 1:29 PM               ASSESSMENT & PLAN      74 year old male w chronic Afib, DM, lymphedema, morbid obesity essentially wheelchair bound w sacral decubitus ulcer, asc ao aneurysm who called 911 today as was bleeding profusely from decubitus ulcer. Nephrology consulted for EMILIO. Hospital course c/b cardiac arrest, acute resp failure requiring intubation and shock.      EMILIO - improved.  -- likely prerenal due decreased intravascular volume + poor perfusion w hypotension   -- renal US w no hydro.   -- hold torsemide, jardiance, irbesartan, spironolactone  -- Diuresis per ICU, monitor kidney function closely  -- Luis placed, strict UOP     Anemia /acute blood loss anemia  -- transfusion prn.    Shock   -- per ICU        Thank you for allowing me to participate in the care of this patient. Please do not hesitate to call with questions or concerns.     Isaiah Hale, DO [facility] - Nephrology

## 2025-01-14 NOTE — PROGRESS NOTES
Patient was stable overnight with no acute overnight events reported to me.  He had about 3 L of urine output yesterday.  I cut his Lasix in half today from 40 IV twice daily to 40 p.o. twice daily.    He remains on his Eliquis as well as his vancomycin for his C. difficile colitis.    On exam he was awake alert oriented abdomen was obese but soft nontender nondistended.  Good pulses in bilateral upper and lower extremities.  He had weakness in his right upper extremity which is more profound that I noticed on exam yesterday.  He could not  my hand but he could not lift at the elbow or move at his wrist or shrug his shoulder.  He tells me is a chronic problem but he is normally is much stronger than he is currently.  He does have a known myelopathy in the cervical spine and he has been seen by multiple doctors with the said it was \"impinging on the nerve\" and it had come to the point where he would have to open his window because he was not able to turn his head to look backwards outside of the driveway when backing up.  This was markedly affecting the way that he was driving.  He was supposed to follow-up but he never did although he did have imaging showing myelopathy in the past.    I did contact neurosurgery but and I did talk to the family that even with getting a CT scan or MRI would be difficult given he has had a prior cardiac arrest when laying flat although I think this was in the setting of profound volume overload significant pulmonary hypertension and edema.    His labs are showing a sodium of 140 potassium 3.5 a creatinine of 0.88 and a bicarb of 36.  His AST and his ALT were relatively unremarkable.  His CBC showed a hemoglobin of 11.6 a white blood cell count of 9.8 and a platelet count of 117,000.    In summary this is a 74-year-old male that presents to us with cardiac arrest now extubated with hypercapnic respiratory failure and profound fluid overload and renal failure    Problems  Renal  failure  Fluid overload  Chronic hypercapnic respiratory failure  OHS HEIDI  Cardiac arrest  A-fib  Abdominal distention    Plan  Neuro patient is neurologically intact  He does have significant weaknesses in his right upper extremity this seems more peripheral than central in nature.  He can  at the hand but he cannot lift his arm and his shoulder and he has numbness shooting down his arm.  This does sound like a more peripheral neuron disease than a central neuron disease.  I have asked neurosurgery to see him they stated that any surgery they would perform would obviously require him to lay flat.  In addition they would need imaging that would be obtained when he is laying flat such as an MRI or CT scan.  They will see him tomorrow discussed with the family about the options.    Respiratory  Chronic hypercapnic respiratory failure he wears noninvasive ventilation at night  We are continuing diuretics  His bicarb is starting to increase we may need to provide him Diamox his baseline bicarb is closer to the high 30s and I think as we are aggressively diuresing him his pCO2 is increasing alongside this.  This is likely from a partial contraction alkalosis and not all just compensation for chronic hypercapnia    GI  Abdomen is obese but soft nontender nondistended he is eating and drinking well    Renal  Patient remains on Lasix p.o. twice daily    Renal function remained stable    Infectious disease  Patient remains off of all antimicrobials other than vancomycin p.o. which she is getting for C. difficile colitis    Endocrine as above remains on an insulin sliding scale for his diabetes    Disposition okay to transfer to the floor    Date of service January 14, 2024.

## 2025-01-14 NOTE — DIETARY NOTE
[facility]   part of [facility]  NUTRITION ASSESSMENT    Pt does not meet malnutrition criteria at this time.    NUTRITION INTERVENTION:    Meal and Snacks - Continue 2 gm Na diet as tolerated; monitor and encourage po intake.  Medical Nutrition Supplements - RD added Magic Shake chocolate daily.  Vitamin and Mineral Supplementation - Recommend daily multivitamin.    PATIENT STATUS: 1/14: Extubated 1/11. SLP evaluated 1/12 and recommends regular solids and thin liquids. Visited pt at bedside. Pt reports decreased appetite which started ~1 month PTA from unknown etiology. Denies nausea, vomiting and constipation but having diarrhea (+cdiff); last BM today. NKFA. Reports his UBW PTA was ~325 lbs with no significant changes recently. Offered ONS and pt agreeable to chocolate Magic Shake daily. Encouraged smaller more frequent meals 5-6x/day while appetite is decreased. All questions answered at this time.     1/10: Pt remains intubated, sedated on propofol, requiring dopamine but off other pressors, tolerating TF at goal via OGT. Pt still with abd distention (+cdiff).   1/8: 74 year old male admitted on 1/6 presents with bleeding from sacral wound. Per H&P, pt was having diarrhea and increased fatigue as well as poor oral intake for days PTA. Pt with abdominal distention so KUB attempted 1/7 but code blue called after lying flat; was intubated and transferred to ICU. Pt screened d/t consult for tube feeds. Pt is currently intubated, sedation on hold for SBT, requiring pressors, NPO with OGT to LIS. Will provide TF recs above.     PMH: CAD, Diabetes mellitus type 2, Gout, High blood pressure, High cholesterol, Pulmonary hypertension, Thoracic aortic aneurysm    ANTHROPOMETRICS:  Ht: 177.8 cm (5' 10\")  Wt: (!) 154.1 kg (339 lb 11.2 oz).   BMI: Body mass index is 48.74 kg/m².  IBW: 75.5 kg    WEIGHT HISTORY:   Patient Weight(s) for the past 336 hrs:   Weight   01/11/25 0000 (!) 154.1 kg (339 lb 11.2 oz)    01/10/25 0600 (!) 154.8 kg (341 lb 4.8 oz)   01/09/25 0430 (!) 166 kg (365 lb 14.4 oz)   01/06/25 1602 (!) 154.4 kg (340 lb 6.2 oz)   01/06/25 1144 (!) 148.8 kg (328 lb)       Wt Readings from Last 10 Encounters:   11/06/23 (!) 138.3 kg (305 lb)   07/18/23 136.1 kg (300 lb)   07/20/22 (!) 145.2 kg (320 lb)   03/14/22 136.1 kg (300 lb)   11/29/21 (!) 140.6 kg (310 lb)   11/16/21 136.1 kg (300 lb)   11/15/21 136.1 kg (300 lb)   09/14/21 135.2 kg (298 lb)   08/09/21 (!) 137 kg (302 lb)   07/01/21 132.5 kg (292 lb)        NUTRITION:  Diet:       Procedures    Regular/General diet Sodium Restriction: 2 GM NA; Is Patient on Accuchecks? Yes      Food Allergies: No  Cultural/Ethnic/Buddhism Preferences Addressed: Yes    Percent Meals Eaten (last 3 days)       Date/Time Percent Meals Eaten (%)    01/12/25 0819 0 %    01/12/25 1500 50 %            GI SYSTEM REVIEW: diarrhea and distention (+cdiff); last BM 1/14  Skin/Wounds: sacral partial thickness wound    NUTRITION RELATED PHYSICAL FINDINGS:     1. Body Fat/Muscle Mass: obese     2. Fluid Accumulation: ascites, upper extremity edema non-pitting, hand edema non-pitting, lower extremity edema 3+, foot edema 3+, and perineal +2, scrotal nonpitting    NUTRITION PRESCRIPTION:  75.5 kg IBW  Calories: 3943-7316 calories/day (25-30 kcal/kg)  Protein: 113-151 grams protein/day (1.5-2.0 gm/kg)  Fluid: ~1 ml/kcal or per MD discretion    NUTRITION DIAGNOSIS/PROBLEM:  Inadequate oral intake related to insufficient appetite resulting in inadequate nutrition intake as evidenced by documented/reported insufficient oral intake    MONITOR AND EVALUATE/NUTRITION GOALS:  PO intake of 75% of meals TID - New  PO intake of 75% of oral nutrition supplement/s - New  Weight stable within 1 to 2 lbs during admission - Ongoing  Tolerate alternative nutrition at 100% of goal - Resolved      MEDICATIONS:  Vit B12 2000 mcg, lasix, novolog, multivitamin, flomax, thiamine 100 mg, abx    LABS:  Phos  2    Pt is at Moderate nutrition risk    Amy Hurtado RD, LDN, CNSC  Clinical Dietitian  Spectra: [id number]

## 2025-01-14 NOTE — PROGRESS NOTES
[facility]  Cardiology Progress Note    Gerardo Torrez Patient Status:  Inpatient    6/15/1950 MRN [id number]   Location [facility] 4SW-A Attending Peter Flores, DO   Hosp Day # 8 PCP Sid Gordon MD       Subjective:  Looks super. Wife at the bedside.  HR, BP stable, 3 L NC.  Uneventful night.    Objective:   Temp: 98.9 °F (37.2 °C)  Pulse: 80  Resp: 15  BP: 129/72  FiO2 (%): 40 %    Intake/Output:     Intake/Output Summary (Last 24 hours) at 2025 0724  Last data filed at 2025 0659  Gross per 24 hour   Intake 800 ml   Output 3000 ml   Net -2200 ml       Last 3 Weights   25 0000 (!) 339 lb 11.2 oz (154.1 kg)   01/10/25 0600 (!) 341 lb 4.8 oz (154.8 kg)   25 0430 (!) 365 lb 14.4 oz (166 kg)   25 1602 (!) 340 lb 6.2 oz (154.4 kg)   25 1144 (!) 328 lb (148.8 kg)   23 1318 (!) 305 lb (138.3 kg)   23 0900 300 lb (136.1 kg)   23 1227 300 lb (136.1 kg)           Physical Exam:     General: AAO x 3, No apparent distress. No respiratory or constitutional distress.  HEENT: Normocephalic, anicteric sclera, neck supple.  Neck: No JVD, carotids 2+, no bruits.  Cardiac: irreg irreg. S1, S2 normal. No murmur, pericardial rub, S3.  Lungs: Clear without wheezes, rales, rhonchi or dullness.  Normal excursions and effort.  Abdomen: Soft, non-tender. BS-present.  Extremities: Without clubbing, cyanosis or edema.  Peripheral pulses are 2+.  Neurologic: no focal deficits  Skin: Warm and dry.     Laboratory/Data:    Labs:         Recent Labs   Lab 01/10/25  0409 25  0452 25  0501 25  0518 25  0402   WBC 9.2 9.2 8.9 11.1* 9.8   HGB 11.6* 11.5* 11.3* 11.7* 11.6*   .8* 108.6* 109.7* 109.5* 109.1*   .0* 136.0* 128.0* 120.0* 117.0*       Recent Labs   Lab 01/10/25  0409 25  0452 25  0646 25  0459 25  0518 25  0402    145  --  147* 144 140   K 3.8 3.6  3.6  --  3.7  3.7 3.3*  3.3* 3.5  3.5    106   --  105 103 101   CO2 32.0 36.0*  --  38.0* 35.0* 36.0*   BUN 32* 31*  --  29* 22 20   CREATSERUM 1.13 1.07  --  1.09 0.95 0.88   CA 9.7 9.7  --  9.8 9.6 9.4   MG 1.5* 1.4*  --  1.7 2.0 1.8   PHOS 2.5  --  2.4 3.2  3.2 2.2* 2.0*   * 131*  --  100* 108* 102*       Recent Labs   Lab 01/09/25  0442 01/10/25  0409 01/11/25  0452 01/12/25 0459 01/13/25  0518 01/14/25  0402   ALT 8* 8* 11  --  18 23   AST 14 22 23  --  38* 46*   ALB 3.2 3.4 3.4 3.6 3.4 3.5       No results for input(s): \"TROP\" in the last 168 hours.    Allergies:   Allergies[1]    Medications:  Current Facility-Administered Medications   Medication Dose Route Frequency    potassium phosphate dibasic 15 mmol in sodium chloride 0.9% 250 mL IVPB  15 mmol Intravenous Once    Followed by    potassium chloride 20 mEq/100mL IVPB premix 20 mEq  20 mEq Intravenous Once    vancomycin (Vancocin) cap 125 mg  125 mg Oral QID    tamsulosin (Flomax) cap 0.4 mg  0.4 mg Oral Nightly    apixaban (Eliquis) tab 5 mg  5 mg Oral BID    glucose (Dex4) 15 GM/59ML oral liquid 15 g  15 g Oral Q15 Min PRN    Or    glucose (Glutose) 40% oral gel 15 g  15 g Oral Q15 Min PRN    Or    glucose-vitamin C (Dex-4) chewable tab 4 tablet  4 tablet Oral Q15 Min PRN    Or    dextrose 50% injection 50 mL  50 mL Intravenous Q15 Min PRN    Or    glucose (Dex4) 15 GM/59ML oral liquid 30 g  30 g Oral Q15 Min PRN    Or    glucose (Glutose) 40% oral gel 30 g  30 g Oral Q15 Min PRN    Or    glucose-vitamin C (Dex-4) chewable tab 8 tablet  8 tablet Oral Q15 Min PRN    insulin aspart (NovoLOG) 100 Units/mL FlexPen 2-10 Units  2-10 Units Subcutaneous TID AC and HS    midodrine (ProAmatine) tab 10 mg  10 mg Oral TID    furosemide (Lasix) 10 mg/mL injection 40 mg  40 mg Intravenous BID (Diuretic)    dextrose 10% infusion (TPN no rate)   Intravenous Continuous PRN    polyethylene glycol (PEG 3350) (Miralax) 17 g oral packet 17 g  17 g Per NG Tube Daily PRN    bisacodyl (Dulcolax) 10 MG rectal  suppository 10 mg  10 mg Rectal Daily PRN    atorvastatin (Lipitor) tab 20 mg  20 mg Oral Nightly    cyanocobalamin (Vitamin B12) tab 2,000 mcg  2,000 mcg Oral Daily    acetaminophen (Tylenol) tab 650 mg  650 mg Oral Q6H PRN    ondansetron (Zofran) 4 MG/2ML injection 4 mg  4 mg Intravenous Q6H PRN    multivitamin (Tab-A-Kieran/Beta Carotene) tab 1 tablet  1 tablet Oral Daily    thiamine (Vitamin B1) tab 100 mg  100 mg Oral Daily    miconazole 2 % powder   Topical BID         Assessment:  Cardiopulmonary Arrest - etiology not entirely clear.  One dose of epi, brief CPR.  2.     Acute hypoxic Resp failure, extubated on 1/11/25.  3.    Chronic A Fib with slow VR, has been stable.  4.    Morbid Obesity  5.    EMILIO on CKD - resolved.  Initially fluids, now diuresing well.  6.    Sacral decubitus  7.    ETOH abuse     Plan:   Continue IV diuresis as tolerated.  Continue midodrine.  Tolerate low BP's if Asx.  Mobilize, Ptx.  Ok for CTU.  Eliquis resumed, hb stable.      Alisa  L3       [1]   Allergies  Allergen Reactions    Metformin DIARRHEA

## 2025-01-15 LAB
ALBUMIN SERPL-MCNC: 3.5 G/DL (ref 3.2–4.8)
ALBUMIN/GLOB SERPL: 1.2 {RATIO} (ref 1–2)
ALP LIVER SERPL-CCNC: 99 U/L
ALT SERPL-CCNC: 33 U/L
ANION GAP SERPL CALC-SCNC: 3 MMOL/L (ref 0–18)
AST SERPL-CCNC: 56 U/L (ref ?–34)
BASOPHILS # BLD AUTO: 0.03 X10(3) UL (ref 0–0.2)
BASOPHILS NFR BLD AUTO: 0.4 %
BILIRUB SERPL-MCNC: 1 MG/DL (ref 0.2–1.1)
BUN BLD-MCNC: 18 MG/DL (ref 9–23)
CALCIUM BLD-MCNC: 9.9 MG/DL (ref 8.7–10.6)
CHLORIDE SERPL-SCNC: 103 MMOL/L (ref 98–112)
CO2 SERPL-SCNC: 37 MMOL/L (ref 21–32)
CREAT BLD-MCNC: 0.75 MG/DL
EGFRCR SERPLBLD CKD-EPI 2021: 96 ML/MIN/1.73M2 (ref 60–?)
EOSINOPHIL # BLD AUTO: 0.44 X10(3) UL (ref 0–0.7)
EOSINOPHIL NFR BLD AUTO: 5.7 %
ERYTHROCYTE [DISTWIDTH] IN BLOOD BY AUTOMATED COUNT: 13.7 %
GLOBULIN PLAS-MCNC: 3 G/DL (ref 2–3.5)
GLUCOSE BLD-MCNC: 112 MG/DL (ref 70–99)
GLUCOSE BLD-MCNC: 125 MG/DL (ref 70–99)
GLUCOSE BLD-MCNC: 89 MG/DL (ref 70–99)
GLUCOSE BLD-MCNC: 91 MG/DL (ref 70–99)
GLUCOSE BLD-MCNC: 97 MG/DL (ref 70–99)
HCT VFR BLD AUTO: 34.5 %
HGB BLD-MCNC: 11.6 G/DL
IMM GRANULOCYTES # BLD AUTO: 0.02 X10(3) UL (ref 0–1)
IMM GRANULOCYTES NFR BLD: 0.3 %
LYMPHOCYTES # BLD AUTO: 1.03 X10(3) UL (ref 1–4)
LYMPHOCYTES NFR BLD AUTO: 13.3 %
MAGNESIUM SERPL-MCNC: 1.7 MG/DL (ref 1.6–2.6)
MCH RBC QN AUTO: 36.3 PG (ref 26–34)
MCHC RBC AUTO-ENTMCNC: 33.6 G/DL (ref 31–37)
MCV RBC AUTO: 107.8 FL
MONOCYTES # BLD AUTO: 0.96 X10(3) UL (ref 0.1–1)
MONOCYTES NFR BLD AUTO: 12.4 %
NEUTROPHILS # BLD AUTO: 5.27 X10 (3) UL (ref 1.5–7.7)
NEUTROPHILS # BLD AUTO: 5.27 X10(3) UL (ref 1.5–7.7)
NEUTROPHILS NFR BLD AUTO: 67.9 %
OSMOLALITY SERPL CALC.SUM OF ELEC: 298 MOSM/KG (ref 275–295)
PHOSPHATE SERPL-MCNC: 2.1 MG/DL (ref 2.4–5.1)
PLATELET # BLD AUTO: 121 10(3)UL (ref 150–450)
POTASSIUM SERPL-SCNC: 3.5 MMOL/L (ref 3.5–5.1)
POTASSIUM SERPL-SCNC: 3.5 MMOL/L (ref 3.5–5.1)
PROT SERPL-MCNC: 6.5 G/DL (ref 5.7–8.2)
RBC # BLD AUTO: 3.2 X10(6)UL
SODIUM SERPL-SCNC: 143 MMOL/L (ref 136–145)
WBC # BLD AUTO: 7.8 X10(3) UL (ref 4–11)

## 2025-01-15 PROCEDURE — 99221 1ST HOSP IP/OBS SF/LOW 40: CPT | Performed by: NEUROLOGICAL SURGERY

## 2025-01-15 RX ORDER — MAGNESIUM OXIDE 400 MG/1
400 TABLET ORAL ONCE
Status: COMPLETED | OUTPATIENT
Start: 2025-01-15 | End: 2025-01-15

## 2025-01-15 RX ORDER — POTASSIUM CHLORIDE 14.9 MG/ML
20 INJECTION INTRAVENOUS ONCE
Status: COMPLETED | OUTPATIENT
Start: 2025-01-15 | End: 2025-01-15

## 2025-01-15 NOTE — PROGRESS NOTES
[facility]   part of [facility]    Nephrology Progress Note    Gerardo Torrez Attending:  Peter Flores DO     Cc: EMILIO    SUBJECTIVE     Feeling better.     PHYSICAL EXAM   Vital signs: /63 (BP Location: Right arm)   Pulse 91   Temp 98 °F (36.7 °C) (Oral)   Resp 18   Ht 5' 10\" (1.778 m)   Wt (!) 339 lb 11.2 oz (154.1 kg)   SpO2 93%   BMI 48.74 kg/m²   Temp (24hrs), Av.5 °F (36.9 °C), Min:98 °F (36.7 °C), Max:99 °F (37.2 °C)       Intake/Output Summary (Last 24 hours) at 1/15/2025 1254  Last data filed at 1/15/2025 1136  Gross per 24 hour   Intake 418 ml   Output 2000 ml   Net -1582 ml     Wt Readings from Last 3 Encounters:   23 (!) 305 lb (138.3 kg)   23 300 lb (136.1 kg)   22 (!) 320 lb (145.2 kg)     General: no distress  HEENT: NCAT  Cardiac: RRR  Lungs: + oxygen  Abdomen: +distended   Extremities: + edema  Neurologic: awake     MEDS     Current Facility-Administered Medications   Medication Dose Route Frequency    furosemide (Lasix) tab 40 mg  40 mg Oral BID (Diuretic)    vancomycin (Vancocin) cap 125 mg  125 mg Oral QID    tamsulosin (Flomax) cap 0.4 mg  0.4 mg Oral Nightly    apixaban (Eliquis) tab 5 mg  5 mg Oral BID    glucose (Dex4) 15 GM/59ML oral liquid 15 g  15 g Oral Q15 Min PRN    Or    glucose (Glutose) 40% oral gel 15 g  15 g Oral Q15 Min PRN    Or    glucose-vitamin C (Dex-4) chewable tab 4 tablet  4 tablet Oral Q15 Min PRN    Or    dextrose 50% injection 50 mL  50 mL Intravenous Q15 Min PRN    Or    glucose (Dex4) 15 GM/59ML oral liquid 30 g  30 g Oral Q15 Min PRN    Or    glucose (Glutose) 40% oral gel 30 g  30 g Oral Q15 Min PRN    Or    glucose-vitamin C (Dex-4) chewable tab 8 tablet  8 tablet Oral Q15 Min PRN    insulin aspart (NovoLOG) 100 Units/mL FlexPen 2-10 Units  2-10 Units Subcutaneous TID AC and HS    dextrose 10% infusion (TPN no rate)   Intravenous Continuous PRN    polyethylene glycol (PEG 3350) (Miralax) 17 g oral packet 17 g  17 g  Per NG Tube Daily PRN    bisacodyl (Dulcolax) 10 MG rectal suppository 10 mg  10 mg Rectal Daily PRN    atorvastatin (Lipitor) tab 20 mg  20 mg Oral Nightly    cyanocobalamin (Vitamin B12) tab 2,000 mcg  2,000 mcg Oral Daily    acetaminophen (Tylenol) tab 650 mg  650 mg Oral Q6H PRN    ondansetron (Zofran) 4 MG/2ML injection 4 mg  4 mg Intravenous Q6H PRN    multivitamin (Tab-A-Kieran/Beta Carotene) tab 1 tablet  1 tablet Oral Daily    thiamine (Vitamin B1) tab 100 mg  100 mg Oral Daily    miconazole 2 % powder   Topical BID       LABS     Lab Results   Component Value Date    WBC 7.8 01/15/2025    HGB 11.6 01/15/2025    HCT 34.5 01/15/2025    .0 01/15/2025    CREATSERUM 0.75 01/15/2025    BUN 18 01/15/2025     01/15/2025    K 3.5 01/15/2025    K 3.5 01/15/2025     01/15/2025    CO2 37.0 01/15/2025    GLU 97 01/15/2025    CA 9.9 01/15/2025    ALB 3.5 01/15/2025    ALKPHO 99 01/15/2025    BILT 1.0 01/15/2025    TP 6.5 01/15/2025    AST 56 01/15/2025    ALT 33 01/15/2025    MG 1.7 01/15/2025    PHOS 2.1 01/15/2025    PGLU 112 01/15/2025       IMAGING   All imaging studies personally reviewed.    XR ABDOMEN (1 VIEW) (CPT=74018)   Final Result   PROCEDURE:  XR ABDOMEN (1 VIEW) (CPT=74018)       INDICATIONS:  abdominal distention       COMPARISON:  [facility] , XR, XR ABDOMEN (1 VIEW) (CPT=74018), 1/07/2025, 5:39    AM.       TECHNIQUE:  Supine AP view was obtained.       PATIENT STATED HISTORY: (As transcribed by Technologist)  Patient offered    no additional history at this time.             FINDINGS:     BOWEL GAS PATTERN:  Normal.  No abnormal dilation or deviation.     CALCIFICATIONS:  None significant.   OTHER:  Negative.  No abnormal gaseous collections.                           =====   CONCLUSION:  No evidence of bowel obstruction or free air.  NG tube within    the stomach.           LOCATION:  [facility]           Dictated by (CST): Hemanth Silvestre MD on 1/10/2025 at 1:03 PM        Finalized by (CST):  Hemanth Silvestre MD on 1/10/2025 at 1:03 PM         XR CHEST AP PORTABLE  (CPT=71045)   Final Result   PROCEDURE:  XR CHEST AP PORTABLE  (CPT=71045)       TECHNIQUE:  AP chest radiograph was obtained.       COMPARISON:  [facility] , XR, XR CHEST AP PORTABLE  (CPT=71045), 1/07/2025,    7:41 AM.  [facility] , XR, XR CHEST AP PORTABLE  (CPT=71045), 1/07/2025, 6:37    AM.       INDICATIONS:  Increased O2 needs       PATIENT STATED HISTORY: (As transcribed by Technologist)  Patient offered    no additional history at this time.            FINDINGS:  Right-sided triple-lumen catheter.  Enteric tube extending    beyond the diaphragm the tip not included on this examination.     Endotracheal tube with tip approximately 3 cm above the level of the    gianna.  Cardiomegaly.  Interstitial opacities    bilaterally.  Retrocardiac opacity.  Small left-sided pleural effusion.     No pneumothorax.                         =====   CONCLUSION:     1. Cardiomegaly with interstitial opacities likely representing edema.   2. Retrocardiac opacity suggestive of pneumonia.   3. Small left-sided pleural effusion.             LOCATION:  [facility]                       Dictated by (CST): Elaine Felix MD on 1/09/2025 at 8:06 AM        Finalized by (CST): Elaine Felix MD on 1/09/2025 at 8:09 AM         CT ABDOMEN+PELVIS(CPT=74176)   Final Result   PROCEDURE:  CT ABDOMEN+PELVIS (CPT=74176)       COMPARISON:  [facility] , CT, CT ABD(C/S)/PELVIS(C) (SET), 3/14/2006, 7:09 PM.       INDICATIONS:  abdominal distention       TECHNIQUE:  Unenhanced multislice CT scanning was performed from the dome    of the diaphragm to the pubic symphysis.  Dose reduction techniques were    used. Dose information is transmitted to the ACR (American College of    Radiology) NRDR (National Radiology    Data Registry) which includes the Dose Index Registry.       PATIENT STATED HISTORY: (As transcribed by Technologist)  Patient is here    for evaluation of abdominal distention.              FINDINGS:     LIVER:  No enlargement, atrophy, abnormal density, or significant focal    lesion.     BILIARY:  There is gallbladder wall thickening and mild stranding around    the gallbladder.  There are no calcified stones detected.   PANCREAS:  No lesion, fluid collection, ductal dilatation, or atrophy.     SPLEEN:  No enlargement or focal lesion.     KIDNEYS:  Kidneys are unremarkable.   ADRENALS:  No mass or enlargement.     AORTA/VASCULAR:    Aorta is diffusely atherosclerotic but not aneurysmal.   RETROPERITONEUM:  There is an enlarged portal caval space lymph node    series 3, image 47 which measures 3.6 x 1.7 cm.    BOWEL/MESENTERY:  There is a nasogastric tube with tip in gastric body.     There is enlarged lymph node noted in region of gastrohepatic ligament    series 3, image 44 which measures 2.4 x 2 cm.    ABDOMINAL WALL:  Diffuse body wall edema is noted.   URINARY BLADDER:  No visible focal wall thickening, lesion, or calculus.     PELVIC NODES:  No adenopathy.     PELVIC ORGANS:  No visible mass.  Pelvic organs appropriate for patient    age.     BONES:  There is diffuse osteopenia noted.  There is diffuse degenerative    disc disease in the spine.   LUNG BASES:  There are moderate bilateral pleural effusions and there is    dependent atelectasis in the lower lobes.   OTHER:  Negative.                           =====   CONCLUSION:     1. There is diffuse body wall edema.  There are moderate bilateral pleural    effusions and there is dependent atelectasis likely related to diffuse    edematous state.   2. Gallbladder wall thickening and mild stranding around gallbladder could    also be related to diffusely edematous state although correlation with any    clinical evidence of cholecystitis is necessary.   3. There is retroperitoneal and gastrohepatic ligament lymphadenopathy    which is of uncertain significance.   4. Nasogastric tube tip is in region of gastric body.              LOCATION:  [facility]           Dictated by (CST): Ahmet Griffith MD on 1/07/2025 at 10:16 AM        Finalized by (CST): Ahmet Griffith MD on 1/07/2025 at 10:22 AM         XR CHEST AP PORTABLE  (CPT=71045)   Final Result   PROCEDURE:  XR CHEST AP PORTABLE  (CPT=71045)       TECHNIQUE:  AP chest radiograph was obtained.       COMPARISON:  [facility] , XR, XR CHEST AP PORTABLE  (CPT=71045), 1/07/2025,    6:37 AM.       INDICATIONS:  Verify correct tube placement       PATIENT STATED HISTORY: (As transcribed by Technologist)  Patient offered    no additional history at this time.                                         =====   CONCLUSION:         Stable cardiac and mediastinal contours.  Findings of congestive heart    failure with moderate pulmonary edema.  A small left pleural effusion is    suspected, similar to prior.  No pneumothorax.       Endotracheal tube tip terminates approximately 5 cm from the gianna.     Enteric catheter extends into the upper abdomen beyond the field of view.     Interval placement of right IJ central venous catheter terminating in the    mid/lower SVC.           LOCATION:  [place] [address]                       Dictated by (CST): Yudy Colon MD on 1/07/2025 at 8:22 AM        Finalized by (CST): Yudy Colon MD on 1/07/2025 at 8:23 AM         XR CHEST AP PORTABLE  (CPT=71045)   Final Result   PROCEDURE:  XR CHEST AP PORTABLE  (CPT=71045)       TECHNIQUE:  AP chest radiograph was obtained.       COMPARISON:  [facility], XR, XR CHEST PA + LAT CHEST (CPT=71046),    11/01/2023, 12:38 PM.       INDICATIONS:  s/p intubation       PATIENT STATED HISTORY: (As transcribed by Technologist)  Patient offered    no additional history at this time.             FINDINGS:                           =====   CONCLUSION:  Stable cardiac and mediastinal contours.  Findings of    pulmonary venous hypertension with mild/moderate pulmonary edema.  A    small/mild left pleural effusion is suspected.  No appreciable     pneumothorax.       Endotracheal tube tip terminates approximately 4 cm from the gianna.     Enteric catheter extends into the upper abdomen beyond the field of view.           LOCATION:  [id number]                       Dictated by (CST): Yudy Colon MD on 1/07/2025 at 6:50 AM        Finalized by (CST): Yudy Colon MD on 1/07/2025 at 6:51 AM         XR ABDOMEN (1 VIEW) (CPT=74018)   Final Result   PROCEDURE:  XR ABDOMEN (1 VIEW) (CPT=74018)       INDICATIONS:  abdomen distended       COMPARISON:  None.       TECHNIQUE:  Supine AP view was obtained.       PATIENT STATED HISTORY: (As transcribed by Technologist)  Patient offered    no additional history at this time.                                   =====   CONCLUSION:         Under penetration related to body habitus degrades assessment of the bowel    gas pattern.  Within this limitation there appears to be moderate gaseous    distention of the stomach and small bowel with relative paucity of colonic    gas.  Findings may reflect    ileus or bowel obstruction.           LOCATION:  [id number]           Dictated by (CST): Yudy Colon MD on 1/07/2025 at 6:48 AM        Finalized by (CST): Yudy Colon MD on 1/07/2025 at 6:49 AM         US KIDNEY/BLADDER (CPT=76770)   Final Result   PROCEDURE:  US KIDNEY/BLADDER (CPT=76770)       COMPARISON:  None.       INDICATIONS:  Bleeding ulcers       TECHNIQUE:  Transabdominal gray scale ultrasound imaging of the bilateral    kidneys and bladder was performed.  Routine technique was utilized.         PATIENT STATED HISTORY: (As transcribed by Technologist)              FINDINGS:        RIGHT KIDNEY   MEASUREMENTS:  13.5 x 7.3 x 8.4 cm   ECHOGENICITY:  Normal.   HYDRONEPHROSIS:  None.   CYSTS/STONES/MASSES:  None.       LEFT KIDNEY    MEASUREMENTS:  12.7 x 7.6 x 7.9 cm   ECHOGENICITY:  Normal.   HYDRONEPHROSIS:  None.   CYSTS/STONES/MASSES:  None.       BLADDER:  Empty, not evaluated    OTHER:  Negative.                         =====    CONCLUSION:  Normal appearance of the kidneys.           LOCATION:  [id number]                   Dictated by (CST): Aftab Georges MD on 1/06/2025 at 1:26 PM        Finalized by (RHIANNON): Aftab Georges MD on 1/06/2025 at 1:29 PM               ASSESSMENT & PLAN      74 year old male w chronic Afib, DM, lymphedema, morbid obesity essentially wheelchair bound w sacral decubitus ulcer, asc ao aneurysm who called 911 today as was bleeding profusely from decubitus ulcer. Nephrology consulted for EMILIO. Hospital course c/b cardiac arrest, acute resp failure requiring intubation and shock.      EMILIO - improved.  -- likely prerenal due decreased intravascular volume + poor perfusion w hypotension   -- renal US w no hydro.   -- hold torsemide, jardiance, irbesartan, spironolactone  -- Diuretics per Cardiology, monitor kidney function closely.   -- Luis placed, strict UOP     Anemia /acute blood loss anemia  -- transfusion prn.        Nephrology will sign off at this time. Please contact us with any questions or concerns.       Thank you for allowing me to participate in the care of this patient. Please do not hesitate to call with questions or concerns.     Isaiah Hale, DO  [facility] - Nephrology

## 2025-01-15 NOTE — CM/SW NOTE
Met with pt at bedside to discuss DC planning.  He called his wife who participated by speaker phone.  She stated preference for [facility] in [place].  Facility reserved in [facility].  Message sent to [facility] to ask that she updates [employer] to chosen facility.  Noted [facility] is approved through 1/16.  Await medical clearance for discharge. / to remain available for support and/or discharge planning.     Saira Villafana [place]  P:[phone]  F:[phone]    Christiane Lopez [facility]  Discharge Planner  [phone]

## 2025-01-15 NOTE — SLP NOTE
SPEECH DAILY NOTE - INPATIENT    ASSESSMENT & PLAN   ASSESSMENT  Pt seen for dysphagia tx to assess tolerance with recommended diet, ensure proper utilization of aspiration precautions and provide pt/family education. Patient received awake, alert, and recalled seeing this SLP and reason.  Patient on 3L O2 via NC; vocal quality clear and speech intelligible.  Patient reported and demonstrated no overt clinical s/s aspiration observed or suspected over regular diet textures and thin liquids via straw sip intake.  Patient respiratory status remained stable.  Patient reported his spouse was out looking at rehab facilities at the moment.  Clinical d/w RN revealed patient tolerated PO intake and medications without incident.  All questions were answered to patient's understanding and satisfaction.  No further skilled SLP follow-up is recommended at this time.        Diet Recommendations - Solids: Regular  Diet Recommendations - Liquids: Thin Liquids  Aspiration Precautions: Upright position;Slow rate;Small bites;Small sips;1:1 feeding  Medication Administration Recommendations: One pill at a time    Patient Experiencing Pain: No     Treatment Plan  Treatment Plan/Recommendations: No further inpatient SLP service warranted    Interdisciplinary Communication: Discussed with RN            GOALS  Goal #1 The patient will tolerate regular consistency and thin liquids without overt signs or symptoms of aspiration with 80 % accuracy over 1-2 session(s). Met   Goal #2 The patient/family/caregiver will demonstrate understanding and implementation of aspiration precautions and swallow strategies independently over 1-2 session(s).    Met       FOLLOW UP  Follow Up Needed (Documentation Required): No       Session: 1 of 1    If you have any questions, please contact JULIO Enriquez MS CCC-SLP/L  Speech-Language Pathologist  Spect[facility]Link [phone]

## 2025-01-15 NOTE — CONSULTS
[facility]  JEAN CLAUDE Neurosurgery Consult    Gerardo Torrez Patient Status:  Inpatient    6/15/1950 MRN [id number]   Location [facility] [place] 3SW-A Attending Peter Flores, DO   Hosp Day # 9 PCP Sid Gordon MD     REASON FOR CONSULTATION:  RUE weakness    HISTORY OF PRESENT ILLNESS     Gerardo Torrez is a 74 year old male with PMH of CAD, HFpEF, A-fib (recently started on Eliquis), CKD, DM type II, morbid obesity, gout, HTN, HLD, neuropathy, EtOH abuse, and pulmonary hypertension who presented to ED on 25 with profuse bleeding from a chronic sacral decubitus ulcer and diarrhea. Additional workup revealed a host of additional medical issues for which patient remains hospitalized, including acute respiratory failure, abdominal distension, acute renal failure, acute blood loss anemia secondary to bleeding sacral decubitus ulcer, and thrombocytopenia. Patient suffered a cardiac arrest of uncertain etiology during a bedside KUB on  and remained intubated until . He was transferred to the Ortho-Spine unit on . Prior to transfer, critical care noticed increased RUE weakness on exam therefore Neurosurgery was consulted due to concern for cervical myelopathy.     On exam, patient is awake and alert sitting upright in bed. Gerardo states he has had weakness of the RUE for the last 10-15 years. He denies numbness or tingling of the upper or lower extremities. Denies bowel/bladder incontinence or retention. He is able to lift both upper extremities off the bed but demonstrates limited ROM of the proximal UE bilaterally - states this is his baseline. His right arm does appear swollen. States he ambulates with a walker at baseline but has had increasing difficulty walking over the last few years due to shortness of breath.     There is no current spine imaging, as pt is unable to tolerate lying flat in CT scanner and cannot obtain MRI given body habitus. Last MRI cervical spine dated 21 reports multilevel,  bilateral foraminal narrowing from C4-C7. Images are unavailable for review.     PAST MEDICAL HISTORY     Past Medical History:    Back problem    CAD (coronary artery disease)    Calculus of kidney    Diabetes (HCC)    Diabetes mellitus type 2 in obese    Dilated aortic root (HCC)    GOUT    Gout    High blood pressure    High cholesterol    KIDNEY STONE    Morbid obesity (HCC)    Neuropathy    OTHER DISEASES    SVT Chato    PONV (postoperative nausea and vomiting)    Pulmonary hypertension (HCC)    Thoracic aortic aneurysm (HCC)     PAST SURGICAL HISTORY:  Past Surgical History:   Procedure Laterality Date    Appendectomy      Back surgery      discectomy/cauda equina   [place]    Colonoscopy  2003    polyps/[facility]  [place]    Colonoscopy  2009    Colonoscopy      Colonoscopy N/A 10/28/2015    Procedure: COLONOSCOPY;  Surgeon: Reed Garcia;  Location:  ENDOSCOPY    Colonoscopy N/A 7/18/2023    Procedure: COLONOSCOPY;  Surgeon: Evgeny Horner MD;  Location:  ENDOSCOPY    Each add tooth extraction      Lithotripsy      Other surgical history      achilles rupture     FAMILY HISTORY:  family history includes Cancer in his father.    SOCIAL HISTORY:   reports that he quit smoking about 6 years ago. His smoking use included cigarettes. He started smoking about 26 years ago. He has a 20 pack-year smoking history. He has never used smokeless tobacco. He reports that he does not currently use alcohol after a past usage of about 3.0 - 4.0 standard drinks of alcohol per week. He reports that he does not use drugs.    ALLERGIES     Allergies[1]    MEDICATIONS     Prescriptions Prior to Admission[2]  Current Facility-Administered Medications   Medication Dose Route Frequency    furosemide (Lasix) tab 40 mg  40 mg Oral BID (Diuretic)    vancomycin (Vancocin) cap 125 mg  125 mg Oral QID    tamsulosin (Flomax) cap 0.4 mg  0.4 mg Oral Nightly    apixaban (Eliquis) tab 5 mg  5 mg Oral BID    glucose (Dex4) 15 GM/59ML  oral liquid 15 g  15 g Oral Q15 Min PRN    Or    glucose (Glutose) 40% oral gel 15 g  15 g Oral Q15 Min PRN    Or    glucose-vitamin C (Dex-4) chewable tab 4 tablet  4 tablet Oral Q15 Min PRN    Or    dextrose 50% injection 50 mL  50 mL Intravenous Q15 Min PRN    Or    glucose (Dex4) 15 GM/59ML oral liquid 30 g  30 g Oral Q15 Min PRN    Or    glucose (Glutose) 40% oral gel 30 g  30 g Oral Q15 Min PRN    Or    glucose-vitamin C (Dex-4) chewable tab 8 tablet  8 tablet Oral Q15 Min PRN    insulin aspart (NovoLOG) 100 Units/mL FlexPen 2-10 Units  2-10 Units Subcutaneous TID AC and HS    dextrose 10% infusion (TPN no rate)   Intravenous Continuous PRN    polyethylene glycol (PEG 3350) (Miralax) 17 g oral packet 17 g  17 g Per NG Tube Daily PRN    bisacodyl (Dulcolax) 10 MG rectal suppository 10 mg  10 mg Rectal Daily PRN    atorvastatin (Lipitor) tab 20 mg  20 mg Oral Nightly    cyanocobalamin (Vitamin B12) tab 2,000 mcg  2,000 mcg Oral Daily    acetaminophen (Tylenol) tab 650 mg  650 mg Oral Q6H PRN    ondansetron (Zofran) 4 MG/2ML injection 4 mg  4 mg Intravenous Q6H PRN    multivitamin (Tab-A-Kieran/Beta Carotene) tab 1 tablet  1 tablet Oral Daily    thiamine (Vitamin B1) tab 100 mg  100 mg Oral Daily    miconazole 2 % powder   Topical BID       REVIEW OF SYSTEMS     Comprehensive Review of Systems obtained, and is negative other than that mentioned in the History of Present Illness.      PHYSICAL EXAMINATION     VITALS: /58 (BP Location: Right arm)   Pulse 93   Temp 98.1 °F (36.7 °C) (Oral)   Resp 16   Ht 70\"   Wt (!) 339 lb 11.2 oz (154.1 kg)   SpO2 96%   BMI 48.74 kg/m²     GENERAL:  No acute distress, non-toxic appearing, speech fluent, mood appropriate    HEENT:  Normocephalic, atraumatic    RESP: Non-labored, easy, even    CV: NSR on tele    NEUROLOGICAL:  Alert and oriented x3.  Sensation to light touch is intact bilaterally.  GOLDEN x 4. Gait deferred.       UPPER EXTREMITY STRENGTH:    Deltoid  Biceps   Triceps        Right 4 4 4 4     Left 4 5 5 5   Swelling noted to distal aspect of RUE (below elbow).    DIAGNOSTIC DATA     Lab Results   Component Value Date    WBC 7.8 01/15/2025    HGB 11.6 01/15/2025    HCT 34.5 01/15/2025    .0 01/15/2025    CREATSERUM 0.75 01/15/2025    BUN 18 01/15/2025     01/15/2025    K 3.5 01/15/2025    K 3.5 01/15/2025     01/15/2025    CO2 37.0 01/15/2025    GLU 97 01/15/2025    CA 9.9 01/15/2025    ALB 3.5 01/15/2025    ALKPHO 99 01/15/2025    BILT 1.0 01/15/2025    TP 6.5 01/15/2025    AST 56 01/15/2025    ALT 33 01/15/2025    MG 1.7 01/15/2025    PHOS 2.1 01/15/2025    PGLU 89 01/15/2025     IMAGING     XR ABDOMEN (1 VIEW) (CPT=74018)    Result Date: 1/10/2025  CONCLUSION:  No evidence of bowel obstruction or free air.  NG tube within the stomach.   LOCATION:  [facility]   Dictated by (CST): Hemanth Silvestre MD on 1/10/2025 at 1:03 PM     Finalized by (CST): Hemanth Silvestre MD on 1/10/2025 at 1:03 PM       XR CHEST AP PORTABLE  (CPT=71045)    Result Date: 1/9/2025  CONCLUSION:  1. Cardiomegaly with interstitial opacities likely representing edema. 2. Retrocardiac opacity suggestive of pneumonia. 3. Small left-sided pleural effusion.    LOCATION:  [facility]      Dictated by (CST): Elaine Felix MD on 1/09/2025 at 8:06 AM     Finalized by (CST): Elaine Felix MD on 1/09/2025 at 8:09 AM       CT ABDOMEN+PELVIS(CPT=74176)    Result Date: 1/7/2025  CONCLUSION:  1. There is diffuse body wall edema.  There are moderate bilateral pleural effusions and there is dependent atelectasis likely related to diffuse edematous state. 2. Gallbladder wall thickening and mild stranding around gallbladder could also be related to diffusely edematous state although correlation with any clinical evidence of cholecystitis is necessary. 3. There is retroperitoneal and gastrohepatic ligament lymphadenopathy which is of uncertain significance. 4. Nasogastric tube tip is in region of gastric  body.    LOCATION:  [facility]   Dictated by (CST): Ahmet Griffith MD on 1/07/2025 at 10:16 AM     Finalized by (CST): Ahmet Griffith MD on 1/07/2025 at 10:22 AM       XR CHEST AP PORTABLE  (CPT=71045)    Result Date: 1/7/2025  CONCLUSION:   Stable cardiac and mediastinal contours.  Findings of congestive heart failure with moderate pulmonary edema.  A small left pleural effusion is suspected, similar to prior.  No pneumothorax.  Endotracheal tube tip terminates approximately 5 cm from the gianna.  Enteric catheter extends into the upper abdomen beyond the field of view.  Interval placement of right IJ central venous catheter terminating in the mid/lower SVC.   LOCATION:  [id number]      Dictated by (CST): Yudy Colon MD on 1/07/2025 at 8:22 AM     Finalized by (CST): Yudy Colon MD on 1/07/2025 at 8:23 AM       XR CHEST AP PORTABLE  (CPT=71045)    Result Date: 1/7/2025  CONCLUSION:  Stable cardiac and mediastinal contours.  Findings of pulmonary venous hypertension with mild/moderate pulmonary edema.  A small/mild left pleural effusion is suspected.  No appreciable pneumothorax.  Endotracheal tube tip terminates approximately 4 cm from the gianna.  Enteric catheter extends into the upper abdomen beyond the field of view.   LOCATION:  [id number]      Dictated by (CST): Yudy Colon MD on 1/07/2025 at 6:50 AM     Finalized by (CST): Yudy Colon MD on 1/07/2025 at 6:51 AM       XR ABDOMEN (1 VIEW) (CPT=74018)    Result Date: 1/7/2025  CONCLUSION:   Under penetration related to body habitus degrades assessment of the bowel gas pattern.  Within this limitation there appears to be moderate gaseous distention of the stomach and small bowel with relative paucity of colonic gas.  Findings may reflect ileus or bowel obstruction.   LOCATION:  [id number]   Dictated by (CST): Yudy Colon MD on 1/07/2025 at 6:48 AM     Finalized by (CST): Yudy Colon MD on 1/07/2025 at 6:49 AM       US KIDNEY/BLADDER (CPT=76770)    Result Date:  1/6/2025  CONCLUSION:  Normal appearance of the kidneys.   LOCATION:  [id number]     Dictated by (CST): Aftab Georges MD on 1/06/2025 at 1:26 PM     Finalized by (CST): Aftab Georges MD on 1/06/2025 at 1:29 PM         ASSESSMENT & PLAN     ASSESSMENT:  Chronic bilateral upper extremity weakness, R > L  Acute renal failure  Cardiac arrest  Acute respiratory failure  Acute blood loss anemia   Sacral decubitus ulcer    Patient does present with clinical findings of myelopathy on exam. He reports chronic weakness of the RUE, which is observed but is mild on exam. He does appear to have significant RUE edema which may be limiting his ability to exert strength on confrontational exam - recommend that this be further evaluated via US doppler to r/o DVT. Given patient's multiple comorbdities and active issues at this time, recommend that he is optimized from a medical standpoint and follow up on an as-needed basis in the outpatient setting.  We discussed with Gerardo that he would have to pursue imaging at an outside facility given his body habitus, which he understands.     PLAN:  No acute surgical intervention is indicated  Medical management per hospitalist and consulting disciplines   Neurosurgery will sign off.    The above plan was discussed with Dr. Warren. Thank you very much for the consult.     Whit Farrar, APRN-NP  [facility]  1/15/2025 9:00 AM     Total visit time: 20 minutes; More than 50% spent coordinating care, counseling, reviewing imaging and discussing medication therapy.     Is this a shared or split note between Advanced Practice Provider and Physician? Yes         [1]   Allergies  Allergen Reactions    Metformin DIARRHEA   [2]   Medications Prior to Admission   Medication Sig Dispense Refill Last Dose/Taking    Cholecalciferol (D3 OR) Take 1 tablet by mouth Noon.   1/5/2025 Noon    apixaban (ELIQUIS) 5 MG Oral Tab Take 1 tablet (5 mg total) by mouth 2 (two) times daily.   1/5/2025  Bedtime    dilTIAZem HCl ER Beads 120 MG Oral Capsule SR 24 Hr Take 1 capsule (120 mg total) by mouth daily.   1/4/2025    spironolactone 25 MG Oral Tab Take 0.5 tablets (12.5 mg total) by mouth daily.   1/5/2025    metoprolol succinate ER (TOPROL XL) 50 MG Oral Tablet 24 Hr Take 1 tablet (50 mg total) by mouth 2 (two) times daily.   1/5/2025    torsemide 10 MG Oral Tab Take 1 tablet (10 mg total) by mouth 3 (three) times a week.   1/5/2025 Morning    torsemide 10 MG Oral Tab See Admin Instructions. Take 1 tablet (10 MG total) by mouth twice daily four days a week.   1/4/2025    folic acid 1 MG Oral Tab Take 1 tablet (1 mg total) by mouth daily.   1/5/2025    Semaglutide (RYBELSUS) 7 MG Oral Tab Take 1 tablet by mouth daily.   1/6/2025 Morning    empagliflozin 10 MG Oral Tab Take 1 tablet (10 mg total) by mouth daily.   1/5/2025    allopurinol 300 MG Oral Tab Take 1 tablet (300 mg total) by mouth daily.   1/5/2025    Irbesartan 150 MG Oral Tab Take 1 tablet (150 mg total) by mouth daily.   1/5/2025    tamsulosin (FLOMAX) cap TAKE 1 CAPSULE(0.4 MG) BY MOUTH DAILY (Patient taking differently: Take 1 capsule (0.4 mg total) by mouth daily.) 90 capsule 1 1/5/2025    Cyanocobalamin (B-12) 1000 MCG Oral Tab Take 2,000 mcg by mouth Noon.   1/5/2025 Noon    atorvastatin (LIPITOR) 20 MG Oral Tab Take 1 tablet (20 mg total) by mouth daily.   1/5/2025 Bedtime    aspirin 81 MG Oral Tab EC Take 1 tablet (81 mg total) by mouth at bedtime.   1/5/2025 Bedtime

## 2025-01-15 NOTE — PROGRESS NOTES
[facility]  Cardiology Progress Note    Gerardo Torrez Patient Status:  Inpatient    6/15/1950 MRN [id number]   [facility] 4SW-A Attending Peter Flores, DO   Hosp Day # 9 PCP Sid Gordon MD       Subjective:  Uneventful night, being transferred to [facility].  HR, BP stable, 3 L NC.  Uneventful night.    Objective:   Temp: 98.5 °F (36.9 °C)  Pulse: 76  Resp: 23  BP: 117/60  FiO2 (%): 40 %    Intake/Output:     Intake/Output Summary (Last 24 hours) at 1/15/2025 0703  Last data filed at 1/15/2025 0548  Gross per 24 hour   Intake 658 ml   Output 2600 ml   Net -1942 ml       Last 3 Weights   25 0000 (!) 339 lb 11.2 oz (154.1 kg)   01/10/25 0600 (!) 341 lb 4.8 oz (154.8 kg)   25 0430 (!) 365 lb 14.4 oz (166 kg)   25 1602 (!) 340 lb 6.2 oz (154.4 kg)   25 1144 (!) 328 lb (148.8 kg)   23 1318 (!) 305 lb (138.3 kg)   23 0900 300 lb (136.1 kg)   23 1227 300 lb (136.1 kg)           Physical Exam:     General: AAO x 3, No apparent distress. No respiratory or constitutional distress.  HEENT: Normocephalic, anicteric sclera, neck supple.  Neck: No JVD, carotids 2+, no bruits.  Cardiac: irreg irreg. S1, S2 normal. No murmur, pericardial rub, S3.  Lungs: Clear without wheezes, rales, rhonchi or dullness.  Normal excursions and effort.  Abdomen: Soft, non-tender. BS-present.  Extremities: Without clubbing, cyanosis or edema.  Peripheral pulses are 2+.  Neurologic: no focal deficits  Skin: Warm and dry.     Laboratory/Data:    Labs:         Recent Labs   Lab 25  0452 25  0501 25  0518 25  0402 01/15/25  0508   WBC 9.2 8.9 11.1* 9.8 7.8   HGB 11.5* 11.3* 11.7* 11.6* 11.6*   .6* 109.7* 109.5* 109.1* 107.8*   .0* 128.0* 120.0* 117.0* 121.0*       Recent Labs   Lab 25  0452 25  0646 25  0459 25  0518 25  0402 01/15/25  0508     --  147* 144 140 143   K 3.6  3.6  --  3.7  3.7 3.3*  3.3* 3.5  3.5 3.5   3.5     --  105 103 101 103   CO2 36.0*  --  38.0* 35.0* 36.0* 37.0*   BUN 31*  --  29* 22 20 18   CREATSERUM 1.07  --  1.09 0.95 0.88 0.75   CA 9.7  --  9.8 9.6 9.4 9.9   MG 1.4*  --  1.7 2.0 1.8 1.7   PHOS  --  2.4 3.2  3.2 2.2* 2.0* 2.1*   *  --  100* 108* 102* 97       Recent Labs   Lab 01/10/25  0409 01/11/25  0452 01/12/25  0459 01/13/25  0518 01/14/25  0402 01/15/25  0508   ALT 8* 11  --  18 23 33   AST 22 23  --  38* 46* 56*   ALB 3.4 3.4 3.6 3.4 3.5 3.5       No results for input(s): \"TROP\" in the last 168 hours.    Allergies:   Allergies[1]    Medications:  Current Facility-Administered Medications   Medication Dose Route Frequency    furosemide (Lasix) tab 40 mg  40 mg Oral BID (Diuretic)    vancomycin (Vancocin) cap 125 mg  125 mg Oral QID    tamsulosin (Flomax) cap 0.4 mg  0.4 mg Oral Nightly    apixaban (Eliquis) tab 5 mg  5 mg Oral BID    glucose (Dex4) 15 GM/59ML oral liquid 15 g  15 g Oral Q15 Min PRN    Or    glucose (Glutose) 40% oral gel 15 g  15 g Oral Q15 Min PRN    Or    glucose-vitamin C (Dex-4) chewable tab 4 tablet  4 tablet Oral Q15 Min PRN    Or    dextrose 50% injection 50 mL  50 mL Intravenous Q15 Min PRN    Or    glucose (Dex4) 15 GM/59ML oral liquid 30 g  30 g Oral Q15 Min PRN    Or    glucose (Glutose) 40% oral gel 30 g  30 g Oral Q15 Min PRN    Or    glucose-vitamin C (Dex-4) chewable tab 8 tablet  8 tablet Oral Q15 Min PRN    insulin aspart (NovoLOG) 100 Units/mL FlexPen 2-10 Units  2-10 Units Subcutaneous TID AC and HS    dextrose 10% infusion (TPN no rate)   Intravenous Continuous PRN    polyethylene glycol (PEG 3350) (Miralax) 17 g oral packet 17 g  17 g Per NG Tube Daily PRN    bisacodyl (Dulcolax) 10 MG rectal suppository 10 mg  10 mg Rectal Daily PRN    atorvastatin (Lipitor) tab 20 mg  20 mg Oral Nightly    cyanocobalamin (Vitamin B12) tab 2,000 mcg  2,000 mcg Oral Daily    acetaminophen (Tylenol) tab 650 mg  650 mg Oral Q6H PRN    ondansetron (Zofran) 4 MG/2ML  injection 4 mg  4 mg Intravenous Q6H PRN    multivitamin (Tab-A-Kieran/Beta Carotene) tab 1 tablet  1 tablet Oral Daily    thiamine (Vitamin B1) tab 100 mg  100 mg Oral Daily    miconazole 2 % powder   Topical BID         Assessment:  Cardiopulmonary Arrest - etiology not entirely clear.  One dose of epi, brief CPR.  2.     Acute hypoxic Resp failure, extubated on 1/11/25.  3.    Chronic A Fib with slow VR, has been stable.  4.    Morbid Obesity  5.    EMILIO on CKD - resolved.  Initially fluids, now diuresing well.  6.    Sacral decubitus  7.    ETOH abuse   8. Hypotension - resolved.  Off pressors and midodrine.    Plan:   Now on po diuretics and likely volume ok.  Eliquis resumed.  Lexiscan cardiolite tomorrow.  Had cardiac arrest, troponins never drawn.          Alisa  L3       [1]   Allergies  Allergen Reactions    Metformin DIARRHEA

## 2025-01-15 NOTE — PROGRESS NOTES
[facility]   part of [facility] Hospitalist Progress Note     Gerardo Torrez Patient Status:  Inpatient    6/15/1950 MRN [id number]   Location [facility] 4SW-A Attending Peter Flores, DO   Hosp Day # 9 PCP Sid Gordon MD     Follow Up:  The primary encounter diagnosis was Acute renal failure, unspecified acute renal failure type (HCC). A diagnosis of Anemia, unspecified type was also pertinent to this visit.    Subjective:     Patient seen and examined.  Transferred out of ICU to the floor.  Feeling better, tolerating diet, diarrhea improving. No F/C, N/V.     Objective:    Review of Systems:   10 point ROS completed and was negative, except for pertinent positive and negatives stated in subjective.    Vital signs:  Temp:  [98.1 °F (36.7 °C)-99 °F (37.2 °C)] 98.1 °F (36.7 °C)  Pulse:  [76-93] 93  Resp:  [16-44] 16  BP: (117-135)/(58-69) 132/58  SpO2:  [88 %-97 %] 96 %  FiO2 (%):  [40 %] 40 %    Physical Exam:    Gen:  Aox3, NAD, Chronically ill appearing male.   HEENT:  EOMI, PERRLA, OP clear, MMM.  OGT in place.  Pulm:  Course to auscultation on vent.  CV: Heart with regular rate and rhythm, no murmur.  Normal PMI.    Abd:  Mod to severe abd distention, hypoactive bowel sounds, no TTP. Morbid obesity.    MSK: +lymphedema, +chronic venous stasis dermatitis.   Skin: +sacral decub  Neuro:  Grossly intact, no focal deficits  : Luis cath in place    Diagnostic Data:    Labs:  Recent Labs   Lab 25  0452 25  0501 25  0518 25  0402 01/15/25  0508   WBC 9.2 8.9 11.1* 9.8 7.8   HGB 11.5* 11.3* 11.7* 11.6* 11.6*   .6* 109.7* 109.5* 109.1* 107.8*   .0* 128.0* 120.0* 117.0* 121.0*       Recent Labs   Lab 25  0518 25  0402 01/15/25  0508   * 102* 97   BUN 22 20 18   CREATSERUM 0.95 0.88 0.75   CA 9.6 9.4 9.9   ALB 3.4 3.5 3.5    140 143   K 3.3*  3.3* 3.5  3.5 3.5  3.5    101 103   CO2 35.0* 36.0* 37.0*    ALKPHO 84 90 99   AST 38* 46* 56*   ALT 18 23 33   BILT 1.1 1.0 1.0   TP 6.3 6.6 6.5       Estimated Creatinine Clearance: 91.9 mL/min (based on SCr of 0.75 mg/dL).    No results for input(s): \"PTP\", \"INR\" in the last 168 hours.           COVID-19 Lab Results    COVID-19  Lab Results   Component Value Date    COVID19 Not Detected 07/20/2022    COVID19 DETECTED (A) 01/26/2021       Pro-Calcitonin  No results for input(s): \"PCT\" in the last 168 hours.    Cardiac  No results for input(s): \"TROP\", \"PBNP\" in the last 168 hours.    Creatinine Kinase  No results for input(s): \"CK\" in the last 168 hours.    Inflammatory Markers  No results for input(s): \"CRP\", \"KENNEDY\", \"LDH\", \"DDIMER\" in the last 168 hours.    Imaging: Imaging data reviewed in Epic.    Medications:    furosemide  40 mg Oral BID (Diuretic)    vancomycin  125 mg Oral QID    tamsulosin  0.4 mg Oral Nightly    apixaban  5 mg Oral BID    insulin aspart  2-10 Units Subcutaneous TID AC and HS    atorvastatin  20 mg Oral Nightly    cyanocobalamin  2,000 mcg Oral Daily    multivitamin  1 tablet Oral Daily    thiamine  100 mg Oral Daily    miconazole   Topical BID       Assessment & Plan:      74 yr old male with PMH sig for a-fib on eliquis, HTN, HLD, DM II, gout, pulm HTN, chronic sacral decub, and morbid obesity who presented to the ED for evaluation of bleeding from his sacral wound.       # Cardiac arrest   - concern for primary intra-abd event   - ROSC obtained s/p epi x 1  - maintain normothermia   - lexiscan today to r/o ischemic etiology     # Acute respiratory failure  - intubated in setting of cardiac arrest   - extubated 1/11  - s/p IV diuresis, now transitioned to lasix 40 mg po BID  - wean O2 as tolerated     # Shock  - suspect distributive/hypovolemic  - s/p IVFs  - s/p vasopressors   - s/p empiric zosyn (1/8 - 1/13)  - cultures neg  - resolving     # Abdominal distention   - CT a/p neg for SBO or perf  - OG placed to LIS, now removed  - cont empiric  abx  - 1/10 KUB without obstruction or free air  - gen surg c/s appreciated     # Acute renal failure   - suspect prerenal in the setting of GI losses and ARB use  - improved  - renal US neg for hydro  - monitor renal function and UO, avoid nephrotoxins   - diuretics per cards  - renal c/s appreciated      # Bleeding sacral decub   # Acute blood loss anemia  - held eliquis, resumed 1/14  - monitor CBC  - wound care c/s      # Persistent a-fib  - rate controlled  - ECHO with intact LVEF  - hold metoprolol given bradycardia    - held eliquis for now given bleeding from sacral decub, resumed 1/13     # Essential HTN  - low on admit  - hold ARB, metoprolol     # HLD  - cont statin     # Pulm HTN  - monitor volume status on IVFs  - diuretics per cards     # Thrombocytopenia   - suspect due to EtOH use and possible liver disease   - monitor      # EtOH abuse   - monitor for withdrawal   - MVI, thiamine, folate   - pt counseled on EtOH cessation      # Morbid obesity  - Recommend follow up with PCP to discuss diet and lifestyle modifications to encourage weight loss.    # C-diff infection   - cont po vanco (1/13 - ).  Plan to complete 10 day course.     # Suspected cervical myelopathy   - NSGY c/s appreciated   - no further intervention or imaging at this time  - PT/OT    Plan of care: inpt care.  Await insurance approval for SALAS.     Plan of care discussed with pt's spouse at bedside who agrees.      Peter Flores, DO    Supplementary Documentation:   DVT Mechanical Prophylaxis:   SCDs,    DVT Pharmacologic Prophylaxis   Medication    apixaban (Eliquis) tab 5 mg                Code Status: Full Code  Luis: External urinary catheter in place  Luis Duration (in days): 1  Central line:    ETHAN:

## 2025-01-15 NOTE — CONGREGATE LIVING REVIEW
[facility] Authorization    The [facility] Review Committee has reviewed this case and the patient IS APPROVED for discharge to a facility for Short Term Skilled once the following procedure is followed:     - The physician discharge instructions (contained within the RAJAT note for SNF) must inlcude the below appropriate and approved COVID instructions to the facility    For questions regarding CLRC approval process, please contact the CM assigned to the case.  For questions regarding RN discharge workflow, please contact the unit Clinical Leader.

## 2025-01-15 NOTE — PLAN OF CARE
Assumed care of pt after RN report. Alert and oriented. Denies pain. 2L O2. VSS. Tolereating diet. Loose BMs. External cath in place. Up in chair almost 2 hours this shift with total lift. See flowsheets for full assessments. POC discussed with pt and wife bedside. DC planning in process.

## 2025-01-16 ENCOUNTER — APPOINTMENT (OUTPATIENT)
Dept: CV DIAGNOSTICS | Facility: HOSPITAL | Age: 75
End: 2025-01-16
Attending: INTERNAL MEDICINE
Payer: MEDICARE

## 2025-01-16 LAB
ALBUMIN SERPL-MCNC: 3.5 G/DL (ref 3.2–4.8)
ANION GAP SERPL CALC-SCNC: 7 MMOL/L (ref 0–18)
BUN BLD-MCNC: 16 MG/DL (ref 9–23)
CALCIUM BLD-MCNC: 9.8 MG/DL (ref 8.7–10.6)
CHLORIDE SERPL-SCNC: 102 MMOL/L (ref 98–112)
CO2 SERPL-SCNC: 36 MMOL/L (ref 21–32)
CREAT BLD-MCNC: 0.7 MG/DL
EGFRCR SERPLBLD CKD-EPI 2021: 98 ML/MIN/1.73M2 (ref 60–?)
GLUCOSE BLD-MCNC: 103 MG/DL (ref 70–99)
GLUCOSE BLD-MCNC: 109 MG/DL (ref 70–99)
GLUCOSE BLD-MCNC: 111 MG/DL (ref 70–99)
GLUCOSE BLD-MCNC: 113 MG/DL (ref 70–99)
GLUCOSE BLD-MCNC: 99 MG/DL (ref 70–99)
MAGNESIUM SERPL-MCNC: 1.8 MG/DL (ref 1.6–2.6)
OSMOLALITY SERPL CALC.SUM OF ELEC: 302 MOSM/KG (ref 275–295)
PHOSPHATE SERPL-MCNC: 2.5 MG/DL (ref 2.4–5.1)
PHOSPHATE SERPL-MCNC: 2.5 MG/DL (ref 2.4–5.1)
POTASSIUM SERPL-SCNC: 3.5 MMOL/L (ref 3.5–5.1)
POTASSIUM SERPL-SCNC: 3.5 MMOL/L (ref 3.5–5.1)
SODIUM SERPL-SCNC: 145 MMOL/L (ref 136–145)

## 2025-01-16 PROCEDURE — 93017 CV STRESS TEST TRACING ONLY: CPT | Performed by: INTERNAL MEDICINE

## 2025-01-16 PROCEDURE — 78452 HT MUSCLE IMAGE SPECT MULT: CPT | Performed by: INTERNAL MEDICINE

## 2025-01-16 RX ORDER — REGADENOSON 0.08 MG/ML
INJECTION, SOLUTION INTRAVENOUS
Status: COMPLETED
Start: 2025-01-16 | End: 2025-01-16

## 2025-01-16 RX ORDER — POTASSIUM CHLORIDE 14.9 MG/ML
20 INJECTION INTRAVENOUS ONCE
Status: COMPLETED | OUTPATIENT
Start: 2025-01-16 | End: 2025-01-16

## 2025-01-16 RX ORDER — MAGNESIUM OXIDE 400 MG/1
400 TABLET ORAL ONCE
Status: COMPLETED | OUTPATIENT
Start: 2025-01-16 | End: 2025-01-16

## 2025-01-16 RX ORDER — METOPROLOL SUCCINATE 25 MG/1
25 TABLET, EXTENDED RELEASE ORAL
Status: DISCONTINUED | OUTPATIENT
Start: 2025-01-17 | End: 2025-01-17

## 2025-01-16 NOTE — PROGRESS NOTES
[facility]   part of [facility] Hospitalist Progress Note     Gerardo Torrez Patient Status:  Inpatient    6/15/1950 MRN [id number]   Location [facility] 4SW-A Attending Peter Flores, DO   Hosp Day # 10 PCP Sid Gordon MD     Follow Up:  The primary encounter diagnosis was Acute renal failure, unspecified acute renal failure type (HCC). A diagnosis of Anemia, unspecified type was also pertinent to this visit.    Subjective:     Patient seen and examined.  Still with some mucous, water diarrhea.  Going to stress test today.  No F/C, N/V.     Objective:    Review of Systems:   10 point ROS completed and was negative, except for pertinent positive and negatives stated in subjective.    Vital signs:  Temp:  [97.9 °F (36.6 °C)-99 °F (37.2 °C)] 99 °F (37.2 °C)  Pulse:  [65-91] 70  Resp:  [16-18] 17  BP: (126-153)/(58-63) 130/58  SpO2:  [92 %-96 %] 92 %    Physical Exam:    Gen:  Aox3, NAD, Chronically ill appearing male.   HEENT:  EOMI, PERRLA, OP clear, MMM.  OGT in place.  Pulm:  Course to auscultation on vent.  CV: Heart with regular rate and rhythm, no murmur.  Normal PMI.    Abd:  Mod to severe abd distention, hypoactive bowel sounds, no TTP. Morbid obesity.    MSK: +lymphedema, +chronic venous stasis dermatitis.   Skin: +sacral decub  Neuro:  Grossly intact, no focal deficits    Diagnostic Data:    Labs:  Recent Labs   Lab 25  0452 25  0501 25  0518 25  0402 01/15/25  0508   WBC 9.2 8.9 11.1* 9.8 7.8   HGB 11.5* 11.3* 11.7* 11.6* 11.6*   .6* 109.7* 109.5* 109.1* 107.8*   .0* 128.0* 120.0* 117.0* 121.0*       Recent Labs   Lab 25  0518 25  0402 01/15/25  0508 25  0522   * 102* 97 109*   BUN 22 20 18 16   CREATSERUM 0.95 0.88 0.75 0.70   CA 9.6 9.4 9.9 9.8   ALB 3.4 3.5 3.5 3.5    140 143 145   K 3.3*  3.3* 3.5  3.5 3.5  3.5 3.5  3.5    101 103 102   CO2 35.0* 36.0* 37.0* 36.0*   ALKPHO 84 90 99   --    AST 38* 46* 56*  --    ALT 18 23 33  --    BILT 1.1 1.0 1.0  --    TP 6.3 6.6 6.5  --        Estimated Creatinine Clearance: 98.5 mL/min (based on SCr of 0.7 mg/dL).    No results for input(s): \"PTP\", \"INR\" in the last 168 hours.           COVID-19 Lab Results    COVID-19  Lab Results   Component Value Date    COVID19 Not Detected 07/20/2022    COVID19 DETECTED (A) 01/26/2021       Pro-Calcitonin  No results for input(s): \"PCT\" in the last 168 hours.    Cardiac  No results for input(s): \"TROP\", \"PBNP\" in the last 168 hours.    Creatinine Kinase  No results for input(s): \"CK\" in the last 168 hours.    Inflammatory Markers  No results for input(s): \"CRP\", \"KENNEDY\", \"LDH\", \"DDIMER\" in the last 168 hours.    Imaging: Imaging data reviewed in Epic.    Medications:    potassium phosphate dibasic 15 mmol in sodium chloride 0.9% 250 mL IVPB  15 mmol Intravenous Once    Followed by    potassium chloride  20 mEq Intravenous Once    furosemide  40 mg Oral BID (Diuretic)    vancomycin  125 mg Oral QID    tamsulosin  0.4 mg Oral Nightly    apixaban  5 mg Oral BID    insulin aspart  2-10 Units Subcutaneous TID AC and HS    atorvastatin  20 mg Oral Nightly    cyanocobalamin  2,000 mcg Oral Daily    multivitamin  1 tablet Oral Daily    thiamine  100 mg Oral Daily    miconazole   Topical BID       Assessment & Plan:      74 yr old male with PMH sig for a-fib on eliquis, HTN, HLD, DM II, gout, pulm HTN, chronic sacral decub, and morbid obesity who presented to the ED for evaluation of bleeding from his sacral wound.       # Cardiac arrest   - concern for primary intra-abd event   - ROSC obtained s/p epi x 1  - maintain normothermia   - lexiscan today to r/o ischemic etiology     # Acute respiratory failure  - intubated in setting of cardiac arrest   - extubated 1/11  - s/p IV diuresis, now transitioned to lasix 40 mg po BID  - wean O2 as tolerated     # Shock  - suspect distributive/hypovolemic  - s/p IVFs  - s/p vasopressors   -  s/p empiric zosyn (1/8 - 1/13)  - cultures neg  - resolving     # Abdominal distention   - CT a/p neg for SBO or perf  - OG placed to LIS, now removed  - cont empiric abx  - 1/10 KUB without obstruction or free air  - gen surg c/s appreciated     # Acute renal failure   - suspect prerenal in the setting of GI losses and ARB use  - improved  - renal US neg for hydro  - monitor renal function and UO, avoid nephrotoxins   - diuretics per cards  - renal c/s appreciated      # Bleeding sacral decub   # Acute blood loss anemia  - held eliquis, resumed 1/14  - monitor CBC  - wound care c/s      # Persistent a-fib  - rate controlled  - ECHO with intact LVEF  - hold metoprolol given bradycardia    - held eliquis for now given bleeding from sacral decub, resumed 1/13     # Essential HTN  - low on admit  - hold ARB, metoprolol     # HLD  - cont statin     # Pulm HTN  - monitor volume status on IVFs  - diuretics per cards     # Thrombocytopenia   - suspect due to EtOH use and possible liver disease   - monitor      # EtOH abuse   - monitor for withdrawal   - MVI, thiamine, folate   - pt counseled on EtOH cessation      # Morbid obesity  - Recommend follow up with PCP to discuss diet and lifestyle modifications to encourage weight loss.    # C-diff infection   - cont po vanco (1/13 - ).  Plan to complete 10 day course.     # Suspected cervical myelopathy   - NSGY c/s appreciated   - no further intervention or imaging at this time  - PT/OT    Plan of care: inpt care.  Await insurance approval for [facility].     Plan of care discussed with pt's spouse at bedside who agrees.      Peter Flores DO    Supplementary Documentation:   DVT Mechanical Prophylaxis:   SCDs,    DVT Pharmacologic Prophylaxis   Medication    apixaban (Eliquis) tab 5 mg                Code Status: Full Code  Luis: External urinary catheter in place  Luis Duration (in days): 1  Central line:    ETHAN: 1/17/2025 or 1/18/2025

## 2025-01-16 NOTE — CM/SW NOTE
Updated Saira juarez of possible DC later today pending stress test and cardiac clearance.  Confirmed with DC that pt has until midnight on 1/17 to admit to [facility] even though auth expires on 1/16.  Await medical clearance for discharge.  / to remain available for support and/or discharge planning.     Saira Gutierrez  P:[phone]  F:[phone]    Christiane Lopez [facility]  Discharge Planner  [phone]    Addendum:  Met with pt's wife at bedside to discuss DC planning.  Pt off floor for procedure.  Pt with concerns about NH choice.  Discussed concerns and reviewed other possible options.  After discussion, pt's wife agreeable with plan for DC to N when pt's ready.  Discussed possible DC later today vs tomorrow pending medical clearance for discharge.

## 2025-01-16 NOTE — PROGRESS NOTES
Progress Note  Gerardo Torrez Patient Status:  Inpatient    6/15/1950 MRN [id number]   Location [facility] 3SW-A Attending Peter Flores, DO   Hosp Day # 10 PCP Sid Gordon MD     Subjective:  In bed on exam, no acute distress on nc. Denies cp, sob.     Objective:  /58 (BP Location: Right arm)   Pulse 70   Temp 99 °F (37.2 °C) (Axillary)   Resp 17   Ht 5' 10\" (1.778 m)   Wt (!) 339 lb 11.2 oz (154.1 kg)   SpO2 92%   BMI 48.74 kg/m²     Telemetry: afib      Intake/Output:    Intake/Output Summary (Last 24 hours) at 2025 1238  Last data filed at 1/15/2025 2215  Gross per 24 hour   Intake 480 ml   Output 1950 ml   Net -1470 ml       Last 3 Weights   25 0000 (!) 339 lb 11.2 oz (154.1 kg)   01/10/25 0600 (!) 341 lb 4.8 oz (154.8 kg)   25 0430 (!) 365 lb 14.4 oz (166 kg)   25 1602 (!) 340 lb 6.2 oz (154.4 kg)   25 1144 (!) 328 lb (148.8 kg)   23 1318 (!) 305 lb (138.3 kg)   23 0900 300 lb (136.1 kg)   23 1227 300 lb (136.1 kg)       Labs:  Recent Labs   Lab 25  0402 01/15/25  0508 25  0522   * 97 109*   BUN 20 18 16   CREATSERUM 0.88 0.75 0.70   EGFRCR 91 96 98   CA 9.4 9.9 9.8    143 145   K 3.5  3.5 3.5  3.5 3.5  3.5    103 102   CO2 36.0* 37.0* 36.0*     Recent Labs   Lab 25  0518 25  0402 01/15/25  0508   RBC 3.17* 3.20* 3.20*   HGB 11.7* 11.6* 11.6*   HCT 34.7* 34.9* 34.5*   .5* 109.1* 107.8*   MCH 36.9* 36.3* 36.3*   MCHC 33.7 33.2 33.6   RDW 14.3 14.0 13.7   NEPRELIM 8.55* 7.44 5.27   WBC 11.1* 9.8 7.8   .0* 117.0* 121.0*         No results for input(s): \"TROP\", \"TROPHS\", \"CK\" in the last 168 hours.    Review of Systems   Cardiovascular:  Positive for irregular heartbeat and leg swelling.   Respiratory: Negative.         Physical Exam:    Gen: alert, oriented x 3, NAD  Heent: pupils equal, reactive. Mucous membranes moist.   Neck: no jvd  Cardiac: irregularly irregular normal  S1,S2  Lungs: CTA/dim, on nc  Abd: soft, NT/ND +bs  Ext: mild chronic ble edema.  Skin: Warm, dry  Neuro: No focal deficits        Medications:     potassium phosphate dibasic 15 mmol in sodium chloride 0.9% 250 mL IVPB  15 mmol Intravenous Once    Followed by    potassium chloride  20 mEq Intravenous Once    furosemide  40 mg Oral BID (Diuretic)    vancomycin  125 mg Oral QID    tamsulosin  0.4 mg Oral Nightly    apixaban  5 mg Oral BID    insulin aspart  2-10 Units Subcutaneous TID AC and HS    atorvastatin  20 mg Oral Nightly    cyanocobalamin  2,000 mcg Oral Daily    multivitamin  1 tablet Oral Daily    thiamine  100 mg Oral Daily    miconazole   Topical BID      dextrose 10%             Assessment:  Cardiac arrest on 1/7, intraabdominal source suspected  Cpr and epi given with ROSC  Kalie today.   Echo 1/7/25-LVEF 55-60%.   Normal cardiac PET 2/2023  Initially presented on 1/6 with bleeding from sacral wound, diarrhea, abdominal distention. Acute blood loss anemia.   CT negative for SBO or perf.   Empiric abx.   Hgb stable.   Acute respiratory failure 2/2 above  Now extubated 1/11.   Shock, multifactorial-resolved.   EMILIO-creat 5.2 on admit. Resolved. Renal function has normalized.  Renal signed off.   Jardiance, arb, dorothy on hold. On po diuretics.   Permanent Afib, rates controlled.  Eliquis resumed 1/14, initially held due to anemia/bleeding.  Home bb and ccb have been on hold.   Obesity  Nonobstructive CAD by [facility] 11/2023  DMII-A1c 5.6  Lymphedema  Etoh use  HTN-controlled  HL-statin.   Cdiff + on po vanco.     Plan:  Home ccb and bb have been on hold. Rates controlled. Resume oral bb today at lower dose.   Overall negative 22.6L since admit. Needs weight. Currently on lasix 40mg po bid. Historically on on torsemide 10mg bid x4 days then 10mg daily the other. Can resume usual torsemide at dc.   Cont eliquis.   Prelim nuc results normal perfusion, EF 61%.   Home slgt2i, arb, dorothy remain on hold.   Plan for dc  to [facility]. Anticipate tomorrow.     Plan of care discussed with patient, RN.    Tana Zambrano, APRN  1/16/2025  12:38 PM

## 2025-01-16 NOTE — PROGRESS NOTES
CARDIODIAGNOSTIC PRELIMINARY REPORT:    LEXSICAN completed, tolerated well    Second set of images pending

## 2025-01-16 NOTE — PAYOR COMM NOTE
--------------  1/15:  CONTINUED STAY REVIEW    Payor: [employer]  Subscriber #:  [id number]  Authorization Number: [id number]    Admit date: 1/6/25  Admit time:  2:58 PM        CARDIOLOGY:    Subjective:  Uneventful night, being transferred to [facility].  HR, BP stable, 3 L NC.  Uneventful night.       Assessment:  Cardiopulmonary Arrest - etiology not entirely clear.  One dose of epi, brief CPR.  2.     Acute hypoxic Resp failure, extubated on 1/11/25.  3.    Chronic A Fib with slow VR, has been stable.  4.    Morbid Obesity  5.    EMILIO on CKD - resolved.  Initially fluids, now diuresing well.  6.    Sacral decubitus  7.    ETOH abuse   8. Hypotension - resolved.  Off pressors and midodrine.     Plan:   Now on po diuretics and likely volume ok.  Eliquis resumed.  Lexiscan cardiolite tomorrow.  Had cardiac arrest, troponins never drawn.              Alisa  L3  MEDICATIONS ADMINISTERED IN LAST 1 DAY:  apixaban (Eliquis) tab 5 mg       Date Action Dose Route User    1/15/2025 2056 Given 5 mg Oral Cruzito Davenport RN    1/15/2025 0827 Given 5 mg Oral Idalmis Gomez RN          atorvastatin (Lipitor) tab 20 mg       Date Action Dose Route User    1/15/2025 2056 Given 20 mg Oral Cruzito Davenport RN          furosemide (Lasix) tab 40 mg       Date Action Dose Route User    1/15/2025 1702 Given 40 mg Oral Idalmis Gomez RN    1/15/2025 0827 Given 40 mg Oral Idalmis Gomez RN          magnesium oxide (Mag-Ox) tab 400 mg       Date Action Dose Route User    1/15/2025 1522 Given 400 mg Oral Idalmis Gomez RN          miconazole 2 % powder       Date Action Dose Route User    1/15/2025 2103 Given (none) Topical Cruzito Davenport RN    1/15/2025 0828 Given (none) Topical Idalmis Gomez RN          potassium chloride 20 mEq/100mL IVPB premix 20 mEq       Date Action Dose Route User    1/15/2025 2056 New Bag 20 mEq Intravenous Cruzito Davenport RN          potassium phosphate dibasic 15 mmol in sodium  chloride 0.9% 250 mL IVPB       Date Action Dose Route User    1/15/2025 1522 New Bag 15 mmol Intravenous Idalmis Gomez RN          multivitamin (Tab-A-Kieran/Beta Carotene) tab 1 tablet       Date Action Dose Route User    1/15/2025 0827 Given 1 tablet Oral Idalmis Gomez RN          tamsulosin (Flomax) cap 0.4 mg       Date Action Dose Route User    1/15/2025 2056 Given 0.4 mg Oral Cruzito Davenport RN          thiamine (Vitamin B1) tab 100 mg       Date Action Dose Route User    1/15/2025 0827 Given 100 mg Oral Idalmis Gomez RN          vancomycin (Vancocin) cap 125 mg       Date Action Dose Route User    1/15/2025 2056 Given 125 mg Oral Cruzito Davenport RN    1/15/2025 1702 Given 125 mg Oral Idalmis Gomez RN    1/15/2025 1421 Given 125 mg Oral Idalmis Gomez RN    1/15/2025 0827 Given 125 mg Oral Idalmis Gomez RN          cyanocobalamin (Vitamin B12) tab 2,000 mcg       Date Action Dose Route User    1/15/2025 0827 Given 2,000 mcg Oral Idalmis Gomez RN            Vitals (last day)       Date/Time Temp Pulse Resp BP SpO2 Weight O2 Device O2 Flow Rate (L/min) [facility]    01/16/25 0405 98.1 °F (36.7 °C) 72 18 133/61 95 % -- Nasal cannula 2 L/min     01/16/25 0234 -- -- -- -- 94 % -- Nasal cannula 2 L/min     01/15/25 2355 98.3 °F (36.8 °C) 75 16 126/60 94 % -- Nasal cannula 2 L/min     01/15/25 2035 98 °F (36.7 °C) 83 16 127/59 94 % -- Nasal cannula 2 L/min     01/15/25 1639 97.9 °F (36.6 °C) 65 18 139/63 94 % -- Nasal cannula 2 L/min     01/15/25 1136 98 °F (36.7 °C) 91 18 153/63 93 % -- Nasal cannula 3 L/min     01/15/25 1015 -- -- -- -- -- -- Nasal cannula 3 L/min AM    01/15/25 0715 98.1 °F (36.7 °C) 93 16 132/58 96 % -- Nasal cannula 3 L/min     01/15/25 0440 -- -- -- -- 92 % -- -- --     01/15/25 0439 -- -- -- -- 90 % -- Nasal cannula 3 L/min     01/15/25 0433 -- -- -- -- 88 % -- None (Room air) --     01/15/25 0000 98.5 °F (36.9 °C) 76 23 117/60 97 % -- Bi-PAP -- ABBEY

## 2025-01-16 NOTE — PLAN OF CARE
Pt on bed, Aox3 VSS on 3LNC. Physical assessment completed, due meds given per MAR. Updated with POC. Denies pain at this time. Telemetry maintained, encouraged IS, refused SCDs at this time. Sfety precaution maintained, call light within reach. Kept comfortable.

## 2025-01-17 VITALS
SYSTOLIC BLOOD PRESSURE: 125 MMHG | HEIGHT: 70 IN | RESPIRATION RATE: 16 BRPM | DIASTOLIC BLOOD PRESSURE: 58 MMHG | OXYGEN SATURATION: 94 % | HEART RATE: 75 BPM | TEMPERATURE: 98 F | WEIGHT: 315 LBS | BODY MASS INDEX: 45.1 KG/M2

## 2025-01-17 LAB
ALBUMIN SERPL-MCNC: 3.7 G/DL (ref 3.2–4.8)
ALBUMIN SERPL-MCNC: 3.7 G/DL (ref 3.2–4.8)
ALBUMIN/GLOB SERPL: 1.3 {RATIO} (ref 1–2)
ALP LIVER SERPL-CCNC: 114 U/L
ALT SERPL-CCNC: 50 U/L
ANION GAP SERPL CALC-SCNC: 9 MMOL/L (ref 0–18)
ANION GAP SERPL CALC-SCNC: 9 MMOL/L (ref 0–18)
AST SERPL-CCNC: 78 U/L (ref ?–34)
BASOPHILS # BLD AUTO: 0.07 X10(3) UL (ref 0–0.2)
BASOPHILS NFR BLD AUTO: 0.9 %
BILIRUB SERPL-MCNC: 0.8 MG/DL (ref 0.2–1.1)
BUN BLD-MCNC: 13 MG/DL (ref 9–23)
BUN BLD-MCNC: 13 MG/DL (ref 9–23)
CALCIUM BLD-MCNC: 9.9 MG/DL (ref 8.7–10.6)
CALCIUM BLD-MCNC: 9.9 MG/DL (ref 8.7–10.6)
CHLORIDE SERPL-SCNC: 101 MMOL/L (ref 98–112)
CHLORIDE SERPL-SCNC: 101 MMOL/L (ref 98–112)
CO2 SERPL-SCNC: 33 MMOL/L (ref 21–32)
CO2 SERPL-SCNC: 33 MMOL/L (ref 21–32)
CREAT BLD-MCNC: 0.68 MG/DL
CREAT BLD-MCNC: 0.68 MG/DL
EGFRCR SERPLBLD CKD-EPI 2021: 99 ML/MIN/1.73M2 (ref 60–?)
EGFRCR SERPLBLD CKD-EPI 2021: 99 ML/MIN/1.73M2 (ref 60–?)
EOSINOPHIL # BLD AUTO: 0.43 X10(3) UL (ref 0–0.7)
EOSINOPHIL NFR BLD AUTO: 5.5 %
ERYTHROCYTE [DISTWIDTH] IN BLOOD BY AUTOMATED COUNT: 13.5 %
GLOBULIN PLAS-MCNC: 2.9 G/DL (ref 2–3.5)
GLUCOSE BLD-MCNC: 111 MG/DL (ref 70–99)
GLUCOSE BLD-MCNC: 111 MG/DL (ref 70–99)
GLUCOSE BLD-MCNC: 114 MG/DL (ref 70–99)
GLUCOSE BLD-MCNC: 119 MG/DL (ref 70–99)
HCT VFR BLD AUTO: 34.6 %
HGB BLD-MCNC: 11.6 G/DL
IMM GRANULOCYTES # BLD AUTO: 0.03 X10(3) UL (ref 0–1)
IMM GRANULOCYTES NFR BLD: 0.4 %
LYMPHOCYTES # BLD AUTO: 0.96 X10(3) UL (ref 1–4)
LYMPHOCYTES NFR BLD AUTO: 12.3 %
MAGNESIUM SERPL-MCNC: 1.7 MG/DL (ref 1.6–2.6)
MCH RBC QN AUTO: 36.7 PG (ref 26–34)
MCHC RBC AUTO-ENTMCNC: 33.5 G/DL (ref 31–37)
MCV RBC AUTO: 109.5 FL
MONOCYTES # BLD AUTO: 0.77 X10(3) UL (ref 0.1–1)
MONOCYTES NFR BLD AUTO: 9.9 %
NEUTROPHILS # BLD AUTO: 5.55 X10 (3) UL (ref 1.5–7.7)
NEUTROPHILS # BLD AUTO: 5.55 X10(3) UL (ref 1.5–7.7)
NEUTROPHILS NFR BLD AUTO: 71 %
OSMOLALITY SERPL CALC.SUM OF ELEC: 297 MOSM/KG (ref 275–295)
OSMOLALITY SERPL CALC.SUM OF ELEC: 297 MOSM/KG (ref 275–295)
PHOSPHATE SERPL-MCNC: 2.4 MG/DL (ref 2.4–5.1)
PHOSPHATE SERPL-MCNC: 2.4 MG/DL (ref 2.4–5.1)
PLATELET # BLD AUTO: 154 10(3)UL (ref 150–450)
POTASSIUM SERPL-SCNC: 3.5 MMOL/L (ref 3.5–5.1)
PROT SERPL-MCNC: 6.6 G/DL (ref 5.7–8.2)
RBC # BLD AUTO: 3.16 X10(6)UL
SODIUM SERPL-SCNC: 143 MMOL/L (ref 136–145)
SODIUM SERPL-SCNC: 143 MMOL/L (ref 136–145)
WBC # BLD AUTO: 7.8 X10(3) UL (ref 4–11)

## 2025-01-17 RX ORDER — FUROSEMIDE 40 MG/1
40 TABLET ORAL
Status: SHIPPED | COMMUNITY
Start: 2025-01-17

## 2025-01-17 RX ORDER — POLYETHYLENE GLYCOL 3350 17 G/17G
17 POWDER, FOR SOLUTION ORAL DAILY PRN
Status: SHIPPED | COMMUNITY
Start: 2025-01-17

## 2025-01-17 RX ORDER — METOPROLOL SUCCINATE 25 MG/1
25 TABLET, EXTENDED RELEASE ORAL
Status: SHIPPED | COMMUNITY
Start: 2025-01-18

## 2025-01-17 RX ORDER — MAGNESIUM OXIDE 400 MG/1
400 TABLET ORAL ONCE
Status: COMPLETED | OUTPATIENT
Start: 2025-01-17 | End: 2025-01-17

## 2025-01-17 RX ORDER — MELATONIN
100 DAILY
Status: SHIPPED | COMMUNITY
Start: 2025-01-18

## 2025-01-17 RX ORDER — VANCOMYCIN HYDROCHLORIDE 125 MG/1
125 CAPSULE ORAL 4 TIMES DAILY
Status: SHIPPED | COMMUNITY
Start: 2025-01-17

## 2025-01-17 RX ORDER — POTASSIUM CHLORIDE 14.9 MG/ML
20 INJECTION INTRAVENOUS ONCE
Status: DISCONTINUED | OUTPATIENT
Start: 2025-01-17 | End: 2025-01-17

## 2025-01-17 NOTE — PLAN OF CARE
A&Ox4, 2L O2, IS encouraged. On Clinitron bed, stress test completed. Denies pain. Electrolytes replaced per orders. DC planning to [facility].

## 2025-01-17 NOTE — DISCHARGE INSTRUCTIONS
Daily weights    BMP in 1 week    Aspiration precautions    Wound Cleaning and Dressings:   Wound cleansing:  normal saline   Wound cleaning frequency: Daily and PRN   Wound product: zinc oxide and sacral foam   Dressing change frequency:  Change dressing daily and/or PRN

## 2025-01-17 NOTE — CM/SW NOTE
Informed by RN that pt can discharge to [facility] today.  Spoke with Rosaura from [place] who confirmed pt can be accepted for admission today.  Ambulance transport scheduled for 1230pm.  PCS form completed and available for RN to print. Updated pt at bedside.  Pt to inform his wife.  Updated RN.  No other needs at this time.  / to remain available for support and/or discharge planning.     [facility]  P:[phone]  F:[phone]    [facility]  [phone]    Christiane Lopez [facility]  Discharge Planner  [phone]

## 2025-01-17 NOTE — PLAN OF CARE
Patient A&Ox4, VSS on 2L O2. BiPAP at night. Dressing changed by NOC RN overnight, C/D/I. On clinitron bed, heels protected. Denies pain or discomfort, tolerating diet, voiding via external cath. LBM 1/16. Plan to DC to [facility] today. POC reviewed with patient.      12:00: [facility] called to give report, was transferred to nursing station, no answer. Will call again.    12:45: Tried to call report again, no answer at nurses station. Patient picked up and on way to [facility].

## 2025-01-17 NOTE — PLAN OF CARE
Alert and Oriented x4. On 2L via NC. VSS. Tele-NS. Dressing to sacral changed and in place, C/D/I; Denies pain at this time. Voiding freely. Tolerating diet. Denies N/V. Call light within reach at this time.    Plan: DC to [facility] juarez when cleared

## 2025-01-17 NOTE — DISCHARGE SUMMARY
[facility] Internal Medicine Hospitalist Discharge Summary     Patient ID:  Gerardo Torrez  74 year old  6/15/1950    Admit date: 1/6/2025    Discharge date and time: 1/17/2025    Attending Physician: Peter Flores DO     Primary Care Physician: Sid Gordon MD     Discharge Diagnoses:   Cardiac arrest   Acute respiratory failure   Shock  Abdominal distention   EMILIO  Acute blood loss anemia   Persistent a-fib  HTN  HLD  Thrombocytopenia   EtOH abuse   Morbid obesity   C-diff infection   Suspected cervical myelopathy     Please note that only IHP DMG and EMG patients enrolled in the Medicare ACO, [facility] ACO and [facility] HMOs will be handled by the [facility] Care Management team.  For all other patients, please follow usual protocol for discharge care transition.    Discharge Condition: stable    Disposition:  Home    Important Follow up:  - PCP: within 5 days of SALAS discharge   - Consults: Mitzi, gen surg, pulC    Follow Up Items:  BMP per cards    Hospital Course:      74 yr old male with PMH sig for a-fib on eliquis, HTN, HLD, DM II, gout, pulm HTN, chronic sacral decub, and morbid obesity who presented to the ED for evaluation of bleeding from his sacral wound.       # Cardiac arrest   - concern for primary intra-abd event   - ROSC obtained s/p epi x 1  - maintain normothermia   - lexiscan 1/16 neg for ischemic etiology      # Acute respiratory failure  - intubated in setting of cardiac arrest   - extubated 1/11  - s/p IV diuresis, now transitioned to lasix 40 mg po BID  - wean O2 as tolerated      # Shock  - suspect distributive/hypovolemic  - s/p IVFs  - s/p vasopressors   - s/p empiric zosyn (1/8 - 1/13)  - cultures neg  - resolving      # Abdominal distention   - CT a/p neg for SBO or perf  - OG placed to LIS, now removed  - cont empiric abx  - 1/10 KUB without obstruction or free air  - gen surg c/s appreciated      # Chronic diastolic HF  - now compensated    - cont lasix 40 mg po BID  - monitor BMP    # Acute renal failure   - suspect prerenal in the setting of GI losses and ARB use  - improved  - renal US neg for hydro  - monitor renal function and UO, avoid nephrotoxins   - diuretics per cards  - renal c/s appreciated      # Bleeding sacral decub   # Acute blood loss anemia  - held eliquis, resumed 1/14  - monitor CBC  - wound care c/s      # Persistent a-fib  - rate controlled  - ECHO with intact LVEF  - held metoprolol given bradycardia now resumed   - held eliquis for now given bleeding from sacral decub, resumed 1/13     # Essential HTN  - low on admit  - hold ARB  - metoprolol resumed      # HLD  - cont statin     # Pulm HTN  - monitor volume status on IVFs  - diuretics per cards     # Thrombocytopenia   - suspect due to EtOH use and possible liver disease   - monitor      # EtOH abuse   - monitor for withdrawal   - MVI, thiamine, folate   - pt counseled on EtOH cessation      # Morbid obesity  - Recommend follow up with PCP to discuss diet and lifestyle modifications to encourage weight loss.     # C-diff infection   - cont po vanco (1/13 thought 1/26/25 ).  Plan to complete 14 day course.      # Suspected cervical myelopathy   - NSGY c/s appreciated   - no further intervention or imaging at this time  - PT/OT    # Sacral decub   - improving with wound care  - cont wound care recs    Stable for discharge to [facility].    Consults: IP CONSULT TO HOSPITALIST  IP CONSULT TO NEPHROLOGY  IP CONSULT TO CARDIOLOGY  CONSULT TO WOUND OSTOMY  IP CONSULT TO SPIRITUAL CARE  IP CONSULT TO SOCIAL WORK  IP CONSULT TO PHARMACY  IP CONSULT TO GENERAL SURGERY  NURSING CONSULT TO DIETITIAN  IP CONSULT TO SPIRITUAL CARE  IP CONSULT TO NEUROSURGERY    Operative Procedures:  None      Patient instructions:      I as the attending physician reconciled the current and discharge medications on day of discharge.     Current Discharge Medication List        START taking these medications     Details   miconazole 2 % External Powder Apply topically 2 times daily x 4 more days      furosemide 40 MG Oral Tab Take 1 tablet (40 mg total) by mouth BID (Diuretic).      polyethylene glycol, PEG 3350, 17 g Oral Powd Pack 17 g by Per NG Tube route daily as needed (constipation).      vancomycin 125 MG Oral Cap Take 1 capsule (125 mg total) by mouth 4 (four) times daily.      thiamine 100 MG Oral Tab Take 1 tablet (100 mg total) by mouth daily.           CONTINUE these medications which have CHANGED    Details   metoprolol succinate ER 25 MG Oral Tablet 24 Hr Take 1 tablet (25 mg total) by mouth Daily Beta Blocker. Hold for SBP < 100 and HR < 60           CONTINUE these medications which have NOT CHANGED    Details   Cholecalciferol (D3 OR) Take 1 tablet by mouth Noon.      apixaban (ELIQUIS) 5 MG Oral Tab Take 1 tablet (5 mg total) by mouth 2 (two) times daily.      folic acid 1 MG Oral Tab Take 1 tablet (1 mg total) by mouth daily.      allopurinol 300 MG Oral Tab Take 1 tablet (300 mg total) by mouth daily.      tamsulosin (FLOMAX) cap TAKE 1 CAPSULE(0.4 MG) BY MOUTH DAILY      Cyanocobalamin (B-12) 1000 MCG Oral Tab Take 2,000 mcg by mouth Noon.      atorvastatin (LIPITOR) 20 MG Oral Tab Take 1 tablet (20 mg total) by mouth daily.           STOP taking these medications       dilTIAZem HCl ER Beads 120 MG Oral Capsule SR 24 Hr        spironolactone 25 MG Oral Tab        torsemide 10 MG Oral Tab        torsemide 10 MG Oral Tab        Semaglutide (RYBELSUS) 7 MG Oral Tab        empagliflozin 10 MG Oral Tab        Irbesartan 150 MG Oral Tab        aspirin 81 MG Oral Tab EC              Activity:  Up with assist  Diet: cardiac diet  Wound Care: as directed  Code Status: Full Code      Exam on day of discharge:     Vitals:    01/17/25 0732   BP: 125/58   Pulse:    Resp: 16   Temp: 97.9 °F (36.6 °C)       Gen:  Aox3, NAD, Chronically ill appearing male.   HEENT:  EOMI, PERRLA, OP clear, MMM.  OGT in place.  Pulm:   Course to auscultation on vent.  CV: Heart with regular rate and rhythm, no murmur.  Normal PMI.    Abd:  Mod to severe abd distention, hypoactive bowel sounds, no TTP. Morbid obesity.    MSK: +lymphedema, +chronic venous stasis dermatitis.   Skin: +sacral decub  Neuro:  Grossly intact, no focal deficits      Total time coordinating care for discharge: Greater than 30 minutes    Peter Flores [facility] Center[facility]

## 2025-01-17 NOTE — PROGRESS NOTES
Progress Note  Gerardo Torrez Patient Status:  Inpatient    6/15/1950 MRN [id number]   Location [facility] 3SW-A Attending Peter Flores, DO   Hosp Day # 11 PCP Sid Gordon MD     Subjective:  Reports he feels the same as he has the last two days.  No chest pain, dyspnea at rest, or palpitations.  Edema is markedly improved since admission    Objective:  Physical Exam:   /58 (BP Location: Right arm)   Pulse 75   Temp 97.9 °F (36.6 °C) (Oral)   Resp 16   Ht 5' 10\" (1.778 m)   Wt (!) 339 lb 11.2 oz (154.1 kg)   SpO2 94%   BMI 48.74 kg/m²   Temp (24hrs), Av.5 °F (36.9 °C), Min:97.9 °F (36.6 °C), Max:99.2 °F (37.3 °C)       Intake/Output Summary (Last 24 hours) at 2025 0835  Last data filed at 2025 2230  Gross per 24 hour   Intake 480 ml   Output 1550 ml   Net -1070 ml     Wt Readings from Last 3 Encounters:   23 (!) 305 lb (138.3 kg)   23 300 lb (136.1 kg)   22 (!) 320 lb (145.2 kg)     Telemetry: afib rates in the 70's  General: Alert and oriented in no apparent distress lying comfortably in bed with son in law bedside.  HEENT: No focal deficits.  Neck: No JVD, carotids 2+ no bruits.  Cardiac: Irregulary irregular, S1, S2 normal, rub or gallop.  Lungs: Diminished breath sounds throughout otherwise clear without wheezes, rales, rhonchi or dullness.  Normal excursions and effort.  Abdomen: Soft, non-tender.   Extremities: Without clubbing, cyanosis or edema-chronic caitlyn discoloration of lower extremities.  Peripheral pulses are 2+.  Neurologic: Alert and oriented, normal affect.  Skin: Warm and dry.        Intake/Output:    Intake/Output Summary (Last 24 hours) at 2025 0835  Last data filed at 2025 2230  Gross per 24 hour   Intake 480 ml   Output 1550 ml   Net -1070 ml       Last 3 Weights   01/11/25 0000 (!) 339 lb 11.2 oz (154.1 kg)   01/10/25 0600 (!) 341 lb 4.8 oz (154.8 kg)   25 0430 (!) 365 lb 14.4 oz (166 kg)   25 1602 (!) 340 lb 6.2  oz (154.4 kg)   01/06/25 1144 (!) 328 lb (148.8 kg)   11/06/23 1318 (!) 305 lb (138.3 kg)   07/18/23 0900 300 lb (136.1 kg)   07/06/23 1227 300 lb (136.1 kg)       Labs:  Recent Labs   Lab 01/15/25  0508 01/16/25  0522 01/17/25  0425   GLU 97 109* 111*  111*   BUN 18 16 13  13   CREATSERUM 0.75 0.70 0.68*  0.68*   EGFRCR 96 98 99  99   CA 9.9 9.8 9.9  9.9    145 143  143   K 3.5  3.5 3.5  3.5 3.5  3.5  3.5    102 101  101   CO2 37.0* 36.0* 33.0*  33.0*     Recent Labs   Lab 01/14/25  0402 01/15/25  0508 01/17/25  0425   RBC 3.20* 3.20* 3.16*   HGB 11.6* 11.6* 11.6*   HCT 34.9* 34.5* 34.6*   .1* 107.8* 109.5*   MCH 36.3* 36.3* 36.7*   MCHC 33.2 33.6 33.5   RDW 14.0 13.7 13.5   NEPRELIM 7.44 5.27 5.55   WBC 9.8 7.8 7.8   .0* 121.0* 154.0         No results for input(s): \"TROP\", \"TROPHS\", \"CK\" in the last 168 hours.    Diagnostics:   NM STRESS TEST 1/16/2025:  Normal left ventricular systolic function.  Normal perfusion.  Normal Lexiscan infusion EKG with baseline atrial fibrillation.     ECHOCARDIOGRAM 1/7/2025:  1. Left ventricle: Systolic function was normal. The estimated ejection      fraction was 55-60%, by visual assessment.   2. Pulmonary arteries: Systolic pressure was mildly increased, estimated to      be 44mm Hg. Estimated pulmonary artery diastolic pressure was 6mm Hg.   Impressions:  This study is compared with previous dated 12/10/2020:   Limitations due to poor acoustic windows.   LVEF is preserved.         Medications:   potassium phosphate dibasic 15 mmol in sodium chloride 0.9% 250 mL IVPB  15 mmol Intravenous Once    Followed by    potassium chloride  20 mEq Intravenous Once    magnesium oxide  400 mg Oral Once    metoprolol succinate  25 mg Oral Daily Beta Blocker    furosemide  40 mg Oral BID (Diuretic)    vancomycin  125 mg Oral QID    tamsulosin  0.4 mg Oral Nightly    apixaban  5 mg Oral BID    insulin aspart  2-10 Units Subcutaneous TID AC and HS     atorvastatin  20 mg Oral Nightly    cyanocobalamin  2,000 mcg Oral Daily    multivitamin  1 tablet Oral Daily    thiamine  100 mg Oral Daily    miconazole   Topical BID      dextrose 10%         Assessment/Plan:    PEA arrest 1/7/25   Responded to epi and CPR  Kalie without perfusion defects 1/16  Echo 1/7 preserved LVEF 55-60%  Outpatient PET without concerns 2/2023  Presenting complaint; bleeding from chronic sacral ulcer w/ abdominal distension on 1/6  No acute abdominal pathology on CT  Abx course  Hgb remains stable  Multifactorial shock with acute respiratory failure  Off pressors, out of ICU, extubated  EMILIO w/ Cr to 5.2 on admission  Normalized - renal signed off  Developed considerable hypervolemia requiring IV diuretics - now compensated and -19L  Jardiance, ARB, and dorothy on hold  On Po diuretics - outpatient dosing planned  Chronic HFpEF  Concern for compnent of HfpEF which may have contributed to hypervolemia alongside EMILIO  GDMT held for now, consider reintroducing outpatient with advanced CHF team who he has seen in the past  Persistent afib  On Eliquis, resumed 1/14 without further bleeding/worsening anemia  Plan had been for DCCV to regain atrial kick and improve long term volume  Home BB and CCB held, ventricular rates controlled  Obesity, BMI 48  nonobstructive CAD by [facility] 11/2023  Statin  Reported hx of DM II  Hgb A1C 5.6%  Lymphedema chronic  ETOH overuse disorder  HTN  BP at goal  HLD  Statin  Cdiff PCR + on PO vancomycin    PLAN  -22 L since admission. Consider increasing outpatient torsemide dose outpatient, will review with his primary cardiologist  Ventricular rates remains controlled off AV blockade- eventual DCCV to regain atrial kick - Eliquis resumed 1/14  Home SGLT-2i, ARB, and aldactone held for now - reintroduce GDMT as tolerated working with advanced CHF outpatient  Reasonable to discharge to [facility] with daily weights, BMP in 1 week, and diuretic adjustment as indicated  Follow with our  outpatient [facility] team in 1 week          Ventura Peterson PA-C  1/17/2025  8:35 AM

## 2025-01-20 NOTE — PAYOR COMM NOTE
--------------  DISCHARGE REVIEW    Payor: [employer]  Subscriber #:  [id number]  Authorization Number: [id number]    Admit date: 1/6/25  Admit time:   2:58 PM  Discharge Date: 1/17/2025  1:15 PM     Admitting Physician: Peter Flores DO  Attending Physician:  Cyndie att. providers found  Primary Care Physician: Sid Gordon MD          Discharge Summary Notes        Discharge Summary signed by Peter Flores DO at 1/17/2025 11:04 AM       Author: Peter Flores DO Specialty: Internal Medicine Author Type: Physician    Filed: 1/17/2025 11:04 AM Date of Service: 1/17/2025 10:49 AM Status: Signed    : Peter Flores DO (Physician)                                                          [facility] Internal Medicine Hospitalist Discharge Summary     Patient ID:  Gerardo Torrez  74 year old  6/15/1950    Admit date: 1/6/2025    Discharge date and time: 1/17/2025    Attending Physician: Peter Flores DO     Primary Care Physician: Sid Gordon MD     Discharge Diagnoses:   Cardiac arrest   Acute respiratory failure   Shock  Abdominal distention   EMILIO  Acute blood loss anemia   Persistent a-fib  HTN  HLD  Thrombocytopenia   EtOH abuse   Morbid obesity   C-diff infection   Suspected cervical myelopathy     Please note that only IHP DMG and EMG patients enrolled in the Medicare ACO, [facility] ACO and [facility] HMOs will be handled by the [facility] Care Management team.  For all other patients, please follow usual protocol for discharge care transition.    Discharge Condition: stable    Disposition:  Home    Important Follow up:  - PCP: within 5 days of SALAS discharge   - Consults: Cards, gen surg, pulC    Follow Up Items:  BMP per cards    Hospital Course:      74 yr old male with PMH sig for a-fib on eliquis, HTN, HLD, DM II, gout, pulm HTN, chronic sacral decub, and morbid obesity who presented to the ED for evaluation of bleeding from his sacral wound.       # Cardiac arrest   -  concern for primary intra-abd event   - ROSC obtained s/p epi x 1  - maintain normothermia   - lexiscan 1/16 neg for ischemic etiology      # Acute respiratory failure  - intubated in setting of cardiac arrest   - extubated 1/11  - s/p IV diuresis, now transitioned to lasix 40 mg po BID  - wean O2 as tolerated      # Shock  - suspect distributive/hypovolemic  - s/p IVFs  - s/p vasopressors   - s/p empiric zosyn (1/8 - 1/13)  - cultures neg  - resolving      # Abdominal distention   - CT a/p neg for SBO or perf  - OG placed to LIS, now removed  - cont empiric abx  - 1/10 KUB without obstruction or free air  - gen surg c/s appreciated      # Chronic diastolic HF  - now compensated   - cont lasix 40 mg po BID  - monitor BMP    # Acute renal failure   - suspect prerenal in the setting of GI losses and ARB use  - improved  - renal US neg for hydro  - monitor renal function and UO, avoid nephrotoxins   - diuretics per cards  - renal c/s appreciated      # Bleeding sacral decub   # Acute blood loss anemia  - held eliquis, resumed 1/14  - monitor CBC  - wound care c/s      # Persistent a-fib  - rate controlled  - ECHO with intact LVEF  - held metoprolol given bradycardia now resumed   - held eliquis for now given bleeding from sacral decub, resumed 1/13     # Essential HTN  - low on admit  - hold ARB  - metoprolol resumed      # HLD  - cont statin     # Pulm HTN  - monitor volume status on IVFs  - diuretics per cards     # Thrombocytopenia   - suspect due to EtOH use and possible liver disease   - monitor      # EtOH abuse   - monitor for withdrawal   - MVI, thiamine, folate   - pt counseled on EtOH cessation      # Morbid obesity  - Recommend follow up with PCP to discuss diet and lifestyle modifications to encourage weight loss.     # C-diff infection   - cont po vanco (1/13 thought 1/26/25 ).  Plan to complete 14 day course.      # Suspected cervical myelopathy   - NSGY c/s appreciated   - no further intervention or  imaging at this time  - PT/OT    # Sacral decub   - improving with wound care  - cont wound care recs    Stable for discharge to [facility].    Consults: IP CONSULT TO HOSPITALIST  IP CONSULT TO NEPHROLOGY  IP CONSULT TO CARDIOLOGY  CONSULT TO WOUND OSTOMY  IP CONSULT TO SPIRITUAL CARE  IP CONSULT TO SOCIAL WORK  IP CONSULT TO PHARMACY  IP CONSULT TO GENERAL SURGERY  NURSING CONSULT TO DIETITIAN  IP CONSULT TO SPIRITUAL CARE  IP CONSULT TO NEUROSURGERY    Operative Procedures:  None      Patient instructions:      I as the attending physician reconciled the current and discharge medications on day of discharge.     Current Discharge Medication List        START taking these medications    Details   miconazole 2 % External Powder Apply topically 2 times daily x 4 more days      furosemide 40 MG Oral Tab Take 1 tablet (40 mg total) by mouth BID (Diuretic).      polyethylene glycol, PEG 3350, 17 g Oral Powd Pack 17 g by Per NG Tube route daily as needed (constipation).      vancomycin 125 MG Oral Cap Take 1 capsule (125 mg total) by mouth 4 (four) times daily.      thiamine 100 MG Oral Tab Take 1 tablet (100 mg total) by mouth daily.           CONTINUE these medications which have CHANGED    Details   metoprolol succinate ER 25 MG Oral Tablet 24 Hr Take 1 tablet (25 mg total) by mouth Daily Beta Blocker. Hold for SBP < 100 and HR < 60           CONTINUE these medications which have NOT CHANGED    Details   Cholecalciferol (D3 OR) Take 1 tablet by mouth Noon.      apixaban (ELIQUIS) 5 MG Oral Tab Take 1 tablet (5 mg total) by mouth 2 (two) times daily.      folic acid 1 MG Oral Tab Take 1 tablet (1 mg total) by mouth daily.      allopurinol 300 MG Oral Tab Take 1 tablet (300 mg total) by mouth daily.      tamsulosin (FLOMAX) cap TAKE 1 CAPSULE(0.4 MG) BY MOUTH DAILY      Cyanocobalamin (B-12) 1000 MCG Oral Tab Take 2,000 mcg by mouth Noon.      atorvastatin (LIPITOR) 20 MG Oral Tab Take 1 tablet (20 mg total) by mouth daily.            STOP taking these medications       dilTIAZem HCl ER Beads 120 MG Oral Capsule SR 24 Hr        spironolactone 25 MG Oral Tab        torsemide 10 MG Oral Tab        torsemide 10 MG Oral Tab        Semaglutide (RYBELSUS) 7 MG Oral Tab        empagliflozin 10 MG Oral Tab        Irbesartan 150 MG Oral Tab        aspirin 81 MG Oral Tab EC              Activity:  Up with assist  Diet: cardiac diet  Wound Care: as directed  Code Status: Full Code      Exam on day of discharge:     Vitals:    01/17/25 0732   BP: 125/58   Pulse:    Resp: 16   Temp: 97.9 °F (36.6 °C)       Gen:  Aox3, NAD, Chronically ill appearing male.   HEENT:  EOMI, PERRLA, OP clear, MMM.  OGT in place.  Pulm:  Course to auscultation on vent.  CV: Heart with regular rate and rhythm, no murmur.  Normal PMI.    Abd:  Mod to severe abd distention, hypoactive bowel sounds, no TTP. Morbid obesity.    MSK: +lymphedema, +chronic venous stasis dermatitis.   Skin: +sacral decub  Neuro:  Grossly intact, no focal deficits      Total time coordinating care for discharge: Greater than 30 minutes    Peter Flores DO  [facility] Hospitalist       Electronically signed by Peter Flores DO on 1/17/2025 11:04 AM         REVIEWER COMMENTS

## 2025-01-22 NOTE — PAT NURSING NOTE
Called [facility], spoke with Gely; stated RN's busy currently call back in 10 mins.    Called back at 0919; spoke with Nadia FREITAS. Discussed pt scheduled 1/23 for DCCV, missed doses, recent admission in hospital, etc. Nadia stated she will look into case and update [facility] staff. Thanked Nadia for the assistance.    Update at 0932; Nadia reviewed with Dr. Tapia, pt to be seen in office first so procedure cancelled for 1/23. Nadia had attempted calls to both numbers on file for pt in [facility]; this RN reviewed EPIC notes and provided Saira Willis # to Nadia to assist reaching pt. Nadia thankful and will send message with formal cancellation.

## 2025-01-23 ENCOUNTER — HOSPITAL ENCOUNTER (OUTPATIENT)
Dept: INTERVENTIONAL RADIOLOGY/VASCULAR | Facility: HOSPITAL | Age: 75
Discharge: HOME | End: 2025-01-23
Attending: INTERNAL MEDICINE
Payer: MEDICARE

## 2025-02-13 LAB
BASE EXCESS BLDA CALC-SCNC: -11.2 MMOL/L (ref ?–2)
BODY TEMPERATURE: 98.6 F
CA-I BLD-SCNC: 1.09 MMOL/L (ref 0.95–1.32)
COHGB MFR BLD: 2 % SAT (ref 0–3)
FIO2: 100 %
HCO3 BLDA-SCNC: 16.2 MEQ/L (ref 21–27)
HGB BLD-MCNC: 11.3 G/DL
LACTATE BLD-SCNC: 1.2 MMOL/L (ref 0.5–2)
METHGB MFR BLD: 0.9 % SAT (ref 0.4–1.5)
OXYHGB MFR BLDA: 97.1 % (ref 92–100)
P/F RATIO: 169 MMHG
PCO2 BLDA: 40 MM HG (ref 35–45)
PEEP: 5 CM H2O
PH BLDA: 7.21 [PH] (ref 7.35–7.45)
PO2 BLDA: 169 MM HG (ref 80–100)
POTASSIUM BLD-SCNC: 9.6 MMOL/L (ref 3.6–5.1)
SODIUM BLD-SCNC: 123 MMOL/L (ref 135–145)
TIDAL VOLUME: 500 ML
VENT RATE: 22 /MIN

## 2025-02-17 DIAGNOSIS — I50.89 OTHER HEART FAILURE (HCC): Primary | ICD-10-CM

## 2025-02-24 ENCOUNTER — HOSPITAL ENCOUNTER (OUTPATIENT)
Dept: LAB | Facility: HOSPITAL | Age: 75
Discharge: HOME | End: 2025-02-24
Attending: NURSE PRACTITIONER
Payer: MEDICARE

## 2025-02-24 ENCOUNTER — HOSPITAL ENCOUNTER (OUTPATIENT)
Dept: CARDIOLOGY CLINIC | Facility: HOSPITAL | Age: 75
End: 2025-02-24
Attending: NURSE PRACTITIONER
Payer: MEDICARE

## 2025-02-24 VITALS
RESPIRATION RATE: 16 BRPM | HEART RATE: 65 BPM | OXYGEN SATURATION: 97 % | BODY MASS INDEX: 40 KG/M2 | WEIGHT: 278.81 LBS | DIASTOLIC BLOOD PRESSURE: 61 MMHG | SYSTOLIC BLOOD PRESSURE: 110 MMHG

## 2025-02-24 DIAGNOSIS — I50.89 OTHER HEART FAILURE (HCC): ICD-10-CM

## 2025-02-24 DIAGNOSIS — I50.32 CHRONIC HEART FAILURE WITH PRESERVED EJECTION FRACTION (HCC): ICD-10-CM

## 2025-02-24 DIAGNOSIS — I50.32 CHRONIC HEART FAILURE WITH PRESERVED EJECTION FRACTION (HCC): Primary | ICD-10-CM

## 2025-02-24 DIAGNOSIS — E66.01 OBESITY, MORBID (HCC): ICD-10-CM

## 2025-02-24 DIAGNOSIS — E87.6 HYPOKALEMIA: ICD-10-CM

## 2025-02-24 LAB
ALBUMIN SERPL-MCNC: 4.2 G/DL (ref 3.2–4.8)
ALP LIVER SERPL-CCNC: 123 U/L
ALT SERPL-CCNC: 23 U/L
ANION GAP SERPL CALC-SCNC: 7 MMOL/L (ref 0–18)
AST SERPL-CCNC: 36 U/L (ref ?–34)
BILIRUB DIRECT SERPL-MCNC: 0.4 MG/DL (ref ?–0.3)
BILIRUB SERPL-MCNC: 1 MG/DL (ref 0.2–1.1)
BUN BLD-MCNC: 7 MG/DL (ref 9–23)
CALCIUM BLD-MCNC: 10.2 MG/DL (ref 8.7–10.6)
CHLORIDE SERPL-SCNC: 102 MMOL/L (ref 98–112)
CO2 SERPL-SCNC: 29 MMOL/L (ref 21–32)
CREAT BLD-MCNC: 0.81 MG/DL
EGFRCR SERPLBLD CKD-EPI 2021: 94 ML/MIN/1.73M2 (ref 60–?)
ERYTHROCYTE [DISTWIDTH] IN BLOOD BY AUTOMATED COUNT: 13.9 %
GLUCOSE BLD-MCNC: 98 MG/DL (ref 70–99)
HCT VFR BLD AUTO: 40.1 %
HGB BLD-MCNC: 13.7 G/DL
MCH RBC QN AUTO: 34.1 PG (ref 26–34)
MCHC RBC AUTO-ENTMCNC: 34.2 G/DL (ref 31–37)
MCV RBC AUTO: 99.8 FL
NT-PROBNP SERPL-MCNC: 671 PG/ML (ref ?–125)
OSMOLALITY SERPL CALC.SUM OF ELEC: 284 MOSM/KG (ref 275–295)
PLATELET # BLD AUTO: 226 10(3)UL (ref 150–450)
POTASSIUM SERPL-SCNC: 3.3 MMOL/L (ref 3.5–5.1)
PROT SERPL-MCNC: 7.1 G/DL (ref 5.7–8.2)
RBC # BLD AUTO: 4.02 X10(6)UL
SODIUM SERPL-SCNC: 138 MMOL/L (ref 136–145)
WBC # BLD AUTO: 8.2 X10(3) UL (ref 4–11)

## 2025-02-24 PROCEDURE — 80076 HEPATIC FUNCTION PANEL: CPT | Performed by: NURSE PRACTITIONER

## 2025-02-24 PROCEDURE — 80048 BASIC METABOLIC PNL TOTAL CA: CPT | Performed by: NURSE PRACTITIONER

## 2025-02-24 PROCEDURE — 83880 ASSAY OF NATRIURETIC PEPTIDE: CPT | Performed by: NURSE PRACTITIONER

## 2025-02-24 PROCEDURE — 36415 COLL VENOUS BLD VENIPUNCTURE: CPT | Performed by: NURSE PRACTITIONER

## 2025-02-24 PROCEDURE — 99214 OFFICE O/P EST MOD 30 MIN: CPT

## 2025-02-24 PROCEDURE — 96374 THER/PROPH/DIAG INJ IV PUSH: CPT

## 2025-02-24 PROCEDURE — 85027 COMPLETE CBC AUTOMATED: CPT | Performed by: NURSE PRACTITIONER

## 2025-02-24 RX ORDER — BUMETANIDE 0.25 MG/ML
2 INJECTION, SOLUTION INTRAMUSCULAR; INTRAVENOUS ONCE
Status: COMPLETED | OUTPATIENT
Start: 2025-02-24 | End: 2025-02-24

## 2025-02-24 RX ORDER — POTASSIUM CHLORIDE 1500 MG/1
60 TABLET, EXTENDED RELEASE ORAL ONCE
Status: COMPLETED | OUTPATIENT
Start: 2025-02-24 | End: 2025-02-24

## 2025-02-24 RX ORDER — EPLERENONE 25 MG/1
25 TABLET ORAL EVERY OTHER DAY
Qty: 30 TABLET | Refills: 5 | Status: SHIPPED | OUTPATIENT
Start: 2025-02-24

## 2025-02-24 RX ADMIN — POTASSIUM CHLORIDE 60 MEQ: 1500 TABLET, EXTENDED RELEASE ORAL at 16:52:00

## 2025-02-24 RX ADMIN — BUMETANIDE 2 MG: 0.25 INJECTION, SOLUTION INTRAMUSCULAR; INTRAVENOUS at 16:52:00

## 2025-02-24 NOTE — PROGRESS NOTES
Patient is new to the [facility] and was provided with an orientation to the clinic with all questions answered.    Patient assessed. Patient reports that his lower extremity edema, bloating, and shortness of breath have improved since his hospitalization. Patient reports that over the last week his weight, bloating, and edema have slowly been increasing. Weight today was 278.8 lbs. APN notified of all the above information. Labs ordered and drawn by  Lab. Reviewed allergies and list of current medications with patient and updated it in the Electronic Medical Record.     IV established per protocol. IV 2 mg bumex given and PO 60 meq potassium given. Patient tolerated it well. Educated patient on low sodium diet and food choices, fluid restriction of 2 liters, and daily weights. Reviewed follow-up appointments and discharge Heart Failure instructions with patient. Patient verbalized an understanding. IV discontinued; catheter intact. Pressure held and gauze applied. Patient to return to the clinic in 2 weeks.

## 2025-02-24 NOTE — PROGRESS NOTES
[facility] Progress Note    Gerardo Torrez is a 74 year old male who presents to clinic for an APN assessment and management of chronic HFpEF and is functional class 3.  Patient has a history of:    Past Medical History:    Back problem    CAD (coronary artery disease)    Calculus of kidney    Diabetes (HCC)    Diabetes mellitus type 2 in obese    Dilated aortic root    GOUT    Gout    High blood pressure    High cholesterol    KIDNEY STONE    Morbid obesity (HCC)    Neuropathy    OTHER DISEASES    SVT Chato    PONV (postoperative nausea and vomiting)    Pulmonary hypertension (HCC)    Thoracic aortic aneurysm      Past Surgical History:   Procedure Laterality Date    Appendectomy      Back surgery      discectomy/cauda equina   Nicole    Colonoscopy      polyps/[facility]  [place]    Colonoscopy      Colonoscopy      Colonoscopy N/A 10/28/2015    Procedure: COLONOSCOPY;  Surgeon: Reed Garcia;  Location:  ENDOSCOPY    Colonoscopy N/A 2023    Procedure: COLONOSCOPY;  Surgeon: Evgeny Horner MD;  Location:  ENDOSCOPY    Each add tooth extraction      Lithotripsy      Other surgical history      achilles rupture      Family History   Problem Relation Age of Onset    Cancer Father         colon cancer      Social History     Socioeconomic History    Marital status:    Tobacco Use    Smoking status: Former     Current packs/day: 0.00     Average packs/day: 1 pack/day for 20.0 years (20.0 ttl pk-yrs)     Types: Cigarettes     Start date: 1998     Quit date: 2018     Years since quittin.4    Smokeless tobacco: Never    Tobacco comments:     cigarillos/small cigars 3-5 daily   Vaping Use    Vaping status: Never Used   Substance and Sexual Activity    Alcohol use: Not Currently     Alcohol/week: 3.0 - 4.0 standard drinks of alcohol     Types: 3 - 4 Standard drinks or equivalent per week    Drug use: No     Social Drivers of Health     Food Insecurity:  No Food Insecurity (1/6/2025)    Food Insecurity     Food Insecurity: Never true   Transportation Needs: No Transportation Needs (1/6/2025)    Transportation Needs     Lack of Transportation: No   Housing Stability: Low Risk  (1/6/2025)    Housing Stability     Housing Instability: No        Subjective:  Gerardo Torrez is a 73 y/o with history of obesity, DM type 2, HFpEF, HLD, pHTN, HEIDI, and HTN; here for his first clinic visit.     He saw hospitalized from 1/6-1/17 with a bleeding sacral ulcer. He was recently started on Eliquis and held initially during admission, then restarted on 1/14. He had a cardiac arrest on 1/7 likely due to a combination of vagal tone from HEIDI, morbid obesity and possible BB toxicity from worsening EMILIO. BB held and then restarted. He was on pressors for hypovolemia and given IVFs and abx. CT a/p negative for SBO or perforation and KUB without obstruction with abdominal distention. Found to have Cdiff and treated PO vancomycin. He had a Lexiscan that was negative for ischemia. Went to [facility] for rehab.     He saw Dr. Tapia on 2/11 after a prolonged hospitalization and rehab for 1 month. Plan for CXR, probnp, CBC and CMP with [facility] visit in 1 week. He discussed role of GLP-1 for weight loss due to morbid obesity. Continue holding Jardiance and Rybelsus.    He saw Dr. Valles on 12/22 for HFpEF. He added MRA. He is rate controlled and on DOAC with plans for cardioversion. On Rybelsus, and discussed importance of changed to Ozempic.     Today, his weight is up about 6 lbs from discharge in January. His home weight previously was 272 lbs, now up to 276 lbs. He has noticed increased LE edema and abdominal bloating in the last couple weeks. His shortness of breath has worsened. He reports having borderline sleep apnea, and trying to use cpap, but was intolerant to the mask. He checked his blood pressure last night and reports SBP in the 100's.     Review of Systems  Complete review of systems  performed and negative except for the above.      Objective:  Telemetry: Afib    /73   Pulse 79   Resp 25   Wt 278 lb 12.8 oz (126.5 kg)   SpO2 92%   BMI 40.00 kg/m²             Wt Readings from Last 6 Encounters:   02/24/25 278 lb 12.8 oz (126.5 kg)   11/06/23 (!) 305 lb (138.3 kg)   07/18/23 300 lb (136.1 kg)   07/20/22 (!) 320 lb (145.2 kg)   03/14/22 300 lb (136.1 kg)   11/29/21 (!) 310 lb (140.6 kg)        Current Outpatient Medications:     eplerenone 25 MG Oral Tab, Take 1 tablet (25 mg total) by mouth every other day., Disp: 30 tablet, Rfl: 5    metoprolol succinate ER 25 MG Oral Tablet 24 Hr, Take 1 tablet (25 mg total) by mouth Daily Beta Blocker. Hold for SBP < 100 and HR < 60, Disp: , Rfl:     furosemide 40 MG Oral Tab, Take 1 tablet (40 mg total) by mouth BID (Diuretic)., Disp: , Rfl:     apixaban (ELIQUIS) 5 MG Oral Tab, Take 1 tablet (5 mg total) by mouth 2 (two) times daily., Disp: , Rfl:     allopurinol 300 MG Oral Tab, Take 1 tablet (300 mg total) by mouth daily., Disp: , Rfl:     tamsulosin (FLOMAX) cap, TAKE 1 CAPSULE(0.4 MG) BY MOUTH DAILY, Disp: 90 capsule, Rfl: 1    Cyanocobalamin (B-12) 1000 MCG Oral Tab, Take 2,000 mcg by mouth Noon., Disp: , Rfl:     atorvastatin (LIPITOR) 20 MG Oral Tab, Take 1 tablet (20 mg total) by mouth daily., Disp: , Rfl:     Cholecalciferol (D3 OR), Take 1 tablet by mouth Noon., Disp: , Rfl:      Exam:   General:         Alert, no apparent distress  HEENT:           +6cm JVD  Lungs:            RLL crackles  CV:                  irregularly irregular  Abdomen:      distended, soft,   Extremities:    +1-2 LE edema up to mid calf  Skin:               Pink, warm, dry   Neurological:  A&O x 3; GOLDEN    Cardiographics:  ECG:   Atrial fibrillation   Right bundle branch block   Abnormal ECG   When compared with ECG of 07-JAN-2025 23:01,   T wave inversion no longer evident in Lateral leads   Confirmed by BRUNA CHINCHILLA ([id number]) on 1/9/2025 12:44:27 PM      Echocardiogram: 1/7/25  Conclusions:   1. Left ventricle: Systolic function was normal. The estimated ejection      fraction was 55-60%, by visual assessment.   2. Pulmonary arteries: Systolic pressure was mildly increased, estimated to      be 44mm Hg. Estimated pulmonary artery diastolic pressure was 6mm Hg.   Impressions:  This study is compared with previous dated 12/10/2020:   Limitations due to poor acoustic windows.   LVEF is preserved.       Holter Monitoring 1.This is an excellent quality study. 2.Predominant rhythm is atrial fibrillation. 3.The minimum heart rate recorded was 35 beats / minute (12/18/2024). The maximum heart rate is 105 beats / minute (12/18/2024). The mean heart rate is 65 beats / minute. 4.No evidence of AV block is noted. 5.Rare premature atrial contractions noted. 6.No evidence of supraventricular tachycardia is noted.7.Rare premature ventricular contractions noted. 8.No pauses were noted. 9.*The predominant rhythm was Atrial Fibrillation/Flutter. *The Maximum Heart Rate recorded was 105 bpm, 12/18 15:10:14, the Minimum Heart Rate recorded was 35 bpm, 12/18 15:57:19, and the Average Heart Rate was 65 bpm. *There were 569 VE beats with a burden of 2 %. *The study included an Atrial Fibrillation/Flutter [place] of >99 % with <1 % in RVR and 24 % in SVR. The longest episode was 6h 22m 48.2s, 12/18 10:06:57, and the Fastest episode was 105 bpm, 12/18 10:06:57. *There were 0 Patient Triggers. 12/17/2024 3:30:00 PM       Imaging:    Lexiscan 1/16/25  IMPRESSION:   Normal left ventricular systolic function.  Normal perfusion.  Normal Lexiscan infusion EKG with baseline atrial fibrillation.     R/LHC 11/2024:    Right atrial pressure 3 mmHg. RV pressure 29/0/5 mmHg. Pulmonary artery pressure 30/8/19 mmHg. Pulmonary capillary wedge pressure is 7 mmHg. Transpulmonary gradient 12 mmHg. Pulmonary vascular resistance 1.5 Wood units with systemic vascular resistance of 415. Cardiac output 8.1  L/minute with cardiac index of 3.2 L/minute/sq m.     IMPRESSION:    1.          Normal coronary arteries angiographically.  2.          Dominant circumflex artery.  3.          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  4.          Right heart catheterization was surprisingly unremarkable with normal pulmonary pressures, but in view of mild hypovolemia and if volume status is corrected, he may have at most mild pulmonary hypertension.  5.          Normal transpulmonary gradient, at the upper limit of normal.  6.          Pulmonary vascular resistance at the upper limit of normal.  7.          Low normal biventricular filling pressures.  8.          No intracardiac shunt by quick saturation run on room air.  9.          Low SVR with hypovolemia.  10.        Right ventricle pressure waveform was consistent with sleep apnea.    Education:  Patient and family instructed at length regarding clinic procedures, hours, purpose of clinic visits, sodium restricted diet, low sodium foods, fluid restriction, daily weights, medication regimen, s/s HF exacerbation and when to call APN/clinic.    [] CardioMEMs  [] ARNi/ACEi/ARB   [x] BB  [] MRA  [] SGLT2i  [] GLP-1, cannot afford.     Assessment  HFpEF - LVEF 55-60% on echo. Probnp 671. Hypervolemic on exam.   Longstanding Persistent Afib - on Eliquis. Plan for eventual DCCV.   Non-obstructive CAD - [facility] on 11/2023. Recent Lexiscan without ischemia.  Morbid obesity - class 3. BMI 48. Previously on Rybelsus. Ozempic was approved, but too expensive.   DM type 2 - last A1c 5.6%.   Essential HTN - controlled.   HLD - on statin.   Chronic lymphedema - prior use of pumps.   Renal insufficiency with recent EMILIO - resolved.  Anemia - acute blood loss, improved from 11.6 to 13.7 g/dl.   Hx ETOH abuse - reports 2-3 drinks per night, quit in 1/2025.  Cdiff - s/p oral  vanco. Resolved.     Plan:  IV Bumex 2mg x 1  Kdur 60meq PO x 1  Start Eplerenone 25mg every other day.   Check weight daily.  Check blood pressure, 1-2 hours after medications daily.  BVA ordered to further assess fluid status.   Instructed to call Central Scheduling #[phone] to schedule your chest xray that Dr. Tapia ordered.  BMP next week to recheck potassium level.   Would consider re-initiation of SGLT2i.   Return to clinic in 2 weeks; sooner if needed. No appts available next week.   CHF instructions and education given.    I have spent >60 min total time on the day of the encounter, including: preparing to see the patient, obtaining and/or reviewing separately obtained history, performing a medically appropriate examination and/or evaluation, counseling and educating the patient/family caregiver, ordering medication, tests, or procedures, referring and communicating with other health care professionals, documenting clinical information in Epic, independently interpreting results and communicating results to the patient/family caregiver, and care coordination     Julisa Maier, APRN  2/24/2025

## 2025-02-24 NOTE — PATIENT INSTRUCTIONS
Heart Failure Discharge Instructions    Start Eplerenone 25mg every other day.  Check weight daily.  Check blood pressure, 1-2 hours after medications daily.  The [facility] schedulers will call you to schedule your blood volume analysis. Please schedule before your next visit.  Please call Central Scheduling #[phone] to schedule your chest xray that Dr. Tapia ordered.  Please have your blood drawn next week to recheck potassium level.     Activity: Regular exercise and activity is important for your overall health and to help keep your heart strong and functioning as well as possible.   Walk at a slow to moderate pace for 15-20 minutes 3-5 days per week.     Follow these instructions every day to keep yourself in the Green Zone     The Green Zone means you are feeling well and your symptoms are under control                                    Medications  Take your medication every day as instructed  Do not stop taking your medicine or change the amount you are taking without instructions from your doctor or nurse  Do Not take non-steroidal antiinflammatory drugs such as ibuprofen, aleve, advil, or motrin                                    Diet/Fluids  People with heart failure should eat less sodium (salt) and limit fluid. Sodium attracts water and makes the body hold fluid. This extra fluid makes the heart work harder and can worsen the symptoms of heart failure.     Diet    2000 mg sodium daily  Fluid restriction    64 ounces daily  (8 oz. = 1 cup)                                     Body Weight  Weigh yourself every day before breakfast and write your weight down  Use the same scale and wear about the same amount of clothes each time  A sudden weight increase is due to fluid retention rather than fat                                         Activity  Pace your activities to avoid getting overtired  Take rest periods as needed  Elevate your feet to reduce ankle swelling when resting                              Signs of Worsening Heart Failure    You are entering the Yellow Zone - this is a warning zone    Call your doctor or nurse if you have any of these signs or symptoms:  You gain 2 or more pounds overnight or 3-5 pounds in 3-7 days  You have more trouble breathing  You get more tired with regular activity, or are limiting activity because of shortness of breath or fatigue  You are short of breath lying down, you need more pillows to breathe comfortably,  or wake up during the night short of breath  You urinate less often during the day and more often at night  You have a bloated feeling, upset stomach, loss of appetite, or your clothes are fitting tighter    GO TO THE EMERGENCY DEPARTMENT or CALL 911 IF:    These are signs you are in the RED ZONE - THIS IS AN EMERGENCY  You have tightness or pain in your chest  You are extremely short of breath or can't catch your breath  You cough up frothy pink mucous  You feel confused or can't think clearly  You are traveling and develop symptoms of worsening heart failure     We respect everyone's time and availability. Please be aware that this is not a walk-in clinic and we require appointments in order to facilitate timely care for all patients. We ask you to arrive 30 minutes before your appointment to allow time for you to check-in and have your bloodwork drawn. Please understand if you are late for your appointment, you may be asked to reschedule. If possible, all attempts will be made to accommodate but realize this is no guarantee that this will always be available. We understand there are extenuating circumstances. If you need to cancel or reschedule your appointment, please call the [facility] within 24 hours at [phone].  Thank you for your cooperation, [facility] Staff.    If you are currently [employer] active, starting July 1st 2024, we will be utilizing [employer] messaging ONLY to confirm your appointment.    IF YOU HAVE QUESTIONS REGARDING YOUR BILL,  FEEL FREE TO CONTACT [facility] PATIENT ACCOUNTS -[phone]. IF YOU NEED FINANCIAL ASSISTANCE, PLEASE CALL AN [facility] FINANCIAL COUNSELOR -[phone].             [facility]     [phone]

## 2025-02-25 NOTE — PROGRESS NOTES
[facility] Progress Note    Gerardo Torrez is a 74 year old male who presents to clinic for APN assessment and management of chronic HFpEF and is functional class 3.     Subjective:  Since his first clinic visit on 2/25 when he was given IVP Bumex 2 mg, Kdur 60 meq, started on Eplerenone 25 mg every other day and BVA was ordered to further assess fluid status; he had BMP on 3/7 that was 3.3. No changes were made and it was rechecked today.     He had BVA 3/7 that suggest he he severely fluid overloaded with severe excess plasma volume, normal RBC deficit and moderate excess TBV. Albumin transudation was normal. Peripheral venous hematocrit was 37.6%, 43.7% when normalized.         Today his weight is unchanged, last visit it was up 6 lbs. Home weights have varied from 272 to 278 lbs. He felt IV diuretics worked last visit, but he didn't notice a substantial change in weight. Home BP's have been \"good\" around 120/70's. He denies abdominal bloating but has noticed feet and leg swelling is getting worse. Shortness of breath is not as bad as when he was hospitalized. He remains fairly sedentary but is doing PT 2x per week and walked around the house a few times the other day without issues. Once the weather gets better he plans to walk in the pool for exercise. He sleeps in a recliner since he gets up multiple times to go to the bathroom at night and it is easier for him to get up from.     Review of Systems   Constitutional: Negative.   Cardiovascular:  Positive for dyspnea on exertion (improved) and leg swelling.   Gastrointestinal:  Negative for bloating.   Neurological:  Negative for dizziness.       HISTORY:  Past Medical History:    Back problem    CAD (coronary artery disease)    Calculus of kidney    Diabetes (HCC)    Diabetes mellitus type 2 in obese    Dilated aortic root    GOUT    Gout    High blood pressure    High cholesterol    KIDNEY STONE    Morbid obesity (HCC)     Neuropathy    OTHER DISEASES    SVT Chato    PONV (postoperative nausea and vomiting)    Pulmonary hypertension (HCC)    Thoracic aortic aneurysm      Past Surgical History:   Procedure Laterality Date    Appendectomy      Back surgery      discectomy/cauda equina   Nicole    Colonoscopy      polyps/FMH  [place]    Colonoscopy      Colonoscopy      Colonoscopy N/A 10/28/2015    Procedure: COLONOSCOPY;  Surgeon: Reed Garcia;  Location:  ENDOSCOPY    Colonoscopy N/A 2023    Procedure: COLONOSCOPY;  Surgeon: Evgeny Horner MD;  Location:  ENDOSCOPY    Each add tooth extraction      Lithotripsy      Other surgical history      achilles rupture      Family History   Problem Relation Age of Onset    Cancer Father         colon cancer      Social History     Socioeconomic History    Marital status:    Tobacco Use    Smoking status: Former     Current packs/day: 0.00     Average packs/day: 1 pack/day for 20.0 years (20.0 ttl pk-yrs)     Types: Cigarettes     Start date: 1998     Quit date: 2018     Years since quittin.5    Smokeless tobacco: Never    Tobacco comments:     cigarillos/small cigars 3-5 daily   Vaping Use    Vaping status: Never Used   Substance and Sexual Activity    Alcohol use: Not Currently     Alcohol/week: 3.0 - 4.0 standard drinks of alcohol     Types: 3 - 4 Standard drinks or equivalent per week    Drug use: No     Social Drivers of Health     Food Insecurity: No Food Insecurity (2025)    Food Insecurity     Food Insecurity: Never true   Transportation Needs: No Transportation Needs (2025)    Transportation Needs     Lack of Transportation: No   Housing Stability: Low Risk  (2025)    Housing Stability     Housing Instability: No           Objective:    Telemetry: AF      /68   Pulse 63   Resp 17   Wt 279 lb (126.6 kg)   SpO2 98%   BMI 40.03 kg/m²     Wt Readings from Last 6 Encounters:   25 279 lb (126.6 kg)   25 278  lb 12.8 oz (126.5 kg)   11/06/23 (!) 305 lb (138.3 kg)   07/18/23 300 lb (136.1 kg)   07/20/22 (!) 320 lb (145.2 kg)   03/14/22 300 lb (136.1 kg)      Latest Reference Range & Units 03/11/25 13:23   Glucose 70 - 99 mg/dL 128 (H)   Sodium 136 - 145 mmol/L 139   Potassium 3.5 - 5.1 mmol/L 3.4 (L)   Chloride 98 - 112 mmol/L 104   Carbon Dioxide, Total 21.0 - 32.0 mmol/L 27.0   BUN 9 - 23 mg/dL 10   CREATININE 0.70 - 1.30 mg/dL 0.88   CALCIUM 8.7 - 10.6 mg/dL 10.7 (H)   EGFR >=60 mL/min/1.73m2 91   ANION GAP 0 - 18 mmol/L 8   CALCULATED OSMOLALITY 275 - 295 mOsm/kg 289   (H): Data is abnormally high  (L): Data is abnormally low    Current Outpatient Medications:     empagliflozin (JARDIANCE) 10 MG Oral Tab, Take 1 tablet (10 mg total) by mouth daily., Disp: , Rfl:     eplerenone 25 MG Oral Tab, Take 1 tablet (25 mg total) by mouth daily., Disp: , Rfl:     torsemide 10 MG Oral Tab, Take 4 tablets (40 mg total) by mouth daily., Disp: , Rfl:     potassium chloride 20 MEQ Oral Tab CR, Take 60 meq (3 tablets) the delma of 3/11, then 20 meq (1 tablet) daily, Disp: 40 tablet, Rfl: 0    metoprolol succinate ER 25 MG Oral Tablet 24 Hr, Take 1 tablet (25 mg total) by mouth Daily Beta Blocker. Hold for SBP < 100 and HR < 60, Disp: , Rfl:     Cholecalciferol (D3 OR), Take 1 tablet by mouth Noon., Disp: , Rfl:     apixaban (ELIQUIS) 5 MG Oral Tab, Take 1 tablet (5 mg total) by mouth 2 (two) times daily., Disp: , Rfl:     allopurinol 300 MG Oral Tab, Take 1 tablet (300 mg total) by mouth daily., Disp: , Rfl:     tamsulosin (FLOMAX) cap, TAKE 1 CAPSULE(0.4 MG) BY MOUTH DAILY, Disp: 90 capsule, Rfl: 1    Cyanocobalamin (B-12) 1000 MCG Oral Tab, Take 2,000 mcg by mouth Noon., Disp: , Rfl:     atorvastatin (LIPITOR) 20 MG Oral Tab, Take 1 tablet (20 mg total) by mouth daily., Disp: , Rfl:     Exam:   General:         Alert, in no apparent distress  HEENT:          elevated JVD  Lungs:           bibasilar crackles        CV:                   irregular   Abdomen:       round, semi-firm, non-tender  Extremities:    +1 -2 LE edema L>R  Neuro:             A&O x 3, GOLDEN  Skin:                chronic venous stasis changes LE's     Diagnostics:     Echocardiogram: 1/7/25  Conclusions:   1. Left ventricle: Systolic function was normal. The estimated ejection      fraction was 55-60%, by visual assessment.   2. Pulmonary arteries: Systolic pressure was mildly increased, estimated to      be 44mm Hg. Estimated pulmonary artery diastolic pressure was 6mm Hg.   Impressions:  This study is compared with previous dated 12/10/2020:   Limitations due to poor acoustic windows.   LVEF is preserved.         Holter Monitoring 1.This is an excellent quality study. 2.Predominant rhythm is atrial fibrillation. 3.The minimum heart rate recorded was 35 beats / minute (12/18/2024). The maximum heart rate is 105 beats / minute (12/18/2024). The mean heart rate is 65 beats / minute. 4.No evidence of AV block is noted. 5.Rare premature atrial contractions noted. 6.No evidence of supraventricular tachycardia is noted.7.Rare premature ventricular contractions noted. 8.No pauses were noted. 9.*The predominant rhythm was Atrial Fibrillation/Flutter. *The Maximum Heart Rate recorded was 105 bpm, 12/18 15:10:14, the Minimum Heart Rate recorded was 35 bpm, 12/18 15:57:19, and the Average Heart Rate was 65 bpm. *There were 569 VE beats with a burden of 2 %. *The study included an Atrial Fibrillation/Flutter [place] of >99 % with <1 % in RVR and 24 % in SVR. The longest episode was 6h 22m 48.2s, 12/18 10:06:57, and the Fastest episode was 105 bpm, 12/18 10:06:57. *There were 0 Patient Triggers. 12/17/2024 3:30:00 PM         Imaging:     Lexiscan 1/16/25  IMPRESSION:   Normal left ventricular systolic function.  Normal perfusion.  Normal Lexiscan infusion EKG with baseline atrial fibrillation.      R/LHC 11/2024:     Right atrial pressure 3 mmHg. RV pressure 29/0/5 mmHg. Pulmonary artery  pressure 30/8/19 mmHg. Pulmonary capillary wedge pressure is 7 mmHg. Transpulmonary gradient 12 mmHg. Pulmonary vascular resistance 1.5 Wood units with systemic vascular resistance of 415. Cardiac output 8.1 L/minute with cardiac index of 3.2 L/minute/sq m.      IMPRESSION:    1.          Normal coronary arteries angiographically.  2.          Dominant circumflex artery.  3.          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  4.          Right heart catheterization was surprisingly unremarkable with normal pulmonary pressures, but in view of mild hypovolemia and if volume status is corrected, he may have at most mild pulmonary hypertension.  5.          Normal transpulmonary gradient, at the upper limit of normal.  6.          Pulmonary vascular resistance at the upper limit of normal.  7.          Low normal biventricular filling pressures.  8.          No intracardiac shunt by quick saturation run on room air.  9.          Low SVR with hypovolemia.  10.        Right ventricle pressure waveform was consistent with sleep apnea.     GDMT:  [] CardioMEMs  [] ARNi/ACEi/ARB  [x] BB  [x] MRA  [] SGLT2i  [x] Corlanor  [] GLP-1, can't afford    Education:  Patient instructed regarding sodium restricted diet, low sodium foods, fluid restriction, daily weights, medication regimen, s/s HF exacerbation and when to call APN/clinic.    Assessment:   HFpEF, ACC/AHA Class 3 - LVEF 55-60% on echo. Last Probnp 671. Recent BVA suggest severe plasma excess. Hypervolemic.   Longstanding Persistent Afib - on Eliquis. Rates controlled. DCCV recommended per Dr. Valles. Plan for rate control for now per Dr. aTpia.   Non-obstructive CAD - [facility] on 11/2023. Lexiscan 1/2025 without ischemia.  Morbid obesity - class 3. BMI 48. Previously on Rybelsus. Ozempic was approved, but too expensive. Encouraged activity as  tolerated, enrolled in PT with plans for water walking during the summer.   DM type 2 - last A1c 5.6%. To restart Jardiance for CV benefits.   Essential HTN - controlled and monitors at home.   HLD - on statin.   Chronic lymphedema - prior use of pumps. Referred to the lymphedema clinic per Dr. Valles in December, discussed today and transportation is an issue.   Renal insufficiency with recent EMILIO - resolved.   Anemia - acute blood loss, last Hgb improved from 11.6 to 13.7 g/dl. Iron sat 28. Ferritin 232 3/7. Normalized hematocrit per BVA 43.7%. BVA suggest normal RBC deficit.   Hx ETOH abuse - reports 2-3 drinks per night, quit in 1/2025.  Cdiff - s/p oral vanco. Resolved.   HEIDI intolerant of CPAP.   Hypokalemia     Plan:     IVP Diuril 250 mg x1  IVP Bumex 4 mg x1  Kdur 60 meq PO x2 doses, first dose given in the clinic with second dose at bedtime.  Start Kdur 20 meq daily tomorrow.   Restart Jardiance 10 mg daily.  Increase Inspra to 25 mg daily from every other day.  Stop Furosemide and start Torsemide 40 mg daily in the AM to help limit nocturia.    Return to clinic in 1 week.   Encouraged increased activity as tolerated  Encouraged use of Velcro wraps.   F/U with Dr. Tapia as planned.   CHF discharge instructions given    I have spent 60 min total time on the day of the encounter, including: preparing to see the patient, obtaining and/or reviewing separately obtained history, performing a medically appropriate examination and/or evaluation, counseling and educating the patient/family caregiver, ordering medication, tests, or procedures, documenting clinical information in Epic, independently interpreting results and communicating results to the patient/family caregiver, and care coordination      Julisa Frederick, APRN

## 2025-02-28 ENCOUNTER — HOSPITAL ENCOUNTER (OUTPATIENT)
Dept: GENERAL RADIOLOGY | Age: 75
Discharge: HOME | End: 2025-02-28
Attending: INTERNAL MEDICINE
Payer: MEDICARE

## 2025-02-28 DIAGNOSIS — Z98.890 S/P CARDIAC CATH: ICD-10-CM

## 2025-02-28 DIAGNOSIS — I71.20 ANEURYSM, THORACIC AORTIC: ICD-10-CM

## 2025-02-28 PROCEDURE — 71046 X-RAY EXAM CHEST 2 VIEWS: CPT | Performed by: INTERNAL MEDICINE

## 2025-03-04 ENCOUNTER — TELEPHONE (OUTPATIENT)
Dept: CARDIOLOGY CLINIC | Facility: HOSPITAL | Age: 75
End: 2025-03-04

## 2025-03-04 NOTE — TELEPHONE ENCOUNTER
[facility] apn requested repeat labs to be done 3/3/25.  LABS NOT done. RN called patient to remind. He states he will go on Friday.

## 2025-03-06 DIAGNOSIS — I50.32 CHRONIC HEART FAILURE WITH PRESERVED EJECTION FRACTION (HCC): Primary | ICD-10-CM

## 2025-03-06 DIAGNOSIS — E55.9 VITAMIN D DEFICIENCY: ICD-10-CM

## 2025-03-07 ENCOUNTER — HOSPITAL ENCOUNTER (OUTPATIENT)
Dept: CV DIAGNOSTICS | Facility: HOSPITAL | Age: 75
Discharge: HOME | End: 2025-03-07
Attending: NURSE PRACTITIONER
Payer: MEDICARE

## 2025-03-07 ENCOUNTER — HOSPITAL ENCOUNTER (OUTPATIENT)
Dept: LAB | Facility: HOSPITAL | Age: 75
Discharge: HOME | End: 2025-03-07
Attending: NURSE PRACTITIONER
Payer: MEDICARE

## 2025-03-07 DIAGNOSIS — I50.32 CHRONIC HEART FAILURE WITH PRESERVED EJECTION FRACTION (HCC): ICD-10-CM

## 2025-03-07 DIAGNOSIS — E55.9 VITAMIN D DEFICIENCY: ICD-10-CM

## 2025-03-07 LAB
ANION GAP SERPL CALC-SCNC: 10 MMOL/L (ref 0–18)
BUN BLD-MCNC: 6 MG/DL (ref 9–23)
CALCIUM BLD-MCNC: 10.3 MG/DL (ref 8.7–10.6)
CHLORIDE SERPL-SCNC: 104 MMOL/L (ref 98–112)
CO2 SERPL-SCNC: 27 MMOL/L (ref 21–32)
CREAT BLD-MCNC: 0.81 MG/DL
DEPRECATED HBV CORE AB SER IA-ACNC: 232 NG/ML
EGFRCR SERPLBLD CKD-EPI 2021: 94 ML/MIN/1.73M2 (ref 60–?)
GLUCOSE BLD-MCNC: 117 MG/DL (ref 70–99)
IRON SATN MFR SERPL: 28 %
IRON SERPL-MCNC: 76 UG/DL
OSMOLALITY SERPL CALC.SUM OF ELEC: 291 MOSM/KG (ref 275–295)
POTASSIUM SERPL-SCNC: 3.3 MMOL/L (ref 3.5–5.1)
SODIUM SERPL-SCNC: 141 MMOL/L (ref 136–145)
TOTAL IRON BINDING CAPACITY: 267 UG/DL (ref 250–425)
TRANSFERRIN SERPL-MCNC: 213 MG/DL (ref 215–365)
VIT D+METAB SERPL-MCNC: 43.9 NG/ML (ref 30–100)

## 2025-03-07 PROCEDURE — 78122 WHL BLD VOLUME DETERMINATION: CPT | Performed by: NURSE PRACTITIONER

## 2025-03-07 PROCEDURE — 85013 SPUN MICROHEMATOCRIT: CPT | Performed by: NURSE PRACTITIONER

## 2025-03-07 PROCEDURE — 82306 VITAMIN D 25 HYDROXY: CPT | Performed by: NURSE PRACTITIONER

## 2025-03-07 PROCEDURE — 83540 ASSAY OF IRON: CPT | Performed by: NURSE PRACTITIONER

## 2025-03-07 PROCEDURE — 36415 COLL VENOUS BLD VENIPUNCTURE: CPT | Performed by: NURSE PRACTITIONER

## 2025-03-07 PROCEDURE — 83550 IRON BINDING TEST: CPT | Performed by: NURSE PRACTITIONER

## 2025-03-07 PROCEDURE — 80048 BASIC METABOLIC PNL TOTAL CA: CPT | Performed by: NURSE PRACTITIONER

## 2025-03-07 PROCEDURE — 82728 ASSAY OF FERRITIN: CPT | Performed by: NURSE PRACTITIONER

## 2025-03-11 ENCOUNTER — HOSPITAL ENCOUNTER (OUTPATIENT)
Dept: LAB | Facility: HOSPITAL | Age: 75
Discharge: HOME | End: 2025-03-11
Attending: NURSE PRACTITIONER
Payer: MEDICARE

## 2025-03-11 ENCOUNTER — HOSPITAL ENCOUNTER (OUTPATIENT)
Dept: CARDIOLOGY CLINIC | Facility: HOSPITAL | Age: 75
Discharge: HOME | End: 2025-03-11
Attending: NURSE PRACTITIONER
Payer: MEDICARE

## 2025-03-11 VITALS
WEIGHT: 279 LBS | DIASTOLIC BLOOD PRESSURE: 68 MMHG | RESPIRATION RATE: 17 BRPM | HEART RATE: 63 BPM | OXYGEN SATURATION: 98 % | BODY MASS INDEX: 40 KG/M2 | SYSTOLIC BLOOD PRESSURE: 110 MMHG

## 2025-03-11 DIAGNOSIS — I50.32 CHRONIC HEART FAILURE WITH PRESERVED EJECTION FRACTION (HCC): Primary | ICD-10-CM

## 2025-03-11 DIAGNOSIS — I10 ESSENTIAL HYPERTENSION: ICD-10-CM

## 2025-03-11 DIAGNOSIS — N17.9 ACUTE RENAL FAILURE, UNSPECIFIED ACUTE RENAL FAILURE TYPE: ICD-10-CM

## 2025-03-11 DIAGNOSIS — E66.01 OBESITY, MORBID (HCC): ICD-10-CM

## 2025-03-11 DIAGNOSIS — I50.31 ACUTE HEART FAILURE WITH PRESERVED EJECTION FRACTION (HCC): ICD-10-CM

## 2025-03-11 DIAGNOSIS — E87.6 HYPOKALEMIA: ICD-10-CM

## 2025-03-11 LAB
ANION GAP SERPL CALC-SCNC: 8 MMOL/L (ref 0–18)
BUN BLD-MCNC: 10 MG/DL (ref 9–23)
CALCIUM BLD-MCNC: 10.7 MG/DL (ref 8.7–10.6)
CHLORIDE SERPL-SCNC: 104 MMOL/L (ref 98–112)
CO2 SERPL-SCNC: 27 MMOL/L (ref 21–32)
CREAT BLD-MCNC: 0.88 MG/DL
EGFRCR SERPLBLD CKD-EPI 2021: 91 ML/MIN/1.73M2 (ref 60–?)
GLUCOSE BLD-MCNC: 128 MG/DL (ref 70–99)
OSMOLALITY SERPL CALC.SUM OF ELEC: 289 MOSM/KG (ref 275–295)
POTASSIUM SERPL-SCNC: 3.4 MMOL/L (ref 3.5–5.1)
SODIUM SERPL-SCNC: 139 MMOL/L (ref 136–145)

## 2025-03-11 PROCEDURE — 99215 OFFICE O/P EST HI 40 MIN: CPT | Performed by: NURSE PRACTITIONER

## 2025-03-11 PROCEDURE — 36415 COLL VENOUS BLD VENIPUNCTURE: CPT | Performed by: PHYSICIAN ASSISTANT

## 2025-03-11 PROCEDURE — G2212 PROLONG OUTPT/OFFICE VIS: HCPCS | Performed by: NURSE PRACTITIONER

## 2025-03-11 PROCEDURE — 80048 BASIC METABOLIC PNL TOTAL CA: CPT | Performed by: PHYSICIAN ASSISTANT

## 2025-03-11 RX ORDER — POTASSIUM CHLORIDE 1500 MG/1
60 TABLET, EXTENDED RELEASE ORAL ONCE
Status: COMPLETED | OUTPATIENT
Start: 2025-03-11 | End: 2025-03-11

## 2025-03-11 RX ORDER — EPLERENONE 25 MG/1
25 TABLET ORAL DAILY
COMMUNITY
Start: 2025-03-11 | End: 2025-03-17

## 2025-03-11 RX ORDER — POTASSIUM CHLORIDE 1500 MG/1
TABLET, EXTENDED RELEASE ORAL
Qty: 40 TABLET | Refills: 0 | Status: SHIPPED | OUTPATIENT
Start: 2025-03-11

## 2025-03-11 RX ORDER — TORSEMIDE 20 MG/1
40 TABLET ORAL DAILY
COMMUNITY
Start: 2025-03-11 | End: 2025-03-11

## 2025-03-11 RX ORDER — BUMETANIDE 0.25 MG/ML
4 INJECTION, SOLUTION INTRAMUSCULAR; INTRAVENOUS ONCE
Status: COMPLETED | OUTPATIENT
Start: 2025-03-11 | End: 2025-03-11

## 2025-03-11 RX ORDER — TORSEMIDE 10 MG/1
40 TABLET ORAL DAILY
COMMUNITY
Start: 2025-03-11

## 2025-03-11 RX ADMIN — BUMETANIDE 4 MG: 0.25 INJECTION, SOLUTION INTRAMUSCULAR; INTRAVENOUS at 14:40:00

## 2025-03-11 RX ADMIN — POTASSIUM CHLORIDE 60 MEQ: 1500 TABLET, EXTENDED RELEASE ORAL at 14:33:00

## 2025-03-11 NOTE — PROGRESS NOTES
Patient assessed. Patient reports he diuresed good the evening of his last appointment, but has noticed that his edema has returned in his lower extremities. Patient reports his shortness of breath has improved overall but still gets short of breath with activity. Patient reports he does not feel or notice any bloating. Weight stable at 279.0 lbs from his last visit. APN notified of all the above information. Labs ordered and drawn by  Lab. Reviewed allergies and list of current medications with patient and updated it in the Electronic Medical Record.     IV established per protocol.  mg diuril given, IV 4 mg bumex given, and PO 60 meq potassium given. Patient tolerated it well. Educated patient on low sodium diet and food choices, fluid restriction of 2 liters, and daily weights. Reviewed follow-up appointments and discharge Heart Failure instructions with patient. Patient verbalized an understanding. IV discontinued; catheter intact. Pressure held and gauze applied. Patient to return to the clinic next week.

## 2025-03-11 NOTE — PATIENT INSTRUCTIONS
Heart Failure Discharge Instructions    Stop Lasix. Start Torsemide 40 mg (4 tablets if tablets are 10 mg) daily in the AM tomorrow.   Restart Jardiance 10 mg daily.  Increase Inspra to 25 mg daily.   Take an extra Kdur 60 meq tonight. Start Kdur 20 meq daily tomorrow in the AM.       Activity: Regular exercise and activity is important for your overall health and to help keep your heart strong and functioning as well as possible.   Walk at a slow to moderate pace for 15-20 minutes 3-5 days per week.     Follow these instructions every day to keep yourself in the Green Zone     The Green Zone means you are feeling well and your symptoms are under control                                    Medications  Take your medication every day as instructed  Do not stop taking your medicine or change the amount you are taking without instructions from your doctor or nurse  Do Not take non-steroidal antiinflammatory drugs such as ibuprofen, aleve, advil, or motrin                                    Diet/Fluids  People with heart failure should eat less sodium (salt) and limit fluid. Sodium attracts water and makes the body hold fluid. This extra fluid makes the heart work harder and can worsen the symptoms of heart failure.     Diet    2000 mg sodium daily  Fluid restriction    64 ounces daily  (8 oz. = 1 cup)                                     Body Weight  Weigh yourself every day before breakfast and write your weight down  Use the same scale and wear about the same amount of clothes each time  A sudden weight increase is due to fluid retention rather than fat                                         Activity  Pace your activities to avoid getting overtired  Take rest periods as needed  Elevate your feet to reduce ankle swelling when resting                             Signs of Worsening Heart Failure    You are entering the Yellow Zone - this is a warning zone    Call your doctor or nurse if you have any of these signs or  symptoms:  You gain 2 or more pounds overnight or 3-5 pounds in 3-7 days  You have more trouble breathing  You get more tired with regular activity, or are limiting activity because of shortness of breath or fatigue  You are short of breath lying down, you need more pillows to breathe comfortably,  or wake up during the night short of breath  You urinate less often during the day and more often at night  You have a bloated feeling, upset stomach, loss of appetite, or your clothes are fitting tighter    GO TO THE EMERGENCY DEPARTMENT or CALL 911 IF:    These are signs you are in the RED ZONE - THIS IS AN EMERGENCY  You have tightness or pain in your chest  You are extremely short of breath or can't catch your breath  You cough up frothy pink mucous  You feel confused or can't think clearly  You are traveling and develop symptoms of worsening heart failure     We respect everyone's time and availability. Please be aware that this is not a walk-in clinic and we require appointments in order to facilitate timely care for all patients. We ask you to arrive 30 minutes before your appointment to allow time for you to check-in and have your bloodwork drawn. Please understand if you are late for your appointment, you may be asked to reschedule. If possible, all attempts will be made to accommodate but realize this is no guarantee that this will always be available. We understand there are extenuating circumstances. If you need to cancel or reschedule your appointment, please call the [facility] within 24 hours at [phone].  Thank you for your cooperation, [facility] Staff.    If you are currently [employer] active, starting July 1st 2024, we will be utilizing [employer] messaging ONLY to confirm your appointment.    IF YOU HAVE QUESTIONS REGARDING YOUR BILL, FEEL FREE TO CONTACT [facility] PATIENT ACCOUNTS -[phone]. IF YOU NEED FINANCIAL ASSISTANCE, PLEASE CALL AN [facility] FINANCIAL COUNSELOR -[phone].              [facility]     [phone]

## 2025-03-13 NOTE — PROGRESS NOTES
[facility] Progress Note    Gerardo Torrez is a 74 year old male who presents to clinic for APN assessment and management of chronic HFpEF and is functional class 3.     Subjective:  Since his first clinic visit on 3/11 when he was given IVP Bumex 4 mg, Diuril 250 mg, Kdur 60 meq x2 doses, Eplerenone 25 mg was increased to daily, Jardiance was restarted and Torsemide was substituted for Furosemide; he is down 9 lbs. He urinated \"40x\" the first 24 hrs after I gave him IV diuretics and he took torsemide the following day. He notes improvement in leg swelling. Breathing is better. He can get in and out of the car easier. He denies dizziness. Home weight today was 265 lbs down from ~273 lbs last week. He has been mindful of following a low sodium diet, \"I don't overdue it.\" He has been drinking more protein drinks.     He had recent BVA 3/7 that suggest he he severely fluid overloaded with severe excess plasma volume, normal RBC deficit and moderate excess TBV. Albumin transudation was normal. Peripheral venous hematocrit was 37.6%, 43.7% when normalized.         Review of Systems   Constitutional: Positive for weight loss.   Cardiovascular:  Positive for dyspnea on exertion (improved) and leg swelling (improved).   Gastrointestinal:  Negative for bloating.   Neurological:  Negative for dizziness.       HISTORY:  Past Medical History:    Back problem    CAD (coronary artery disease)    Calculus of kidney    Diabetes (HCC)    Diabetes mellitus type 2 in obese    Dilated aortic root    GOUT    Gout    High blood pressure    High cholesterol    KIDNEY STONE    Morbid obesity (HCC)    Neuropathy    OTHER DISEASES    SVT Chato    PONV (postoperative nausea and vomiting)    Pulmonary hypertension (HCC)    Thoracic aortic aneurysm      Past Surgical History:   Procedure Laterality Date    Appendectomy      Back surgery      discectomy/cauda equina   Nicole    Colonoscopy  2003    polyps/[facility]   vale    Colonoscopy  2009    Colonoscopy      Colonoscopy N/A 10/28/2015    Procedure: COLONOSCOPY;  Surgeon: Reed Garcia;  Location:  ENDOSCOPY    Colonoscopy N/A 2023    Procedure: COLONOSCOPY;  Surgeon: Evgeny Horner MD;  Location:  ENDOSCOPY    Each add tooth extraction      Lithotripsy      Other surgical history      achilles rupture      Family History   Problem Relation Age of Onset    Cancer Father         colon cancer      Social History     Socioeconomic History    Marital status:    Tobacco Use    Smoking status: Former     Current packs/day: 0.00     Average packs/day: 1 pack/day for 20.0 years (20.0 ttl pk-yrs)     Types: Cigarettes     Start date: 1998     Quit date: 2018     Years since quittin.5    Smokeless tobacco: Never    Tobacco comments:     cigarillos/small cigars 3-5 daily   Vaping Use    Vaping status: Never Used   Substance and Sexual Activity    Alcohol use: Not Currently     Alcohol/week: 3.0 - 4.0 standard drinks of alcohol     Types: 3 - 4 Standard drinks or equivalent per week    Drug use: No     Social Drivers of Health     Food Insecurity: No Food Insecurity (2025)    Food Insecurity     Food Insecurity: Never true   Transportation Needs: No Transportation Needs (2025)    Transportation Needs     Lack of Transportation: No   Housing Stability: Low Risk  (2025)    Housing Stability     Housing Instability: No           Objective:    Telemetry:   Cardiac Rhythm: Atrial fibrillation  /70 (BP Location: Left arm)   Wt 270 lb (122.5 kg)   SpO2 96%   BMI 38.74 kg/m²     Wt Readings from Last 6 Encounters:   25 270 lb (122.5 kg)   25 279 lb (126.6 kg)   25 278 lb 12.8 oz (126.5 kg)   23 (!) 305 lb (138.3 kg)   23 300 lb (136.1 kg)   22 (!) 320 lb (145.2 kg)     Current Outpatient Medications:     eplerenone 25 MG Oral Tab, Take 2 tablets (50 mg total) by mouth daily., Disp: , Rfl:      empagliflozin (JARDIANCE) 10 MG Oral Tab, Take 1 tablet (10 mg total) by mouth daily., Disp: , Rfl:     torsemide 10 MG Oral Tab, Take 4 tablets (40 mg total) by mouth daily., Disp: , Rfl:     potassium chloride 20 MEQ Oral Tab CR, Take 60 meq (3 tablets) the delma of 3/11, then 20 meq (1 tablet) daily, Disp: 40 tablet, Rfl: 0    metoprolol succinate ER 25 MG Oral Tablet 24 Hr, Take 1 tablet (25 mg total) by mouth Daily Beta Blocker. Hold for SBP < 100 and HR < 60, Disp: , Rfl:     Cholecalciferol (D3 OR), Take 1 tablet by mouth Noon., Disp: , Rfl:     apixaban (ELIQUIS) 5 MG Oral Tab, Take 1 tablet (5 mg total) by mouth 2 (two) times daily., Disp: , Rfl:     allopurinol 300 MG Oral Tab, Take 1 tablet (300 mg total) by mouth daily., Disp: , Rfl:     tamsulosin (FLOMAX) cap, TAKE 1 CAPSULE(0.4 MG) BY MOUTH DAILY, Disp: 90 capsule, Rfl: 1    Cyanocobalamin (B-12) 1000 MCG Oral Tab, Take 2,000 mcg by mouth Noon., Disp: , Rfl:     atorvastatin (LIPITOR) 20 MG Oral Tab, Take 1 tablet (20 mg total) by mouth daily., Disp: , Rfl:     Exam:   General:         Alert, in no apparent distress  HEENT:          no JVD at 90 degrees  Lungs:           CTAB   CV:                  irregular   Abdomen:       round, soft, non-tender  Extremities:    +1 ankle edema L>R  Neuro:             A&O x 3, GOLDEN  Skin:                chronic venous stasis changes LE's     Component      Latest Ref Rng 3/17/2025   Glucose      70 - 99 mg/dL 116 (H)    Sodium      136 - 145 mmol/L 138    Potassium      3.5 - 5.1 mmol/L 3.4 (L)    Chloride      98 - 112 mmol/L 101    Carbon Dioxide, Total      21.0 - 32.0 mmol/L 29.0    ANION GAP      0 - 18 mmol/L 8    BUN      9 - 23 mg/dL 14    CREATININE      0.70 - 1.30 mg/dL 0.93    CALCIUM      8.7 - 10.6 mg/dL 11.3 (H)    CALCULATED OSMOLALITY      275 - 295 mOsm/kg 287    EGFR      >=60 mL/min/1.73m2 87    Patient Fasting for BMP? Patient not present       Legend:  (H) High  (L) Low      Diagnostics:      Echocardiogram: 1/7/25  Conclusions:   1. Left ventricle: Systolic function was normal. The estimated ejection      fraction was 55-60%, by visual assessment.   2. Pulmonary arteries: Systolic pressure was mildly increased, estimated to      be 44mm Hg. Estimated pulmonary artery diastolic pressure was 6mm Hg.   Impressions:  This study is compared with previous dated 12/10/2020:   Limitations due to poor acoustic windows.   LVEF is preserved.         Holter Monitoring 1.This is an excellent quality study. 2.Predominant rhythm is atrial fibrillation. 3.The minimum heart rate recorded was 35 beats / minute (12/18/2024). The maximum heart rate is 105 beats / minute (12/18/2024). The mean heart rate is 65 beats / minute. 4.No evidence of AV block is noted. 5.Rare premature atrial contractions noted. 6.No evidence of supraventricular tachycardia is noted.7.Rare premature ventricular contractions noted. 8.No pauses were noted. 9.*The predominant rhythm was Atrial Fibrillation/Flutter. *The Maximum Heart Rate recorded was 105 bpm, 12/18 15:10:14, the Minimum Heart Rate recorded was 35 bpm, 12/18 15:57:19, and the Average Heart Rate was 65 bpm. *There were 569 VE beats with a burden of 2 %. *The study included an Atrial Fibrillation/Flutter Cochrane of >99 % with <1 % in RVR and 24 % in SVR. The longest episode was 6h 22m 48.2s, 12/18 10:06:57, and the Fastest episode was 105 bpm, 12/18 10:06:57. *There were 0 Patient Triggers. 12/17/2024 3:30:00 PM         Imaging:     Lexiscan 1/16/25  IMPRESSION:   Normal left ventricular systolic function.  Normal perfusion.  Normal Lexiscan infusion EKG with baseline atrial fibrillation.      R/LHC 11/2024:     Right atrial pressure 3 mmHg. RV pressure 29/0/5 mmHg. Pulmonary artery pressure 30/8/19 mmHg. Pulmonary capillary wedge pressure is 7 mmHg. Transpulmonary gradient 12 mmHg. Pulmonary vascular resistance 1.5 Wood units with systemic vascular resistance of 415. Cardiac output  8.1 L/minute with cardiac index of 3.2 L/minute/sq m.      IMPRESSION:    1.          Normal coronary arteries angiographically.  2.          Dominant circumflex artery.  3.          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  4.          Right heart catheterization was surprisingly unremarkable with normal pulmonary pressures, but in view of mild hypovolemia and if volume status is corrected, he may have at most mild pulmonary hypertension.  5.          Normal transpulmonary gradient, at the upper limit of normal.  6.          Pulmonary vascular resistance at the upper limit of normal.  7.          Low normal biventricular filling pressures.  8.          No intracardiac shunt by quick saturation run on room air.  9.          Low SVR with hypovolemia.  10.        Right ventricle pressure waveform was consistent with sleep apnea.     GDMT:  [] CardioMEMs  [] ARNi/ACEi/ARB  [x] BB  [x] MRA  [] SGLT2i  [x] Corlanor  [] GLP-1, can't afford    Education:  Patient instructed regarding sodium restricted diet, low sodium foods, fluid restriction, daily weights, medication regimen, s/s HF exacerbation and when to call APN/clinic.    Assessment:   HFpEF, ACC/AHA Class 3 - LVEF 55-60% on echo. Last Probnp 671. Recent BVA suggest severe plasma excess. Volume status markedly improved.   Longstanding Persistent Afib - on Eliquis. Rates controlled. DCCV recommended per Dr. Valles. Plan for rate control for now per Dr. Tapia.   Non-obstructive CAD - [facility] on 11/2023. Lexiscan 1/2025 without ischemia.  Morbid obesity - class 3. Body mass index is 38.74 kg/m². Previously on Rybelsus. Ozempic was approved, but too expensive. Encouraged activity as tolerated, enrolled in PT with plans for water walking during the summer.   DM type 2 - last A1c 5.6%. On Jardiance for CV benefits.   Essential HTN - controlled,   HLD -  on statin.   Chronic lymphedema - prior use of pumps. Referred to the lymphedema clinic per Dr. Valles in December, discussed last visit and transportation is an issue.   Renal insufficiency with recent EMILIO - resolved.   Anemia - acute blood loss, last Hgb improved from 11.6 to 13.7 g/dl. Iron sat 28. Ferritin 232 3/7. Normalized hematocrit per BVA 43.7%. BVA suggest normal RBC deficit.   Hx ETOH abuse - reports 2-3 drinks per night, quit in 1/2025.  Cdiff - s/p oral vanco. Resolved.   HEIDI intolerant of CPAP.   Hypokalemia, K 3.4 today  Hypercalcemia     Plan:     Kdur 40 meq PO x1  Increase Inspra to 50 mg from 25 mg.   Advised to read food labels to see if he is consuming anything new that is high in Calcium. If calcium is elevated next week will send results to PCP. He has not been taking any new supplements.   Encouraged he continue to follow daily weights at home and contact us with any concerns.   Return to clinic in 1 week.   Encouraged increased activity as tolerated  F/U with Dr. Tapia as planned.   CHF discharge instructions given    I have spent 60 min total time on the day of the encounter, including: preparing to see the patient, obtaining and/or reviewing separately obtained history, performing a medically appropriate examination and/or evaluation, counseling and educating the patient/family caregiver, ordering medication, tests, or procedures, documenting clinical information in Epic, independently interpreting results and communicating results to the patient/family caregiver, and care coordination      MARYCARMEN John

## 2025-03-17 ENCOUNTER — HOSPITAL ENCOUNTER (OUTPATIENT)
Dept: LAB | Facility: HOSPITAL | Age: 75
Discharge: HOME | End: 2025-03-17
Attending: NURSE PRACTITIONER
Payer: MEDICARE

## 2025-03-17 ENCOUNTER — HOSPITAL ENCOUNTER (OUTPATIENT)
Dept: CARDIOLOGY CLINIC | Facility: HOSPITAL | Age: 75
End: 2025-03-17
Attending: NURSE PRACTITIONER
Payer: MEDICARE

## 2025-03-17 VITALS
WEIGHT: 270 LBS | SYSTOLIC BLOOD PRESSURE: 110 MMHG | OXYGEN SATURATION: 96 % | BODY MASS INDEX: 39 KG/M2 | RESPIRATION RATE: 19 BRPM | HEART RATE: 46 BPM | DIASTOLIC BLOOD PRESSURE: 57 MMHG

## 2025-03-17 DIAGNOSIS — E87.6 HYPOKALEMIA: ICD-10-CM

## 2025-03-17 DIAGNOSIS — I50.31 ACUTE HEART FAILURE WITH PRESERVED EJECTION FRACTION (HCC): ICD-10-CM

## 2025-03-17 DIAGNOSIS — I10 ESSENTIAL HYPERTENSION: ICD-10-CM

## 2025-03-17 DIAGNOSIS — E66.01 OBESITY, MORBID (HCC): ICD-10-CM

## 2025-03-17 DIAGNOSIS — I50.32 CHRONIC HEART FAILURE WITH PRESERVED EJECTION FRACTION (HCC): ICD-10-CM

## 2025-03-17 DIAGNOSIS — I50.32 CHRONIC HEART FAILURE WITH PRESERVED EJECTION FRACTION (HCC): Primary | ICD-10-CM

## 2025-03-17 DIAGNOSIS — N28.9 RENAL INSUFFICIENCY: ICD-10-CM

## 2025-03-17 LAB
ANION GAP SERPL CALC-SCNC: 8 MMOL/L (ref 0–18)
BUN BLD-MCNC: 14 MG/DL (ref 9–23)
CALCIUM BLD-MCNC: 11.3 MG/DL (ref 8.7–10.6)
CHLORIDE SERPL-SCNC: 101 MMOL/L (ref 98–112)
CO2 SERPL-SCNC: 29 MMOL/L (ref 21–32)
CREAT BLD-MCNC: 0.93 MG/DL
EGFRCR SERPLBLD CKD-EPI 2021: 87 ML/MIN/1.73M2 (ref 60–?)
GLUCOSE BLD-MCNC: 116 MG/DL (ref 70–99)
OSMOLALITY SERPL CALC.SUM OF ELEC: 287 MOSM/KG (ref 275–295)
POTASSIUM SERPL-SCNC: 3.4 MMOL/L (ref 3.5–5.1)
SODIUM SERPL-SCNC: 138 MMOL/L (ref 136–145)

## 2025-03-17 PROCEDURE — 99215 OFFICE O/P EST HI 40 MIN: CPT | Performed by: NURSE PRACTITIONER

## 2025-03-17 PROCEDURE — 36415 COLL VENOUS BLD VENIPUNCTURE: CPT | Performed by: NURSE PRACTITIONER

## 2025-03-17 PROCEDURE — 80048 BASIC METABOLIC PNL TOTAL CA: CPT | Performed by: NURSE PRACTITIONER

## 2025-03-17 RX ORDER — POTASSIUM CHLORIDE 1500 MG/1
40 TABLET, EXTENDED RELEASE ORAL ONCE
Status: COMPLETED | OUTPATIENT
Start: 2025-03-17 | End: 2025-03-17

## 2025-03-17 RX ORDER — EPLERENONE 25 MG/1
50 TABLET ORAL DAILY
COMMUNITY
Start: 2025-03-17

## 2025-03-17 RX ADMIN — POTASSIUM CHLORIDE 40 MEQ: 1500 TABLET, EXTENDED RELEASE ORAL at 08:15:00

## 2025-03-17 NOTE — PROGRESS NOTES
Patient Assessed.   No signs or symptoms of shortness of breath or chest pain.   Bilateral lower leg edema decreased; no swelling noted.  States he is still fatigued, but some days are better than others.  Weight stable at 270 lbs; down 9 lbs from last week.    Patient given oral 40 meq potassium. Tolerated well.   Reviewed current list of patient's allergies and medication; updated the Electronic Medical Record. Labs ordered to assess kidney function and drawn by  Lab. Reviewed follow-up appointment and Heart Failure discharge instructions with patient. Patient verbalized an understanding.     RTC next week.

## 2025-03-17 NOTE — PATIENT INSTRUCTIONS
Heart Failure Discharge Instructions    Increase Eplerenone to 50 mg daily.  Make sure you are not taking any calcium supplements, this includes Tums.     Activity: Regular exercise and activity is important for your overall health and to help keep your heart strong and functioning as well as possible.   Walk at a slow to moderate pace for 15-20 minutes 3-5 days per week.     Follow these instructions every day to keep yourself in the Green Zone     The Green Zone means you are feeling well and your symptoms are under control                                    Medications  Take your medication every day as instructed  Do not stop taking your medicine or change the amount you are taking without instructions from your doctor or nurse  Do Not take non-steroidal antiinflammatory drugs such as ibuprofen, aleve, advil, or motrin                                    Diet/Fluids  People with heart failure should eat less sodium (salt) and limit fluid. Sodium attracts water and makes the body hold fluid. This extra fluid makes the heart work harder and can worsen the symptoms of heart failure.     Diet    2000 mg sodium daily  Fluid restriction    64 ounces daily  (8 oz. = 1 cup)                                     Body Weight  Weigh yourself every day before breakfast and write your weight down  Use the same scale and wear about the same amount of clothes each time  A sudden weight increase is due to fluid retention rather than fat                                         Activity  Pace your activities to avoid getting overtired  Take rest periods as needed  Elevate your feet to reduce ankle swelling when resting                             Signs of Worsening Heart Failure    You are entering the Yellow Zone - this is a warning zone    Call your doctor or nurse if you have any of these signs or symptoms:  You gain 2 or more pounds overnight or 3-5 pounds in 3-7 days  You have more trouble breathing  You get more tired with  regular activity, or are limiting activity because of shortness of breath or fatigue  You are short of breath lying down, you need more pillows to breathe comfortably,  or wake up during the night short of breath  You urinate less often during the day and more often at night  You have a bloated feeling, upset stomach, loss of appetite, or your clothes are fitting tighter    GO TO THE EMERGENCY DEPARTMENT or CALL 911 IF:    These are signs you are in the RED ZONE - THIS IS AN EMERGENCY  You have tightness or pain in your chest  You are extremely short of breath or can't catch your breath  You cough up frothy pink mucous  You feel confused or can't think clearly  You are traveling and develop symptoms of worsening heart failure     We respect everyone's time and availability. Please be aware that this is not a walk-in clinic and we require appointments in order to facilitate timely care for all patients. We ask you to arrive 30 minutes before your appointment to allow time for you to check-in and have your bloodwork drawn. Please understand if you are late for your appointment, you may be asked to reschedule. If possible, all attempts will be made to accommodate but realize this is no guarantee that this will always be available. We understand there are extenuating circumstances. If you need to cancel or reschedule your appointment, please call the [facility] within 24 hours at [phone].  Thank you for your cooperation, [facility] Staff.    If you are currently [facility] active, starting July 1st 2024, we will be utilizing [facility] messaging ONLY to confirm your appointment.    IF YOU HAVE QUESTIONS REGARDING YOUR BILL, FEEL FREE TO CONTACT [facility] PATIENT ACCOUNTS -[phone]. IF YOU NEED FINANCIAL ASSISTANCE, PLEASE CALL AN [facility] FINANCIAL COUNSELOR -[phone].             [facility]     [phone]

## 2025-03-25 ENCOUNTER — HOSPITAL ENCOUNTER (OUTPATIENT)
Dept: CARDIOLOGY CLINIC | Facility: HOSPITAL | Age: 75
Discharge: HOME | End: 2025-03-25
Attending: NURSE PRACTITIONER
Payer: MEDICARE

## 2025-03-25 ENCOUNTER — HOSPITAL ENCOUNTER (OUTPATIENT)
Dept: LAB | Facility: HOSPITAL | Age: 75
Discharge: HOME | End: 2025-03-25
Attending: NURSE PRACTITIONER
Payer: MEDICARE

## 2025-03-25 VITALS
RESPIRATION RATE: 17 BRPM | SYSTOLIC BLOOD PRESSURE: 113 MMHG | DIASTOLIC BLOOD PRESSURE: 73 MMHG | WEIGHT: 269.81 LBS | HEART RATE: 68 BPM | OXYGEN SATURATION: 98 % | BODY MASS INDEX: 39 KG/M2

## 2025-03-25 DIAGNOSIS — I50.32 CHRONIC HEART FAILURE WITH PRESERVED EJECTION FRACTION (HCC): Primary | ICD-10-CM

## 2025-03-25 DIAGNOSIS — I50.31 ACUTE HEART FAILURE WITH PRESERVED EJECTION FRACTION (HCC): ICD-10-CM

## 2025-03-25 LAB
ANION GAP SERPL CALC-SCNC: 10 MMOL/L (ref 0–18)
BUN BLD-MCNC: 20 MG/DL (ref 9–23)
CALCIUM BLD-MCNC: 10.6 MG/DL (ref 8.7–10.6)
CHLORIDE SERPL-SCNC: 102 MMOL/L (ref 98–112)
CO2 SERPL-SCNC: 27 MMOL/L (ref 21–32)
CREAT BLD-MCNC: 1.01 MG/DL
EGFRCR SERPLBLD CKD-EPI 2021: 79 ML/MIN/1.73M2 (ref 60–?)
FASTING STATUS PATIENT QL REPORTED: NO
GLUCOSE BLD-MCNC: 113 MG/DL (ref 70–99)
OSMOLALITY SERPL CALC.SUM OF ELEC: 291 MOSM/KG (ref 275–295)
POTASSIUM SERPL-SCNC: 3.7 MMOL/L (ref 3.5–5.1)
SODIUM SERPL-SCNC: 139 MMOL/L (ref 136–145)

## 2025-03-25 PROCEDURE — 99215 OFFICE O/P EST HI 40 MIN: CPT | Performed by: NURSE PRACTITIONER

## 2025-03-25 PROCEDURE — 80048 BASIC METABOLIC PNL TOTAL CA: CPT | Performed by: NURSE PRACTITIONER

## 2025-03-25 PROCEDURE — 36415 COLL VENOUS BLD VENIPUNCTURE: CPT | Performed by: NURSE PRACTITIONER

## 2025-03-25 NOTE — PROGRESS NOTES
Patient assessed. Patient denies any bloating or shortness of breath. No lower extremity edema noted. Weight stable at 269.8 lbs.Patient's only complaint is the sensation of his skin burning from very low on his back to around his hips and upper legs, which started a few weeks ago and is worse on his left than right. He denies having any rashes or redness on his skin. He reports using Aquaphor on his skin, but reports it only helps slightly. Reviewed current list of patient's allergies and medication; updated the Electronic Medical Record. Labs ordered to assess kidney function and drawn by  Lab. APN notified of all the above information.    Reviewed follow-up appointment and Heart Failure discharge instructions with patient. Patient verbalized an understanding. Patient to return to the clinic in 2 weeks.

## 2025-03-25 NOTE — PATIENT INSTRUCTIONS
Heart Failure Discharge Instructions    Activity: Regular exercise and activity is important for your overall health and to help keep your heart strong and functioning as well as possible.   Walk at a slow to moderate pace for 15-20 minutes 3-5 days per week.     Follow these instructions every day to keep yourself in the Green Zone     The Green Zone means you are feeling well and your symptoms are under control                                    Medications  Take your medication every day as instructed  Do not stop taking your medicine or change the amount you are taking without instructions from your doctor or nurse  Do Not take non-steroidal antiinflammatory drugs such as ibuprofen, aleve, advil, or motrin                                    Diet/Fluids  People with heart failure should eat less sodium (salt) and limit fluid. Sodium attracts water and makes the body hold fluid. This extra fluid makes the heart work harder and can worsen the symptoms of heart failure.     Diet    2000 mg sodium daily  Fluid restriction    64 ounces daily  (8 oz. = 1 cup)                                     Body Weight  Weigh yourself every day before breakfast and write your weight down  Use the same scale and wear about the same amount of clothes each time  A sudden weight increase is due to fluid retention rather than fat                                         Activity  Pace your activities to avoid getting overtired  Take rest periods as needed  Elevate your feet to reduce ankle swelling when resting                             Signs of Worsening Heart Failure    You are entering the Yellow Zone - this is a warning zone    Call your doctor or nurse if you have any of these signs or symptoms:  You gain 2 or more pounds overnight or 3-5 pounds in 3-7 days  You have more trouble breathing  You get more tired with regular activity, or are limiting activity because of shortness of breath or fatigue  You are short of breath lying  down, you need more pillows to breathe comfortably,  or wake up during the night short of breath  You urinate less often during the day and more often at night  You have a bloated feeling, upset stomach, loss of appetite, or your clothes are fitting tighter    GO TO THE EMERGENCY DEPARTMENT or CALL 911 IF:    These are signs you are in the RED ZONE - THIS IS AN EMERGENCY  You have tightness or pain in your chest  You are extremely short of breath or can't catch your breath  You cough up frothy pink mucous  You feel confused or can't think clearly  You are traveling and develop symptoms of worsening heart failure     We respect everyone's time and availability. Please be aware that this is not a walk-in clinic and we require appointments in order to facilitate timely care for all patients. We ask you to arrive 30 minutes before your appointment to allow time for you to check-in and have your bloodwork drawn. Please understand if you are late for your appointment, you may be asked to reschedule. If possible, all attempts will be made to accommodate but realize this is no guarantee that this will always be available. We understand there are extenuating circumstances. If you need to cancel or reschedule your appointment, please call the [facility] within 24 hours at [phone].  Thank you for your cooperation, [facility] Staff.    If you are currently [employer] active, starting July 1st 2024, we will be utilizing [employer] messaging ONLY to confirm your appointment.    IF YOU HAVE QUESTIONS REGARDING YOUR BILL, FEEL FREE TO CONTACT [facility] PATIENT ACCOUNTS -[phone]. IF YOU NEED FINANCIAL ASSISTANCE, PLEASE CALL AN [facility] FINANCIAL COUNSELOR -[phone].             [facility]     [phone]

## 2025-03-25 NOTE — PROGRESS NOTES
[facility] Progress Note    Gerardo Torrez is a 74 year old male who presents to clinic for APN assessment and management of chronic HFpEF and is functional class 3.     Subjective:  Since her last clinic visit on 3/17; when he received Kdur 40meq PO and Inspra was increased to 50mg, his weight is mainly unchanged. His LE edema remains unchanged. Breathing has improved recently and stable. He denies dizziness. Home weights have been ~265 lbs. He uses lymphedema pumps daily. He notes having very frequent urination after morning torsemide and gets extremely thirsty, especially in the afternoon. He drinks over 100 ounces of fluid.     He endorses burning, itching pain that starts in the lower back and radiates down his legs that started at the beginning of February. Cerve and Aquaphor have helped. There is no signs of any lesions or rash. Recommended he call his PCP to discuss.     He had recent BVA 3/7 that suggest he he severely fluid overloaded with severe excess plasma volume, normal RBC deficit and moderate excess TBV. Albumin transudation was normal. Peripheral venous hematocrit was 37.6%, 43.7% when normalized.     Review of Systems   Constitutional: Negative.   Cardiovascular:  Positive for dyspnea on exertion (unchanged) and leg swelling (minimal).   Respiratory: Negative.     Gastrointestinal: Negative.        HISTORY:  Past Medical History:    Back problem    CAD (coronary artery disease)    Calculus of kidney    Diabetes (HCC)    Diabetes mellitus type 2 in obese    Dilated aortic root    GOUT    Gout    High blood pressure    High cholesterol    KIDNEY STONE    Morbid obesity (HCC)    Neuropathy    OTHER DISEASES    SVT Chato    PONV (postoperative nausea and vomiting)    Pulmonary hypertension (HCC)    Thoracic aortic aneurysm      Past Surgical History:   Procedure Laterality Date    Appendectomy      Back surgery      discectomy/cauda equina   [place]    Colonoscopy  2003     polyps/FMH  [place]    Colonoscopy  2009    Colonoscopy      Colonoscopy N/A 10/28/2015    Procedure: COLONOSCOPY;  Surgeon: Reed Garcia;  Location:  ENDOSCOPY    Colonoscopy N/A 2023    Procedure: COLONOSCOPY;  Surgeon: Evgeny Horner MD;  Location:  ENDOSCOPY    Each add tooth extraction      Lithotripsy      Other surgical history      achilles rupture      Family History   Problem Relation Age of Onset    Cancer Father         colon cancer      Social History     Socioeconomic History    Marital status:    Tobacco Use    Smoking status: Former     Current packs/day: 0.00     Average packs/day: 1 pack/day for 20.0 years (20.0 ttl pk-yrs)     Types: Cigarettes     Start date: 1998     Quit date: 2018     Years since quittin.5    Smokeless tobacco: Never    Tobacco comments:     cigarillos/small cigars 3-5 daily   Vaping Use    Vaping status: Never Used   Substance and Sexual Activity    Alcohol use: Not Currently     Alcohol/week: 3.0 - 4.0 standard drinks of alcohol     Types: 3 - 4 Standard drinks or equivalent per week    Drug use: No     Social Drivers of Health     Food Insecurity: No Food Insecurity (2025)    Food Insecurity     Food Insecurity: Never true   Transportation Needs: No Transportation Needs (2025)    Transportation Needs     Lack of Transportation: No   Housing Stability: Low Risk  (2025)    Housing Stability     Housing Instability: No           Objective:    Cardiac Rhythm: Atrial fibrillation    /73   Pulse 68   Resp 17   Wt 269 lb 12.8 oz (122.4 kg)   SpO2 98%   BMI 38.71 kg/m²     Wt Readings from Last 6 Encounters:   25 269 lb 12.8 oz (122.4 kg)   25 270 lb (122.5 kg)   25 279 lb (126.6 kg)   25 278 lb 12.8 oz (126.5 kg)   23 (!) 305 lb (138.3 kg)   23 300 lb (136.1 kg)            Recent Results (from the past 24 hours)   Basic Metabolic Panel (8)    Collection Time: 25  3:28 PM    Result Value Ref Range    Glucose 113 (H) 70 - 99 mg/dL    Sodium 139 136 - 145 mmol/L    Potassium 3.7 3.5 - 5.1 mmol/L    Chloride 102 98 - 112 mmol/L    CO2 27.0 21.0 - 32.0 mmol/L    Anion Gap 10 0 - 18 mmol/L    BUN 20 9 - 23 mg/dL    Creatinine 1.01 0.70 - 1.30 mg/dL    Calcium, Total 10.6 8.7 - 10.6 mg/dL    Calculated Osmolality 291 275 - 295 mOsm/kg    eGFR-Cr 79 >=60 mL/min/1.73m2    Patient Fasting for BMP? No          Current Outpatient Medications:     eplerenone 25 MG Oral Tab, Take 2 tablets (50 mg total) by mouth daily., Disp: , Rfl:     empagliflozin (JARDIANCE) 10 MG Oral Tab, Take 1 tablet (10 mg total) by mouth daily., Disp: , Rfl:     torsemide 10 MG Oral Tab, Take 4 tablets (40 mg total) by mouth daily., Disp: , Rfl:     potassium chloride 20 MEQ Oral Tab CR, Take 60 meq (3 tablets) the delma of 3/11, then 20 meq (1 tablet) daily, Disp: 40 tablet, Rfl: 0    metoprolol succinate ER 25 MG Oral Tablet 24 Hr, Take 1 tablet (25 mg total) by mouth Daily Beta Blocker. Hold for SBP < 100 and HR < 60, Disp: , Rfl:     Cholecalciferol (D3 OR), Take 1 tablet by mouth Noon., Disp: , Rfl:     apixaban (ELIQUIS) 5 MG Oral Tab, Take 1 tablet (5 mg total) by mouth 2 (two) times daily., Disp: , Rfl:     allopurinol 300 MG Oral Tab, Take 1 tablet (300 mg total) by mouth daily., Disp: , Rfl:     tamsulosin (FLOMAX) cap, TAKE 1 CAPSULE(0.4 MG) BY MOUTH DAILY, Disp: 90 capsule, Rfl: 1    Cyanocobalamin (B-12) 1000 MCG Oral Tab, Take 2,000 mcg by mouth Noon., Disp: , Rfl:     atorvastatin (LIPITOR) 20 MG Oral Tab, Take 1 tablet (20 mg total) by mouth daily., Disp: , Rfl:     Exam:   General:         Alert, in no apparent distress  HEENT:          no JVD   Lungs:           CTAB   CV:                  irregular   Abdomen:       round/obese, soft, non-tender  Extremities:    trace-+1 ankle edema L>R; chronic venous insufficiency  Neuro:             A&O x 3, GOLDEN  Skin:                pink/dry     Education:  Patient  instructed regarding sodium restricted diet, low sodium foods, fluid restriction, daily weights, medication regimen, s/s HF exacerbation and when to call APN/clinic.    [] ARNi/ACEi/ARB  [x] BB  [x] MRA  [x] SGLT2i  [x] Corlanor  [] GLP-1, can't afford    Assessment:   HFpEF, ACC/AHA Class 3 - LVEF 55-60% on echo. Last Probnp 671. Recent BVA suggest severe plasma excess. Volume status improved.  Longstanding Persistent Afib - on Eliquis. Rates controlled. DCCV recommended per Dr. Valles. Plan for rate control for now per Dr. Tapia.   Non-obstructive CAD - LHC on 11/2023. Lexiscan 1/2025 without ischemia.  Morbid obesity - class 3. Body mass index is 38.74 kg/m². Previously on Rybelsus. Ozempic was approved, but too expensive. Encouraged activity as tolerated, enrolled in PT with plans for water walking during the summer.   DM type 2 - last A1c 5.6%. On Jardiance for CV benefits.   Essential HTN - controlled.   HLD - on statin.   Chronic lymphedema - prior use of pumps. Referred to the lymphedema clinic per Dr. Valles in December, discussed last visit and transportation is an issue.   Renal insufficiency with recent EMILIO - resolved.   Anemia - acute blood loss, last Hgb improved from 11.6 to 13.7 g/dl. Iron sat 28. Ferritin 232 3/7. Normalized hematocrit per BVA 43.7%. BVA suggest normal RBC deficit.   Hx ETOH abuse - reports 2-3 drinks per night, quit in 1/2025.  Cdiff - s/p oral vanco. Resolved.   HEIDI intolerant of CPAP.   Hypokalemia - resolved with increase in Eplerenone.   Hypercalcemia - resolved.     Plan:   Continue current medications. He can split the doses of Torsemide, taking 20mg in the am and 20mg later in the AM or early afternoon to potentially help with excessive thirst.   Continue checking daily weights at home.   Encouraged ongoing exercise/walking.   Recommended talking to PCP about skin burning and itching.   Return to clinic in 2 weeks.  F/U with Dr. Tapia as planned.   CHF discharge  instructions given    I have spent 40 min total time on the day of the encounter, including: preparing to see the patient, obtaining and/or reviewing separately obtained history, performing a medically appropriate examination and/or evaluation, counseling and educating the patient/family caregiver, ordering medication, tests, or procedures, documenting clinical information in Epic, and independently interpreting results and communicating results to the patient/family caregiver     MARYCARMEN Cantu

## 2025-03-26 RX ORDER — EPLERENONE 25 MG/1
50 TABLET ORAL DAILY
Qty: 60 TABLET | Refills: 5 | Status: SHIPPED | OUTPATIENT
Start: 2025-03-26

## 2025-04-07 NOTE — PROGRESS NOTES
[facility] Progress Note    Gerardo Torrez is a 74 year old male who presents to clinic for APN assessment and management of chronic HFpEF and is functional class 3.     Subjective:  Since his last clinic visit on 3/25;     Today, his weight is down 4 lbs. He has no LE edema. Breathing has improved recently and stable. Home weights have been ~260 lbs. He uses lymphedema pumps daily. He was constipated and had an episode of dizziness and abdominal pain when bearing down on the toilet. This resolved once he had a BM after taking MOM. He checked his BP which was ~110's at that time. Recommended probiotic and Miralax.      He endorses burning, itching pain that starts in the lower back and radiates down his legs that started at the beginning of February. Cerve and Aquaphor have helped. There is no signs of any lesions or rash. Recommended he call his PCP to discuss.      He had recent BVA 3/7 that suggest he he severely fluid overloaded with severe excess plasma volume, normal RBC deficit and moderate excess TBV. Albumin transudation was normal. Peripheral venous hematocrit was 37.6%, 43.7% when normalized.     Review of Systems   Constitutional: Positive for weight loss.   Cardiovascular:  Positive for dyspnea on exertion (stable, mild).   Respiratory: Negative.     Gastrointestinal: Negative.        HISTORY:  Past Medical History:    Back problem    CAD (coronary artery disease)    Calculus of kidney    Diabetes (HCC)    Diabetes mellitus type 2 in obese    Dilated aortic root    GOUT    Gout    High blood pressure    High cholesterol    KIDNEY STONE    Morbid obesity (HCC)    Neuropathy    OTHER DISEASES    SVT Chato    PONV (postoperative nausea and vomiting)    Pulmonary hypertension (HCC)    Thoracic aortic aneurysm      Past Surgical History:   Procedure Laterality Date    Appendectomy      Back surgery      discectomy/cauda equina   [place]    Colonoscopy  2003    polyps/H   [place]    Colonoscopy  2009    Colonoscopy      Colonoscopy N/A 10/28/2015    Procedure: COLONOSCOPY;  Surgeon: Reed Garcia;  Location:  ENDOSCOPY    Colonoscopy N/A 2023    Procedure: COLONOSCOPY;  Surgeon: Evgeny Horner MD;  Location:  ENDOSCOPY    Each add tooth extraction      Lithotripsy      Other surgical history      achilles rupture      Family History   Problem Relation Age of Onset    Cancer Father         colon cancer      Social History     Socioeconomic History    Marital status:    Tobacco Use    Smoking status: Former     Current packs/day: 0.00     Average packs/day: 1 pack/day for 20.0 years (20.0 ttl pk-yrs)     Types: Cigarettes     Start date: 1998     Quit date: 2018     Years since quittin.6    Smokeless tobacco: Never    Tobacco comments:     cigarillos/small cigars 3-5 daily   Vaping Use    Vaping status: Never Used   Substance and Sexual Activity    Alcohol use: Not Currently     Alcohol/week: 3.0 - 4.0 standard drinks of alcohol     Types: 3 - 4 Standard drinks or equivalent per week    Drug use: No     Social Drivers of Health     Food Insecurity: No Food Insecurity (2025)    Food Insecurity     Food Insecurity: Never true   Transportation Needs: No Transportation Needs (2025)    Transportation Needs     Lack of Transportation: No   Housing Stability: Low Risk  (2025)    Housing Stability     Housing Instability: No           Objective:    Cardiac Rhythm: Atrial fibrillation    /67   Pulse 76   Resp 22   Wt 265 lb 6.4 oz (120.4 kg)   SpO2 98%   BMI 38.08 kg/m²     Wt Readings from Last 6 Encounters:   25 265 lb 6.4 oz (120.4 kg)   25 269 lb 12.8 oz (122.4 kg)   25 270 lb (122.5 kg)   25 279 lb (126.6 kg)   25 278 lb 12.8 oz (126.5 kg)   23 (!) 305 lb (138.3 kg)            Recent Results (from the past 24 hours)   Basic Metabolic Panel (8)    Collection Time: 25  3:27 PM   Result  Value Ref Range    Glucose 120 (H) 70 - 99 mg/dL    Sodium 139 136 - 145 mmol/L    Potassium 3.6 3.5 - 5.1 mmol/L    Chloride 100 98 - 112 mmol/L    CO2 25.0 21.0 - 32.0 mmol/L    Anion Gap 14 0 - 18 mmol/L    BUN 15 9 - 23 mg/dL    Creatinine 1.02 0.70 - 1.30 mg/dL    Calcium, Total 11.0 (H) 8.7 - 10.6 mg/dL    Calculated Osmolality 290 275 - 295 mOsm/kg    eGFR-Cr 78 >=60 mL/min/1.73m2    Patient Fasting for BMP? No          Current Outpatient Medications:     eplerenone 25 MG Oral Tab, Take 2 tablets (50 mg total) by mouth daily., Disp: 60 tablet, Rfl: 5    empagliflozin (JARDIANCE) 10 MG Oral Tab, Take 1 tablet (10 mg total) by mouth daily., Disp: , Rfl:     torsemide 10 MG Oral Tab, Take 4 tablets (40 mg total) by mouth daily., Disp: , Rfl:     potassium chloride 20 MEQ Oral Tab CR, Take 60 meq (3 tablets) the delma of 3/11, then 20 meq (1 tablet) daily, Disp: 40 tablet, Rfl: 0    metoprolol succinate ER 25 MG Oral Tablet 24 Hr, Take 1 tablet (25 mg total) by mouth Daily Beta Blocker. Hold for SBP < 100 and HR < 60, Disp: , Rfl:     Cholecalciferol (D3 OR), Take 1 tablet by mouth Noon., Disp: , Rfl:     apixaban (ELIQUIS) 5 MG Oral Tab, Take 1 tablet (5 mg total) by mouth 2 (two) times daily., Disp: , Rfl:     allopurinol 300 MG Oral Tab, Take 1 tablet (300 mg total) by mouth daily., Disp: , Rfl:     tamsulosin (FLOMAX) cap, TAKE 1 CAPSULE(0.4 MG) BY MOUTH DAILY, Disp: 90 capsule, Rfl: 1    Cyanocobalamin (B-12) 1000 MCG Oral Tab, Take 2,000 mcg by mouth Noon., Disp: , Rfl:     atorvastatin (LIPITOR) 20 MG Oral Tab, Take 1 tablet (20 mg total) by mouth daily., Disp: , Rfl:     Exam:   General:         Alert, in no apparent distress  HEENT:          no JVD   Lungs:           CTAB   CV:                  irregular   Abdomen:       round/obese, soft, non-tender  Extremities:    no LE edema L>R; chronic venous insufficiency  Neuro:             A&O x 3, GOLDEN  Skin:                pink/dry        Education:  Patient  instructed regarding sodium restricted diet, low sodium foods, fluid restriction, daily weights, medication regimen, s/s HF exacerbation and when to call APN/clinic.      [] ARNi/ACEi/ARB  [x] BB  [x] MRA  [x] SGLT2i  [x] Corlanor  [] GLP-1, can't afford       Assessment:   HFpEF, ACC/AHA Class 3 - LVEF 55-60% on echo. Last Probnp 671. Recent BVA suggest severe plasma excess. Volume status improved.  Longstanding Persistent Afib - on Eliquis. Rates controlled. DCCV recommended per Dr. Valles. Plan for rate control for now per Dr. Tapia.   Non-obstructive CAD - LHC on 11/2023. Lexiscan 1/2025 without ischemia.  Morbid obesity - class 3. Body mass index is 38.74 kg/m². Previously on Rybelsus. Ozempic was approved, but too expensive. Encouraged activity as tolerated, enrolled in PT with plans for water walking during the summer.   DM type 2 - last A1c 5.6%. On Jardiance for CV benefits.   Essential HTN - controlled.   HLD - on statin.   Chronic lymphedema - prior use of pumps. Referred to the lymphedema clinic per Dr. Valles in December, discussed last visit and transportation is an issue. Improved.   Hx Renal insufficiency with recent EMILIO - resolved.   Anemia - acute blood loss, last Hgb improved from 11.6 to 13.7 g/dl. Iron sat 28. Ferritin 232 3/7. Normalized hematocrit per BVA 43.7%. BVA suggest normal RBC deficit.   Hx ETOH abuse - reports 2-3 drinks per night, quit in 1/2025.  Cdiff - s/p oral vanco. Resolved.   HEIDI intolerant of CPAP.   Hypokalemia - resolved with increase in Eplerenone.   Hypercalcemia - labile, to discuss with PCP.     Plan:   Continue current medications.   Continue daily weights.  Return to clinic in 1 month.  F/U with Dr. Tapia as planned.   CHF discharge instructions given    I have spent 40 min total time on the day of the encounter, including: preparing to see the patient, obtaining and/or reviewing separately obtained history, performing a medically appropriate examination and/or  evaluation, counseling and educating the patient/family caregiver, ordering medication, tests, or procedures, documenting clinical information in Epic, and independently interpreting results and communicating results to the patient/family caregiver     MARYCARMEN Cantu

## 2025-04-08 ENCOUNTER — HOSPITAL ENCOUNTER (OUTPATIENT)
Dept: LAB | Facility: HOSPITAL | Age: 75
Discharge: HOME | End: 2025-04-08
Attending: NURSE PRACTITIONER
Payer: MEDICARE

## 2025-04-08 ENCOUNTER — HOSPITAL ENCOUNTER (OUTPATIENT)
Dept: CARDIOLOGY CLINIC | Facility: HOSPITAL | Age: 75
Discharge: HOME | End: 2025-04-08
Attending: NURSE PRACTITIONER
Payer: MEDICARE

## 2025-04-08 VITALS
OXYGEN SATURATION: 98 % | DIASTOLIC BLOOD PRESSURE: 67 MMHG | HEART RATE: 76 BPM | WEIGHT: 265.38 LBS | RESPIRATION RATE: 22 BRPM | SYSTOLIC BLOOD PRESSURE: 110 MMHG | BODY MASS INDEX: 38 KG/M2

## 2025-04-08 DIAGNOSIS — I50.32 CHRONIC HEART FAILURE WITH PRESERVED EJECTION FRACTION (HCC): Primary | ICD-10-CM

## 2025-04-08 DIAGNOSIS — I50.32 CHRONIC HEART FAILURE WITH PRESERVED EJECTION FRACTION (HCC): ICD-10-CM

## 2025-04-08 LAB
ANION GAP SERPL CALC-SCNC: 14 MMOL/L (ref 0–18)
BUN BLD-MCNC: 15 MG/DL (ref 9–23)
CALCIUM BLD-MCNC: 11 MG/DL (ref 8.7–10.6)
CHLORIDE SERPL-SCNC: 100 MMOL/L (ref 98–112)
CO2 SERPL-SCNC: 25 MMOL/L (ref 21–32)
CREAT BLD-MCNC: 1.02 MG/DL
EGFRCR SERPLBLD CKD-EPI 2021: 78 ML/MIN/1.73M2 (ref 60–?)
FASTING STATUS PATIENT QL REPORTED: NO
GLUCOSE BLD-MCNC: 120 MG/DL (ref 70–99)
OSMOLALITY SERPL CALC.SUM OF ELEC: 290 MOSM/KG (ref 275–295)
POTASSIUM SERPL-SCNC: 3.6 MMOL/L (ref 3.5–5.1)
SODIUM SERPL-SCNC: 139 MMOL/L (ref 136–145)

## 2025-04-08 PROCEDURE — 99215 OFFICE O/P EST HI 40 MIN: CPT | Performed by: NURSE PRACTITIONER

## 2025-04-08 PROCEDURE — 36415 COLL VENOUS BLD VENIPUNCTURE: CPT | Performed by: NURSE PRACTITIONER

## 2025-04-08 PROCEDURE — 80048 BASIC METABOLIC PNL TOTAL CA: CPT | Performed by: NURSE PRACTITIONER

## 2025-04-08 NOTE — PATIENT INSTRUCTIONS
Heart Failure Discharge Instructions    Activity: Regular exercise and activity is important for your overall health and to help keep your heart strong and functioning as well as possible.   Walk at a slow to moderate pace for 15-20 minutes 3-5 days per week.     Follow these instructions every day to keep yourself in the Green Zone     The Green Zone means you are feeling well and your symptoms are under control                                    Medications  Take your medication every day as instructed  Do not stop taking your medicine or change the amount you are taking without instructions from your doctor or nurse  Do Not take non-steroidal antiinflammatory drugs such as ibuprofen, aleve, advil, or motrin                                    Diet/Fluids  People with heart failure should eat less sodium (salt) and limit fluid. Sodium attracts water and makes the body hold fluid. This extra fluid makes the heart work harder and can worsen the symptoms of heart failure.     Diet    2000 mg sodium daily  Fluid restriction    64 ounces daily  (8 oz. = 1 cup)                                     Body Weight  Weigh yourself every day before breakfast and write your weight down  Use the same scale and wear about the same amount of clothes each time  A sudden weight increase is due to fluid retention rather than fat                                         Activity  Pace your activities to avoid getting overtired  Take rest periods as needed  Elevate your feet to reduce ankle swelling when resting                             Signs of Worsening Heart Failure    You are entering the Yellow Zone - this is a warning zone    Call your doctor or nurse if you have any of these signs or symptoms:  You gain 2 or more pounds overnight or 3-5 pounds in 3-7 days  You have more trouble breathing  You get more tired with regular activity, or are limiting activity because of shortness of breath or fatigue  You are short of breath lying  down, you need more pillows to breathe comfortably,  or wake up during the night short of breath  You urinate less often during the day and more often at night  You have a bloated feeling, upset stomach, loss of appetite, or your clothes are fitting tighter    GO TO THE EMERGENCY DEPARTMENT or CALL 911 IF:    These are signs you are in the RED ZONE - THIS IS AN EMERGENCY  You have tightness or pain in your chest  You are extremely short of breath or can't catch your breath  You cough up frothy pink mucous  You feel confused or can't think clearly  You are traveling and develop symptoms of worsening heart failure     We respect everyone's time and availability. Please be aware that this is not a walk-in clinic and we require appointments in order to facilitate timely care for all patients. We ask you to arrive 30 minutes before your appointment to allow time for you to check-in and have your bloodwork drawn. Please understand if you are late for your appointment, you may be asked to reschedule. If possible, all attempts will be made to accommodate but realize this is no guarantee that this will always be available. We understand there are extenuating circumstances. If you need to cancel or reschedule your appointment, please call the [facility] within 24 hours at [phone].  Thank you for your cooperation, [facility] Staff.    If you are currently [employer] active, starting July 1st 2024, we will be utilizing [employer] messaging ONLY to confirm your appointment.    IF YOU HAVE QUESTIONS REGARDING YOUR BILL, FEEL FREE TO CONTACT [facility] PATIENT ACCOUNTS -[phone]. IF YOU NEED FINANCIAL ASSISTANCE, PLEASE CALL AN [facility] FINANCIAL COUNSELOR -[phone].             [facility]     [phone]

## 2025-05-02 NOTE — PROGRESS NOTES
[facility] Progress Note    Gerardo Torrez is a 74 year old male who presents to clinic for APN assessment and management of chronic HFpEF and is functional class 3a.     Subjective:  Since his last clinic visit on 4/8 when no changes were made;    He presents with his wife for evaluation. Overall he feels good and has been doing well. His weight is down 1 lb on our scale, was down 4 lbs last visit. Home weight was 264 lbs today. Leg swelling is mild. He endorses still feeling \"puffy\" in his abdomen but abdomen is soft. Breathing is good.  He has not used his lymphedema pumps in a week since leg swelling is well controlled. He is sleeping well in a recliner. He gets up a couple times a night to urinate and goes back to sleep. He has been more active around the house and cleaned the refrigerator the other day. [place] for exercise. He denies dizziness or palpitations. He has been eating healthy.     Review of Systems   Constitutional: Positive for weight loss.   Cardiovascular:  Positive for leg swelling (trace). Negative for dyspnea on exertion and palpitations.   Gastrointestinal:  Positive for bloating (\"puffy\").   Neurological:  Negative for dizziness.       HISTORY:  Past Medical History[1]   Past Surgical History[2]   Family History[3]   Short Social Hx on File[4]        Objective:    Telemetry: AF         /60   Pulse 72   Resp 23   Wt 264 lb (119.7 kg)   SpO2 96%   BMI 37.88 kg/m²     Wt Readings from Last 6 Encounters:   05/05/25 264 lb (119.7 kg)   04/08/25 265 lb 6.4 oz (120.4 kg)   03/25/25 269 lb 12.8 oz (122.4 kg)   03/17/25 270 lb (122.5 kg)   03/11/25 279 lb (126.6 kg)   02/24/25 278 lb 12.8 oz (126.5 kg)            Recent Results (from the past 24 hours)   Basic Metabolic Panel (8)    Collection Time: 05/05/25  3:55 PM   Result Value Ref Range    Glucose 110 (H) 70 - 99 mg/dL    Sodium 138 136 - 145 mmol/L     Potassium 4.0 3.5 - 5.1 mmol/L    Chloride 101 98 - 112 mmol/L    CO2 27.0 21.0 - 32.0 mmol/L    Anion Gap 10 0 - 18 mmol/L    BUN 17 9 - 23 mg/dL    Creatinine 1.08 0.70 - 1.30 mg/dL    Calcium, Total 10.2 8.7 - 10.6 mg/dL    Calculated Osmolality 288 275 - 295 mOsm/kg    eGFR-Cr 73 >=60 mL/min/1.73m2    Patient Fasting for BMP? No        Medications - Current[5]    Exam:   General:         Alert, in no apparent distress  HEENT:          no jvd  Lungs:            ctab                     CV:                  irregular   Abdomen:       round, soft, non-tender, BS+  Extremities:    trace LE edema, L>R   Neuro:             A&O x 3, GOLDEN  Skin:                chronic venous stasis changes LE's     [] CardioMEMs  [] ARNi/ACEi/ARB  [x] BB  [x] MRA  [x] SGLT2i  [] GLP-1, too expensive   Education:  Patient instructed regarding sodium restricted diet, low sodium foods, fluid restriction, daily weights, medication regimen, s/s HF exacerbation and when to call APN/clinic.    Assessment:   HFpEF, ACC/AHA Class 3 - LVEF 55-60% on echo. Last Probnp 671. BVA 3/7 suggest severe plasma excess. He was 276 lbs on the day of BVA. Weight today is 264 lbs.   Longstanding Persistent Afib - on Eliquis. Rates controlled. DCCV recommended per Dr. Valles. Plan for rate control for now per Dr. Tapia.   Non-obstructive CAD - [facility] on 11/2023. Lexiscan 1/2025 without ischemia.  Morbid obesity - class 3. Previously on Rybelsus. Ozempic was approved, but too expensive. Encouraged activity as tolerated, plans for water walking during the summer.   DM type 2 - last A1c 5.6%. On Jardiance for CV benefits.   Essential HTN - controlled.   HLD - on statin.   Chronic lymphedema -has pumps but not needed to use. Referred to the lymphedema clinic per Dr. Valles in December, transportation an issue and now not needed.   Hx Renal insufficiency with recent EMILIO - resolved.   Anemia - acute blood loss, last Hgb improved from 11.6 to 13.7 g/dl. Iron sat 28.  Ferritin 232 3/7. Normalized hematocrit per BVA 43.7%. BVA suggest normal RBC deficit.   Hx ETOH abuse - reports 2-3 drinks per night, quit in 1/2025.  HEIDI intolerant of CPAP.   Hypokalemia - resolved with increase in Eplerenone. Will follow.   Hypercalcemia - labile, to discuss with PCP.      Plan:   Discussed consideration for repeating BVA to help guide diuretic therapy with ongoing abdominal fullness versus trial of small increase in Torsemide. He prefers to try to increase Torsemide first. Will increase dose to 50 mg daily from 40 mg daily.  To continue to follow daily weights at home.   Encouraged to increase potassium in his diet with increase in diuretics.   Return to clinic in 2 weeks  F/U with Dr. Tapia regularly, last seen in February.   CHF discharge instructions given    I have spent 40 min total time on the day of the encounter, including: preparing to see the patient, obtaining and/or reviewing separately obtained history, performing a medically appropriate examination and/or evaluation, counseling and educating the patient/family caregiver, ordering medication, tests, or procedures, documenting clinical information in Epic, and independently interpreting results and communicating results to the patient/family caregiver      MARYCARMEN John               [1]   Past Medical History:   Back problem    CAD (coronary artery disease)    Calculus of kidney    Diabetes (HCC)    Diabetes mellitus type 2 in obese    Dilated aortic root    GOUT    Gout    High blood pressure    High cholesterol    KIDNEY STONE    Morbid obesity (HCC)    Neuropathy    OTHER DISEASES    SVT Chato    PONV (postoperative nausea and vomiting)    Pulmonary hypertension (HCC)    Thoracic aortic aneurysm   [2]   Past Surgical History:  Procedure Laterality Date    Appendectomy      Back surgery      discectomy/cauda equina   [place]    Colonoscopy  2003    [place]  [place]    Colonoscopy  2009    Colonoscopy       Colonoscopy N/A 10/28/2015    Procedure: COLONOSCOPY;  Surgeon: Reed Garcia;  Location:  ENDOSCOPY    Colonoscopy N/A 2023    Procedure: COLONOSCOPY;  Surgeon: Evgeny Horner MD;  Location:  ENDOSCOPY    Each add tooth extraction      Lithotripsy      Other surgical history      achilles rupture   [3]   Family History  Problem Relation Age of Onset    Cancer Father         colon cancer   [4]   Social History  Socioeconomic History    Marital status:    Tobacco Use    Smoking status: Former     Current packs/day: 0.00     Average packs/day: 1 pack/day for 20.0 years (20.0 ttl pk-yrs)     Types: Cigarettes     Start date: 1998     Quit date: 2018     Years since quittin.6    Smokeless tobacco: Never    Tobacco comments:     cigarillos/small cigars 3-5 daily   Vaping Use    Vaping status: Never Used   Substance and Sexual Activity    Alcohol use: Not Currently     Alcohol/week: 3.0 - 4.0 standard drinks of alcohol     Types: 3 - 4 Standard drinks or equivalent per week    Drug use: No     Social Drivers of Health     Food Insecurity: No Food Insecurity (2025)    Food Insecurity     Food Insecurity: Never true   Transportation Needs: No Transportation Needs (2025)    Transportation Needs     Lack of Transportation: No   Housing Stability: Low Risk  (2025)    Housing Stability     Housing Instability: No   [5]   Current Outpatient Medications:     torsemide 10 MG Oral Tab, Take 5 tablets (50 mg total) by mouth daily., Disp: 150 tablet, Rfl: 1    eplerenone 25 MG Oral Tab, Take 2 tablets (50 mg total) by mouth daily., Disp: 60 tablet, Rfl: 5    empagliflozin (JARDIANCE) 10 MG Oral Tab, Take 1 tablet (10 mg total) by mouth daily., Disp: , Rfl:     potassium chloride 20 MEQ Oral Tab CR, Take 60 meq (3 tablets) the delma of 3/11, then 20 meq (1 tablet) daily, Disp: 40 tablet, Rfl: 0    metoprolol succinate ER 25 MG Oral Tablet 24 Hr, Take 1 tablet (25 mg total) by mouth Daily  Beta Blocker. Hold for SBP < 100 and HR < 60, Disp: , Rfl:     Cholecalciferol (D3 OR), Take 1 tablet by mouth Noon., Disp: , Rfl:     apixaban (ELIQUIS) 5 MG Oral Tab, Take 1 tablet (5 mg total) by mouth 2 (two) times daily., Disp: , Rfl:     allopurinol 300 MG Oral Tab, Take 1 tablet (300 mg total) by mouth daily., Disp: , Rfl:     tamsulosin (FLOMAX) cap, TAKE 1 CAPSULE(0.4 MG) BY MOUTH DAILY, Disp: 90 capsule, Rfl: 1    Cyanocobalamin (B-12) 1000 MCG Oral Tab, Take 2,000 mcg by mouth Noon., Disp: , Rfl:     atorvastatin (LIPITOR) 20 MG Oral Tab, Take 1 tablet (20 mg total) by mouth daily., Disp: , Rfl:

## 2025-05-05 ENCOUNTER — HOSPITAL ENCOUNTER (OUTPATIENT)
Dept: LAB | Facility: HOSPITAL | Age: 75
Discharge: HOME | End: 2025-05-05
Attending: NURSE PRACTITIONER
Payer: MEDICARE

## 2025-05-05 ENCOUNTER — HOSPITAL ENCOUNTER (OUTPATIENT)
Dept: CARDIOLOGY CLINIC | Facility: HOSPITAL | Age: 75
End: 2025-05-05
Attending: NURSE PRACTITIONER
Payer: MEDICARE

## 2025-05-05 VITALS
WEIGHT: 264 LBS | HEART RATE: 72 BPM | OXYGEN SATURATION: 96 % | RESPIRATION RATE: 23 BRPM | DIASTOLIC BLOOD PRESSURE: 60 MMHG | SYSTOLIC BLOOD PRESSURE: 126 MMHG | BODY MASS INDEX: 38 KG/M2

## 2025-05-05 DIAGNOSIS — I50.32 CHRONIC HEART FAILURE WITH PRESERVED EJECTION FRACTION (HCC): Primary | ICD-10-CM

## 2025-05-05 DIAGNOSIS — I10 ESSENTIAL HYPERTENSION: ICD-10-CM

## 2025-05-05 DIAGNOSIS — I50.32 CHRONIC HEART FAILURE WITH PRESERVED EJECTION FRACTION (HCC): ICD-10-CM

## 2025-05-05 DIAGNOSIS — E66.812 OBESITY, CLASS II, BMI 35-39.9: ICD-10-CM

## 2025-05-05 DIAGNOSIS — I27.20 PULMONARY HYPERTENSION (HCC): ICD-10-CM

## 2025-05-05 LAB
ANION GAP SERPL CALC-SCNC: 10 MMOL/L (ref 0–18)
BUN BLD-MCNC: 17 MG/DL (ref 9–23)
CALCIUM BLD-MCNC: 10.2 MG/DL (ref 8.7–10.6)
CHLORIDE SERPL-SCNC: 101 MMOL/L (ref 98–112)
CO2 SERPL-SCNC: 27 MMOL/L (ref 21–32)
CREAT BLD-MCNC: 1.08 MG/DL (ref 0.7–1.3)
EGFRCR SERPLBLD CKD-EPI 2021: 73 ML/MIN/1.73M2 (ref 60–?)
FASTING STATUS PATIENT QL REPORTED: NO
GLUCOSE BLD-MCNC: 110 MG/DL (ref 70–99)
OSMOLALITY SERPL CALC.SUM OF ELEC: 288 MOSM/KG (ref 275–295)
POTASSIUM SERPL-SCNC: 4 MMOL/L (ref 3.5–5.1)
SODIUM SERPL-SCNC: 138 MMOL/L (ref 136–145)

## 2025-05-05 PROCEDURE — 36415 COLL VENOUS BLD VENIPUNCTURE: CPT | Performed by: NURSE PRACTITIONER

## 2025-05-05 PROCEDURE — 80048 BASIC METABOLIC PNL TOTAL CA: CPT | Performed by: NURSE PRACTITIONER

## 2025-05-05 PROCEDURE — 99215 OFFICE O/P EST HI 40 MIN: CPT | Performed by: NURSE PRACTITIONER

## 2025-05-05 RX ORDER — TORSEMIDE 10 MG/1
50 TABLET ORAL DAILY
Qty: 150 TABLET | Refills: 1 | Status: SHIPPED | OUTPATIENT
Start: 2025-05-05

## 2025-05-05 NOTE — PROGRESS NOTES
Patient assessed. Patient with no edema and no bloating. Patient reports no changes in his shortness of breath since he was last seen, but he reports overall it has improved. Patient notices he is able to  tolerate and do more activities, such as, cooking meals and walking to the mailbox.  Weight down almost 2 lbs at 264.0 lbs. APN notified of all the above information. Labs ordered and drawn by  Lab. Reviewed allergies and list of current medications with patient and updated it in the Electronic Medical Record.     Educated patient on low sodium diet and food choices, fluid restriction of 2 liters, and daily weights. Reviewed follow-up appointments and discharge Heart Failure instructions with patient. Patient verbalized an understanding. Patient to return to the clinic in 2 weeks.

## 2025-05-05 NOTE — PATIENT INSTRUCTIONS
Heart Failure Discharge Instructions    Increase Torsemide to 50 mg daily.  Continue to follow daily weights and contact us with any questions or concerns.     Activity: Regular exercise and activity is important for your overall health and to help keep your heart strong and functioning as well as possible.   Walk at a slow to moderate pace for 15-20 minutes 3-5 days per week.     Follow these instructions every day to keep yourself in the Green Zone     The Green Zone means you are feeling well and your symptoms are under control                                    Medications  Take your medication every day as instructed  Do not stop taking your medicine or change the amount you are taking without instructions from your doctor or nurse  Do Not take non-steroidal antiinflammatory drugs such as ibuprofen, aleve, advil, or motrin                                    Diet/Fluids  People with heart failure should eat less sodium (salt) and limit fluid. Sodium attracts water and makes the body hold fluid. This extra fluid makes the heart work harder and can worsen the symptoms of heart failure.     Diet    2000 mg sodium daily  Fluid restriction    64 ounces daily  (8 oz. = 1 cup)                                     Body Weight  Weigh yourself every day before breakfast and write your weight down  Use the same scale and wear about the same amount of clothes each time  A sudden weight increase is due to fluid retention rather than fat                                         Activity  Pace your activities to avoid getting overtired  Take rest periods as needed  Elevate your feet to reduce ankle swelling when resting                             Signs of Worsening Heart Failure    You are entering the Yellow Zone - this is a warning zone    Call your doctor or nurse if you have any of these signs or symptoms:  You gain 2 or more pounds overnight or 3-5 pounds in 3-7 days  You have more trouble breathing  You get more tired  with regular activity, or are limiting activity because of shortness of breath or fatigue  You are short of breath lying down, you need more pillows to breathe comfortably,  or wake up during the night short of breath  You urinate less often during the day and more often at night  You have a bloated feeling, upset stomach, loss of appetite, or your clothes are fitting tighter    GO TO THE EMERGENCY DEPARTMENT or CALL 911 IF:    These are signs you are in the RED ZONE - THIS IS AN EMERGENCY  You have tightness or pain in your chest  You are extremely short of breath or can't catch your breath  You cough up frothy pink mucous  You feel confused or can't think clearly  You are traveling and develop symptoms of worsening heart failure     We respect everyone's time and availability. Please be aware that this is not a walk-in clinic and we require appointments in order to facilitate timely care for all patients. We ask you to arrive 30 minutes before your appointment to allow time for you to check-in and have your bloodwork drawn. Please understand if you are late for your appointment, you may be asked to reschedule. If possible, all attempts will be made to accommodate but realize this is no guarantee that this will always be available. We understand there are extenuating circumstances. If you need to cancel or reschedule your appointment, please call the [facility] within 24 hours at [phone].  Thank you for your cooperation, [facility] Staff.    If you are currently Nodeable active, starting July 1st 2024, we will be utilizing Nodeable messaging ONLY to confirm your appointment.    IF YOU HAVE QUESTIONS REGARDING YOUR BILL, FEEL FREE TO CONTACT [facility] PATIENT ACCOUNTS -[phone]. IF YOU NEED FINANCIAL ASSISTANCE, PLEASE CALL AN [facility] FINANCIAL COUNSELOR -[phone].             [facility]     [phone]

## 2025-05-15 RX ORDER — TORSEMIDE 10 MG/1
50 TABLET ORAL DAILY
Qty: 150 TABLET | Refills: 1 | Status: SHIPPED | OUTPATIENT
Start: 2025-05-15 | End: 2025-05-15

## 2025-05-15 RX ORDER — TORSEMIDE 10 MG/1
50 TABLET ORAL DAILY
Qty: 450 TABLET | Refills: 1 | Status: SHIPPED | OUTPATIENT
Start: 2025-05-15

## 2025-05-16 DIAGNOSIS — I50.32 CHRONIC HEART FAILURE WITH PRESERVED EJECTION FRACTION (HFPEF) (HCC): Primary | ICD-10-CM

## 2025-05-20 ENCOUNTER — HOSPITAL ENCOUNTER (OUTPATIENT)
Dept: LAB | Facility: HOSPITAL | Age: 75
Discharge: HOME | End: 2025-05-20
Attending: NURSE PRACTITIONER
Payer: MEDICARE

## 2025-05-20 ENCOUNTER — HOSPITAL ENCOUNTER (OUTPATIENT)
Dept: CARDIOLOGY CLINIC | Facility: HOSPITAL | Age: 75
Discharge: HOME | End: 2025-05-20
Attending: NURSE PRACTITIONER
Payer: MEDICARE

## 2025-05-20 VITALS
HEART RATE: 78 BPM | DIASTOLIC BLOOD PRESSURE: 70 MMHG | WEIGHT: 264.19 LBS | RESPIRATION RATE: 18 BRPM | SYSTOLIC BLOOD PRESSURE: 102 MMHG | BODY MASS INDEX: 38 KG/M2 | OXYGEN SATURATION: 98 %

## 2025-05-20 DIAGNOSIS — I50.32 CHRONIC HEART FAILURE WITH PRESERVED EJECTION FRACTION (HFPEF) (HCC): ICD-10-CM

## 2025-05-20 DIAGNOSIS — I48.11 LONGSTANDING PERSISTENT ATRIAL FIBRILLATION (HCC): ICD-10-CM

## 2025-05-20 DIAGNOSIS — I50.32 CHRONIC HEART FAILURE WITH PRESERVED EJECTION FRACTION (HCC): Primary | ICD-10-CM

## 2025-05-20 LAB
ANION GAP SERPL CALC-SCNC: 10 MMOL/L (ref 0–18)
BUN BLD-MCNC: 17 MG/DL (ref 9–23)
CALCIUM BLD-MCNC: 10 MG/DL (ref 8.7–10.6)
CHLORIDE SERPL-SCNC: 100 MMOL/L (ref 98–112)
CO2 SERPL-SCNC: 28 MMOL/L (ref 21–32)
CREAT BLD-MCNC: 1.01 MG/DL (ref 0.7–1.3)
EGFRCR SERPLBLD CKD-EPI 2021: 79 ML/MIN/1.73M2 (ref 60–?)
ERYTHROCYTE [DISTWIDTH] IN BLOOD BY AUTOMATED COUNT: 14.5 %
FASTING STATUS PATIENT QL REPORTED: NO
GLUCOSE BLD-MCNC: 110 MG/DL (ref 70–99)
HCT VFR BLD AUTO: 46.4 % (ref 39–53)
HGB BLD-MCNC: 16.2 G/DL (ref 13–17.5)
MCH RBC QN AUTO: 33 PG (ref 26–34)
MCHC RBC AUTO-ENTMCNC: 34.9 G/DL (ref 31–37)
MCV RBC AUTO: 94.5 FL (ref 80–100)
NT-PROBNP SERPL-MCNC: 880 PG/ML (ref ?–125)
OSMOLALITY SERPL CALC.SUM OF ELEC: 288 MOSM/KG (ref 275–295)
PLATELET # BLD AUTO: 176 10(3)UL (ref 150–450)
POTASSIUM SERPL-SCNC: 3.9 MMOL/L (ref 3.5–5.1)
RBC # BLD AUTO: 4.91 X10(6)UL (ref 3.8–5.8)
SODIUM SERPL-SCNC: 138 MMOL/L (ref 136–145)
WBC # BLD AUTO: 9.2 X10(3) UL (ref 4–11)

## 2025-05-20 PROCEDURE — 36415 COLL VENOUS BLD VENIPUNCTURE: CPT | Performed by: NURSE PRACTITIONER

## 2025-05-20 PROCEDURE — 83880 ASSAY OF NATRIURETIC PEPTIDE: CPT | Performed by: NURSE PRACTITIONER

## 2025-05-20 PROCEDURE — 99215 OFFICE O/P EST HI 40 MIN: CPT | Performed by: NURSE PRACTITIONER

## 2025-05-20 PROCEDURE — 80048 BASIC METABOLIC PNL TOTAL CA: CPT | Performed by: NURSE PRACTITIONER

## 2025-05-20 PROCEDURE — 85027 COMPLETE CBC AUTOMATED: CPT | Performed by: NURSE PRACTITIONER

## 2025-05-20 NOTE — PROGRESS NOTES
[facility] Progress Note    Gerardo Torrez is a 74 year old male who presents to clinic for APN assessment and management of chronic HFpEF and is functional class 3.     Subjective:  Since her last clinic visit on 5/5; when his Torsemide was increased from 40mg daily to 50mg daily,     Today, he is feeling well. He presents with his wife for evaluation. His weight is unchanged in the clinic and also at home with a weight of 264 lbs. He denies shortness of breath. He has no LE edema. He is constipated today and has tried milk of magnesia. Recommended prunes, benefiber biscuits or gummies. [place] and was able to walk the stairs into the yard and get into the pool and swim. He is sleeping well in a recliner. He gets up a couple times a night to urinate and goes back to sleep. He has been more active around the house by hindered by ongoing neuropathy.     Review of Systems   Constitutional: Negative.   Cardiovascular: Negative.    Respiratory: Negative.     Gastrointestinal: Negative.        HISTORY:  Past Medical History[1]   Past Surgical History[2]   Family History[3]   Short Social Hx on File[4]        Objective:    Cardiac Rhythm: Atrial fibrillation    /70   Pulse 78   Resp 18   Wt 264 lb 3.2 oz (119.8 kg)   SpO2 98%   BMI 37.91 kg/m²     Wt Readings from Last 6 Encounters:   05/20/25 264 lb 3.2 oz (119.8 kg)   05/05/25 264 lb (119.7 kg)   04/08/25 265 lb 6.4 oz (120.4 kg)   03/25/25 269 lb 12.8 oz (122.4 kg)   03/17/25 270 lb (122.5 kg)   03/11/25 279 lb (126.6 kg)            Recent Results (from the past 24 hours)   CBC, Platelet; No Differential    Collection Time: 05/20/25  3:32 PM   Result Value Ref Range    WBC 9.2 4.0 - 11.0 x10(3) uL    RBC 4.91 3.80 - 5.80 x10(6)uL    HGB 16.2 13.0 - 17.5 g/dL    HCT 46.4 39.0 - 53.0 %    .0 150.0 - 450.0 10(3)uL    MCV 94.5 80.0 - 100.0 fL    MCH 33.0 26.0 - 34.0 pg    MCHC 34.9 31.0 - 37.0 g/dL     RDW 14.5 %   Pro Beta Natriuretic Peptide    Collection Time: 05/20/25  3:32 PM   Result Value Ref Range    Pro-Beta Natriuretic Peptide 880 (H) <125 pg/mL   Basic Metabolic Panel (8)    Collection Time: 05/20/25  3:32 PM   Result Value Ref Range    Glucose 110 (H) 70 - 99 mg/dL    Sodium 138 136 - 145 mmol/L    Potassium 3.9 3.5 - 5.1 mmol/L    Chloride 100 98 - 112 mmol/L    CO2 28.0 21.0 - 32.0 mmol/L    Anion Gap 10 0 - 18 mmol/L    BUN 17 9 - 23 mg/dL    Creatinine 1.01 0.70 - 1.30 mg/dL    Calcium, Total 10.0 8.7 - 10.6 mg/dL    Calculated Osmolality 288 275 - 295 mOsm/kg    eGFR-Cr 79 >=60 mL/min/1.73m2    Patient Fasting for BMP? No        Medications - Current[5]    Exam:   General:         Alert, in no apparent distress  HEENT:          no jvd  Lungs:            ctab                     CV:                  irregular   Abdomen:       round, soft, non-tender, BS+  Extremities:    no LE edema, L>R   Neuro:             A&O x 3, GOLDEN  Skin:                chronic venous stasis changes LE's     Education:  Patient instructed regarding sodium restricted diet, low sodium foods, fluid restriction, daily weights, medication regimen, s/s HF exacerbation and when to call APN/clinic.      [] CardioMEMs  [] ARNi/ACEi/ARB  [x] BB  [x] MRA  [x] SGLT2i  [] GLP-1, too expensive    Assessment:   HFpEF, ACC/AHA Class 3 - LVEF 55-60% on echo. Last Probnp 671 and now slightly trending up to 880. BVA 3/7 suggest severe plasma excess. He was 276 lbs on the day of BVA. Weight today is 264 lbs and stable.   Longstanding Persistent Afib - on Eliquis. Rates controlled. DCCV recommended per Dr. Valles. Plan for rate control for now per Dr. Tapia.   Non-obstructive CAD - [facility] on 11/2023. Lexiscan 1/2025 without ischemia.  Morbid obesity - class 3. Previously on Rybelsus. Ozempic was approved, but too expensive. Encouraged activity as tolerated, plans for continued swimming at [place].  DM type 2 - last A1c 5.6%. On Jardiance for CV  benefits.   Essential HTN - controlled.   HLD - on statin.   Chronic lymphedema - has pumps but not needed to use. Referred to the lymphedema clinic per Dr. Valles in December, transportation an issue and now not needed. Improved.  Hx Renal insufficiency with recent EMILIO - resolved.   Anemia - acute blood loss, Hgb improved from 13.7 g/dl to 16.2 g/dl. Iron sat 28. Ferritin 232 3/7. Normalized hematocrit per BVA 43.7%. BVA suggest normal RBC deficit.   Hx ETOH abuse - reports 2-3 drinks per night, quit in 1/2025.  HEIDI - intolerant of CPAP.   Hypokalemia - resolved with recent increase in Eplerenone. Will follow.   Hypercalcemia - labile, resolved.     Plan:   Continue current medications.    Referred him to PCP for neuropathy.   Return to clinic in 4 weeks.  F/U with Dr. Tapia regularly, last seen in February.   CHF discharge instructions given    I have spent 45 min total time on the day of the encounter, including: preparing to see the patient, obtaining and/or reviewing separately obtained history, performing a medically appropriate examination and/or evaluation, counseling and educating the patient/family caregiver, ordering medication, tests, or procedures, documenting clinical information in Epic, and independently interpreting results and communicating results to the patient/family caregiver     MARYCARMEN Cantu                [1]   Past Medical History:   Back problem    CAD (coronary artery disease)    Calculus of kidney    Diabetes (HCC)    Diabetes mellitus type 2 in obese    Dilated aortic root    GOUT    Gout    High blood pressure    High cholesterol    KIDNEY STONE    Morbid obesity (HCC)    Neuropathy    OTHER DISEASES    SVT Chato    PONV (postoperative nausea and vomiting)    Pulmonary hypertension (HCC)    Thoracic aortic aneurysm   [2]   Past Surgical History:  Procedure Laterality Date    Appendectomy      Back surgery      discectomy/cauda equina   [place]    [place]onoscopy  [place]    [place]   [place]    Colonoscopy  2009    Colonoscopy      Colonoscopy N/A 10/28/2015    Procedure: COLONOSCOPY;  Surgeon: Reed Garcia;  Location:  ENDOSCOPY    Colonoscopy N/A 2023    Procedure: COLONOSCOPY;  Surgeon: Evgeny Horner MD;  Location:  ENDOSCOPY    Each add tooth extraction      Lithotripsy      Other surgical history      achilles rupture   [3]   Family History  Problem Relation Age of Onset    Cancer Father         colon cancer   [4]   Social History  Socioeconomic History    Marital status:    Tobacco Use    Smoking status: Former     Current packs/day: 0.00     Average packs/day: 1 pack/day for 20.0 years (20.0 ttl pk-yrs)     Types: Cigarettes     Start date: 1998     Quit date: 2018     Years since quittin.7    Smokeless tobacco: Never    Tobacco comments:     cigarillos/small cigars 3-5 daily   Vaping Use    Vaping status: Never Used   Substance and Sexual Activity    Alcohol use: Not Currently     Alcohol/week: 3.0 - 4.0 standard drinks of alcohol     Types: 3 - 4 Standard drinks or equivalent per week    Drug use: No     Social Drivers of Health     Food Insecurity: No Food Insecurity (2025)    Food Insecurity     Food Insecurity: Never true   Transportation Needs: No Transportation Needs (2025)    Transportation Needs     Lack of Transportation: No   Housing Stability: Low Risk  (2025)    Housing Stability     Housing Instability: No   [5]   Current Outpatient Medications:     torsemide 10 MG Oral Tab, Take 5 tablets (50 mg total) by mouth daily., Disp: 450 tablet, Rfl: 1    eplerenone 25 MG Oral Tab, Take 2 tablets (50 mg total) by mouth daily., Disp: 60 tablet, Rfl: 5    empagliflozin (JARDIANCE) 10 MG Oral Tab, Take 1 tablet (10 mg total) by mouth daily., Disp: , Rfl:     potassium chloride 20 MEQ Oral Tab CR, Take 60 meq (3 tablets) the delma of 3/11, then 20 meq (1 tablet) daily, Disp: 40 tablet, Rfl: 0    metoprolol succinate ER 25 MG Oral  Tablet 24 Hr, Take 1 tablet (25 mg total) by mouth Daily Beta Blocker. Hold for SBP < 100 and HR < 60, Disp: , Rfl:     Cholecalciferol (D3 OR), Take 1 tablet by mouth Noon., Disp: , Rfl:     apixaban (ELIQUIS) 5 MG Oral Tab, Take 1 tablet (5 mg total) by mouth 2 (two) times daily., Disp: , Rfl:     allopurinol 300 MG Oral Tab, Take 1 tablet (300 mg total) by mouth daily., Disp: , Rfl:     tamsulosin (FLOMAX) cap, TAKE 1 CAPSULE(0.4 MG) BY MOUTH DAILY, Disp: 90 capsule, Rfl: 1    Cyanocobalamin (B-12) 1000 MCG Oral Tab, Take 2,000 mcg by mouth Noon., Disp: , Rfl:     atorvastatin (LIPITOR) 20 MG Oral Tab, Take 1 tablet (20 mg total) by mouth daily., Disp: , Rfl:

## 2025-05-20 NOTE — ADDENDUM NOTE
Encounter addended by: Julisa Maier APRN on: 5/20/2025 4:43 PM   Actions taken: Clinical Note Signed

## 2025-05-20 NOTE — PROGRESS NOTES
Pt. Assessed. No signs or symptoms of shortness of breath, fatigue, chest pain or edema noted. Weight stable at 264.2 lbs. Pt c/o constipation as his weight is lower at home. Last BM 5/17. APN notified. Reviewed current list of patient's allergies and medication; updated the Electronic Medical Record. Labs ordered to assess kidney function and drawn by  Lab. Reviewed follow-up appointment and Heart Failure discharge instructions with patient. Patient verbalized an understanding.   Pt unable to do 6MWT. Pt came up in wheelchair and can't walk more than 20ft. Fall screen completed.  Fall Risk Assessment  Do you feel unsteady when standing or walking?: No  Do you worry about falling?: No  Have you fallen in the past year?: No        6 Minute Walk    Results at Rest                   Ambulatory Results  Unable to perform 6 min walk: Other (pt unable to walk more than 20 ft.;in wheelchair)                            Recovery Results                   6 Min Walk Results

## 2025-05-20 NOTE — PATIENT INSTRUCTIONS
Heart Failure Discharge Instructions    Make an appointment with Dr. Sid Gordon regarding your neuropathy.    Activity: Regular exercise and activity is important for your overall health and to help keep your heart strong and functioning as well as possible.   Walk at a slow to moderate pace for 15-20 minutes 3-5 days per week.     Follow these instructions every day to keep yourself in the Green Zone     The Green Zone means you are feeling well and your symptoms are under control                                    Medications  Take your medication every day as instructed  Do not stop taking your medicine or change the amount you are taking without instructions from your doctor or nurse  Do Not take non-steroidal antiinflammatory drugs such as ibuprofen, aleve, advil, or motrin                                    Diet/Fluids  People with heart failure should eat less sodium (salt) and limit fluid. Sodium attracts water and makes the body hold fluid. This extra fluid makes the heart work harder and can worsen the symptoms of heart failure.     Diet    2000 mg sodium daily  Fluid restriction    64 ounces daily  (8 oz. = 1 cup)                                     Body Weight  Weigh yourself every day before breakfast and write your weight down  Use the same scale and wear about the same amount of clothes each time  A sudden weight increase is due to fluid retention rather than fat                                         Activity  Pace your activities to avoid getting overtired  Take rest periods as needed  Elevate your feet to reduce ankle swelling when resting                             Signs of Worsening Heart Failure    You are entering the Yellow Zone - this is a warning zone    Call your doctor or nurse if you have any of these signs or symptoms:  You gain 2 or more pounds overnight or 3-5 pounds in 3-7 days  You have more trouble breathing  You get more tired with regular activity, or are limiting activity  because of shortness of breath or fatigue  You are short of breath lying down, you need more pillows to breathe comfortably,  or wake up during the night short of breath  You urinate less often during the day and more often at night  You have a bloated feeling, upset stomach, loss of appetite, or your clothes are fitting tighter    GO TO THE EMERGENCY DEPARTMENT or CALL 911 IF:    These are signs you are in the RED ZONE - THIS IS AN EMERGENCY  You have tightness or pain in your chest  You are extremely short of breath or can't catch your breath  You cough up frothy pink mucous  You feel confused or can't think clearly  You are traveling and develop symptoms of worsening heart failure     We respect everyone's time and availability. Please be aware that this is not a walk-in clinic and we require appointments in order to facilitate timely care for all patients. We ask you to arrive 30 minutes before your appointment to allow time for you to check-in and have your bloodwork drawn. Please understand if you are late for your appointment, you may be asked to reschedule. If possible, all attempts will be made to accommodate but realize this is no guarantee that this will always be available. We understand there are extenuating circumstances. If you need to cancel or reschedule your appointment, please call the [facility] within 24 hours at [phone].  Thank you for your cooperation, [facility] Staff.    If you are currently [employer] active, starting July 1st 2024, we will be utilizing [employer] messaging ONLY to confirm your appointment.    IF YOU HAVE QUESTIONS REGARDING YOUR BILL, FEEL FREE TO CONTACT [facility] PATIENT ACCOUNTS -[phone]. IF YOU NEED FINANCIAL ASSISTANCE, PLEASE CALL AN [facility] FINANCIAL COUNSELOR -[phone].             [facility]     [phone]

## 2025-06-09 RX ORDER — POTASSIUM CHLORIDE 1500 MG/1
TABLET, EXTENDED RELEASE ORAL
Qty: 40 TABLET | Refills: 2 | Status: SHIPPED | OUTPATIENT
Start: 2025-06-09

## 2025-06-13 NOTE — PROGRESS NOTES
[facility] Progress Note    Gerardo Torrez is a 75 year old male who presents to clinic for APN assessment and management of chronic HFpEF and is functional class 3.     Subjective:  Since his last clinic visit on 5/20 when no changes were made and he was referred to his PCP for evaluation of neuropathy;      He has been doing great. His weight is up  a couple lbs but cut back on his Torsemide to 40 mg daily instead of 50 mg due to ongoing constipation that is a little better. Home weight has been low as 258 lbs and has high as 262 lbs. Appetite is good. He indulged recently for his Birthday and Father's Day at the [employer]. He denies leg swelling, abdominal bloating or shortness of breath. He is sleeping well in a recliner since it is easier to get in and out of. He has been exercising in the pool and starting to do more independently which he enjoys.     Review of Systems   Constitutional: Positive for weight gain.   Cardiovascular:  Positive for leg swelling (trace). Negative for dyspnea on exertion.   Gastrointestinal:  Positive for constipation (improving).   Neurological:  Negative for dizziness.       HISTORY:  Past Medical History[1]   Past Surgical History[2]   Family History[3]   Short Social Hx on File[4]        Objective:    Telemetry:     Cardiac Rhythm: Atrial fibrillation    /68   Pulse 71   Resp 15   Wt 266 lb (120.7 kg)   SpO2 96%   BMI 38.17 kg/m²     Wt Readings from Last 6 Encounters:   06/16/25 266 lb (120.7 kg)   05/20/25 264 lb 3.2 oz (119.8 kg)   05/05/25 264 lb (119.7 kg)   04/08/25 265 lb 6.4 oz (120.4 kg)   03/25/25 269 lb 12.8 oz (122.4 kg)   03/17/25 270 lb (122.5 kg)            Recent Results (from the past 24 hours)   Basic Metabolic Panel (8)    Collection Time: 06/16/25 12:53 PM   Result Value Ref Range    Glucose 137 (H) 70 - 99 mg/dL    Sodium 139 136 - 145 mmol/L    Potassium 4.2 3.5 - 5.1 mmol/L    Chloride 101 98 - 112 mmol/L    CO2  28.0 21.0 - 32.0 mmol/L    Anion Gap 10 0 - 18 mmol/L    BUN 18 9 - 23 mg/dL    Creatinine 1.08 0.70 - 1.30 mg/dL    Calcium, Total 10.0 8.7 - 10.6 mg/dL    Calculated Osmolality 292 275 - 295 mOsm/kg    eGFR-Cr 72 >=60 mL/min/1.73m2    Patient Fasting for BMP? No        Medications - Current[5]    Exam:   General:         Alert, in no apparent distress  HEENT:          no jvd  Lungs:            ctab                    CV:                  irregular   Abdomen:       round, soft, non-tender  Extremities:    trace LE edema  Neuro:             A&O x 3, GOLDEN  Skin:                dry , tan       Education:  Patient instructed regarding sodium restricted diet, low sodium foods, fluid restriction, daily weights, medication regimen, s/s HF exacerbation and when to call APN/clinic.       [] CardioMEMs  [] ARNi/ACEi/ARB  [x] BB  [x] MRA  [x] SGLT2i  [] GLP-1, too expensive     Assessment:   HFpEF, ACC/AHA Class 3 - LVEF 55-60% on echo. Last Probnp up to 880 from 671. BVA 3/2025 suggest severe plasma excess. He was 276 lbs on the day of BVA. He is 266 lbs today. Compensated.   Longstanding Persistent Afib - on Eliquis. Rates controlled. DCCV recommended per Dr. Valles. Plan for rate control for now per Dr. Tapia.   Non-obstructive CAD - [facility] on 11/2023. Lexiscan 1/2025 without ischemia.  Morbid obesity - class 3. Previously on Rybelsus. Ozempic was approved, but too expensive. Encouraged activity as tolerated, plans for continued swimming at [employer].  DM type 2 - last A1c 5.6%. On Jardiance for CV benefits.   Essential HTN - controlled.   HLD - on statin.   Chronic lymphedema - has pumps but not needed to use. Referred to the lymphedema clinic per Dr. Valles in December, transportation an issue and now not needed. Improved.  Hx Renal insufficiency with recent EMILIO - resolved.   Anemia -last Hgb up to 16.2 g/dl from 13.7 mg/dL. Iron sat 28. Ferritin 232 3/7. Normalized hematocrit per BVA 43.7%. BVA suggest normal RBC deficit.    Hx ETOH abuse - reports 2-3 drinks per night, quit in 1/2025.  HEIDI - intolerant of CPAP.   Hypokalemia - resolved with recent increase in Eplerenone. Will follow.   Hypercalcemia - labile, resolved.     Plan:     Continue current medications with 40 mg of Torsemide divided in 2 doses like he has been taking.   Encouraged to continue to follow daily weights.   Return to clinic in 6 weeks, earlier if needed.   F/U with Dr. Tapia in August.   CHF discharge instructions given    I have spent 40 min total time on the day of the encounter, including: preparing to see the patient, obtaining and/or reviewing separately obtained history, performing a medically appropriate examination and/or evaluation, counseling and educating the patient/family caregiver, ordering medication, tests, or procedures, documenting clinical information in Epic, and independently interpreting results and communicating results to the patient/family caregiver      MARYCARMEN John               [1]   Past Medical History:   Back problem    CAD (coronary artery disease)    Calculus of kidney    Diabetes (HCC)    Diabetes mellitus type 2 in obese    Dilated aortic root    GOUT    Gout    High blood pressure    High cholesterol    KIDNEY STONE    Morbid obesity (HCC)    Neuropathy    OTHER DISEASES    SVT Chato    PONV (postoperative nausea and vomiting)    Pulmonary hypertension (HCC)    Thoracic aortic aneurysm   [2]   Past Surgical History:  Procedure Laterality Date    Appendectomy      Back surgery      discectomy/cauda equina   [place]    Colonoscopy  2003    [facility]ps[place]  [place]    Colonoscopy  2009    Colonoscopy      Colonoscopy N/A 10/28/2015    Procedure: COLONOSCOPY;  Surgeon: Reed Garcia;  Location:  ENDOSCOPY    Colonoscopy N/A 7/18/2023    Procedure: COLONOSCOPY;  Surgeon: Evgeny Horner MD;  Location:  ENDOSCOPY    Each add tooth extraction      Lithotripsy      Other surgical history      achilles rupture   [3]    Family History  Problem Relation Age of Onset    Cancer Father         colon cancer   [4]   Social History  Socioeconomic History    Marital status:    Tobacco Use    Smoking status: Former     Current packs/day: 0.00     Average packs/day: 1 pack/day for 20.0 years (20.0 ttl pk-yrs)     Types: Cigarettes     Start date: 1998     Quit date: 2018     Years since quittin.7    Smokeless tobacco: Never    Tobacco comments:     cigarillos/small cigars 3-5 daily   Vaping Use    Vaping status: Never Used   Substance and Sexual Activity    Alcohol use: Not Currently     Alcohol/week: 3.0 - 4.0 standard drinks of alcohol     Types: 3 - 4 Standard drinks or equivalent per week    Drug use: No     Social Drivers of Health     Food Insecurity: No Food Insecurity (2025)    Food Insecurity     Food Insecurity: Never true   Transportation Needs: No Transportation Needs (2025)    Transportation Needs     Lack of Transportation: No   Housing Stability: Low Risk  (2025)    Housing Stability     Housing Instability: No   [5]   Current Outpatient Medications:     torsemide 10 MG Oral Tab, Take 2 tablets (20 mg total) by mouth in the morning and 2 tablets (20 mg total) before bedtime., Disp: , Rfl:     potassium chloride 20 MEQ Oral Tab CR, Take 20 meq (1 tablet) daily, Disp: , Rfl:     eplerenone 25 MG Oral Tab, Take 2 tablets (50 mg total) by mouth daily., Disp: 60 tablet, Rfl: 5    empagliflozin (JARDIANCE) 10 MG Oral Tab, Take 1 tablet (10 mg total) by mouth daily., Disp: , Rfl:     metoprolol succinate ER 25 MG Oral Tablet 24 Hr, Take 1 tablet (25 mg total) by mouth Daily Beta Blocker. Hold for SBP < 100 and HR < 60, Disp: , Rfl:     Cholecalciferol (D3 OR), Take 1 tablet by mouth Noon., Disp: , Rfl:     apixaban (ELIQUIS) 5 MG Oral Tab, Take 1 tablet (5 mg total) by mouth 2 (two) times daily., Disp: , Rfl:     allopurinol 300 MG Oral Tab, Take 1 tablet (300 mg total) by mouth daily., Disp: , Rfl:      Cyanocobalamin (B-12) 1000 MCG Oral Tab, Take 2,000 mcg by mouth Noon., Disp: , Rfl:     atorvastatin (LIPITOR) 20 MG Oral Tab, Take 1 tablet (20 mg total) by mouth daily., Disp: , Rfl:

## 2025-06-16 ENCOUNTER — HOSPITAL ENCOUNTER (OUTPATIENT)
Dept: LAB | Facility: HOSPITAL | Age: 75
Discharge: HOME | End: 2025-06-16
Attending: NURSE PRACTITIONER
Payer: MEDICARE

## 2025-06-16 ENCOUNTER — HOSPITAL ENCOUNTER (OUTPATIENT)
Dept: CARDIOLOGY CLINIC | Facility: HOSPITAL | Age: 75
End: 2025-06-16
Attending: NURSE PRACTITIONER
Payer: MEDICARE

## 2025-06-16 VITALS
OXYGEN SATURATION: 96 % | BODY MASS INDEX: 38 KG/M2 | HEART RATE: 71 BPM | SYSTOLIC BLOOD PRESSURE: 125 MMHG | DIASTOLIC BLOOD PRESSURE: 68 MMHG | RESPIRATION RATE: 15 BRPM | WEIGHT: 266 LBS

## 2025-06-16 DIAGNOSIS — I50.32 CHRONIC HEART FAILURE WITH PRESERVED EJECTION FRACTION (HCC): Primary | ICD-10-CM

## 2025-06-16 DIAGNOSIS — I10 ESSENTIAL HYPERTENSION: ICD-10-CM

## 2025-06-16 DIAGNOSIS — I50.32 CHRONIC HEART FAILURE WITH PRESERVED EJECTION FRACTION (HCC): ICD-10-CM

## 2025-06-16 LAB
ANION GAP SERPL CALC-SCNC: 10 MMOL/L (ref 0–18)
BUN BLD-MCNC: 18 MG/DL (ref 9–23)
CALCIUM BLD-MCNC: 10 MG/DL (ref 8.7–10.6)
CHLORIDE SERPL-SCNC: 101 MMOL/L (ref 98–112)
CO2 SERPL-SCNC: 28 MMOL/L (ref 21–32)
CREAT BLD-MCNC: 1.08 MG/DL (ref 0.7–1.3)
EGFRCR SERPLBLD CKD-EPI 2021: 72 ML/MIN/1.73M2 (ref 60–?)
FASTING STATUS PATIENT QL REPORTED: NO
GLUCOSE BLD-MCNC: 137 MG/DL (ref 70–99)
OSMOLALITY SERPL CALC.SUM OF ELEC: 292 MOSM/KG (ref 275–295)
POTASSIUM SERPL-SCNC: 4.2 MMOL/L (ref 3.5–5.1)
SODIUM SERPL-SCNC: 139 MMOL/L (ref 136–145)

## 2025-06-16 PROCEDURE — 99215 OFFICE O/P EST HI 40 MIN: CPT | Performed by: NURSE PRACTITIONER

## 2025-06-16 PROCEDURE — 80048 BASIC METABOLIC PNL TOTAL CA: CPT | Performed by: NURSE PRACTITIONER

## 2025-06-16 PROCEDURE — 36415 COLL VENOUS BLD VENIPUNCTURE: CPT | Performed by: NURSE PRACTITIONER

## 2025-06-16 RX ORDER — TORSEMIDE 10 MG/1
50 TABLET ORAL DAILY
Qty: 450 TABLET | Refills: 1 | Status: SHIPPED | OUTPATIENT
Start: 2025-06-16 | End: 2025-06-16

## 2025-06-16 RX ORDER — TORSEMIDE 10 MG/1
20 TABLET ORAL 2 TIMES DAILY
COMMUNITY
Start: 2025-06-16

## 2025-06-16 RX ORDER — POTASSIUM CHLORIDE 1500 MG/1
TABLET, EXTENDED RELEASE ORAL
COMMUNITY
Start: 2025-06-16

## 2025-06-16 NOTE — PATIENT INSTRUCTIONS
Heart Failure Discharge Instructions    Activity: Regular exercise and activity is important for your overall health and to help keep your heart strong and functioning as well as possible.   Walk at a slow to moderate pace for 15-20 minutes 3-5 days per week.     Follow these instructions every day to keep yourself in the Green Zone     The Green Zone means you are feeling well and your symptoms are under control                                    Medications  Take your medication every day as instructed  Do not stop taking your medicine or change the amount you are taking without instructions from your doctor or nurse  Do Not take non-steroidal antiinflammatory drugs such as ibuprofen, aleve, advil, or motrin                                    Diet/Fluids  People with heart failure should eat less sodium (salt) and limit fluid. Sodium attracts water and makes the body hold fluid. This extra fluid makes the heart work harder and can worsen the symptoms of heart failure.     Diet    2000 mg sodium daily  Fluid restriction    64 ounces daily  (8 oz. = 1 cup)                                     Body Weight  Weigh yourself every day before breakfast and write your weight down  Use the same scale and wear about the same amount of clothes each time  A sudden weight increase is due to fluid retention rather than fat                                         Activity  Pace your activities to avoid getting overtired  Take rest periods as needed  Elevate your feet to reduce ankle swelling when resting                             Signs of Worsening Heart Failure    You are entering the Yellow Zone - this is a warning zone    Call your doctor or nurse if you have any of these signs or symptoms:  You gain 2 or more pounds overnight or 3-5 pounds in 3-7 days  You have more trouble breathing  You get more tired with regular activity, or are limiting activity because of shortness of breath or fatigue  You are short of breath lying  down, you need more pillows to breathe comfortably,  or wake up during the night short of breath  You urinate less often during the day and more often at night  You have a bloated feeling, upset stomach, loss of appetite, or your clothes are fitting tighter    GO TO THE EMERGENCY DEPARTMENT or CALL 911 IF:    These are signs you are in the RED ZONE - THIS IS AN EMERGENCY  You have tightness or pain in your chest  You are extremely short of breath or can't catch your breath  You cough up frothy pink mucous  You feel confused or can't think clearly  You are traveling and develop symptoms of worsening heart failure     We respect everyone's time and availability. Please be aware that this is not a walk-in clinic and we require appointments in order to facilitate timely care for all patients. We ask you to arrive 30 minutes before your appointment to allow time for you to check-in and have your bloodwork drawn. Please understand if you are late for your appointment, you may be asked to reschedule. If possible, all attempts will be made to accommodate but realize this is no guarantee that this will always be available. We understand there are extenuating circumstances. If you need to cancel or reschedule your appointment, please call the [facility] within 24 hours at [phone].  Thank you for your cooperation, [facility] Staff.    If you are currently [employer] active, starting July 1st 2024, we will be utilizing [employer] messaging ONLY to confirm your appointment.    IF YOU HAVE QUESTIONS REGARDING YOUR BILL, FEEL FREE TO CONTACT [facility] PATIENT ACCOUNTS -[phone]. IF YOU NEED FINANCIAL ASSISTANCE, PLEASE CALL AN [facility] FINANCIAL COUNSELOR -[phone].             [facility]     [phone]

## 2025-06-16 NOTE — PROGRESS NOTES
Patient assessed. Patient denies any issues with bloating, shortness of breath, or edema. Weight up 2 lbs at 266.0 lbs. Patient reports he self decreased his torsemide dose from 50 mg daily to 40 mg daily about 2 weeks ago. Patient reports he ate too much of unhealthy food this last weekend around his birthday and Father's Day. Patient reports his tamsulosin ran out 2 weeks ago, but he does not think he needs it, so he has not called Dr. Gordon for a refill.  APN notified of all the above information. Labs ordered and drawn by  Lab. Reviewed allergies and list of current medications with patient and updated it in the Electronic Medical Record.     Educated patient on low sodium diet and food choices, fluid restriction of 2 liters, and daily weights. Reviewed follow-up appointments and discharge Heart Failure instructions with patient. Patient verbalized an understanding. Patient to return to the clinic in 6 weeks.

## 2025-07-25 NOTE — PROGRESS NOTES
[facility] Progress Note    Gerardo Torrez is a 75 year old male who presents to clinic for APN assessment and management of chronic HFpEF and is functional class 3.     Subjective:  Since his last clinic visit on 6/16 when no changes were made; he saw Dr. Valles 6/23; to continue risk factor modification. He will see him in 8 months with labs (CMP and ProBNP)  in 3-6 months     He has been doing okay. His weight is up 5 lbs on our scale. Home weight has been gradually going up an recently has been 268-269 lbs. Last visit weight was as high as 262 lbs. Appetite is good and he had been eating differently. He used to eat mostly fruit and vegetables. Recently he has been having more potatoes and meat.  He has mild leg swelling in his feet and ankles and mild abdominal bloating. He has not noticed more shortness of breath but he has been sedentary in the last week where he was previously exercising in the pool. He was not short of breath walking up to the clinic with his walker today. He took an extra 10 mg on Torsemide once with no response. Previously he reduced torsemide from 50 mg to 40 mg daily since he was constipated and felt like he was dehydrated.       Review of Systems   Constitutional: Positive for weight gain.   Cardiovascular:  Positive for leg swelling (mild). Negative for dyspnea on exertion.   Gastrointestinal:  Positive for bloating (mild). Negative for constipation.       HISTORY:  Past Medical History[1]   Past Surgical History[2]   Family History[3]   Short Social Hx on File[4]        Objective:    Telemetry: AF          /64   Pulse 63   Resp 18   Wt 271 lb 3.2 oz (123 kg)   SpO2 97%   BMI 38.91 kg/m²     Wt Readings from Last 6 Encounters:   07/28/25 271 lb 3.2 oz (123 kg)   06/16/25 266 lb (120.7 kg)   05/20/25 264 lb 3.2 oz (119.8 kg)   05/05/25 264 lb (119.7 kg)   04/08/25 265 lb 6.4 oz (120.4 kg)   03/25/25 269 lb 12.8 oz (122.4 kg)            Recent  Results (from the past 24 hours)   Basic Metabolic Panel (8)    Collection Time: 07/28/25  1:25 PM   Result Value Ref Range    Glucose 138 (H) 70 - 99 mg/dL    Sodium 138 136 - 145 mmol/L    Potassium 4.0 3.5 - 5.1 mmol/L    Chloride 99 98 - 112 mmol/L    CO2 30.0 21.0 - 32.0 mmol/L    Anion Gap 9 0 - 18 mmol/L    BUN 13 9 - 23 mg/dL    Creatinine 1.04 0.70 - 1.30 mg/dL    Calcium, Total 9.7 8.7 - 10.6 mg/dL    Calculated Osmolality 288 275 - 295 mOsm/kg    eGFR-Cr 76 >=60 mL/min/1.73m2    Patient Fasting for BMP? No        Medications - Current[5]    Exam:   General:         Alert, in no apparent distress  HEENT:          hard to assess due to  body habitus   Lungs:           ctab  CV:                  irregularly irregular    Abdomen:       round, semi-firm, non-tender, BS+  Extremities:    +1 ankle edema  Neuro:             A&O x 3, GOLDEN  Skin:                Pink, warm, dry      Education:  Patient instructed regarding sodium restricted diet, low sodium foods, fluid restriction, daily weights, medication regimen, s/s HF exacerbation and when to call APN/clinic.       [] CardioMEMs  [] ARNi/ACEi/ARB  [x] BB  [x] MRA  [x] SGLT2i  [] GLP-1, too expensive     Assessment:   HFpEF, ACC/AHA Class 3 - LVEF 55-60% on echo. Last Probnp up to 880 from 671 in May. BVA 3/2025 suggest severe plasma excess. He was 276 lbs on the day of BVA. Weight 271 lbs today. Mildly fluid overloaded.   Longstanding Persistent Afib - on Eliquis. Rates controlled. DCCV recommended per Dr. Valles. Plan for rate control for now per Dr. Tapia.   Non-obstructive CAD - [facility] on 11/2023. Lexiscan 1/2025 without ischemia.  Morbid obesity - class 3. Previously on Rybelsus. Ozempic was approved, but too expensive. Encouraged activity as tolerated, plans for continued swimming at [employer].  DM type 2 - last A1c 5.6%. On Jardiance for CV benefits.   Essential HTN - controlled.   HLD - on statin.   Chronic lymphedema - has pumps but has not needed to  use. Referred to the lymphedema clinic per Dr. Valles in December, transportation an issue and now not needed.   Hx Renal insufficiency with prior EMILIO - resolved.   Anemia -last Hgb up to 16.2 g/dl from 13.7 mg/dL. Iron sat 28. Ferritin 232 3/7. Normalized hematocrit per BVA 43.7%. BVA suggest normal RBC deficit.   Hx ETOH abuse - reports 2-3 drinks per night, quit in 1/2025.  HEIDI - intolerant of CPAP.   Hypokalemia - resolved with recent increase in Eplerenone. Will follow.   Hypercalcemia - labile, resolved.        Plan:     Advised to increase Torsemide to 50 mg from 40 mg on days he is sedentary and eating more. Can continue 40 mg on days he is exercising in the pool and eating healthier.    ProBNP and BMP next visit.   To continue to follow daily weights and contact us with any concerns.  Return to clinic in 4 weeks  Encouraged he resume exercise.   F/U with Dr. Tapia soon, he thinks he sees him in August.   Follow up with Dr. Valles in ~February/March 2026.   CHF discharge instructions given    I have spent 40 min total time on the day of the encounter, including: preparing to see the patient, obtaining and/or reviewing separately obtained history, performing a medically appropriate examination and/or evaluation, counseling and educating the patient/family caregiver, ordering medication, tests, or procedures, documenting clinical information in Epic, and independently interpreting results and communicating results to the patient/family caregiver      MARYCARMEN John               [1]   Past Medical History:   Back problem    CAD (coronary artery disease)    Calculus of kidney    Diabetes (HCC)    Diabetes mellitus type 2 in obese    Dilated aortic root    GOUT    Gout    High blood pressure    High cholesterol    KIDNEY STONE    Morbid obesity (HCC)    Neuropathy    OTHER DISEASES    SVT Chato    PONV (postoperative nausea and vomiting)    Pulmonary hypertension (HCC)    Thoracic aortic aneurysm   [2]    Past Surgical History:  Procedure Laterality Date    Appendectomy      Back surgery      discectomy/cauda equina   [place]    Colonoscopy      polyps/[facility]  [place]    Colonoscopy      Colonoscopy      Colonoscopy N/A 10/28/2015    Procedure: COLONOSCOPY;  Surgeon: Reed Garcia;  Location:  ENDOSCOPY    Colonoscopy N/A 2023    Procedure: COLONOSCOPY;  Surgeon: Evgeny Horner MD;  Location:  ENDOSCOPY    Each add tooth extraction      Lithotripsy      Other surgical history      achilles rupture   [3]   Family History  Problem Relation Age of Onset    Cancer Father         colon cancer   [4]   Social History  Socioeconomic History    Marital status:    Tobacco Use    Smoking status: Former     Current packs/day: 0.00     Average packs/day: 1 pack/day for 20.0 years (20.0 ttl pk-yrs)     Types: Cigarettes     Start date: 1998     Quit date: 2018     Years since quittin.9    Smokeless tobacco: Never    Tobacco comments:     cigarillos/small cigars 3-5 daily   Vaping Use    Vaping status: Never Used   Substance and Sexual Activity    Alcohol use: Not Currently     Alcohol/week: 3.0 - 4.0 standard drinks of alcohol     Types: 3 - 4 Standard drinks or equivalent per week    Drug use: No     Social Drivers of Health     Food Insecurity: No Food Insecurity (2025)    Food Insecurity     Food Insecurity: Never true   Transportation Needs: No Transportation Needs (2025)    Transportation Needs     Lack of Transportation: No   Housing Stability: Low Risk  (2025)    Housing Stability     Housing Instability: No   [5]   Current Outpatient Medications:     torsemide 10 MG Oral Tab, Take 3 tablets (30 mg total) by mouth daily with breakfast AND 2 tablets (20 mg total) Every afternoon at 2:00 pm., Disp: , Rfl:     potassium chloride 20 MEQ Oral Tab CR, Take 20 meq (1 tablet) daily, Disp: , Rfl:     eplerenone 25 MG Oral Tab, Take 2 tablets (50 mg total) by mouth daily.,  Disp: 60 tablet, Rfl: 5    empagliflozin (JARDIANCE) 10 MG Oral Tab, Take 1 tablet (10 mg total) by mouth daily., Disp: , Rfl:     metoprolol succinate ER 25 MG Oral Tablet 24 Hr, Take 1 tablet (25 mg total) by mouth Daily Beta Blocker. Hold for SBP < 100 and HR < 60, Disp: , Rfl:     Cholecalciferol (D3 OR), Take 1 tablet by mouth Noon., Disp: , Rfl:     apixaban (ELIQUIS) 5 MG Oral Tab, Take 1 tablet (5 mg total) by mouth 2 (two) times daily., Disp: , Rfl:     allopurinol 300 MG Oral Tab, Take 1 tablet (300 mg total) by mouth daily., Disp: , Rfl:     Cyanocobalamin (B-12) 1000 MCG Oral Tab, Take 2,000 mcg by mouth Noon., Disp: , Rfl:     atorvastatin (LIPITOR) 20 MG Oral Tab, Take 1 tablet (20 mg total) by mouth daily., Disp: , Rfl:

## 2025-07-28 ENCOUNTER — HOSPITAL ENCOUNTER (OUTPATIENT)
Dept: LAB | Facility: HOSPITAL | Age: 75
Discharge: HOME | End: 2025-07-28
Attending: NURSE PRACTITIONER

## 2025-07-28 ENCOUNTER — HOSPITAL ENCOUNTER (OUTPATIENT)
Dept: CARDIOLOGY CLINIC | Facility: HOSPITAL | Age: 75
End: 2025-07-28
Attending: NURSE PRACTITIONER
Payer: MEDICARE

## 2025-07-28 VITALS
OXYGEN SATURATION: 97 % | HEART RATE: 63 BPM | WEIGHT: 271.19 LBS | RESPIRATION RATE: 18 BRPM | DIASTOLIC BLOOD PRESSURE: 64 MMHG | BODY MASS INDEX: 39 KG/M2 | SYSTOLIC BLOOD PRESSURE: 124 MMHG

## 2025-07-28 DIAGNOSIS — E66.01 OBESITY, MORBID (HCC): ICD-10-CM

## 2025-07-28 DIAGNOSIS — I27.20 PULMONARY HYPERTENSION (HCC): ICD-10-CM

## 2025-07-28 DIAGNOSIS — I50.32 CHRONIC HEART FAILURE WITH PRESERVED EJECTION FRACTION (HCC): ICD-10-CM

## 2025-07-28 DIAGNOSIS — I48.11 LONGSTANDING PERSISTENT ATRIAL FIBRILLATION (HCC): ICD-10-CM

## 2025-07-28 DIAGNOSIS — I50.32 CHRONIC HEART FAILURE WITH PRESERVED EJECTION FRACTION (HCC): Primary | ICD-10-CM

## 2025-07-28 LAB
ANION GAP SERPL CALC-SCNC: 9 MMOL/L (ref 0–18)
BUN BLD-MCNC: 13 MG/DL (ref 9–23)
CALCIUM BLD-MCNC: 9.7 MG/DL (ref 8.7–10.6)
CHLORIDE SERPL-SCNC: 99 MMOL/L (ref 98–112)
CO2 SERPL-SCNC: 30 MMOL/L (ref 21–32)
CREAT BLD-MCNC: 1.04 MG/DL (ref 0.7–1.3)
EGFRCR SERPLBLD CKD-EPI 2021: 76 ML/MIN/1.73M2 (ref 60–?)
FASTING STATUS PATIENT QL REPORTED: NO
GLUCOSE BLD-MCNC: 138 MG/DL (ref 70–99)
OSMOLALITY SERPL CALC.SUM OF ELEC: 288 MOSM/KG (ref 275–295)
POTASSIUM SERPL-SCNC: 4 MMOL/L (ref 3.5–5.1)
SODIUM SERPL-SCNC: 138 MMOL/L (ref 136–145)

## 2025-07-28 PROCEDURE — 36415 COLL VENOUS BLD VENIPUNCTURE: CPT | Performed by: NURSE PRACTITIONER

## 2025-07-28 PROCEDURE — 99215 OFFICE O/P EST HI 40 MIN: CPT | Performed by: NURSE PRACTITIONER

## 2025-07-28 PROCEDURE — 80048 BASIC METABOLIC PNL TOTAL CA: CPT | Performed by: NURSE PRACTITIONER

## 2025-07-28 RX ORDER — TORSEMIDE 10 MG/1
TABLET ORAL
COMMUNITY
Start: 2025-07-28

## 2025-07-28 RX ORDER — TORSEMIDE 10 MG/1
TABLET ORAL
COMMUNITY
Start: 2025-07-28 | End: 2025-07-28

## 2025-07-28 NOTE — PATIENT INSTRUCTIONS
Heart Failure Discharge Instructions    Take 50 mg of Torsemide on days you are sedentary in the house.  Take 40 mg of Torsemide when you are outside.   Try to remain active.       Activity: Regular exercise and activity is important for your overall health and to help keep your heart strong and functioning as well as possible.   Walk at a slow to moderate pace for 15-20 minutes 3-5 days per week.     Follow these instructions every day to keep yourself in the Green Zone     The Green Zone means you are feeling well and your symptoms are under control                                    Medications  Take your medication every day as instructed  Do not stop taking your medicine or change the amount you are taking without instructions from your doctor or nurse  Do Not take non-steroidal antiinflammatory drugs such as ibuprofen, aleve, advil, or motrin                                    Diet/Fluids  People with heart failure should eat less sodium (salt) and limit fluid. Sodium attracts water and makes the body hold fluid. This extra fluid makes the heart work harder and can worsen the symptoms of heart failure.     Diet    2000 mg sodium daily  Fluid restriction    64 ounces daily  (8 oz. = 1 cup)                                     Body Weight  Weigh yourself every day before breakfast and write your weight down  Use the same scale and wear about the same amount of clothes each time  A sudden weight increase is due to fluid retention rather than fat                                         Activity  Pace your activities to avoid getting overtired  Take rest periods as needed  Elevate your feet to reduce ankle swelling when resting                             Signs of Worsening Heart Failure    You are entering the Yellow Zone - this is a warning zone    Call your doctor or nurse if you have any of these signs or symptoms:  You gain 2 or more pounds overnight or 3-5 pounds in 3-7 days  You have more trouble  breathing  You get more tired with regular activity, or are limiting activity because of shortness of breath or fatigue  You are short of breath lying down, you need more pillows to breathe comfortably,  or wake up during the night short of breath  You urinate less often during the day and more often at night  You have a bloated feeling, upset stomach, loss of appetite, or your clothes are fitting tighter    GO TO THE EMERGENCY DEPARTMENT or CALL 911 IF:    These are signs you are in the RED ZONE - THIS IS AN EMERGENCY  You have tightness or pain in your chest  You are extremely short of breath or can't catch your breath  You cough up frothy pink mucous  You feel confused or can't think clearly  You are traveling and develop symptoms of worsening heart failure     We respect everyone's time and availability. Please be aware that this is not a walk-in clinic and we require appointments in order to facilitate timely care for all patients. We ask you to arrive 30 minutes before your appointment to allow time for you to check-in and have your bloodwork drawn. Please understand if you are late for your appointment, you may be asked to reschedule. If possible, all attempts will be made to accommodate but realize this is no guarantee that this will always be available. We understand there are extenuating circumstances. If you need to cancel or reschedule your appointment, please call the [facility] within 24 hours at [phone].  Thank you for your cooperation, [facility] Staff.    If you are currently [employer] active, starting July 1st 2024, we will be utilizing [employer] messaging ONLY to confirm your appointment.    IF YOU HAVE QUESTIONS REGARDING YOUR BILL, FEEL FREE TO CONTACT Critical access hospital PATIENT ACCOUNTS -[phone]. IF YOU NEED FINANCIAL ASSISTANCE, PLEASE CALL AN Critical access hospital FINANCIAL COUNSELOR -[phone].             [facility]     [phone]

## 2025-07-28 NOTE — PROGRESS NOTES
Patient assessed. Patient complaining of increased edema to his feet and mild bloating. Patient denies any issues with shortness of breath. Weight up 5 lbs at 271.2 lbs. APN notified of the above information. Labs ordered and drawn by  Lab. Reviewed allergies and list of current medications with patient and updated it in the Electronic Medical Record.     Educated patient on low sodium diet and food choices, fluid restriction of 2 liters, and daily weights. Reviewed follow-up appointments and discharge Heart Failure instructions with patient. Patient verbalized an understanding.  Patient to return to the clinic in 1 month.

## 2025-08-22 ENCOUNTER — TELEPHONE (OUTPATIENT)
Dept: CARDIOLOGY CLINIC | Facility: HOSPITAL | Age: 75
End: 2025-08-22

## 2025-08-22 DIAGNOSIS — D50.8 OTHER IRON DEFICIENCY ANEMIA: Primary | ICD-10-CM

## 2025-08-25 ENCOUNTER — HOSPITAL ENCOUNTER (OUTPATIENT)
Dept: LAB | Facility: HOSPITAL | Age: 75
Discharge: HOME | End: 2025-08-25
Attending: NURSE PRACTITIONER

## 2025-08-25 ENCOUNTER — HOSPITAL ENCOUNTER (OUTPATIENT)
Dept: CARDIOLOGY CLINIC | Facility: HOSPITAL | Age: 75
End: 2025-08-25
Attending: NURSE PRACTITIONER

## 2025-08-25 VITALS
OXYGEN SATURATION: 90 % | DIASTOLIC BLOOD PRESSURE: 64 MMHG | HEART RATE: 87 BPM | RESPIRATION RATE: 18 BRPM | SYSTOLIC BLOOD PRESSURE: 109 MMHG

## 2025-08-25 DIAGNOSIS — I48.19 PERSISTENT ATRIAL FIBRILLATION (HCC): ICD-10-CM

## 2025-08-25 DIAGNOSIS — F10.10 ETOH ABUSE: ICD-10-CM

## 2025-08-25 DIAGNOSIS — D50.8 OTHER IRON DEFICIENCY ANEMIA: ICD-10-CM

## 2025-08-25 DIAGNOSIS — I25.10 CORONARY ARTERY DISEASE INVOLVING NATIVE CORONARY ARTERY OF NATIVE HEART WITHOUT ANGINA PECTORIS: ICD-10-CM

## 2025-08-25 DIAGNOSIS — I50.32 CHRONIC HEART FAILURE WITH PRESERVED EJECTION FRACTION (HCC): ICD-10-CM

## 2025-08-25 DIAGNOSIS — I10 ESSENTIAL HYPERTENSION: Primary | ICD-10-CM

## 2025-08-25 LAB
ANION GAP SERPL CALC-SCNC: 14 MMOL/L (ref 0–18)
BUN BLD-MCNC: 14 MG/DL (ref 9–23)
CALCIUM BLD-MCNC: 10.3 MG/DL (ref 8.7–10.6)
CHLORIDE SERPL-SCNC: 97 MMOL/L (ref 98–112)
CO2 SERPL-SCNC: 26 MMOL/L (ref 21–32)
CREAT BLD-MCNC: 1.13 MG/DL (ref 0.7–1.3)
DEPRECATED HBV CORE AB SER IA-ACNC: 348 NG/ML (ref 50–336)
EGFRCR SERPLBLD CKD-EPI 2021: 69 ML/MIN/1.73M2 (ref 60–?)
ERYTHROCYTE [DISTWIDTH] IN BLOOD BY AUTOMATED COUNT: 14 %
GLUCOSE BLD-MCNC: 127 MG/DL (ref 70–99)
HCT VFR BLD AUTO: 46.9 % (ref 39–53)
HGB BLD-MCNC: 16.4 G/DL (ref 13–17.5)
IRON SATN MFR SERPL: 35 % (ref 20–50)
IRON SERPL-MCNC: 100 UG/DL (ref 65–175)
MCH RBC QN AUTO: 35.5 PG (ref 26–34)
MCHC RBC AUTO-ENTMCNC: 35 G/DL (ref 31–37)
MCV RBC AUTO: 101.5 FL (ref 80–100)
NT-PROBNP SERPL-MCNC: 762 PG/ML (ref ?–125)
OSMOLALITY SERPL CALC.SUM OF ELEC: 286 MOSM/KG (ref 275–295)
PLATELET # BLD AUTO: 244 10(3)UL (ref 150–450)
POTASSIUM SERPL-SCNC: 3.7 MMOL/L (ref 3.5–5.1)
RBC # BLD AUTO: 4.62 X10(6)UL (ref 3.8–5.8)
SODIUM SERPL-SCNC: 137 MMOL/L (ref 136–145)
TOTAL IRON BINDING CAPACITY: 286 UG/DL (ref 250–425)
TRANSFERRIN SERPL-MCNC: 227 MG/DL (ref 215–365)
WBC # BLD AUTO: 11.6 X10(3) UL (ref 4–11)

## 2025-08-25 PROCEDURE — 36415 COLL VENOUS BLD VENIPUNCTURE: CPT | Performed by: NURSE PRACTITIONER

## 2025-08-25 PROCEDURE — 83550 IRON BINDING TEST: CPT | Performed by: NURSE PRACTITIONER

## 2025-08-25 PROCEDURE — 99215 OFFICE O/P EST HI 40 MIN: CPT | Performed by: NURSE PRACTITIONER

## 2025-08-25 PROCEDURE — 85027 COMPLETE CBC AUTOMATED: CPT | Performed by: NURSE PRACTITIONER

## 2025-08-25 PROCEDURE — 83540 ASSAY OF IRON: CPT | Performed by: NURSE PRACTITIONER

## 2025-08-25 PROCEDURE — 83880 ASSAY OF NATRIURETIC PEPTIDE: CPT | Performed by: NURSE PRACTITIONER

## 2025-08-25 PROCEDURE — 82728 ASSAY OF FERRITIN: CPT | Performed by: NURSE PRACTITIONER

## 2025-08-25 PROCEDURE — 80048 BASIC METABOLIC PNL TOTAL CA: CPT | Performed by: NURSE PRACTITIONER

## 2025-08-25 RX ORDER — TORSEMIDE 10 MG/1
40 TABLET ORAL DAILY
COMMUNITY
Start: 2025-08-25

## (undated) DEVICE — MASK ETCO2 PANORAMIC

## (undated) DEVICE — ENDOSCOPY PACK - LOWER: Brand: MEDLINE INDUSTRIES, INC.

## (undated) DEVICE — 3M™ RED DOT™ MONITORING ELECTRODE WITH FOAM TAPE AND STICKY GEL, 50/BAG, 20/CASE, 72/PLT 2570: Brand: RED DOT™

## (undated) DEVICE — KIT ENDO ORCAPOD 160/180/190

## (undated) DEVICE — BIOGUARD CLEANING ADAPTER

## (undated) DEVICE — 10FT COMBINED O2 DELIVERY/CO2 MONITORING. FILTER WITH MICROSTREAM TYPE LUER: Brand: DUAL ADULT NASAL CANNULA

## (undated) DEVICE — 1200CC GUARDIAN II: Brand: GUARDIAN